# Patient Record
Sex: FEMALE | Race: OTHER | HISPANIC OR LATINO | URBAN - METROPOLITAN AREA
[De-identification: names, ages, dates, MRNs, and addresses within clinical notes are randomized per-mention and may not be internally consistent; named-entity substitution may affect disease eponyms.]

---

## 2017-11-05 ENCOUNTER — INPATIENT (INPATIENT)
Facility: HOSPITAL | Age: 82
LOS: 2 days | Discharge: HOME | DRG: 281 | End: 2017-11-08
Attending: INTERNAL MEDICINE | Admitting: INTERNAL MEDICINE
Payer: COMMERCIAL

## 2017-11-05 VITALS
SYSTOLIC BLOOD PRESSURE: 157 MMHG | OXYGEN SATURATION: 97 % | TEMPERATURE: 98 F | HEIGHT: 61 IN | WEIGHT: 167.99 LBS | RESPIRATION RATE: 16 BRPM | DIASTOLIC BLOOD PRESSURE: 87 MMHG | HEART RATE: 91 BPM

## 2017-11-05 DIAGNOSIS — E11.9 TYPE 2 DIABETES MELLITUS WITHOUT COMPLICATIONS: ICD-10-CM

## 2017-11-05 DIAGNOSIS — Z29.9 ENCOUNTER FOR PROPHYLACTIC MEASURES, UNSPECIFIED: ICD-10-CM

## 2017-11-05 DIAGNOSIS — I21.4 NON-ST ELEVATION (NSTEMI) MYOCARDIAL INFARCTION: ICD-10-CM

## 2017-11-05 DIAGNOSIS — E03.9 HYPOTHYROIDISM, UNSPECIFIED: ICD-10-CM

## 2017-11-05 DIAGNOSIS — J44.9 CHRONIC OBSTRUCTIVE PULMONARY DISEASE, UNSPECIFIED: ICD-10-CM

## 2017-11-05 DIAGNOSIS — N17.9 ACUTE KIDNEY FAILURE, UNSPECIFIED: ICD-10-CM

## 2017-11-05 DIAGNOSIS — I10 ESSENTIAL (PRIMARY) HYPERTENSION: ICD-10-CM

## 2017-11-05 DIAGNOSIS — Z86.711 PERSONAL HISTORY OF PULMONARY EMBOLISM: ICD-10-CM

## 2017-11-05 LAB
ALBUMIN SERPL ELPH-MCNC: 3.7 G/DL — SIGNIFICANT CHANGE UP (ref 3.5–5)
ALP SERPL-CCNC: 58 U/L — SIGNIFICANT CHANGE UP (ref 40–120)
ALT FLD-CCNC: 28 U/L DA — SIGNIFICANT CHANGE UP (ref 10–60)
ANION GAP SERPL CALC-SCNC: 5 MMOL/L — SIGNIFICANT CHANGE UP (ref 5–17)
ANION GAP SERPL CALC-SCNC: 5 MMOL/L — SIGNIFICANT CHANGE UP (ref 5–17)
ANION GAP SERPL CALC-SCNC: 7 MMOL/L — SIGNIFICANT CHANGE UP (ref 5–17)
APTT BLD: >200 SEC — CRITICAL HIGH (ref 27.5–37.4)
APTT BLD: >200 SEC — CRITICAL HIGH (ref 27.5–37.4)
AST SERPL-CCNC: 21 U/L — SIGNIFICANT CHANGE UP (ref 10–40)
BASOPHILS # BLD AUTO: 0.1 K/UL — SIGNIFICANT CHANGE UP (ref 0–0.2)
BASOPHILS NFR BLD AUTO: 1.2 % — SIGNIFICANT CHANGE UP (ref 0–2)
BILIRUB SERPL-MCNC: 0.2 MG/DL — SIGNIFICANT CHANGE UP (ref 0.2–1.2)
BUN SERPL-MCNC: 29 MG/DL — HIGH (ref 7–18)
BUN SERPL-MCNC: 36 MG/DL — HIGH (ref 7–18)
BUN SERPL-MCNC: 36 MG/DL — HIGH (ref 7–18)
CALCIUM SERPL-MCNC: 8.6 MG/DL — SIGNIFICANT CHANGE UP (ref 8.4–10.5)
CALCIUM SERPL-MCNC: 8.7 MG/DL — SIGNIFICANT CHANGE UP (ref 8.4–10.5)
CALCIUM SERPL-MCNC: 8.8 MG/DL — SIGNIFICANT CHANGE UP (ref 8.4–10.5)
CHLORIDE SERPL-SCNC: 103 MMOL/L — SIGNIFICANT CHANGE UP (ref 96–108)
CHLORIDE SERPL-SCNC: 105 MMOL/L — SIGNIFICANT CHANGE UP (ref 96–108)
CHLORIDE SERPL-SCNC: 106 MMOL/L — SIGNIFICANT CHANGE UP (ref 96–108)
CK MB BLD-MCNC: 1.4 % — SIGNIFICANT CHANGE UP (ref 0–3.5)
CK MB CFR SERPL CALC: 1.3 NG/ML — SIGNIFICANT CHANGE UP (ref 0–3.6)
CK MB CFR SERPL CALC: 1.7 NG/ML — SIGNIFICANT CHANGE UP (ref 0–3.6)
CK SERPL-CCNC: 120 U/L — SIGNIFICANT CHANGE UP (ref 21–215)
CO2 SERPL-SCNC: 29 MMOL/L — SIGNIFICANT CHANGE UP (ref 22–31)
CO2 SERPL-SCNC: 30 MMOL/L — SIGNIFICANT CHANGE UP (ref 22–31)
CO2 SERPL-SCNC: 31 MMOL/L — SIGNIFICANT CHANGE UP (ref 22–31)
CREAT SERPL-MCNC: 1.06 MG/DL — SIGNIFICANT CHANGE UP (ref 0.5–1.3)
CREAT SERPL-MCNC: 1.28 MG/DL — SIGNIFICANT CHANGE UP (ref 0.5–1.3)
CREAT SERPL-MCNC: 1.41 MG/DL — HIGH (ref 0.5–1.3)
D DIMER BLD IA.RAPID-MCNC: <150 NG/ML DDU — SIGNIFICANT CHANGE UP
EOSINOPHIL # BLD AUTO: 0.4 K/UL — SIGNIFICANT CHANGE UP (ref 0–0.5)
EOSINOPHIL NFR BLD AUTO: 4.9 % — SIGNIFICANT CHANGE UP (ref 0–6)
GLUCOSE BLDC GLUCOMTR-MCNC: 119 MG/DL — HIGH (ref 70–99)
GLUCOSE BLDC GLUCOMTR-MCNC: 78 MG/DL — SIGNIFICANT CHANGE UP (ref 70–99)
GLUCOSE BLDC GLUCOMTR-MCNC: 94 MG/DL — SIGNIFICANT CHANGE UP (ref 70–99)
GLUCOSE SERPL-MCNC: 86 MG/DL — SIGNIFICANT CHANGE UP (ref 70–99)
GLUCOSE SERPL-MCNC: 96 MG/DL — SIGNIFICANT CHANGE UP (ref 70–99)
GLUCOSE SERPL-MCNC: 98 MG/DL — SIGNIFICANT CHANGE UP (ref 70–99)
HCT VFR BLD CALC: 39.9 % — SIGNIFICANT CHANGE UP (ref 34.5–45)
HCT VFR BLD CALC: 41.9 % — SIGNIFICANT CHANGE UP (ref 34.5–45)
HGB BLD-MCNC: 12 G/DL — SIGNIFICANT CHANGE UP (ref 11.5–15.5)
HGB BLD-MCNC: 12.5 G/DL — SIGNIFICANT CHANGE UP (ref 11.5–15.5)
INR BLD: 2.64 RATIO — HIGH (ref 0.88–1.16)
LYMPHOCYTES # BLD AUTO: 2.1 K/UL — SIGNIFICANT CHANGE UP (ref 1–3.3)
LYMPHOCYTES # BLD AUTO: 26.8 % — SIGNIFICANT CHANGE UP (ref 13–44)
MCHC RBC-ENTMCNC: 29.5 PG — SIGNIFICANT CHANGE UP (ref 27–34)
MCHC RBC-ENTMCNC: 29.8 GM/DL — LOW (ref 32–36)
MCHC RBC-ENTMCNC: 29.8 PG — SIGNIFICANT CHANGE UP (ref 27–34)
MCHC RBC-ENTMCNC: 30.2 GM/DL — LOW (ref 32–36)
MCV RBC AUTO: 98.8 FL — SIGNIFICANT CHANGE UP (ref 80–100)
MCV RBC AUTO: 98.8 FL — SIGNIFICANT CHANGE UP (ref 80–100)
MONOCYTES # BLD AUTO: 0.7 K/UL — SIGNIFICANT CHANGE UP (ref 0–0.9)
MONOCYTES NFR BLD AUTO: 8.8 % — SIGNIFICANT CHANGE UP (ref 2–14)
NEUTROPHILS # BLD AUTO: 4.5 K/UL — SIGNIFICANT CHANGE UP (ref 1.8–7.4)
NEUTROPHILS NFR BLD AUTO: 58.3 % — SIGNIFICANT CHANGE UP (ref 43–77)
NT-PROBNP SERPL-SCNC: 303 PG/ML — SIGNIFICANT CHANGE UP (ref 0–450)
PLATELET # BLD AUTO: 232 K/UL — SIGNIFICANT CHANGE UP (ref 150–400)
PLATELET # BLD AUTO: 234 K/UL — SIGNIFICANT CHANGE UP (ref 150–400)
POTASSIUM SERPL-MCNC: 4.5 MMOL/L — SIGNIFICANT CHANGE UP (ref 3.5–5.3)
POTASSIUM SERPL-MCNC: 4.6 MMOL/L — SIGNIFICANT CHANGE UP (ref 3.5–5.3)
POTASSIUM SERPL-MCNC: 4.7 MMOL/L — SIGNIFICANT CHANGE UP (ref 3.5–5.3)
POTASSIUM SERPL-SCNC: 4.5 MMOL/L — SIGNIFICANT CHANGE UP (ref 3.5–5.3)
POTASSIUM SERPL-SCNC: 4.6 MMOL/L — SIGNIFICANT CHANGE UP (ref 3.5–5.3)
POTASSIUM SERPL-SCNC: 4.7 MMOL/L — SIGNIFICANT CHANGE UP (ref 3.5–5.3)
PROT SERPL-MCNC: 7.1 G/DL — SIGNIFICANT CHANGE UP (ref 6–8.3)
PROTHROM AB SERPL-ACNC: 29.4 SEC — HIGH (ref 9.8–12.7)
RBC # BLD: 4.04 M/UL — SIGNIFICANT CHANGE UP (ref 3.8–5.2)
RBC # BLD: 4.24 M/UL — SIGNIFICANT CHANGE UP (ref 3.8–5.2)
RBC # FLD: 14.2 % — SIGNIFICANT CHANGE UP (ref 10.3–14.5)
RBC # FLD: 14.3 % — SIGNIFICANT CHANGE UP (ref 10.3–14.5)
SODIUM SERPL-SCNC: 139 MMOL/L — SIGNIFICANT CHANGE UP (ref 135–145)
SODIUM SERPL-SCNC: 141 MMOL/L — SIGNIFICANT CHANGE UP (ref 135–145)
SODIUM SERPL-SCNC: 141 MMOL/L — SIGNIFICANT CHANGE UP (ref 135–145)
TROPONIN I SERPL-MCNC: 0.04 NG/ML — SIGNIFICANT CHANGE UP (ref 0–0.04)
TROPONIN I SERPL-MCNC: 0.04 NG/ML — SIGNIFICANT CHANGE UP (ref 0–0.04)
TROPONIN I SERPL-MCNC: 0.06 NG/ML — HIGH (ref 0–0.04)
TROPONIN I SERPL-MCNC: 0.1 NG/ML — HIGH (ref 0–0.04)
TSH SERPL-MCNC: 0.66 UU/ML — SIGNIFICANT CHANGE UP (ref 0.34–4.82)
TSH SERPL-MCNC: 0.88 UU/ML — SIGNIFICANT CHANGE UP (ref 0.34–4.82)
WBC # BLD: 7.7 K/UL — SIGNIFICANT CHANGE UP (ref 3.8–10.5)
WBC # BLD: 7.9 K/UL — SIGNIFICANT CHANGE UP (ref 3.8–10.5)
WBC # FLD AUTO: 7.7 K/UL — SIGNIFICANT CHANGE UP (ref 3.8–10.5)
WBC # FLD AUTO: 7.9 K/UL — SIGNIFICANT CHANGE UP (ref 3.8–10.5)

## 2017-11-05 PROCEDURE — 93970 EXTREMITY STUDY: CPT | Mod: 26

## 2017-11-05 PROCEDURE — 99285 EMERGENCY DEPT VISIT HI MDM: CPT | Mod: 25

## 2017-11-05 RX ORDER — ASPIRIN/CALCIUM CARB/MAGNESIUM 324 MG
81 TABLET ORAL DAILY
Qty: 0 | Refills: 0 | Status: DISCONTINUED | OUTPATIENT
Start: 2017-11-05 | End: 2017-11-08

## 2017-11-05 RX ORDER — AMLODIPINE BESYLATE 2.5 MG/1
2.5 TABLET ORAL DAILY
Qty: 0 | Refills: 0 | Status: DISCONTINUED | OUTPATIENT
Start: 2017-11-05 | End: 2017-11-08

## 2017-11-05 RX ORDER — GLUCAGON INJECTION, SOLUTION 0.5 MG/.1ML
1 INJECTION, SOLUTION SUBCUTANEOUS ONCE
Qty: 0 | Refills: 0 | Status: DISCONTINUED | OUTPATIENT
Start: 2017-11-05 | End: 2017-11-08

## 2017-11-05 RX ORDER — SODIUM CHLORIDE 9 MG/ML
1000 INJECTION, SOLUTION INTRAVENOUS
Qty: 0 | Refills: 0 | Status: DISCONTINUED | OUTPATIENT
Start: 2017-11-05 | End: 2017-11-08

## 2017-11-05 RX ORDER — LEVOTHYROXINE SODIUM 125 MCG
25 TABLET ORAL DAILY
Qty: 0 | Refills: 0 | Status: DISCONTINUED | OUTPATIENT
Start: 2017-11-05 | End: 2017-11-08

## 2017-11-05 RX ORDER — DEXTROSE 50 % IN WATER 50 %
25 SYRINGE (ML) INTRAVENOUS ONCE
Qty: 0 | Refills: 0 | Status: DISCONTINUED | OUTPATIENT
Start: 2017-11-05 | End: 2017-11-08

## 2017-11-05 RX ORDER — SODIUM CHLORIDE 9 MG/ML
1000 INJECTION INTRAMUSCULAR; INTRAVENOUS; SUBCUTANEOUS ONCE
Qty: 0 | Refills: 0 | Status: COMPLETED | OUTPATIENT
Start: 2017-11-05 | End: 2017-11-05

## 2017-11-05 RX ORDER — SODIUM CHLORIDE 9 MG/ML
500 INJECTION INTRAMUSCULAR; INTRAVENOUS; SUBCUTANEOUS ONCE
Qty: 0 | Refills: 0 | Status: DISCONTINUED | OUTPATIENT
Start: 2017-11-05 | End: 2017-11-05

## 2017-11-05 RX ORDER — DEXTROSE 50 % IN WATER 50 %
1 SYRINGE (ML) INTRAVENOUS ONCE
Qty: 0 | Refills: 0 | Status: DISCONTINUED | OUTPATIENT
Start: 2017-11-05 | End: 2017-11-08

## 2017-11-05 RX ORDER — INSULIN LISPRO 100/ML
VIAL (ML) SUBCUTANEOUS
Qty: 0 | Refills: 0 | Status: DISCONTINUED | OUTPATIENT
Start: 2017-11-05 | End: 2017-11-08

## 2017-11-05 RX ORDER — HEPARIN SODIUM 5000 [USP'U]/ML
4700 INJECTION INTRAVENOUS; SUBCUTANEOUS EVERY 6 HOURS
Qty: 0 | Refills: 0 | Status: DISCONTINUED | OUTPATIENT
Start: 2017-11-05 | End: 2017-11-06

## 2017-11-05 RX ORDER — MONTELUKAST 4 MG/1
10 TABLET, CHEWABLE ORAL DAILY
Qty: 0 | Refills: 0 | Status: DISCONTINUED | OUTPATIENT
Start: 2017-11-05 | End: 2017-11-08

## 2017-11-05 RX ORDER — DEXTROSE 50 % IN WATER 50 %
12.5 SYRINGE (ML) INTRAVENOUS ONCE
Qty: 0 | Refills: 0 | Status: DISCONTINUED | OUTPATIENT
Start: 2017-11-05 | End: 2017-11-08

## 2017-11-05 RX ORDER — CARVEDILOL PHOSPHATE 80 MG/1
3.12 CAPSULE, EXTENDED RELEASE ORAL EVERY 12 HOURS
Qty: 0 | Refills: 0 | Status: DISCONTINUED | OUTPATIENT
Start: 2017-11-05 | End: 2017-11-08

## 2017-11-05 RX ORDER — ASPIRIN/CALCIUM CARB/MAGNESIUM 324 MG
324 TABLET ORAL ONCE
Qty: 0 | Refills: 0 | Status: COMPLETED | OUTPATIENT
Start: 2017-11-05 | End: 2017-11-05

## 2017-11-05 RX ORDER — ATORVASTATIN CALCIUM 80 MG/1
40 TABLET, FILM COATED ORAL AT BEDTIME
Qty: 0 | Refills: 0 | Status: DISCONTINUED | OUTPATIENT
Start: 2017-11-05 | End: 2017-11-08

## 2017-11-05 RX ORDER — HEPARIN SODIUM 5000 [USP'U]/ML
4700 INJECTION INTRAVENOUS; SUBCUTANEOUS ONCE
Qty: 0 | Refills: 0 | Status: COMPLETED | OUTPATIENT
Start: 2017-11-05 | End: 2017-11-05

## 2017-11-05 RX ORDER — HEPARIN SODIUM 5000 [USP'U]/ML
INJECTION INTRAVENOUS; SUBCUTANEOUS
Qty: 25000 | Refills: 0 | Status: DISCONTINUED | OUTPATIENT
Start: 2017-11-05 | End: 2017-11-06

## 2017-11-05 RX ADMIN — MONTELUKAST 10 MILLIGRAM(S): 4 TABLET, CHEWABLE ORAL at 15:51

## 2017-11-05 RX ADMIN — Medication 324 MILLIGRAM(S): at 09:18

## 2017-11-05 RX ADMIN — ATORVASTATIN CALCIUM 40 MILLIGRAM(S): 80 TABLET, FILM COATED ORAL at 23:41

## 2017-11-05 RX ADMIN — CARVEDILOL PHOSPHATE 3.12 MILLIGRAM(S): 80 CAPSULE, EXTENDED RELEASE ORAL at 18:59

## 2017-11-05 RX ADMIN — HEPARIN SODIUM 0 UNIT(S)/HR: 5000 INJECTION INTRAVENOUS; SUBCUTANEOUS at 21:31

## 2017-11-05 RX ADMIN — HEPARIN SODIUM 4700 UNIT(S): 5000 INJECTION INTRAVENOUS; SUBCUTANEOUS at 09:18

## 2017-11-05 RX ADMIN — HEPARIN SODIUM 950 UNIT(S)/HR: 5000 INJECTION INTRAVENOUS; SUBCUTANEOUS at 09:18

## 2017-11-05 RX ADMIN — SODIUM CHLORIDE 1000 MILLILITER(S): 9 INJECTION INTRAMUSCULAR; INTRAVENOUS; SUBCUTANEOUS at 12:01

## 2017-11-05 NOTE — H&P ADULT - ASSESSMENT
89yo F with omhx of DM, HTN, HLD, Breast cancer in 2004 s/p R mastectomy, COPD, and PE on coumadin came in with c/o L sided chest pain that started around 1 am. She said she often has chest pain but this was very severe. It was 10/10, tightening, non-radiating and lasted about 30 minutes. It was releived once she got 2 doses of aspirin. Also c/o SOB while she was having chest pain. On my initial encounter, patient denies any complaints at all. She said the chest pain, and SOB has resolved.

## 2017-11-05 NOTE — H&P ADULT - NSHPLABSRESULTS_GEN_ALL_CORE
12.0   7.7   )-----------( 232      ( 05 Nov 2017 03:48 )             39.9   11-05    139  |  103  |  36<H>  ----------------------------<  96  4.5   |  31  |  1.41<H>    Ca    8.6      05 Nov 2017 03:48    TPro  7.1  /  Alb  3.7  /  TBili  0.2  /  DBili  x   /  AST  21  /  ALT  28  /  AlkPhos  58  11-05    Impression    No acute findings, in particular normal cardiac silhouette        < end of copied text >

## 2017-11-05 NOTE — ED PROVIDER NOTE - MEDICAL DECISION MAKING DETAILS
89 y/o female with multiple comorbidities here with CP associated with SOB. Exam is unremarkable with clear lungs and cardiac exam with S1S2 noted and RRR. Abd soft and nontender and nondistended. No LE edema and no JVD noted. 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 Consider CAD vs CAP vs CHF vs Valvular disease. Patient with low pretest probability for VTE, therefore PE is less likely. Plan CBC, CMP, CXR, EKG, Cardiacs, Coags, ASA, Reassess.

## 2017-11-05 NOTE — CONSULT NOTE ADULT - ATTENDING COMMENTS
Thank you for the courtesy of the consultation,I would be available for any further discussion if needed.  Jaime Lao MD,FACC.  [address] [place], [place] [id number] [phone]

## 2017-11-05 NOTE — H&P ADULT - PROBLEM SELECTOR PLAN 2
on coumadin  INR- within goal  - f/u daily INR  - Creatinine mildly elevated  - Will hydrate the patient and once creatinine is back to normal, will order CTA as discussed with Dr Lao.

## 2017-11-05 NOTE — CONSULT NOTE ADULT - ASSESSMENT
89yo F with omhx of DM, HTN, HLD, Breast cancer in 2004 s/p R mastectomy, COPD, and PE on coumadin came in with c/o L sided chest pain    Problem/Plan - 1:  ·  Problem: NSTEMI (non-ST elevated myocardial infarction).  Plan: T1 negative with TWI in V1 and V2 (no baseline EKG to compare with)  T2- 0.1 with TWI in v1, v2, v3, v4, and v5  - Start on heparin drip  - trende CE  - start ASA, Statin, and low dose coreg  - Titrate coreg as tolerated  - Admit to tele  - f/u ECHO    Problem/Plan - 2:  ·  Problem: History of pulmonary embolism.  Plan: on coumadin  INR- within goal  - f/u daily INR  - Creatinine mildly elevated  - Will hydrate the patient and once creatinine is back to normal, will order CTA       Problem/Plan - 3:  ·  Problem: JUAN FRANCISCO (acute kidney injury).  Plan: BUN/Cr ratio>20  - likely pre-renal  - Will give 1L bolus and f/u repeat BMP.

## 2017-11-05 NOTE — ED PROVIDER NOTE - PROGRESS NOTE DETAILS
Kathleen MALCOLM: Patient reassessed and found sleeping comfortably in bed. Patient family does not know her baseline Creatinine. Results discussed. Patient will wait for a second Troponin. Kathleen MALCOLM: Dr Rice (Cardiology ) reached and patient discussed. He would like to be called for any questions

## 2017-11-05 NOTE — ED ADULT NURSE REASSESSMENT NOTE - NS ED NURSE REASSESS COMMENT FT1
pt awake laert not in distress hooked to cardiac monitor,with saline lock intact.with family members at bedside.

## 2017-11-05 NOTE — H&P ADULT - NSHPREVIEWOFSYSTEMS_GEN_ALL_CORE
REVIEW OF SYSTEMS:  CONSTITUTIONAL: No fever, weight loss, or fatigue  EYES: No eye pain, visual disturbances, or discharge  ENMT:  No difficulty hearing, tinnitus, vertigo; No sinus or throat pain  NECK: No pain or stiffness  RESPIRATORY: Dry cough, No  wheezing, chills or hemoptysis; No shortness of breath  CARDIOVASCULAR: No chest pain, palpitations, dizziness, or leg swelling  GASTROINTESTINAL: No abdominal or epigastric pain. No nausea, vomiting, or hematemesis; No diarrhea or constipation. No melena or hematochezia.  GENITOURINARY: No dysuria, frequency, hematuria, or incontinence  NEUROLOGICAL: No headaches, memory loss, loss of strength, numbness, or tremors  SKIN: No itching, burning, rashes, or lesions   LYMPH NODES: No enlarged glands  ENDOCRINE: No heat or cold intolerance; No hair loss  MUSCULOSKELETAL: R leg pain- chronic

## 2017-11-05 NOTE — CONSULT NOTE ADULT - SUBJECTIVE AND OBJECTIVE BOX
CHIEF COMPLAINT:Chest pain    HPI: 89yo F with pmhx of T2DM, HTN, HLD, Breast cancer in 2004 s/p R mastectomy, COPD, and PE on Coumadin came in with c/o L sided chest pain that started around 1 am. She said she often has chest pain but this was very severe. It was 10/10, tightening, non-radiating and lasted about 30 minutes. It was releived once she got 2 doses of aspirin. Also c/o SOB while she was having chest pain. On my initial encounter, patient denies any complaints at all. She said the chest pain, and SOB has resolved. Patient has R eye covered, she said she had an infection in the eye s/p surgery a while ago, since then her doctor said to keep it on?    PMHx: as mentioned  PSHx: R mastectomy  Medications: Updated on med rec  SH: Lives with daughter. Ambulated independently. Denies smoking, alcohol or any other drug use  Allergies: NKDA  Code status: Full code per family (05 Nov 2017 09:12)      PAST MEDICAL & SURGICAL HISTORY:      MEDICATIONS  (STANDING):  amLODIPine   Tablet 2.5 milliGRAM(s) Oral daily  aspirin enteric coated 81 milliGRAM(s) Oral daily  atorvastatin 40 milliGRAM(s) Oral at bedtime  carvedilol 3.125 milliGRAM(s) Oral every 12 hours  heparin  Infusion.  Unit(s)/Hr (9.5 mL/Hr) IV Continuous <Continuous>  insulin lispro (HumaLOG) corrective regimen sliding scale   SubCutaneous three times a day before meals  levothyroxine 25 MICROGram(s) Oral daily  montelukast 10 milliGRAM(s) Oral daily    MEDICATIONS  (PRN):  dextrose Gel 1 Dose(s) Oral once PRN Blood Glucose LESS THAN 70 milliGRAM(s)/deciliter  glucagon  Injectable 1 milliGRAM(s) IntraMuscular once PRN Glucose LESS THAN 70 milligrams/deciliter  heparin  Injectable 4700 Unit(s) IV Push every 6 hours PRN For aPTT less than 40      FAMILY HISTORY:    No family history of premature coronary artery disease or sudden cardiac death    SOCIAL HISTORY:  Smoking-Non Smoker  Alcohol-Denies  Ilicit Drug use-Denies    REVIEW OF SYSTEMS:  Constitutional: [ ] fever, [ ]weight loss, [x ]fatigue Activity [ ] Bedbound,[ ] Ambulates [ ] Unassisted[ ] Cane/Walker [ ] Assistence.  Eyes: [ ] visual changes  Respiratory: [x ]shortness of breath;  [ ] cough, [ ]wheezing, [ ]chills, [ ]hemoptysis  Cardiovascular: [x ] chest pain, [ ]palpitations, [ ]dizziness,  [ ]leg swelling[ ]orthopnea [ ]PND  Gastrointestinal: [ ] abdominal pain, [ ]nausea, [ ]vomiting,  [ ]diarrhea,[ ]constipation  Genitourinary: [ ] dysuria, [ ] hematuria  Neurologic: [ ] headaches [ ] tremors[ ] weakness  Skin: [ ] itching, [ ]burning, [ ] rashes  Endocrine: [ ] heat or cold intolerance  Musculoskeletal: [ ] joint pain or swelling; [ ] muscle, back, or extremity pain  Psychiatric: [ ] depression, [ ]anxiety, [ ]mood swings, or [ ]difficulty sleeping  Hematologic: [ ] easy bruising, [ ] bleeding gums       [ x] All others negative	  [ ] Unable to obtain    Vital Signs Last 24 Hrs  T(C): 36.8 (05 Nov 2017 11:29), Max: 36.9 (05 Nov 2017 08:01)  T(F): 98.3 (05 Nov 2017 11:29), Max: 98.5 (05 Nov 2017 08:01)  HR: 93 (05 Nov 2017 11:29) (60 - 93)  BP: 129/68 (05 Nov 2017 11:29) (129/68 - 157/87)  BP(mean): --  RR: 18 (05 Nov 2017 11:29) (16 - 18)  SpO2: 98% (05 Nov 2017 11:29) (97% - 100%)  I&O's Summary      PHYSICAL EXAM:  General: No acute distress BMI-  HEENT: EOMI, PERRL[ ] Icteric  Neck: Supple, No JVD  Lungs: Equal air entry bilaterally; [ ] Rales [ ] Rhonchi [ ] Wheezing  Heart: Regular rate and rhythm;[ ] Murmurs-   /6 [ ] Systolic [ ] Diastolic [ ] Radiation,No rubs, or gallops  Abdomen: Nontender, bowel sounds present  Extremities: No clubbing, cyanosis, or edema[ ] Calf tenderness  Nervous system:  Alert & Oriented X3, no focal deficits  Psychiatric: Normal affect  Skin: No rashes or lesions      LABS:  11-05    141  |  105  |  36<H>  ----------------------------<  86  4.6   |  29  |  1.28    Ca    8.8      05 Nov 2017 07:22    TPro  7.1  /  Alb  3.7  /  TBili  0.2  /  DBili  x   /  AST  21  /  ALT  28  /  AlkPhos  58  11-05    Creatinine Trend: 1.28<--, 1.41<--                        12.0   7.7   )-----------( 232      ( 05 Nov 2017 03:48 )             39.9     PT/INR - ( 05 Nov 2017 09:32 )   PT: 29.4 sec;   INR: 2.64 ratio             Lipid Panel:   Cardiac Enzymes: CARDIAC MARKERS ( 05 Nov 2017 07:22 )  0.100 ng/mL / x     / x     / x     / x      CARDIAC MARKERS ( 05 Nov 2017 03:48 )  0.035 ng/mL / x     / x     / x     / 1.3 ng/mL      Serum Pro-Brain Natriuretic Peptide: 303 pg/mL (11-05-17 @ 07:22)        RADIOLOGY: US DPLX LWR EXT VEINS COMPL BI     Impression Negative bilateral DVT study      ECG [my interpretation]:    TELEMETRY:    ECHO:    STRESS TEST:    CATHETERIZATION:

## 2017-11-05 NOTE — ED PROVIDER NOTE - OBJECTIVE STATEMENT
89 y/o F pt with a significant PMHx of DM, HTN, HLD, breast cancer s/p R mastectomy, COPD, PE (on Coumadin) presents to the ED c/o L sided chest pain, pressure like in sensation since 0100 today, lasting 2 hours. Family member states that pt took 2 Aspirins at home PTA to the ED. Pt is wearing a L eye patch for an infection x3 months ago. Pt reports associated SOB. Pt denies fever, chills, cough, nausea, vomiting or any other complaints. Non-smoker. NKDA.

## 2017-11-05 NOTE — ED PROVIDER NOTE - PMH
Breast cancer in female    COPD (chronic obstructive pulmonary disease)    DM (diabetes mellitus)    HLD (hyperlipidemia)    HTN (hypertension)    PE (pulmonary thromboembolism)

## 2017-11-05 NOTE — ED PROVIDER NOTE - NS_ ATTENDINGSCRIBEDETAILS _ED_A_ED_FT
The scribe's documentation has been prepared under my direction and personally reviewed by me in its entirety. I confirm that the note above accurately reflects all work, treatment, procedures, and medical decision making performed by me (Dr. Daniele Miller).

## 2017-11-05 NOTE — H&P ADULT - PROBLEM SELECTOR PLAN 1
T1 negative with TWI in V1 and V2 (no baseline EKG to compare with)  T2- 0.1 with TWI in v1, v2, v3, v4, and v5  - Start on heparin drip  - trende CE  - start ASA, Statin, and low dose coreg  - Titrate coreg as tolerated  - Admit to tele  - f/u ECHO  - Amin- Cardio consult

## 2017-11-05 NOTE — H&P ADULT - HISTORY OF PRESENT ILLNESS
89yo F with pmhx of DM, HTN, HLD, Breast cancer in 2004 s/p R mastectomy, COPD, and PE on coumadin came in with c/o L sided chest pain that started around 1 am. She said she often has chest pain but this was very severe. It was 10/10, tightening, non-radiating and lasted about 30 minutes. It was releived once she got 2 doses of aspirin. Also c/o SOB while she was having chest pain. On my initial encounter, patient denies any complaints at all. She said the chest pain, and SOB has resolved. Patient has R eye covere, she said she had an infection in the eye s/p surgery a while ago, since then her doctor said to keep it on?    PMHx: as mentioned  PSHx: R mastectomy  Medications: Updated on med rec  SH: Lives with daughter. Ambulated independently. Denies smoking, alcohol or any other drug use  Allergies: NKDA  Code status: Full code per family

## 2017-11-05 NOTE — ED ADULT NURSE REASSESSMENT NOTE - NS ED NURSE REASSESS COMMENT FT1
Received patient at [facility] telemetry with NSR in no acute cardiac distress, V/S stable pain better control . Patient resting comfortably family at bedside continue to monitor patient.

## 2017-11-05 NOTE — H&P ADULT - NSHPPHYSICALEXAM_GEN_ALL_CORE
Vital Signs Last 24 Hrs  T(C): 36.9 (05 Nov 2017 08:01), Max: 36.9 (05 Nov 2017 08:01)  T(F): 98.5 (05 Nov 2017 08:01), Max: 98.5 (05 Nov 2017 08:01)  HR: 60 (05 Nov 2017 08:01) (60 - 91)  BP: 147/60 (05 Nov 2017 08:01) (139/65 - 157/87)  BP(mean): --  RR: 18 (05 Nov 2017 08:01) (16 - 18)  SpO2: 98% (05 Nov 2017 08:01) (97% - 100%)    GENERAL: NAD  HEAD:  Atraumatic, Normocephalic  EYES: EOMI, PERRLA, conjunctiva and sclera clear, L eye covered  ENMT:  Moist mucous membranes  NECK: Supple  NERVOUS SYSTEM:  Alert & Oriented X3  CHEST/LUNG: Clear to auscultation bilaterally; No rales, rhonchi, wheezing, or rubs  HEART: Regular rate and rhythm; No murmurs, rubs, or gallops  ABDOMEN: Soft, Nontender, Nondistended; Bowel sounds present  EXTREMITIES:  2+ Peripheral Pulses, No clubbing, or cyanosis  LYMPH: No lymphadenopathy noted  SKIN: No rashes or lesions

## 2017-11-05 NOTE — H&P ADULT - PROBLEM SELECTOR PLAN 5
on lasix, losartan and amlodipine  - C/w amlodipine  - Will hold off to lasix and losartan for now due to JUAN FRANCISCO. Reassess and restart as needed

## 2017-11-05 NOTE — ED ADULT NURSE REASSESSMENT NOTE - NS ED NURSE REASSESS COMMENT FT1
recived pt awake alert not in distress with saline lock intact,heparin on hold,to re start.with family members at bedside,hooked to cardiac monitor on sinus rhythym.

## 2017-11-05 NOTE — PATIENT PROFILE ADULT. - VISION (WITH CORRECTIVE LENSES IF THE PATIENT USUALLY WEARS THEM):
left eye vision loss/Partially impaired: cannot see medication labels or newsprint, but can see obstacles in path, and the surrounding layout; can count fingers at arm's length

## 2017-11-06 LAB
ANION GAP SERPL CALC-SCNC: 5 MMOL/L — SIGNIFICANT CHANGE UP (ref 5–17)
APPEARANCE UR: CLEAR — SIGNIFICANT CHANGE UP
APTT BLD: 44.7 SEC — HIGH (ref 27.5–37.4)
APTT BLD: 72.5 SEC — HIGH (ref 27.5–37.4)
APTT BLD: 81.7 SEC — HIGH (ref 27.5–37.4)
BILIRUB UR-MCNC: NEGATIVE — SIGNIFICANT CHANGE UP
BUN SERPL-MCNC: 23 MG/DL — HIGH (ref 7–18)
CALCIUM SERPL-MCNC: 8.7 MG/DL — SIGNIFICANT CHANGE UP (ref 8.4–10.5)
CHLORIDE SERPL-SCNC: 107 MMOL/L — SIGNIFICANT CHANGE UP (ref 96–108)
CO2 SERPL-SCNC: 29 MMOL/L — SIGNIFICANT CHANGE UP (ref 22–31)
COLOR SPEC: YELLOW — SIGNIFICANT CHANGE UP
CREAT SERPL-MCNC: 0.88 MG/DL — SIGNIFICANT CHANGE UP (ref 0.5–1.3)
DIFF PNL FLD: NEGATIVE — SIGNIFICANT CHANGE UP
GLUCOSE BLDC GLUCOMTR-MCNC: 102 MG/DL — HIGH (ref 70–99)
GLUCOSE BLDC GLUCOMTR-MCNC: 104 MG/DL — HIGH (ref 70–99)
GLUCOSE SERPL-MCNC: 96 MG/DL — SIGNIFICANT CHANGE UP (ref 70–99)
GLUCOSE UR QL: NEGATIVE — SIGNIFICANT CHANGE UP
HBA1C BLD-MCNC: 5.9 % — HIGH (ref 4–5.6)
HCT VFR BLD CALC: 40.8 % — SIGNIFICANT CHANGE UP (ref 34.5–45)
HCT VFR BLD CALC: 41 % — SIGNIFICANT CHANGE UP (ref 34.5–45)
HGB BLD-MCNC: 12.3 G/DL — SIGNIFICANT CHANGE UP (ref 11.5–15.5)
HGB BLD-MCNC: 12.4 G/DL — SIGNIFICANT CHANGE UP (ref 11.5–15.5)
INR BLD: 2.39 RATIO — HIGH (ref 0.88–1.16)
INR BLD: 2.52 RATIO — HIGH (ref 0.88–1.16)
KETONES UR-MCNC: NEGATIVE — SIGNIFICANT CHANGE UP
LEUKOCYTE ESTERASE UR-ACNC: ABNORMAL
MCHC RBC-ENTMCNC: 30.2 GM/DL — LOW (ref 32–36)
MCHC RBC-ENTMCNC: 30.2 GM/DL — LOW (ref 32–36)
MCHC RBC-ENTMCNC: 30.3 PG — SIGNIFICANT CHANGE UP (ref 27–34)
MCHC RBC-ENTMCNC: 30.5 PG — SIGNIFICANT CHANGE UP (ref 27–34)
MCV RBC AUTO: 100.4 FL — HIGH (ref 80–100)
MCV RBC AUTO: 100.9 FL — HIGH (ref 80–100)
NITRITE UR-MCNC: NEGATIVE — SIGNIFICANT CHANGE UP
PH UR: 6 — SIGNIFICANT CHANGE UP (ref 5–8)
PLATELET # BLD AUTO: 200 K/UL — SIGNIFICANT CHANGE UP (ref 150–400)
PLATELET # BLD AUTO: 212 K/UL — SIGNIFICANT CHANGE UP (ref 150–400)
POTASSIUM SERPL-MCNC: 5 MMOL/L — SIGNIFICANT CHANGE UP (ref 3.5–5.3)
POTASSIUM SERPL-SCNC: 5 MMOL/L — SIGNIFICANT CHANGE UP (ref 3.5–5.3)
PROT UR-MCNC: NEGATIVE — SIGNIFICANT CHANGE UP
PROTHROM AB SERPL-ACNC: 26.5 SEC — HIGH (ref 9.8–12.7)
PROTHROM AB SERPL-ACNC: 28 SEC — HIGH (ref 9.8–12.7)
RBC # BLD: 4.04 M/UL — SIGNIFICANT CHANGE UP (ref 3.8–5.2)
RBC # BLD: 4.09 M/UL — SIGNIFICANT CHANGE UP (ref 3.8–5.2)
RBC # FLD: 14.4 % — SIGNIFICANT CHANGE UP (ref 10.3–14.5)
RBC # FLD: 14.5 % — SIGNIFICANT CHANGE UP (ref 10.3–14.5)
SODIUM SERPL-SCNC: 141 MMOL/L — SIGNIFICANT CHANGE UP (ref 135–145)
SP GR SPEC: 1.01 — SIGNIFICANT CHANGE UP (ref 1.01–1.02)
UROBILINOGEN FLD QL: NEGATIVE — SIGNIFICANT CHANGE UP
WBC # BLD: 6.6 K/UL — SIGNIFICANT CHANGE UP (ref 3.8–10.5)
WBC # BLD: 7.4 K/UL — SIGNIFICANT CHANGE UP (ref 3.8–10.5)
WBC # FLD AUTO: 6.6 K/UL — SIGNIFICANT CHANGE UP (ref 3.8–10.5)
WBC # FLD AUTO: 7.4 K/UL — SIGNIFICANT CHANGE UP (ref 3.8–10.5)

## 2017-11-06 RX ORDER — HEPARIN SODIUM 5000 [USP'U]/ML
600 INJECTION INTRAVENOUS; SUBCUTANEOUS
Qty: 25000 | Refills: 0 | Status: DISCONTINUED | OUTPATIENT
Start: 2017-11-06 | End: 2017-11-06

## 2017-11-06 RX ORDER — WARFARIN SODIUM 2.5 MG/1
3 TABLET ORAL ONCE
Qty: 0 | Refills: 0 | Status: COMPLETED | OUTPATIENT
Start: 2017-11-06 | End: 2017-11-06

## 2017-11-06 RX ORDER — HEPARIN SODIUM 5000 [USP'U]/ML
4700 INJECTION INTRAVENOUS; SUBCUTANEOUS ONCE
Qty: 0 | Refills: 0 | Status: DISCONTINUED | OUTPATIENT
Start: 2017-11-06 | End: 2017-11-06

## 2017-11-06 RX ORDER — HEPARIN SODIUM 5000 [USP'U]/ML
4700 INJECTION INTRAVENOUS; SUBCUTANEOUS EVERY 6 HOURS
Qty: 0 | Refills: 0 | Status: DISCONTINUED | OUTPATIENT
Start: 2017-11-06 | End: 2017-11-06

## 2017-11-06 RX ORDER — LETROZOLE 2.5 MG/1
1 TABLET, FILM COATED ORAL
Qty: 0 | Refills: 0 | COMMUNITY

## 2017-11-06 RX ADMIN — Medication 81 MILLIGRAM(S): at 12:57

## 2017-11-06 RX ADMIN — HEPARIN SODIUM 0 UNIT(S)/HR: 5000 INJECTION INTRAVENOUS; SUBCUTANEOUS at 05:01

## 2017-11-06 RX ADMIN — HEPARIN SODIUM 0 UNIT(S)/HR: 5000 INJECTION INTRAVENOUS; SUBCUTANEOUS at 03:07

## 2017-11-06 RX ADMIN — Medication 25 MICROGRAM(S): at 11:31

## 2017-11-06 RX ADMIN — ATORVASTATIN CALCIUM 40 MILLIGRAM(S): 80 TABLET, FILM COATED ORAL at 22:24

## 2017-11-06 RX ADMIN — HEPARIN SODIUM 600 UNIT(S)/HR: 5000 INJECTION INTRAVENOUS; SUBCUTANEOUS at 12:57

## 2017-11-06 RX ADMIN — MONTELUKAST 10 MILLIGRAM(S): 4 TABLET, CHEWABLE ORAL at 12:56

## 2017-11-06 RX ADMIN — WARFARIN SODIUM 3 MILLIGRAM(S): 2.5 TABLET ORAL at 22:24

## 2017-11-06 RX ADMIN — AMLODIPINE BESYLATE 2.5 MILLIGRAM(S): 2.5 TABLET ORAL at 11:31

## 2017-11-06 NOTE — PROGRESS NOTE ADULT - PROBLEM SELECTOR PLAN 4
- on heparin gtt, bridge to coumadin  - LE doppler negative for dvt in b/l extremities - on heparin gtt, bridge to coumadin  - LE doppler negative for dvt in b/l extremities  - no clinical indication for CTA d/w attending and cardiology

## 2017-11-06 NOTE — ED ADULT NURSE REASSESSMENT NOTE - NS ED NURSE REASSESS COMMENT FT1
dr. sachin quintana text page of ptt result. dr. sachin quintana text page of ptt result.heparin stopped as per nomogram.

## 2017-11-06 NOTE — PROGRESS NOTE ADULT - PROBLEM SELECTOR PLAN 1
- symptoms resovled  - on heparin gtt, bridge to coumadin  - INR therapeutic, Warfarin 3mg po tonight  - e/u echocardiogram results  - d/w cardiology dr. Lao, dc telemetry

## 2017-11-07 DIAGNOSIS — Z02.9 ENCOUNTER FOR ADMINISTRATIVE EXAMINATIONS, UNSPECIFIED: ICD-10-CM

## 2017-11-07 LAB
ANION GAP SERPL CALC-SCNC: 2 MMOL/L — LOW (ref 5–17)
APTT BLD: 39.3 SEC — HIGH (ref 27.5–37.4)
BUN SERPL-MCNC: 20 MG/DL — HIGH (ref 7–18)
CALCIUM SERPL-MCNC: 8.8 MG/DL — SIGNIFICANT CHANGE UP (ref 8.4–10.5)
CHLORIDE SERPL-SCNC: 106 MMOL/L — SIGNIFICANT CHANGE UP (ref 96–108)
CO2 SERPL-SCNC: 32 MMOL/L — HIGH (ref 22–31)
CREAT SERPL-MCNC: 1.01 MG/DL — SIGNIFICANT CHANGE UP (ref 0.5–1.3)
GLUCOSE BLDC GLUCOMTR-MCNC: 152 MG/DL — HIGH (ref 70–99)
GLUCOSE BLDC GLUCOMTR-MCNC: 70 MG/DL — SIGNIFICANT CHANGE UP (ref 70–99)
GLUCOSE BLDC GLUCOMTR-MCNC: 86 MG/DL — SIGNIFICANT CHANGE UP (ref 70–99)
GLUCOSE BLDC GLUCOMTR-MCNC: 94 MG/DL — SIGNIFICANT CHANGE UP (ref 70–99)
GLUCOSE SERPL-MCNC: 95 MG/DL — SIGNIFICANT CHANGE UP (ref 70–99)
HCT VFR BLD CALC: 40.6 % — SIGNIFICANT CHANGE UP (ref 34.5–45)
HGB BLD-MCNC: 12.6 G/DL — SIGNIFICANT CHANGE UP (ref 11.5–15.5)
INR BLD: 1.89 RATIO — HIGH (ref 0.88–1.16)
MCHC RBC-ENTMCNC: 31 GM/DL — LOW (ref 32–36)
MCHC RBC-ENTMCNC: 31.5 PG — SIGNIFICANT CHANGE UP (ref 27–34)
MCV RBC AUTO: 101.4 FL — HIGH (ref 80–100)
PLATELET # BLD AUTO: 204 K/UL — SIGNIFICANT CHANGE UP (ref 150–400)
POTASSIUM SERPL-MCNC: 4.6 MMOL/L — SIGNIFICANT CHANGE UP (ref 3.5–5.3)
POTASSIUM SERPL-SCNC: 4.6 MMOL/L — SIGNIFICANT CHANGE UP (ref 3.5–5.3)
PROTHROM AB SERPL-ACNC: 20.9 SEC — HIGH (ref 9.8–12.7)
RBC # BLD: 4 M/UL — SIGNIFICANT CHANGE UP (ref 3.8–5.2)
RBC # FLD: 14.2 % — SIGNIFICANT CHANGE UP (ref 10.3–14.5)
SODIUM SERPL-SCNC: 140 MMOL/L — SIGNIFICANT CHANGE UP (ref 135–145)
WBC # BLD: 7.6 K/UL — SIGNIFICANT CHANGE UP (ref 3.8–10.5)
WBC # FLD AUTO: 7.6 K/UL — SIGNIFICANT CHANGE UP (ref 3.8–10.5)

## 2017-11-07 RX ORDER — WARFARIN SODIUM 2.5 MG/1
4 TABLET ORAL ONCE
Qty: 0 | Refills: 0 | Status: COMPLETED | OUTPATIENT
Start: 2017-11-07 | End: 2017-11-07

## 2017-11-07 RX ORDER — WARFARIN SODIUM 2.5 MG/1
3 TABLET ORAL ONCE
Qty: 0 | Refills: 0 | Status: DISCONTINUED | OUTPATIENT
Start: 2017-11-07 | End: 2017-11-07

## 2017-11-07 RX ADMIN — Medication 1: at 12:04

## 2017-11-07 RX ADMIN — MONTELUKAST 10 MILLIGRAM(S): 4 TABLET, CHEWABLE ORAL at 13:25

## 2017-11-07 RX ADMIN — ATORVASTATIN CALCIUM 40 MILLIGRAM(S): 80 TABLET, FILM COATED ORAL at 21:15

## 2017-11-07 RX ADMIN — AMLODIPINE BESYLATE 2.5 MILLIGRAM(S): 2.5 TABLET ORAL at 05:41

## 2017-11-07 RX ADMIN — Medication 81 MILLIGRAM(S): at 11:16

## 2017-11-07 RX ADMIN — CARVEDILOL PHOSPHATE 3.12 MILLIGRAM(S): 80 CAPSULE, EXTENDED RELEASE ORAL at 17:34

## 2017-11-07 RX ADMIN — WARFARIN SODIUM 4 MILLIGRAM(S): 2.5 TABLET ORAL at 21:15

## 2017-11-07 RX ADMIN — Medication 25 MICROGRAM(S): at 05:41

## 2017-11-07 NOTE — PROGRESS NOTE ADULT - PROBLEM SELECTOR PLAN 5
controlled  continue amlodipine and coreg
HgbA1C 5.9, patient take home medication Januvia  continue to HOLD home medication  continue to monitor fingerstick per floor protocol and sliding scale coverage  continue diabetic diet

## 2017-11-07 NOTE — PROGRESS NOTE ADULT - PROBLEM SELECTOR PLAN 4
patient home medications losartan, Lasix and Norvasc  continue Norvasc and coreg   continue to monitor BP per floor protocol  continue DASH diet.

## 2017-11-07 NOTE — PROGRESS NOTE ADULT - PROBLEM SELECTOR PLAN 1
Troponin 0.035 ----> 0.100 ----> 0.056 ---> 0.040  Cardiology Lao consulted  TTE showed EF 55%, mild TR, mild MI  continue ASA, statin and beta-blocker

## 2017-11-07 NOTE — PROGRESS NOTE ADULT - PROBLEM SELECTOR PLAN 8
IMPROVE SCORE 4  LE doppler negative for dvt in b/l extremities  patient on coumadin, no need for additional DVT prophylaxis at this time.

## 2017-11-07 NOTE — PROGRESS NOTE ADULT - PROBLEM SELECTOR PLAN 9
patient is from home. Physical therapy recommend home PT. Plan is for discharge home tomorrow. Case management aware. cleared by cardiology for discharge  patient is from home. Physical therapy recommend home PT. Plan is for discharge home tomorrow. Case management aware.

## 2017-11-07 NOTE — PROGRESS NOTE ADULT - PROBLEM SELECTOR PLAN 3
controlled  HgA1C 5.9  continue HISS
LE doppler negative for dvt in b/l extremities  no clinical indication for CTA   continue coumadin  INR 1.89 this AM -- will dose 4mg this evening  follow up AM level

## 2017-11-07 NOTE — PROGRESS NOTE ADULT - PROBLEM SELECTOR PLAN 7
Resolved  contine to encorage food PO hydration Resolved 1.41 on admission to 1.01 this AM  continue to encourage good PO hydration

## 2017-11-08 ENCOUNTER — TRANSCRIPTION ENCOUNTER (OUTPATIENT)
Age: 82
End: 2017-11-08

## 2017-11-08 VITALS
OXYGEN SATURATION: 96 % | DIASTOLIC BLOOD PRESSURE: 48 MMHG | HEART RATE: 61 BPM | SYSTOLIC BLOOD PRESSURE: 127 MMHG | RESPIRATION RATE: 16 BRPM | TEMPERATURE: 98 F

## 2017-11-08 LAB
APTT BLD: 44.5 SEC — HIGH (ref 27.5–37.4)
GLUCOSE BLDC GLUCOMTR-MCNC: 114 MG/DL — HIGH (ref 70–99)
GLUCOSE BLDC GLUCOMTR-MCNC: 89 MG/DL — SIGNIFICANT CHANGE UP (ref 70–99)
HCT VFR BLD CALC: 38.5 % — SIGNIFICANT CHANGE UP (ref 34.5–45)
HGB BLD-MCNC: 12.1 G/DL — SIGNIFICANT CHANGE UP (ref 11.5–15.5)
INR BLD: 1.85 RATIO — HIGH (ref 0.88–1.16)
MCHC RBC-ENTMCNC: 30.8 PG — SIGNIFICANT CHANGE UP (ref 27–34)
MCHC RBC-ENTMCNC: 31.4 GM/DL — LOW (ref 32–36)
MCV RBC AUTO: 98.1 FL — SIGNIFICANT CHANGE UP (ref 80–100)
PLATELET # BLD AUTO: 220 K/UL — SIGNIFICANT CHANGE UP (ref 150–400)
PROTHROM AB SERPL-ACNC: 20.4 SEC — HIGH (ref 9.8–12.7)
RBC # BLD: 3.92 M/UL — SIGNIFICANT CHANGE UP (ref 3.8–5.2)
RBC # FLD: 14 % — SIGNIFICANT CHANGE UP (ref 10.3–14.5)
WBC # BLD: 6.9 K/UL — SIGNIFICANT CHANGE UP (ref 3.8–10.5)
WBC # FLD AUTO: 6.9 K/UL — SIGNIFICANT CHANGE UP (ref 3.8–10.5)

## 2017-11-08 PROCEDURE — 85610 PROTHROMBIN TIME: CPT

## 2017-11-08 PROCEDURE — 85027 COMPLETE CBC AUTOMATED: CPT

## 2017-11-08 PROCEDURE — 80048 BASIC METABOLIC PNL TOTAL CA: CPT

## 2017-11-08 PROCEDURE — 93005 ELECTROCARDIOGRAM TRACING: CPT

## 2017-11-08 PROCEDURE — 82962 GLUCOSE BLOOD TEST: CPT

## 2017-11-08 PROCEDURE — 82553 CREATINE MB FRACTION: CPT

## 2017-11-08 PROCEDURE — 81001 URINALYSIS AUTO W/SCOPE: CPT

## 2017-11-08 PROCEDURE — 93306 TTE W/DOPPLER COMPLETE: CPT

## 2017-11-08 PROCEDURE — 83880 ASSAY OF NATRIURETIC PEPTIDE: CPT

## 2017-11-08 PROCEDURE — 99285 EMERGENCY DEPT VISIT HI MDM: CPT | Mod: 25

## 2017-11-08 PROCEDURE — 36415 COLL VENOUS BLD VENIPUNCTURE: CPT

## 2017-11-08 PROCEDURE — 82550 ASSAY OF CK (CPK): CPT

## 2017-11-08 PROCEDURE — 84484 ASSAY OF TROPONIN QUANT: CPT

## 2017-11-08 PROCEDURE — 85730 THROMBOPLASTIN TIME PARTIAL: CPT

## 2017-11-08 PROCEDURE — 83036 HEMOGLOBIN GLYCOSYLATED A1C: CPT

## 2017-11-08 PROCEDURE — 97161 PT EVAL LOW COMPLEX 20 MIN: CPT

## 2017-11-08 PROCEDURE — 71045 X-RAY EXAM CHEST 1 VIEW: CPT

## 2017-11-08 PROCEDURE — 80053 COMPREHEN METABOLIC PANEL: CPT

## 2017-11-08 PROCEDURE — 84443 ASSAY THYROID STIM HORMONE: CPT

## 2017-11-08 PROCEDURE — 85379 FIBRIN DEGRADATION QUANT: CPT

## 2017-11-08 PROCEDURE — 93970 EXTREMITY STUDY: CPT

## 2017-11-08 RX ORDER — CARVEDILOL PHOSPHATE 80 MG/1
1 CAPSULE, EXTENDED RELEASE ORAL
Qty: 0 | Refills: 0 | COMMUNITY
Start: 2017-11-08

## 2017-11-08 RX ORDER — ASPIRIN/CALCIUM CARB/MAGNESIUM 324 MG
1 TABLET ORAL
Qty: 0 | Refills: 0 | COMMUNITY
Start: 2017-11-08

## 2017-11-08 RX ORDER — WARFARIN SODIUM 2.5 MG/1
1 TABLET ORAL
Qty: 0 | Refills: 0 | COMMUNITY

## 2017-11-08 RX ORDER — LOSARTAN POTASSIUM 100 MG/1
1 TABLET, FILM COATED ORAL
Qty: 0 | Refills: 0 | COMMUNITY

## 2017-11-08 RX ORDER — ATORVASTATIN CALCIUM 80 MG/1
1 TABLET, FILM COATED ORAL
Qty: 0 | Refills: 0 | DISCHARGE
Start: 2017-11-08

## 2017-11-08 RX ORDER — WARFARIN SODIUM 2.5 MG/1
4 TABLET ORAL
Qty: 14 | Refills: 0 | OUTPATIENT
Start: 2017-11-08 | End: 2017-11-22

## 2017-11-08 RX ORDER — ATORVASTATIN CALCIUM 80 MG/1
1 TABLET, FILM COATED ORAL
Qty: 0 | Refills: 0 | COMMUNITY
Start: 2017-11-08

## 2017-11-08 RX ADMIN — AMLODIPINE BESYLATE 2.5 MILLIGRAM(S): 2.5 TABLET ORAL at 06:29

## 2017-11-08 RX ADMIN — Medication 81 MILLIGRAM(S): at 12:21

## 2017-11-08 RX ADMIN — MONTELUKAST 10 MILLIGRAM(S): 4 TABLET, CHEWABLE ORAL at 12:21

## 2017-11-08 RX ADMIN — Medication 25 MICROGRAM(S): at 06:29

## 2017-11-08 NOTE — DISCHARGE NOTE ADULT - MEDICATION SUMMARY - MEDICATIONS TO TAKE
I will START or STAY ON the medications listed below when I get home from the hospital:    aspirin 81 mg oral delayed release tablet  -- 1 tab(s) by mouth once a day  -- Indication: For NSTEMI (non-ST elevated myocardial infarction)    Coumadin 4 mg oral tablet  -- 4 milligram(s) by mouth once a day   -- Indication: For History of pulmonary embolism    Januvia 50 mg oral tablet  -- 1 tab(s) by mouth once a day  -- Indication: For Diabetes mellitus    atorvastatin 40 mg oral tablet  -- 1 tab(s) by mouth once a day (at bedtime)  -- Indication: For Non-ST elevation (NSTEMI) myocardial infarction    Aromasin 25 mg oral tablet  -- 1 tab(s) by mouth once a day  -- Indication: For Need for prophylactic measure    carvedilol 3.125 mg oral tablet  -- 1 tab(s) by mouth every 12 hours  -- Indication: For NSTEMI (non-ST elevated myocardial infarction)    amLODIPine 2.5 mg oral tablet  -- 1 tab(s) by mouth once a day  -- Indication: For HTN (hypertension)    Lasix 40 mg oral tablet  -- 1 tab(s) by mouth once a day  -- Indication: For HTN (hypertension)    Singulair 10 mg oral tablet  -- 1 tab(s) by mouth once a day  -- Indication: For COPD (chronic obstructive pulmonary disease)    levothyroxine 25 mcg (0.025 mg) oral tablet  -- 1 tab(s) by mouth once a day  -- Indication: For Hypothyroidism

## 2017-11-08 NOTE — DISCHARGE NOTE ADULT - HOSPITAL COURSE
89yo F with pmhx of DM, HTN, HLD, Breast cancer in 2004 s/p R mastectomy, COPD, and PE on coumadin came in with c/o L sided chest pain. She said she often has chest pain but this was very severe. It was 10/10, tightening, non-radiating and lasted about 30 minutes. It was relieved once she got 2 doses of aspirin. Also c/o SOB associated with the chest pain. On initial encounter, patient denies any complaints at all. She said the chest pain, and SOB has resolved. Patient has eye covered; she said she had an infection in the eye s/p surgery a while ago, since then her doctor said to keep it on?  SH: Lives with daughter. Ambulated independently. Denies smoking, alcohol or any other drug use  Allergies: NKDA  Code status: Full code per family  Admitted to medicine for further work-up and management.  While on medicine unit patient was followed by cardiologist Tashia and treated for NSTEMI with BB, ASA, and statin. TTE completed and showed EF55% with mild TR and MO. Home medications for history of HTN, COPD, and hypothyroidism were continued.        Problem/Plan - 3:  ·  Problem: History of pulmonary embolism.  Plan: LE doppler negative for dvt in b/l extremities  no clinical indication for CTA   continue coumadin  INR 1.89 this AM -- will dose 4mg this evening  follow up AM level.          Problem/Plan - 5:  ·  Problem: Diabetes mellitus.  Plan: HgbA1C 5.9, patient take home medication Januvia  continue to HOLD home medication  continue to monitor fingerstick per floor protocol and sliding scale coverage  continue diabetic diet.         Problem/Plan - 7:  ·  Problem: JUAN FRANCISCO (acute kidney injury).  Plan: Resolved 1.41 on admission to 1.01 this AM  continue to encourage good PO hydration.      Problem/Plan - 8:  ·  Problem: Need for prophylactic measure.  Plan: IMPROVE SCORE 4  LE doppler negative for dvt in b/l extremities  patient on coumadin, no need for additional DVT prophylaxis at this time.      Problem/Plan - 9:  ·  Problem: Discharge planning issues.  Plan: cleared by cardiology for discharge  patient is from home. Physical therapy recommend home PT. Plan is for discharge home tomorrow. Case management aware. 89yo F with pmhx of DM, HTN, HLD, Breast cancer in 2004 s/p R mastectomy, COPD, and PE on coumadin came in with c/o L sided chest pain. She said she often has chest pain but this was very severe. It was 10/10, tightening, non-radiating and lasted about 30 minutes. It was relieved once she got 2 doses of aspirin. Also c/o SOB associated with the chest pain. On initial encounter, patient denies any complaints at all. She said the chest pain, and SOB has resolved. Patient has eye covered; she said she had an infection in the eye s/p surgery a while ago, since then her doctor said to keep it on?  SH: Lives with daughter. Ambulated independently. Denies smoking, alcohol or any other drug use  Allergies: NKDA  Code status: Full code per family  Admitted to medicine for further work-up and management.  While on medicine unit patient was followed by cardiologist Tashia and treated for NSTEMI with BB, ASA, and statin. TTE completed and showed EF55% with mild TR and UT. Home medications for history of HTN, COPD, and hypothyroidism were continued. LE doppler completed and negative for DVT; coumadin continued for patient history of PE, and INR followed by cardiology. Patient also had JUAN FRANCISCO on admission with creatinine of 1.41; which resolved with hydration to 1.01. Patient remains stable, continues to denies any further episodes of chest pain. Seen by physical therapy who recommends home PT. Patient cleared for discharge by cardiologist and attending. Discharge plan and importance of following up with cardiologist discussed with patient and daughter using  [id number]. Both stated understanding of plan and plan to follow with Mamta Orta for INR and coumadin dose adjustment. 89yo F with pmhx of DM, HTN, HLD, Breast cancer in 2004 s/p R mastectomy, COPD, and PE on coumadin came in with c/o L sided chest pain. She said she often has chest pain but this was very severe. It was 10/10, tightening, non-radiating and lasted about 30 minutes. It was relieved once she got 2 doses of aspirin. Also c/o SOB associated with the chest pain. On initial encounter, patient denies any complaints at all. She said the chest pain, and SOB has resolved. Patient has eye covered; she said she had an infection in the eye s/p surgery a while ago, since then her doctor said to keep it on?  SH: Lives with daughter. Ambulated independently. Denies smoking, alcohol or any other drug use  Allergies: NKDA  Code status: Full code per family  Admitted to medicine for further work-up and management.  While on medicine unit patient was followed by cardiologist Tashia and treated for NSTEMI with BB, ASA, and statin. TTE completed and showed EF55% with mild TR and VA. Home medications for history of HTN, COPD, and hypothyroidism were continued. LE doppler completed and negative for DVT; coumadin continued for patient history of PE, and INR followed by cardiology. Patient also had JUAN FRANCISCO on admission with creatinine of 1.41; which resolved with hydration to 1.01. Patient remains stable, continues to denies any further episodes of chest pain. Seen by physical therapy who recommends home PT. Patient cleared for discharge by cardiologist and attending. Discharge plan and importance of following up with cardiologist discussed with patient and daughter using  [id number]. Both stated understanding of plan and plan to follow with Mamta Orta for INR and coumadin dose adjustment. Erx for 2 weeks coumadin dosing sent to patients pharmacy on file.

## 2017-11-08 NOTE — DISCHARGE NOTE ADULT - NSFTFSERV1RD_GEN_ALL_CORE
medication teaching and assessment/other:/rehabilitation services/observation and assessment/teaching and training

## 2017-11-08 NOTE — DISCHARGE NOTE ADULT - CARE PROVIDER_API CALL
Josiah Ni), Internal Medicine  [address] [place] [address]  Phone: [phone]  Fax: [phone]    Jaime Lao (ASHLEY), Cardiology  [address] [place] [address]  Phone: [phone]  Fax: [phone]

## 2017-11-08 NOTE — PROGRESS NOTE ADULT - ATTENDING COMMENTS
Thank you for the courtesy of the consultation,I would be available for any further discussion if needed.  Jaime Lao MD,FACC.  [address] [place], [place] [id number] [phone]

## 2017-11-08 NOTE — DISCHARGE NOTE ADULT - VISION (WITH CORRECTIVE LENSES IF THE PATIENT USUALLY WEARS THEM):
Partially impaired: cannot see medication labels or newsprint, but can see obstacles in path, and the surrounding layout; can count fingers at arm's length/left eye vision loss

## 2017-11-08 NOTE — DISCHARGE NOTE ADULT - CARE PLAN
Principal Discharge DX:	NSTEMI (non-ST elevated myocardial infarction)  Goal:	patient will remain symptom free  Instructions for follow-up, activity and diet:	continue aspirin, carvedilol, and statin  follow up with your cardiologist as an outpatient  Secondary Diagnosis:	COPD (chronic obstructive pulmonary disease)  Instructions for follow-up, activity and diet:	continue home medications  follow up with your pulmonologist as outpatient  Secondary Diagnosis:	Diabetes mellitus  Instructions for follow-up, activity and diet:	continue home medication regimen  your Hgb A1C was 5.9 on 11/06/17 --please recheck in 3 months as outpatient  follow up with your PCP  Secondary Diagnosis:	HLD (hyperlipidemia)  Instructions for follow-up, activity and diet:	continue statin medication  continue TLC diet  Secondary Diagnosis:	HTN (hypertension)  Instructions for follow-up, activity and diet:	continue norvasc and lasix  continue DASH diet  please follow up with your PCP  Secondary Diagnosis:	Hypothyroidism  Instructions for follow-up, activity and diet:	continue home synthroid  Secondary Diagnosis:	PE (pulmonary thromboembolism)  Instructions for follow-up, activity and diet:	continue coumadin 4mg  please follow up with Mamta Orta to have your INR checked on Friday and to adjust medication dose if necessary

## 2017-11-08 NOTE — PROGRESS NOTE ADULT - SUBJECTIVE AND OBJECTIVE BOX
NP Note discussed with  Primary Attending    Patient is a 89y old  Female who presents with a chief complaint of chest pain.    INTERVAL HPI/OVERNIGHT EVENTS: no new complaints. Denies cp/sob. Family by the bedside translating as patient is Frisian speaking. NAD.    MEDICATIONS  (STANDING):  amLODIPine   Tablet 2.5 milliGRAM(s) Oral daily  aspirin enteric coated 81 milliGRAM(s) Oral daily  atorvastatin 40 milliGRAM(s) Oral at bedtime  carvedilol 3.125 milliGRAM(s) Oral every 12 hours  dextrose 5%. 1000 milliLiter(s) (50 mL/Hr) IV Continuous <Continuous>  dextrose 50% Injectable 12.5 Gram(s) IV Push once  dextrose 50% Injectable 25 Gram(s) IV Push once  dextrose 50% Injectable 25 Gram(s) IV Push once  heparin  Infusion. 600 Unit(s)/Hr (6 mL/Hr) IV Continuous <Continuous>  heparin  Injectable 4700 Unit(s) IV Push once  insulin lispro (HumaLOG) corrective regimen sliding scale   SubCutaneous three times a day before meals  levothyroxine 25 MICROGram(s) Oral daily  montelukast 10 milliGRAM(s) Oral daily    MEDICATIONS  (PRN):  dextrose Gel 1 Dose(s) Oral once PRN Blood Glucose LESS THAN 70 milliGRAM(s)/deciliter  glucagon  Injectable 1 milliGRAM(s) IntraMuscular once PRN Glucose LESS THAN 70 milligrams/deciliter  heparin  Injectable 4700 Unit(s) IV Push every 6 hours PRN For aPTT less than 40  heparin  Injectable 4700 Unit(s) IV Push every 6 hours PRN For aPTT less than 40      __________________________________________________  REVIEW OF SYSTEMS:    CONSTITUTIONAL: No fever,   EYES: no acute visual disturbances  NECK: No pain or stiffness  RESPIRATORY: No cough; No shortness of breath  CARDIOVASCULAR: No chest pain, no palpitations  GASTROINTESTINAL: No pain. No nausea or vomiting; No diarrhea   NEUROLOGICAL: No headache or numbness, no tremors  MUSCULOSKELETAL: No joint pain, no muscle pain  GENITOURINARY: no dysuria, no frequency, no hesitancy  PSYCHIATRY: no depression , no anxiety  ALL OTHER  ROS negative        Vital Signs Last 24 Hrs  T(C): 36.7 (2017 11:50), Max: 37.3 (2017 20:25)  T(F): 98.1 (2017 11:50), Max: 99.1 (2017 20:25)  HR: 56 (2017 11:50) (52 - 63)  BP: 143/73 (2017 11:50) (121/50 - 162/63)  BP(mean): --  RR: 17 (2017 11:50) (13 - 18)  SpO2: 100% (2017 11:50) (97% - 100%)    ________________________________________________  PHYSICAL EXAM:  GENERAL: NAD  HEENT: Normocephalic;  conjunctivae and sclerae clear L eye, R eye covered (corneal abrasion/infection by family)  NECK : supple  CHEST/LUNG: Clear to auscultation bilaterally with good air entry   HEART: S1 S2  regular; no murmurs, gallops or rubs  ABDOMEN: Soft, Nontender, Nondistended; Bowel sounds present  EXTREMITIES: no cyanosis; no edema; no calf tenderness  SKIN: warm and dry; no rash  NERVOUS SYSTEM:  Awake and alert; Oriented  to place, person and time ; no new deficits    _________________________________________________  LABS:                        12.4   7.4   )-----------( 200      ( 2017 06:11 )             41.0     11-    141  |  107  |  23<H>  ----------------------------<  96  5.0   |  29  |  0.88    Ca    8.7      2017 06:11    TPro  7.1  /  Alb  3.7  /  TBili  0.2  /  DBili  x   /  AST  21  /  ALT  28  /  AlkPhos  58  11-05    PT/INR - ( 2017 10:32 )   PT: 26.5 sec;   INR: 2.39 ratio         PTT - ( 2017 10:32 )  PTT:72.5 sec  Urinalysis Basic - ( 2017 03:28 )    Color: Yellow / Appearance: Clear / S.010 / pH: x  Gluc: x / Ketone: Negative  / Bili: Negative / Urobili: Negative   Blood: x / Protein: Negative / Nitrite: Negative   Leuk Esterase: Trace / RBC: 2-5 /HPF / WBC 6-10 /HPF   Sq Epi: x / Non Sq Epi: Few /HPF / Bacteria: Few /HPF      CAPILLARY BLOOD GLUCOSE      POCT Blood Glucose.: 102 mg/dL (2017 11:56)  POCT Blood Glucose.: 104 mg/dL (2017 06:54)  POCT Blood Glucose.: 94 mg/dL (2017 21:11)  POCT Blood Glucose.: 78 mg/dL (2017 17:14)        RADIOLOGY & ADDITIONAL TESTS:    Imaging  Reviewed:  YEs  Consultant(s) Notes Reviewed:   YES      Plan of care was discussed with patient and family caregiver; all questions and concerns were addressed
Patient is a 89y old  Female who presents with a chief complaint of   No Known Allergies      INTERVAL HPI /OVERNIGHT EVENTS: no acute overnight events.  [id number] used to communicate. During encounter patient offers no new complaints. Denies any chest pain. Remains Afebrile. Explained that discharge should be anticipated later today or tomorrow. Patient in agreement. Family at bedside during encounter.     T(C): 36.9 (17 @ 13:47), Max: 37.1 (17 @ 15:39)  HR: 66 (17 @ 14:26) (57 - 66)  BP: 135/45 (17 @ 14:26) (134/44 - 152/43)  RR: 18 (17 @ 13:47) (18 - 20)  SpO2: 97% (17 @ 13:47) (92% - 100%)  I&O's Summary    Vital Signs Last 24 Hrs  T(C): 36.9 (2017 13:47), Max: 37.1 (2017 15:39)  T(F): 98.4 (2017 13:47), Max: 98.8 (2017 18:28)  HR: 66 (2017 14:26) (57 - 66)  BP: 135/45 (2017 14:26) (134/44 - 152/43)  BP(mean): --  RR: 18 (2017 13:47) (18 - 20)  SpO2: 97% (2017 13:47) (92% - 100%)  PAST MEDICAL & SURGICAL HISTORY:      LABS:                        12.6   7.6   )-----------( 204      ( 2017 07:57 )             40.6         140  |  106  |  20<H>  ----------------------------<  95  4.6   |  32<H>  |  1.01    Ca    8.8      2017 07:57      PT/INR - ( 2017 07:57 )   PT: 20.9 sec;   INR: 1.89 ratio         PTT - ( 2017 07:57 )  PTT:39.3 sec  CAPILLARY BLOOD GLUCOSE      POCT Blood Glucose.: 152 mg/dL (2017 11:14)  POCT Blood Glucose.: 94 mg/dL (2017 07:38)        Urinalysis Basic - ( 2017 03:28 )    Color: Yellow / Appearance: Clear / S.010 / pH: x  Gluc: x / Ketone: Negative  / Bili: Negative / Urobili: Negative   Blood: x / Protein: Negative / Nitrite: Negative   Leuk Esterase: Trace / RBC: 2-5 /HPF / WBC 6-10 /HPF   Sq Epi: x / Non Sq Epi: Few /HPF / Bacteria: Few /HPF        MEDICATIONS  (STANDING):  amLODIPine   Tablet 2.5 milliGRAM(s) Oral daily  aspirin enteric coated 81 milliGRAM(s) Oral daily  atorvastatin 40 milliGRAM(s) Oral at bedtime  carvedilol 3.125 milliGRAM(s) Oral every 12 hours  dextrose 5%. 1000 milliLiter(s) (50 mL/Hr) IV Continuous <Continuous>  dextrose 50% Injectable 12.5 Gram(s) IV Push once  dextrose 50% Injectable 25 Gram(s) IV Push once  dextrose 50% Injectable 25 Gram(s) IV Push once  insulin lispro (HumaLOG) corrective regimen sliding scale   SubCutaneous three times a day before meals  levothyroxine 25 MICROGram(s) Oral daily  montelukast 10 milliGRAM(s) Oral daily    MEDICATIONS  (PRN):  dextrose Gel 1 Dose(s) Oral once PRN Blood Glucose LESS THAN 70 milliGRAM(s)/deciliter  glucagon  Injectable 1 milliGRAM(s) IntraMuscular once PRN Glucose LESS THAN 70 milligrams/deciliter      REVIEW OF SYSTEMS:  CONSTITUTIONAL: No fever, weight loss, or fatigue  EYES: + dressing to L eye - patient states this is due to infection and will follow up as outpatient.   ENMT:  No difficulty hearing, tinnitus, vertigo; No sinus or throat pain  NECK: No pain or stiffness  RESPIRATORY: No cough, wheezing, chills or hemoptysis; No shortness of breath  CARDIOVASCULAR: No chest pain, palpitations, dizziness, or leg swelling  GASTROINTESTINAL: No abdominal or epigastric pain. No nausea, vomiting, or hematemesis. No melena or hematochezia.  GENITOURINARY: No dysuria, frequency, hematuria, or incontinence  NEUROLOGICAL: No headaches, memory loss, loss of strength, numbness, or tremors  SKIN: No itching, burning, rashes, or lesions   LYMPH NODES: No enlarged glands  ENDOCRINE: No heat or cold intolerance; No hair loss  MUSCULOSKELETAL: No joint pain or swelling; No muscle, back, or extremity pain  PSYCHIATRIC: No depression, anxiety, mood swings, or difficulty sleeping  HEME/LYMPH: No easy bruising, or bleeding gums  ALLERGY AND IMMUNOLOGIC: No hives or eczema    RADIOLOGY & ADDITIONAL TESTS:  < from: Xray Chest 1 View AP/PA (17 @ 03:31) >  Impression  No acute findings, in particular normal cardiac silhouette    < from: US Duplex Venous Lower Ext Complete, Bilateral (17 @ 11:00) >  Impression  Negative bilateral DVT study        Consultant(s) Notes Reviewed:  [x] YES  [ ] NO    PHYSICAL EXAM:  GENERAL: NAD, well-groomed, well-developed female sitting OOB to chair conversing with family  HEAD:  Atraumatic, Normocephalic  EYES: + dressing noted to left eye.   ENMT: No tonsillar erythema, exudates, or enlargement; Moist mucous membranes. No lesions  NECK: Supple, No JVD, Normal thyroid  NERVOUS SYSTEM:  Alert & Oriented X3, Good concentration; Motor Strength 5/5 B/L upper and lower extremities.   CHEST/LUNG: Good air movement bilaterally.  HEART: S1, S2.  ABDOMEN: Soft, Nontender, Nondistended; Bowel sounds present  EXTREMITIES:  2+ Peripheral Pulses, No clubbing, cyanosis, or edema  LYMPH: No lymphadenopathy noted  SKIN: No rashes or lesions    Care Collaborated Discussed with Consultants/Other Providers [x] YES  [ ] NO
PRESENTING CC:Chest apin    SUBJ: 89yo F with pmhx of T2DM, HTN, HLD, Breast cancer in 2004 s/p R mastectomy, COPD, and PE on Coumadin came in with c/o L sided chest pain.the chest pain, and SOB have resolved.       PMH -reviewed admission note, no change since admission  Heart faliure: acute [ ] chronic [ ] acute or chronic [ ] diastolic [ ] systolic [ ] combied systolic and diastolic[ ]  JUAN FRANCISCO: ATN[ ] renal medullary necrosis [ ] CKD I [ ]CKDII [ ]CKD III [ ]CKD IV [ ]CKD V [ ]Other pathological lesions [ ]    MEDICATIONS  (STANDING):  amLODIPine   Tablet 2.5 milliGRAM(s) Oral daily  aspirin enteric coated 81 milliGRAM(s) Oral daily  atorvastatin 40 milliGRAM(s) Oral at bedtime  carvedilol 3.125 milliGRAM(s) Oral every 12 hours  insulin lispro (HumaLOG) corrective regimen sliding scale   SubCutaneous three times a day before meals  levothyroxine 25 MICROGram(s) Oral daily  montelukast 10 milliGRAM(s) Oral daily    MEDICATIONS  (PRN):  dextrose Gel 1 Dose(s) Oral once PRN Blood Glucose LESS THAN 70 milliGRAM(s)/deciliter  glucagon  Injectable 1 milliGRAM(s) IntraMuscular once PRN Glucose LESS THAN 70 milligrams/deciliter          FAMILY HISTORY:    No family history of premature coronary artery disease or sudden cardiac death      REVIEW OF SYSTEMS:  Constitutional: [ ] fever, [ ]weight loss,  [x ]fatigue  Eyes:[x ] visual changes  Respiratory: [ ]shortness of breath;  [ ] cough, [ ]wheezing, [ ]chills, [ ]hemoptysis  Cardiovascular: [x ] chest pain, [ ]palpitations, [ ]dizziness,  [ ]leg swelling  Gastrointestinal: [ ] abdominal pain, [ ]nausea, [ ]vomiting,  [ ]diarrhea   Genitourinary: [ ] dysuria, [ ] hematuria  Neurologic: [ ] headaches [ ] tremors  Skin: [ ] itching, [ ]burning, [ ] rashes  Endocrine: [ ] heat or cold intolerance  Musculoskeletal: [ ] joint pain or swelling; [ ] muscle, back, or extremity pain  Psychiatric: [ ] depression, [ ]anxiety, [ ]mood swings, or [ ]difficulty sleeping  Hematologic: [ ] easy bruising, [ ] bleeding gums    [x] All remaining systems negative except as per above.     Vital Signs Last 24 Hrs  T(C): 36.6 (08 Nov 2017 04:30), Max: 36.9 (07 Nov 2017 13:47)  T(F): 97.9 (08 Nov 2017 04:30), Max: 98.4 (07 Nov 2017 13:47)  HR: 58 (08 Nov 2017 04:30) (58 - 66)  BP: 144/56 (08 Nov 2017 04:30) (134/44 - 148/52)  BP(mean): --  RR: 18 (08 Nov 2017 04:30) (18 - 20)  SpO2: 92% (08 Nov 2017 04:30) (92% - 97%)  I&O's Summary      PHYSICAL EXAM:  General: No acute distress  HEENT: EOMI, PERRL  Neck: Supple, No JVD  Lungs: Clear to percussion bilaterally; No rales or wheezing  Heart: Regular rate and rhythm; 2/6 systolic murmur,no rubs, or gallops  Abdomen: Nontender, bowel sounds present  Extremities: No clubbing, cyanosis, 1+ edema  Nervous system:  Alert & Oriented X3, no focal deficits  Psychiatric: Normal affect  Skin: No rashes or lesions    LABS:  11-07    140  |  106  |  20<H>  ----------------------------<  95  4.6   |  32<H>  |  1.01    Ca    8.8      07 Nov 2017 07:57      Creatinine Trend: 1.01<--, 0.88<--, 1.06<--, 1.28<--, 1.41<--                        12.1   6.9   )-----------( 220      ( 08 Nov 2017 08:03 )             38.5     PT/INR - ( 08 Nov 2017 08:03 )   PT: 20.4 sec;   INR: 1.85 ratio    PTT - ( 08 Nov 2017 08:03 )  PTT:44.5 sec        RADIOLOGY:US DPLX LWR EXT VEINS COMPL BI   Impression Negative bilateral DVT study    ECHO: Study Date: 11/6/2017:-Normal Left Ventricular Systolic Function,  (EF = 55%) Grade I diastolic dysfunction         IMPRESSION AND PLAN:      Problem/Plan - 1:  ·  Problem: Unlikely NSTEMI (non-ST elevated myocardial infarction).  Plan: Mildly elevated Troponins likely demand.  - - start ASA, Statin, and low dose coreg  - Titrate coreg as tolerated    Problem/Plan - 2:  ·  Problem: History of pulmonary embolism.  Plan: on coumadin Goal INR 2  INR- within goal  - f/u daily INR  -     Problem/Plan - 3:  ·  Problem: JUAN FRANCISCO (acute kidney injury).  Plan: BUN/Cr ratio>20  - likely pre-renal  - Will give 1L bolus and f/u repeat BMP.

## 2017-11-08 NOTE — PROGRESS NOTE ADULT - ASSESSMENT
89yo F with omhx of DM, HTN, HLD, Breast cancer in 2004 s/p R mastectomy, COPD, and PE on coumadin came in with c/o L sided chest pain
89yo F with pmhx of DM, HTN, HLD, Breast cancer in 2004 s/p R mastectomy, COPD, and PE on coumadin came in with c/o L sided chest pain. She said she often has chest pain but this was very severe. It was 10/10, tightening, non-radiating and lasted about 30 minutes. It was relieved once she got 2 doses of aspirin. Also c/o SOB associated with the chest pain. On initial encounter, patient denies any complaints at all. She said the chest pain, and SOB has resolved. Patient has eye covered; she said she had an infection in the eye s/p surgery a while ago, since then her doctor said to keep it on?  SH: Lives with daughter. Ambulated independently. Denies smoking, alcohol or any other drug use  Allergies: NKDA  Code status: Full code per family  Admitted to medicine for further work-up and management.
CARDIAC STATUS STABLE FOR DISCHARGE

## 2017-11-08 NOTE — DISCHARGE NOTE ADULT - PATIENT PORTAL LINK FT
“You can access the [facility] Patient Portal, offered by [facility], by registering with the following website: [email or web address]”

## 2017-11-08 NOTE — DISCHARGE NOTE ADULT - PLAN OF CARE
patient will remain symptom free continue aspirin, carvedilol, and statin  follow up with your cardiologist as an outpatient continue home medications  follow up with your pulmonologist as outpatient continue home medication regimen  your Hgb A1C was 5.9 on 11/06/17 --please recheck in 3 months as outpatient  follow up with your PCP continue statin medication  continue TLC diet continue norvasc and lasix  continue DASH diet  please follow up with your PCP continue home synthroid continue coumadin 4mg  please follow up with Mamta Orta to have your INR checked on Friday and to adjust medication dose if necessary

## 2017-11-08 NOTE — DISCHARGE NOTE ADULT - SECONDARY DIAGNOSIS.
COPD (chronic obstructive pulmonary disease) Diabetes mellitus HLD (hyperlipidemia) HTN (hypertension) Hypothyroidism PE (pulmonary thromboembolism)

## 2017-11-08 NOTE — DISCHARGE NOTE ADULT - CARE PROVIDERS DIRECT ADDRESSES
[email or web address]

## 2017-11-14 DIAGNOSIS — Z90.11 ACQUIRED ABSENCE OF RIGHT BREAST AND NIPPLE: Chronic | ICD-10-CM

## 2018-03-05 ENCOUNTER — APPOINTMENT (OUTPATIENT)
Dept: UROLOGY | Facility: CLINIC | Age: 83
End: 2018-03-05
Payer: MEDICARE

## 2018-03-05 VITALS
BODY MASS INDEX: 34.36 KG/M2 | WEIGHT: 175 LBS | HEIGHT: 60 IN | RESPIRATION RATE: 17 BRPM | OXYGEN SATURATION: 87 % | HEART RATE: 69 BPM

## 2018-03-05 VITALS — DIASTOLIC BLOOD PRESSURE: 70 MMHG | TEMPERATURE: 98.5 F | SYSTOLIC BLOOD PRESSURE: 120 MMHG

## 2018-03-05 DIAGNOSIS — Z86.39 PERSONAL HISTORY OF OTHER ENDOCRINE, NUTRITIONAL AND METABOLIC DISEASE: ICD-10-CM

## 2018-03-05 DIAGNOSIS — Z87.39 PERSONAL HISTORY OF OTHER DISEASES OF THE MUSCULOSKELETAL SYSTEM AND CONNECTIVE TISSUE: ICD-10-CM

## 2018-03-05 DIAGNOSIS — Z87.442 PERSONAL HISTORY OF URINARY CALCULI: ICD-10-CM

## 2018-03-05 DIAGNOSIS — R10.9 UNSPECIFIED ABDOMINAL PAIN: ICD-10-CM

## 2018-03-05 DIAGNOSIS — Z86.69 PERSONAL HISTORY OF OTHER DISEASES OF THE NERVOUS SYSTEM AND SENSE ORGANS: ICD-10-CM

## 2018-03-05 DIAGNOSIS — Z78.9 OTHER SPECIFIED HEALTH STATUS: ICD-10-CM

## 2018-03-05 PROCEDURE — 99203 OFFICE O/P NEW LOW 30 MIN: CPT

## 2018-03-08 ENCOUNTER — RESULT REVIEW (OUTPATIENT)
Age: 83
End: 2018-03-08

## 2018-03-08 PROBLEM — Z87.442 HISTORY OF NEPHROLITHIASIS: Status: ACTIVE | Noted: 2018-03-08

## 2018-03-08 LAB
APPEARANCE: CLEAR
BACTERIA UR CULT: NORMAL
BACTERIA: NEGATIVE
BILIRUBIN URINE: NEGATIVE
BLOOD URINE: NEGATIVE
COLOR: YELLOW
GLUCOSE QUALITATIVE U: NEGATIVE MG/DL
HYALINE CASTS: 1 /LPF
KETONES URINE: NEGATIVE
LEUKOCYTE ESTERASE URINE: NEGATIVE
MICROSCOPIC-UA: NORMAL
NITRITE URINE: NEGATIVE
PH URINE: 6
PROTEIN URINE: NEGATIVE MG/DL
RED BLOOD CELLS URINE: 3 /HPF
SPECIFIC GRAVITY URINE: 1.01
SQUAMOUS EPITHELIAL CELLS: 0 /HPF
UROBILINOGEN URINE: NEGATIVE MG/DL
WHITE BLOOD CELLS URINE: 0 /HPF

## 2018-06-07 ENCOUNTER — APPOINTMENT (OUTPATIENT)
Dept: UROLOGY | Facility: CLINIC | Age: 83
End: 2018-06-07

## 2018-07-25 PROBLEM — Z78.9 ALCOHOL USE: Status: INACTIVE | Noted: 2018-03-05

## 2019-04-24 ENCOUNTER — INPATIENT (INPATIENT)
Facility: HOSPITAL | Age: 84
LOS: 2 days | Discharge: HOME | DRG: 313 | End: 2019-04-27
Attending: INTERNAL MEDICINE | Admitting: INTERNAL MEDICINE
Payer: COMMERCIAL

## 2019-04-24 VITALS
SYSTOLIC BLOOD PRESSURE: 128 MMHG | TEMPERATURE: 98 F | WEIGHT: 169.98 LBS | OXYGEN SATURATION: 97 % | RESPIRATION RATE: 16 BRPM | DIASTOLIC BLOOD PRESSURE: 84 MMHG | HEART RATE: 72 BPM

## 2019-04-24 DIAGNOSIS — Z90.11 ACQUIRED ABSENCE OF RIGHT BREAST AND NIPPLE: Chronic | ICD-10-CM

## 2019-04-24 DIAGNOSIS — R07.9 CHEST PAIN, UNSPECIFIED: ICD-10-CM

## 2019-04-24 DIAGNOSIS — E11.9 TYPE 2 DIABETES MELLITUS WITHOUT COMPLICATIONS: ICD-10-CM

## 2019-04-24 DIAGNOSIS — M51.26 OTHER INTERVERTEBRAL DISC DISPLACEMENT, LUMBAR REGION: ICD-10-CM

## 2019-04-24 DIAGNOSIS — R94.31 ABNORMAL ELECTROCARDIOGRAM [ECG] [EKG]: ICD-10-CM

## 2019-04-24 DIAGNOSIS — I26.99 OTHER PULMONARY EMBOLISM WITHOUT ACUTE COR PULMONALE: ICD-10-CM

## 2019-04-24 DIAGNOSIS — E78.5 HYPERLIPIDEMIA, UNSPECIFIED: ICD-10-CM

## 2019-04-24 DIAGNOSIS — I10 ESSENTIAL (PRIMARY) HYPERTENSION: ICD-10-CM

## 2019-04-24 DIAGNOSIS — C50.919 MALIGNANT NEOPLASM OF UNSPECIFIED SITE OF UNSPECIFIED FEMALE BREAST: ICD-10-CM

## 2019-04-24 DIAGNOSIS — Z29.9 ENCOUNTER FOR PROPHYLACTIC MEASURES, UNSPECIFIED: ICD-10-CM

## 2019-04-24 PROBLEM — J44.9 CHRONIC OBSTRUCTIVE PULMONARY DISEASE, UNSPECIFIED: Chronic | Status: ACTIVE | Noted: 2017-11-14

## 2019-04-24 LAB
ALBUMIN SERPL ELPH-MCNC: 3.7 G/DL — SIGNIFICANT CHANGE UP (ref 3.5–5)
ALP SERPL-CCNC: 82 U/L — SIGNIFICANT CHANGE UP (ref 40–120)
ALT FLD-CCNC: 26 U/L DA — SIGNIFICANT CHANGE UP (ref 10–60)
ANION GAP SERPL CALC-SCNC: 6 MMOL/L — SIGNIFICANT CHANGE UP (ref 5–17)
APTT BLD: 38.3 SEC — HIGH (ref 27.5–36.3)
AST SERPL-CCNC: 19 U/L — SIGNIFICANT CHANGE UP (ref 10–40)
BILIRUB SERPL-MCNC: 0.3 MG/DL — SIGNIFICANT CHANGE UP (ref 0.2–1.2)
BUN SERPL-MCNC: 20 MG/DL — HIGH (ref 7–18)
CALCIUM SERPL-MCNC: 8.6 MG/DL — SIGNIFICANT CHANGE UP (ref 8.4–10.5)
CHLORIDE SERPL-SCNC: 103 MMOL/L — SIGNIFICANT CHANGE UP (ref 96–108)
CO2 SERPL-SCNC: 31 MMOL/L — SIGNIFICANT CHANGE UP (ref 22–31)
CREAT SERPL-MCNC: 1.08 MG/DL — SIGNIFICANT CHANGE UP (ref 0.5–1.3)
GLUCOSE BLDC GLUCOMTR-MCNC: 114 MG/DL — HIGH (ref 70–99)
GLUCOSE SERPL-MCNC: 103 MG/DL — HIGH (ref 70–99)
HCT VFR BLD CALC: 38.5 % — SIGNIFICANT CHANGE UP (ref 34.5–45)
HGB BLD-MCNC: 11.1 G/DL — LOW (ref 11.5–15.5)
INR BLD: 1.45 RATIO — HIGH (ref 0.88–1.16)
MCHC RBC-ENTMCNC: 26.7 PG — LOW (ref 27–34)
MCHC RBC-ENTMCNC: 28.8 GM/DL — LOW (ref 32–36)
MCV RBC AUTO: 92.8 FL — SIGNIFICANT CHANGE UP (ref 80–100)
NRBC # BLD: 0 /100 WBCS — SIGNIFICANT CHANGE UP (ref 0–0)
NT-PROBNP SERPL-SCNC: 199 PG/ML — SIGNIFICANT CHANGE UP (ref 0–450)
PLATELET # BLD AUTO: 322 K/UL — SIGNIFICANT CHANGE UP (ref 150–400)
POTASSIUM SERPL-MCNC: 4 MMOL/L — SIGNIFICANT CHANGE UP (ref 3.5–5.3)
POTASSIUM SERPL-SCNC: 4 MMOL/L — SIGNIFICANT CHANGE UP (ref 3.5–5.3)
PROT SERPL-MCNC: 7.4 G/DL — SIGNIFICANT CHANGE UP (ref 6–8.3)
PROTHROM AB SERPL-ACNC: 16.3 SEC — HIGH (ref 10–12.9)
RBC # BLD: 4.15 M/UL — SIGNIFICANT CHANGE UP (ref 3.8–5.2)
RBC # FLD: 15.5 % — HIGH (ref 10.3–14.5)
SODIUM SERPL-SCNC: 140 MMOL/L — SIGNIFICANT CHANGE UP (ref 135–145)
TROPONIN I SERPL-MCNC: <0.015 NG/ML — SIGNIFICANT CHANGE UP (ref 0–0.04)
TROPONIN I SERPL-MCNC: <0.015 NG/ML — SIGNIFICANT CHANGE UP (ref 0–0.04)
WBC # BLD: 8.4 K/UL — SIGNIFICANT CHANGE UP (ref 3.8–10.5)
WBC # FLD AUTO: 8.4 K/UL — SIGNIFICANT CHANGE UP (ref 3.8–10.5)

## 2019-04-24 PROCEDURE — 99285 EMERGENCY DEPT VISIT HI MDM: CPT

## 2019-04-24 PROCEDURE — 71045 X-RAY EXAM CHEST 1 VIEW: CPT | Mod: 26

## 2019-04-24 PROCEDURE — 71275 CT ANGIOGRAPHY CHEST: CPT | Mod: 26

## 2019-04-24 RX ORDER — FUROSEMIDE 40 MG
40 TABLET ORAL DAILY
Qty: 0 | Refills: 0 | Status: DISCONTINUED | OUTPATIENT
Start: 2019-04-24 | End: 2019-04-25

## 2019-04-24 RX ORDER — ACETAMINOPHEN 500 MG
1000 TABLET ORAL ONCE
Qty: 0 | Refills: 0 | Status: COMPLETED | OUTPATIENT
Start: 2019-04-24 | End: 2019-04-25

## 2019-04-24 RX ORDER — CARVEDILOL PHOSPHATE 80 MG/1
3.12 CAPSULE, EXTENDED RELEASE ORAL EVERY 12 HOURS
Qty: 0 | Refills: 0 | Status: DISCONTINUED | OUTPATIENT
Start: 2019-04-24 | End: 2019-04-27

## 2019-04-24 RX ORDER — ASPIRIN/CALCIUM CARB/MAGNESIUM 324 MG
81 TABLET ORAL DAILY
Qty: 0 | Refills: 0 | Status: DISCONTINUED | OUTPATIENT
Start: 2019-04-24 | End: 2019-04-25

## 2019-04-24 RX ORDER — LOSARTAN POTASSIUM 100 MG/1
100 TABLET, FILM COATED ORAL DAILY
Qty: 0 | Refills: 0 | Status: DISCONTINUED | OUTPATIENT
Start: 2019-04-24 | End: 2019-04-27

## 2019-04-24 RX ORDER — ATORVASTATIN CALCIUM 80 MG/1
40 TABLET, FILM COATED ORAL AT BEDTIME
Qty: 0 | Refills: 0 | Status: DISCONTINUED | OUTPATIENT
Start: 2019-04-24 | End: 2019-04-27

## 2019-04-24 RX ORDER — ACETAMINOPHEN 500 MG
650 TABLET ORAL EVERY 6 HOURS
Qty: 0 | Refills: 0 | Status: DISCONTINUED | OUTPATIENT
Start: 2019-04-24 | End: 2019-04-27

## 2019-04-24 RX ORDER — INSULIN LISPRO 100/ML
VIAL (ML) SUBCUTANEOUS
Qty: 0 | Refills: 0 | Status: DISCONTINUED | OUTPATIENT
Start: 2019-04-24 | End: 2019-04-27

## 2019-04-24 RX ORDER — WARFARIN SODIUM 2.5 MG/1
5 TABLET ORAL DAILY
Qty: 0 | Refills: 0 | Status: COMPLETED | OUTPATIENT
Start: 2019-04-24 | End: 2019-04-26

## 2019-04-24 RX ORDER — KETOROLAC TROMETHAMINE 30 MG/ML
15 SYRINGE (ML) INJECTION ONCE
Qty: 0 | Refills: 0 | Status: DISCONTINUED | OUTPATIENT
Start: 2019-04-24 | End: 2019-04-24

## 2019-04-24 RX ORDER — LEVOTHYROXINE SODIUM 125 MCG
25 TABLET ORAL DAILY
Qty: 0 | Refills: 0 | Status: DISCONTINUED | OUTPATIENT
Start: 2019-04-24 | End: 2019-04-27

## 2019-04-24 RX ORDER — ACETAMINOPHEN 500 MG
1000 TABLET ORAL ONCE
Qty: 0 | Refills: 0 | Status: COMPLETED | OUTPATIENT
Start: 2019-04-24 | End: 2019-04-24

## 2019-04-24 RX ORDER — INSULIN LISPRO 100/ML
VIAL (ML) SUBCUTANEOUS AT BEDTIME
Qty: 0 | Refills: 0 | Status: DISCONTINUED | OUTPATIENT
Start: 2019-04-24 | End: 2019-04-27

## 2019-04-24 RX ORDER — AMLODIPINE BESYLATE 2.5 MG/1
2.5 TABLET ORAL DAILY
Qty: 0 | Refills: 0 | Status: DISCONTINUED | OUTPATIENT
Start: 2019-04-24 | End: 2019-04-27

## 2019-04-24 RX ADMIN — Medication 15 MILLIGRAM(S): at 16:38

## 2019-04-24 RX ADMIN — Medication 1000 MILLIGRAM(S): at 09:11

## 2019-04-24 RX ADMIN — ATORVASTATIN CALCIUM 40 MILLIGRAM(S): 80 TABLET, FILM COATED ORAL at 23:07

## 2019-04-24 RX ADMIN — Medication 15 MILLIGRAM(S): at 17:10

## 2019-04-24 RX ADMIN — Medication 400 MILLIGRAM(S): at 09:11

## 2019-04-24 RX ADMIN — WARFARIN SODIUM 5 MILLIGRAM(S): 2.5 TABLET ORAL at 23:07

## 2019-04-24 RX ADMIN — Medication 1000 MILLIGRAM(S): at 10:06

## 2019-04-24 NOTE — ED ADULT NURSE NOTE - OBJECTIVE STATEMENT
BIB EMS alert and verbally responsive with c/o Rt sided face/neck pain  radiating to chest  and mild SOB

## 2019-04-24 NOTE — ED PROVIDER NOTE - OBJECTIVE STATEMENT
89 y/o F with PMHx of COPD, DM, HLD, HTN, PE presents to the ED with complaints of pain to R chest radiating to neck and R lower face x 3 days. Patient notes mild cough. Takes Coumadin and Lasix. Denies fever, shortness of breath, injury, trauma, fall, nausea, vomiting, abdominal pain or any other acute complaints. Patient has intolerance to aspirin.

## 2019-04-24 NOTE — H&P ADULT - PROBLEM SELECTOR PLAN 6
on coumadin 3 mg - subtherapeutic INR   - unknown duration of PE - on coumadin > 1 year   likely provoked in setting of breast cancer   - will continue with coumadin 5 mg and daily INR check - will hold on heparin drip given prolonged duration of anticoagulation and no evidence of PE on repeat CTA

## 2019-04-24 NOTE — ED PROVIDER NOTE - CLINICAL SUMMARY MEDICAL DECISION MAKING FREE TEXT BOX
89 y/o F presents with R chest pain radiating to the face and neck. Will check EKG, labs and CXR. Will consider CT angio of the chest.

## 2019-04-24 NOTE — H&P ADULT - NSICDXPASTMEDICALHX_GEN_ALL_CORE_FT
PAST MEDICAL HISTORY:  Breast cancer in female     COPD (chronic obstructive pulmonary disease)     DM (diabetes mellitus)     HLD (hyperlipidemia)     HTN (hypertension)     PE (pulmonary thromboembolism)

## 2019-04-24 NOTE — H&P ADULT - ASSESSMENT
ED course:  Vitals: 128/84, 72, 98.4, 16 (97)  Labs pertinent for INR of 1.45, T1 and T2 negative   ProBNP of 199  CTA chest: no PE, no acute lung pathology  noted   S/p tylenol IV and toradol given improvement in pain  EKG: multifocal atrial tachy with new Twave inversion in V2-V5 89/F from home with PMH of COPD on 2L home O2, Breast cancer, HTN, HLD, PE on coumadin, DM, lumbar disc herniation on steroid injections (last was on march 11, 2019) presented with right sided chest pain, shoulder pain and facial pain. History obtained form patient and daughter at bedside who mentions that she was having pain over right chest, shoulder and face which was different then her usual pain from disc herniation. Patient is not able to describe characteristics of pain - reports that pain was combination of colicky, pressure and stabbing, about 6-8/10 with no aggravating and reliving factor. Family also noted that her O2 requirement has gone up over last week. Denies nausea, vomiting, diarrhea, abdominal pain, palpitations, dizziness, headache, cough, wheezing, joint pain or swelling, fever, chills.      ED course:  Vitals: 128/84, 72, 98.4, 16 (97)  Labs pertinent for INR of 1.45, T1 and T2 negative   ProBNP of 199  CTA chest: no PE, no acute lung pathology  noted   S/p tylenol IV and toradol given improvement in pain  EKG: multifocal atrial tachy with new Twave inversion in V2-V5

## 2019-04-24 NOTE — H&P ADULT - PROBLEM SELECTOR PLAN 9
IMPROVE VTE Individual Risk Assessment          RISK                                                          Points  [  ] Previous VTE                                                3  [  ] Thrombophilia                                             2  [  ] Lower limb paralysis                                   2        (unable to hold up >15 seconds)    [  ] Current Cancer                                             2         (within 6 months)  [  ] Immobilization > 24 hrs                              1  [  ] ICU/CCU stay > 24 hours                             1  [  ] Age > 60                                                         1    IMPROVE VTE Score: 2  on coumadin

## 2019-04-24 NOTE — H&P ADULT - PROBLEM SELECTOR PLAN 1
presented with right sided chest pain radiating to shoulder and face over last 3 days- non specific but describes different then her usual pain form disc herniation   -  likely musculoskeletal vs neuropathic given distribution and history of herniation    T1 and T2 negative   EKG MAT with Twave inversion in V2 to V5  proBNP 199 and CTA chest - no PE, no acute pathology noted   will start on aspirin, statin and metoprolol as per ACS protocol   - will also start on tylenol PRN for pain control   - patient is not able to tolerate tramadol and gabapentin which makes her sleepy/lethargic  cardio Dr. Coy presented with right sided chest pain radiating to shoulder and face over last 3 days- non specific but describes different then her usual pain form disc herniation   -  likely musculoskeletal vs neuropathic given distribution and history of herniation    T1 and T2 negative   EKG MAT with Twave inversion in V2 to V5  proBNP 199 and CTA chest - no PE, no acute pathology noted   will start on aspirin, statin and carvedilol as per ACS protocol   - will also start on tylenol PRN for pain control   - patient is not able to tolerate tramadol and gabapentin which makes her sleepy/lethargic  cardio Dr. Coy

## 2019-04-24 NOTE — H&P ADULT - NSHPLABSRESULTS_GEN_ALL_CORE
Vital Signs Last 24 Hrs  T(C): 37.2 (24 Apr 2019 19:01), Max: 37.2 (24 Apr 2019 19:01)  T(F): 99 (24 Apr 2019 19:01), Max: 99 (24 Apr 2019 19:01)  HR: 81 (24 Apr 2019 19:01) (72 - 81)  BP: 145/88 (24 Apr 2019 19:01) (128/84 - 145/88)  BP(mean): --  RR: 18 (24 Apr 2019 19:01) (16 - 18)  SpO2: 98% (24 Apr 2019 19:01) (97% - 98%)                            11.1   8.40  )-----------( 322      ( 24 Apr 2019 08:47 )             38.5       04-24    140  |  103  |  20<H>  ----------------------------<  103<H>  4.0   |  31  |  1.08    Ca    8.6      24 Apr 2019 08:47    TPro  7.4  /  Alb  3.7  /  TBili  0.3  /  DBili  x   /  AST  19  /  ALT  26  /  AlkPhos  82  04-24                  PT/INR - ( 24 Apr 2019 08:47 )   PT: 16.3 sec;   INR: 1.45 ratio         PTT - ( 24 Apr 2019 08:47 )  PTT:38.3 sec    Lactate Trend      CARDIAC MARKERS ( 24 Apr 2019 16:25 )  <0.015 ng/mL / x     / x     / x     / x      CARDIAC MARKERS ( 24 Apr 2019 08:47 )  <0.015 ng/mL / x     / x     / x     / x        < from: CT Angio Chest w/ IV Cont (04.24.19 @ 15:30) >    IMPRESSION:     No pulmonary embolism to the proximal segmental branches. Limited   visualization of distal segmental and subsegmental branches secondary to   a combination of respiratory motion and suboptimal bolus timing.    No acute chest pathology.      < end of copied text >

## 2019-04-24 NOTE — H&P ADULT - PROBLEM SELECTOR PLAN 3
history of lumbar disc herniation - on steroid injection for pain control - last injection was on march, 11.  not able to tolerate tramadol and gabapentin due to sedative effects   pain control with IV and oral tylenol

## 2019-04-24 NOTE — H&P ADULT - HISTORY OF PRESENT ILLNESS
89/F from home with PMH of COPD on 2L home O2, Breast cancer, HTN, HLD, PE on coumadin, DM, lumbar disc herniation on steroid injections (last was on march 11, 2019) presented with right sided chest pain, shoulder pain and facial pain. History obtained form patient and daughter at bedside who mentions that she was having pain over right chest, shoulder and face which was different then her usual pain from disc herniation. Patient is not able to describe characteristics of pain - reports that pain was combination of colicky, pressure and stabbing, about 6-8/10 with no aggravating and reliving factor. Family also noted that her O2 requirement has gone up over last week. Denies nausea, vomiting, diarrhea, abdominal pain, palpitations, dizziness, headache, cough, wheezing, joint pain or swelling, fever, chills.

## 2019-04-25 DIAGNOSIS — C50.411 MALIGNANT NEOPLASM OF UPPER-OUTER QUADRANT OF RIGHT FEMALE BREAST: ICD-10-CM

## 2019-04-25 DIAGNOSIS — J44.9 CHRONIC OBSTRUCTIVE PULMONARY DISEASE, UNSPECIFIED: ICD-10-CM

## 2019-04-25 LAB
24R-OH-CALCIDIOL SERPL-MCNC: 49.2 NG/ML — SIGNIFICANT CHANGE UP (ref 30–80)
ANION GAP SERPL CALC-SCNC: 2 MMOL/L — LOW (ref 5–17)
BASOPHILS # BLD AUTO: 0.04 K/UL — SIGNIFICANT CHANGE UP (ref 0–0.2)
BASOPHILS NFR BLD AUTO: 0.5 % — SIGNIFICANT CHANGE UP (ref 0–2)
BUN SERPL-MCNC: 20 MG/DL — HIGH (ref 7–18)
CALCIUM SERPL-MCNC: 8.6 MG/DL — SIGNIFICANT CHANGE UP (ref 8.4–10.5)
CHLORIDE SERPL-SCNC: 104 MMOL/L — SIGNIFICANT CHANGE UP (ref 96–108)
CHOLEST SERPL-MCNC: 140 MG/DL — SIGNIFICANT CHANGE UP (ref 10–199)
CK MB BLD-MCNC: 1.9 % — SIGNIFICANT CHANGE UP (ref 0–3.5)
CK MB CFR SERPL CALC: 1.3 NG/ML — SIGNIFICANT CHANGE UP (ref 0–3.6)
CK SERPL-CCNC: 68 U/L — SIGNIFICANT CHANGE UP (ref 21–215)
CO2 SERPL-SCNC: 36 MMOL/L — HIGH (ref 22–31)
CREAT SERPL-MCNC: 1.02 MG/DL — SIGNIFICANT CHANGE UP (ref 0.5–1.3)
EOSINOPHIL # BLD AUTO: 0.39 K/UL — SIGNIFICANT CHANGE UP (ref 0–0.5)
EOSINOPHIL NFR BLD AUTO: 4.8 % — SIGNIFICANT CHANGE UP (ref 0–6)
FOLATE SERPL-MCNC: 14.3 NG/ML — SIGNIFICANT CHANGE UP
GLUCOSE BLDC GLUCOMTR-MCNC: 103 MG/DL — HIGH (ref 70–99)
GLUCOSE BLDC GLUCOMTR-MCNC: 106 MG/DL — HIGH (ref 70–99)
GLUCOSE BLDC GLUCOMTR-MCNC: 93 MG/DL — SIGNIFICANT CHANGE UP (ref 70–99)
GLUCOSE SERPL-MCNC: 110 MG/DL — HIGH (ref 70–99)
HBA1C BLD-MCNC: 6.4 % — HIGH (ref 4–5.6)
HCT VFR BLD CALC: 40.2 % — SIGNIFICANT CHANGE UP (ref 34.5–45)
HDLC SERPL-MCNC: 53 MG/DL — SIGNIFICANT CHANGE UP
HGB BLD-MCNC: 11.3 G/DL — LOW (ref 11.5–15.5)
IMM GRANULOCYTES NFR BLD AUTO: 0.4 % — SIGNIFICANT CHANGE UP (ref 0–1.5)
INR BLD: 1.55 RATIO — HIGH (ref 0.88–1.16)
LIPID PNL WITH DIRECT LDL SERPL: 60 MG/DL — SIGNIFICANT CHANGE UP
LYMPHOCYTES # BLD AUTO: 1.36 K/UL — SIGNIFICANT CHANGE UP (ref 1–3.3)
LYMPHOCYTES # BLD AUTO: 16.9 % — SIGNIFICANT CHANGE UP (ref 13–44)
MAGNESIUM SERPL-MCNC: 2 MG/DL — SIGNIFICANT CHANGE UP (ref 1.6–2.6)
MCHC RBC-ENTMCNC: 26.3 PG — LOW (ref 27–34)
MCHC RBC-ENTMCNC: 28.1 GM/DL — LOW (ref 32–36)
MCV RBC AUTO: 93.7 FL — SIGNIFICANT CHANGE UP (ref 80–100)
MONOCYTES # BLD AUTO: 0.72 K/UL — SIGNIFICANT CHANGE UP (ref 0–0.9)
MONOCYTES NFR BLD AUTO: 8.9 % — SIGNIFICANT CHANGE UP (ref 2–14)
NEUTROPHILS # BLD AUTO: 5.53 K/UL — SIGNIFICANT CHANGE UP (ref 1.8–7.4)
NEUTROPHILS NFR BLD AUTO: 68.5 % — SIGNIFICANT CHANGE UP (ref 43–77)
NRBC # BLD: 0 /100 WBCS — SIGNIFICANT CHANGE UP (ref 0–0)
PHOSPHATE SERPL-MCNC: 4.5 MG/DL — SIGNIFICANT CHANGE UP (ref 2.5–4.5)
PLATELET # BLD AUTO: 322 K/UL — SIGNIFICANT CHANGE UP (ref 150–400)
POTASSIUM SERPL-MCNC: 4.6 MMOL/L — SIGNIFICANT CHANGE UP (ref 3.5–5.3)
POTASSIUM SERPL-SCNC: 4.6 MMOL/L — SIGNIFICANT CHANGE UP (ref 3.5–5.3)
PROTHROM AB SERPL-ACNC: 17.4 SEC — HIGH (ref 10–12.9)
RBC # BLD: 4.29 M/UL — SIGNIFICANT CHANGE UP (ref 3.8–5.2)
RBC # FLD: 15.9 % — HIGH (ref 10.3–14.5)
SODIUM SERPL-SCNC: 142 MMOL/L — SIGNIFICANT CHANGE UP (ref 135–145)
TOTAL CHOLESTEROL/HDL RATIO MEASUREMENT: 2.6 RATIO — LOW (ref 3.3–7.1)
TRIGL SERPL-MCNC: 137 MG/DL — SIGNIFICANT CHANGE UP (ref 10–149)
TROPONIN I SERPL-MCNC: <0.015 NG/ML — SIGNIFICANT CHANGE UP (ref 0–0.04)
TSH SERPL-MCNC: 0.95 UU/ML — SIGNIFICANT CHANGE UP (ref 0.34–4.82)
VIT B12 SERPL-MCNC: >2000 PG/ML — HIGH (ref 232–1245)
WBC # BLD: 8.07 K/UL — SIGNIFICANT CHANGE UP (ref 3.8–10.5)
WBC # FLD AUTO: 8.07 K/UL — SIGNIFICANT CHANGE UP (ref 3.8–10.5)

## 2019-04-25 PROCEDURE — 93010 ELECTROCARDIOGRAM REPORT: CPT

## 2019-04-25 RX ORDER — SENNA PLUS 8.6 MG/1
2 TABLET ORAL AT BEDTIME
Qty: 0 | Refills: 0 | Status: DISCONTINUED | OUTPATIENT
Start: 2019-04-25 | End: 2019-04-27

## 2019-04-25 RX ORDER — DOCUSATE SODIUM 100 MG
100 CAPSULE ORAL
Qty: 0 | Refills: 0 | Status: DISCONTINUED | OUTPATIENT
Start: 2019-04-25 | End: 2019-04-25

## 2019-04-25 RX ORDER — ENOXAPARIN SODIUM 100 MG/ML
80 INJECTION SUBCUTANEOUS EVERY 12 HOURS
Qty: 0 | Refills: 0 | Status: COMPLETED | OUTPATIENT
Start: 2019-04-25 | End: 2019-04-26

## 2019-04-25 RX ORDER — POLYETHYLENE GLYCOL 3350 17 G/17G
17 POWDER, FOR SOLUTION ORAL DAILY
Qty: 0 | Refills: 0 | Status: DISCONTINUED | OUTPATIENT
Start: 2019-04-25 | End: 2019-04-27

## 2019-04-25 RX ADMIN — CARVEDILOL PHOSPHATE 3.12 MILLIGRAM(S): 80 CAPSULE, EXTENDED RELEASE ORAL at 06:22

## 2019-04-25 RX ADMIN — AMLODIPINE BESYLATE 2.5 MILLIGRAM(S): 2.5 TABLET ORAL at 06:22

## 2019-04-25 RX ADMIN — CARVEDILOL PHOSPHATE 3.12 MILLIGRAM(S): 80 CAPSULE, EXTENDED RELEASE ORAL at 17:41

## 2019-04-25 RX ADMIN — Medication 400 MILLIGRAM(S): at 21:51

## 2019-04-25 RX ADMIN — WARFARIN SODIUM 5 MILLIGRAM(S): 2.5 TABLET ORAL at 21:48

## 2019-04-25 RX ADMIN — Medication 40 MILLIGRAM(S): at 06:22

## 2019-04-25 RX ADMIN — POLYETHYLENE GLYCOL 3350 17 GRAM(S): 17 POWDER, FOR SOLUTION ORAL at 13:19

## 2019-04-25 RX ADMIN — ENOXAPARIN SODIUM 80 MILLIGRAM(S): 100 INJECTION SUBCUTANEOUS at 13:19

## 2019-04-25 RX ADMIN — Medication 25 MICROGRAM(S): at 06:22

## 2019-04-25 RX ADMIN — ATORVASTATIN CALCIUM 40 MILLIGRAM(S): 80 TABLET, FILM COATED ORAL at 21:48

## 2019-04-25 RX ADMIN — LOSARTAN POTASSIUM 100 MILLIGRAM(S): 100 TABLET, FILM COATED ORAL at 06:23

## 2019-04-25 RX ADMIN — SENNA PLUS 2 TABLET(S): 8.6 TABLET ORAL at 21:48

## 2019-04-25 RX ADMIN — Medication 1000 MILLIGRAM(S): at 22:06

## 2019-04-25 NOTE — PROGRESS NOTE ADULT - SUBJECTIVE AND OBJECTIVE BOX
PGY1 Note discussed with supervising resident and primary attending.    Patient is a 89y old  Female who presents with a chief complaint of right sided chest pain and facial pain (25 Apr 2019 12:11)      INTERVAL HPI/OVERNIGHT EVENTS:  pt seen and examined at bedside.  Cardiology consult appreciated. Nuclear stress test, echo.  [facility] onc recommending lovenox bridging for subtherapeutic INR  COPD at baseline.      MEDICATIONS  (STANDING):  amLODIPine   Tablet 2.5 milliGRAM(s) Oral daily  atorvastatin 40 milliGRAM(s) Oral at bedtime  carvedilol 3.125 milliGRAM(s) Oral every 12 hours  enoxaparin Injectable 80 milliGRAM(s) SubCutaneous every 12 hours  insulin lispro (HumaLOG) corrective regimen sliding scale   SubCutaneous three times a day before meals  insulin lispro (HumaLOG) corrective regimen sliding scale   SubCutaneous at bedtime  levothyroxine 25 MICROGram(s) Oral daily  losartan 100 milliGRAM(s) Oral daily  polyethylene glycol 3350 17 Gram(s) Oral daily  senna 2 Tablet(s) Oral at bedtime  warfarin 5 milliGRAM(s) Oral daily    MEDICATIONS  (PRN):  acetaminophen    Suspension .. 650 milliGRAM(s) Oral every 6 hours PRN Temp greater or equal to 38C (100.4F), Mild Pain (1 - 3)  acetaminophen  IVPB .. 1000 milliGRAM(s) IV Intermittent once PRN Severe Pain (7 - 10)      Allergies    aspirin (Stomach Upset)    Intolerances        REVIEW OF SYSTEMS:  CONSTITUTIONAL: No fever, weight loss, or fatigue  RESPIRATORY: No cough, wheezing, chills or hemoptysis; No shortness of breath  CARDIOVASCULAR: No chest pain, palpitations, dizziness, or leg swelling  GASTROINTESTINAL: No abdominal or epigastric pain. No nausea, vomiting, or hematemesis; No diarrhea or constipation. No melena or hematochezia.  NEUROLOGICAL: No headaches, memory loss, loss of strength, numbness, or tremors  SKIN: No itching, burning, rashes, or lesions     Vital Signs Last 24 Hrs  T(C): 36.4 (25 Apr 2019 11:31), Max: 37.2 (24 Apr 2019 19:01)  T(F): 97.6 (25 Apr 2019 11:31), Max: 99 (24 Apr 2019 19:01)  HR: 66 (25 Apr 2019 11:31) (62 - 81)  BP: 132/46 (25 Apr 2019 11:31) (130/58 - 178/60)  BP(mean): --  RR: 17 (25 Apr 2019 11:31) (17 - 18)  SpO2: 95% (25 Apr 2019 11:31) (95% - 98%)    PHYSICAL EXAM:  GENERAL: NAD, well-groomed, well-developed  HEAD:  Atraumatic, Normocephalic  EYES: EOMI, PERRLA, conjunctiva and sclera clear  NECK: Supple, No JVD, Normal thyroid  CHEST/LUNG: Clear to percussion bilaterally; No rales, rhonchi, wheezing, or rubs  HEART: Regular rate and rhythm; No murmurs, rubs, or gallops  ABDOMEN: Soft, Nontender, Nondistended; Bowel sounds present  NERVOUS SYSTEM:  Alert & Oriented X3, Good concentration; Motor Strength 5/5 B/L   EXTREMITIES:  2+ Peripheral Pulses, No clubbing, cyanosis, or edema  SKIN;    LABS:                        11.3   8.07  )-----------( 322      ( 25 Apr 2019 07:50 )             40.2     04-25    142  |  104  |  20<H>  ----------------------------<  110<H>  4.6   |  36<H>  |  1.02    Ca    8.6      25 Apr 2019 07:50  Phos  4.5     04-25  Mg     2.0     04-25    TPro  7.4  /  Alb  3.7  /  TBili  0.3  /  DBili  x   /  AST  19  /  ALT  26  /  AlkPhos  82  04-24    PT/INR - ( 25 Apr 2019 07:50 )   PT: 17.4 sec;   INR: 1.55 ratio         PTT - ( 24 Apr 2019 08:47 )  PTT:38.3 sec    CAPILLARY BLOOD GLUCOSE      POCT Blood Glucose.: 103 mg/dL (25 Apr 2019 08:05)  POCT Blood Glucose.: 114 mg/dL (24 Apr 2019 22:56)      RADIOLOGY & ADDITIONAL TESTS:    Imaging Personally Reviewed:  [ ] YES  [ ] NO    Consultant(s) Notes Reviewed:  [ ] YES  [ ] NO

## 2019-04-25 NOTE — PROGRESS NOTE ADULT - PROBLEM SELECTOR PLAN 2
[facility] Hospital Medicine  History & Physical    Patient Name: Navin Henning Jr.  MRN: [id number]  Admission Date: 3/3/2019  Attending Physician: Mac Chaparro MD   Primary Care Provider: Primary Doctor No         Patient information was obtained from patient and ER records.     Subjective:     Principal Problem:Ileus    Chief Complaint:   Chief Complaint   Patient presents with    Nausea     Patient reports nausea and vomiting that began last night, evaluated here last night and told to return to ED for worsening symptoms.     Emesis        HPI: 76 y.o. male with hyperlipidemia, COPD, and depression presents with complaint of nausea, vomiting, and abdominal pain that began last night.  Associated with abdominal distension and fever, T-max 102°  F at home.  He denies cough, SOB, chest pain, dizziness, syncope, diarrhea, hematemesis, coffee-ground emesis, bloody or black stools, or dysuria.  Evaluated in ED early this morning for these symptoms, received IV fluids, antiemetics, and Tylenol with improvement and was discharged.  Upon return home his symptoms recurred and he presented to the ER again.  CT abdomen/pelvis suggestive of mild ileus.  Also evidence of acute kidney injury, creatinine 1.7 with presume normal baseline.  He was not febrile in the ED and vital signs and lab work reassuring.  IV fluids and antiemetics initiated.  NG tube placed.  Admitted to Hospital Medicine for further evaluation treatment.    Past Medical History:   Diagnosis Date    Asthma     COPD (chronic obstructive pulmonary disease)     Depression     High cholesterol        No past surgical history on file.    Review of patient's allergies indicates:   Allergen Reactions    Doxycycline Rash    Penicillins Rash       Current Facility-Administered Medications on File Prior to Encounter   Medication    [COMPLETED] (pyxis) gi cocktail (mylanta 30 mL, lidocaine 2 % viscous 10 mL, dicyclomine 10 mL) 50  mL    [COMPLETED] 0.9%  NaCl infusion    [COMPLETED] ketorolac injection 15 mg    [COMPLETED] omnipaque 350 iohexol 75 mL    [COMPLETED] ondansetron injection 8 mg    [COMPLETED] pantoprazole injection 40 mg    [DISCONTINUED] ketorolac injection 15 mg     Current Outpatient Medications on File Prior to Encounter   Medication Sig    aspirin (ECOTRIN) 81 MG EC tablet Take 81 mg by mouth once daily.    FLUoxetine 20 MG tablet Take 40 mg by mouth once daily.    multivitamin capsule Take 1 capsule by mouth once daily.    ondansetron (ZOFRAN) 4 MG tablet Take 1 tablet (4 mg total) by mouth every 6 (six) hours as needed for Nausea (for nausea).    pravastatin (PRAVACHOL) 40 MG tablet Take 40 mg by mouth once daily.    [DISCONTINUED] polyethylene glycol (GLYCOLAX) 17 gram/dose powder Take 17 g by mouth once daily.     Family History     None        Tobacco Use    Smoking status: Never Smoker   Substance and Sexual Activity    Alcohol use: No     Frequency: Never    Drug use: No    Sexual activity: Not on file     Review of Systems   Constitutional: Positive for chills and fever.   Eyes: Negative for photophobia and visual disturbance.   Respiratory: Negative for cough and shortness of breath.    Cardiovascular: Negative for chest pain, palpitations and leg swelling.   Gastrointestinal: Positive for abdominal distention, abdominal pain, nausea and vomiting. Negative for diarrhea.   Genitourinary: Negative for frequency, hematuria and urgency.   Skin: Negative for pallor, rash and wound.   Neurological: Negative for light-headedness and headaches.   Psychiatric/Behavioral: Negative for confusion and decreased concentration.     Objective:     Vital Signs (Most Recent):  Temp: 98.4 °F (36.9 °C) (03/03/19 1652)  Pulse: 63 (03/03/19 1652)  Resp: 17 (03/03/19 1652)  BP: (!) 106/58 (03/03/19 1652)  SpO2: 96 % (03/03/19 1652) Vital Signs (24h Range):  Temp:  [97.8 °F (36.6 °C)-99.7 °F (37.6 °C)] 98.4 °F (36.9  °C)  Pulse:  [63-82] 63  Resp:  [16-20] 17  SpO2:  [91 %-100 %] 96 %  BP: (101-161)/(55-64) 106/58     Weight: 72.6 kg (160 lb)  Body mass index is 25.82 kg/m².    Physical Exam   Constitutional: He is oriented to person, place, and time. He appears well-developed and well-nourished. No distress.   HENT:   Head: Normocephalic and atraumatic.   Right Ear: External ear normal.   Left Ear: External ear normal.   Nose: Nose normal.   Mouth/Throat: Oropharynx is clear and moist.   Eyes: Conjunctivae and EOM are normal. Pupils are equal, round, and reactive to light.   Neck: Normal range of motion. Neck supple.   Cardiovascular: Normal rate, regular rhythm and intact distal pulses.   Pulmonary/Chest: Effort normal and breath sounds normal. No respiratory distress. He has no wheezes. He has no rales.   Abdominal: Soft. He exhibits distension. Bowel sounds are decreased. There is tenderness (Mild, generalized).   No palpable hepatomegaly or splenomegaly   Musculoskeletal: Normal range of motion. He exhibits no edema or tenderness.   Neurological: He is alert and oriented to person, place, and time.   Skin: Skin is warm and dry.   Psychiatric: He has a normal mood and affect. Thought content normal.   Nursing note and vitals reviewed.        CRANIAL NERVES     CN III, IV, VI   Pupils are equal, round, and reactive to light.  Extraocular motions are normal.        Significant Labs: All pertinent labs within the past 24 hours have been reviewed.    Significant Imaging: I have reviewed all pertinent imaging results/findings within the past 24 hours.    Assessment/Plan:     * Ileus    Abdominal pain and distension, nausea, and vomiting with evidence of mild ileus on CT.  Continue NG tube and supportive care with IV fluids, antiemetics, and analgesics.  NPO.     Intractable vomiting with nausea    Supportive care as above.     Acute viral syndrome    Supportive care as above.  Antipyretics as needed for fever.     CAROL (acute  kidney injury)    Creatinine 1.7, no prior baseline on file but he denies chronic kidney disease. Suspect prerenal secondary to dehydration due to gastric losses and poor p.o. intake.  Continue IV fluids, strict I/O's, monitor renal function and electrolytes, avoid nephrotoxic agents.     COPD (chronic obstructive pulmonary disease)    Well controlled and not on any inhalers or nebs at home, he does not smoke.  P.r.n. nebs.     Depression    Resume fluoxetine when able to tolerate p.o.     High cholesterol    Resume statin when able to tolerate p.o.       VTE Risk Mitigation (From admission, onward)        Ordered     enoxaparin injection 40 mg  Daily      03/03/19 1354     IP VTE HIGH RISK PATIENT  Once      03/03/19 1354     Place VICKIE hose  Until discontinued      03/03/19 1354        Benito Herrera Jr., APRN, [facility]  Hospitalist - Department of Hospital Medicine  [facility] [address] [place] [address]  Office #: [phone]; Pager #: [phone]     MAT with Twave inversion on V2 to V5   unchanged form last EKG in 2017

## 2019-04-25 NOTE — CONSULT NOTE ADULT - PROBLEM SELECTOR RECOMMENDATION 9
Continue Anastrazole due to large tumor and benefits of long term aromatase inhibitor w/o significant side effects.
R/o ACS protocol  ASA, BB, Statin  Echo  Cardio eval

## 2019-04-25 NOTE — CONSULT NOTE ADULT - ASSESSMENT
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 History obtained form patient and daughter at bedside who mentions that she was having pain over right chest, shoulder and face which was different then her usual pain from disc herniation. Patient is not able to describe characteristics of pain - reports that pain was combination of colicky, pressure and stabbing, about 6-8/10 with no aggravating and reliving factor. Family also noted that her O2 requirement has gone up over last week. Denies nausea, vomiting, diarrhea, abdominal pain, palpitations, dizziness, headache, cough, wheezing, joint pain or swelling, fever, chills. (24 Apr 2019 19:11)
89 yr old Female from home with PMHX of COPD on 2L home O2, Breast cancer, HTN, HLD, PE on coumadin, DM, lumbar disc herniation on steroid injections (last was on march 11, 2019) presented with right sided chest pain.  1.Tele monitoring.  2.Echocardiogram.  3.Stress test-R/O ischemia.  4.DM-Insulin  5.HTN-cont bp medication.  6.Lipid d/o-statin.  7.Hypothyroidism-synthroid.  8.PPI.

## 2019-04-25 NOTE — PROGRESS NOTE ADULT - SUBJECTIVE AND OBJECTIVE BOX
Patient is a 89y old  Female who presents with a chief complaint of right sided chest pain and facial pain (24 Apr 2019 19:11)    pt seen in icu [  ], reg med floor [   ], bed [  ], chair at bedside [   ], a+o x3 [  ], lethargic [  ],  nad [  ]    valle [  ], ngt [  ], peg [  ], et tube [  ], cent line [  ], picc line [  ]        Allergies    aspirin (Stomach Upset)        Vitals    T(F): 97.7 (04-25-19 @ 05:36), Max: 99 (04-24-19 @ 19:01)  HR: 65 (04-25-19 @ 06:23) (62 - 81)  BP: 130/58 (04-25-19 @ 06:23) (130/58 - 178/60)  RR: 17 (04-25-19 @ 05:36) (17 - 18)  SpO2: 96% (04-25-19 @ 05:36) (95% - 98%)  Wt(kg): --  CAPILLARY BLOOD GLUCOSE      POCT Blood Glucose.: 114 mg/dL (24 Apr 2019 22:56)      Labs                          11.1   8.40  )-----------( 322      ( 24 Apr 2019 08:47 )             38.5       04-24    140  |  103  |  20<H>  ----------------------------<  103<H>  4.0   |  31  |  1.08    Ca    8.6      24 Apr 2019 08:47    TPro  7.4  /  Alb  3.7  /  TBili  0.3  /  DBili  x   /  AST  19  /  ALT  26  /  AlkPhos  82  04-24      CARDIAC MARKERS ( 24 Apr 2019 16:25 )  <0.015 ng/mL / x     / x     / x     / x      CARDIAC MARKERS ( 24 Apr 2019 08:47 )  <0.015 ng/mL / x     / x     / x     / x                Radiology Results      Meds    MEDICATIONS  (STANDING):  amLODIPine   Tablet 2.5 milliGRAM(s) Oral daily  aspirin enteric coated 81 milliGRAM(s) Oral daily  atorvastatin 40 milliGRAM(s) Oral at bedtime  carvedilol 3.125 milliGRAM(s) Oral every 12 hours  furosemide    Tablet 40 milliGRAM(s) Oral daily  insulin lispro (HumaLOG) corrective regimen sliding scale   SubCutaneous three times a day before meals  insulin lispro (HumaLOG) corrective regimen sliding scale   SubCutaneous at bedtime  levothyroxine 25 MICROGram(s) Oral daily  losartan 100 milliGRAM(s) Oral daily  warfarin 5 milliGRAM(s) Oral daily      MEDICATIONS  (PRN):  acetaminophen    Suspension .. 650 milliGRAM(s) Oral every 6 hours PRN Temp greater or equal to 38C (100.4F), Mild Pain (1 - 3)  acetaminophen  IVPB .. 1000 milliGRAM(s) IV Intermittent once PRN Severe Pain (7 - 10)      Physical Exam    Neuro :  no focal deficits  Respiratory: CTA B/L  CV: RRR, S1S2, no murmurs,   Abdominal: Soft, NT, ND +BS,  Extremities: No edema, + peripheral pulses    ASSESSMENT    Chest pain  PE (pulmonary thromboembolism)  COPD (chronic obstructive pulmonary disease)  Breast cancer in female  HLD (hyperlipidemia)  HTN (hypertension)  DM (diabetes mellitus)  H/O mastectomy, right      PLAN 89/F from home with PMH of COPD on 2L home O2, Breast cancer, HTN, HLD, PE on coumadin, DM, lumbar disc herniation on steroid injections (last was on march 11, 2019) presented with right sided chest pain, shoulder pain and facial pain. History obtained form patient and daughter at bedside who mentions that she was having pain over right chest, shoulder and face which was different then her usual pain from disc herniation. Patient is not able to describe characteristics of pain - reports that pain was combination of colicky, pressure and stabbing, about 6-8/10 with no aggravating and reliving factor. Family also noted that her O2 requirement has gone up over last week. Denies nausea, vomiting, diarrhea, abdominal pain, palpitations, dizziness, headache, cough, wheezing, joint pain or swelling, fever, chills.        Review of Systems:  Other Review of Systems: All other review of systems negative, except as noted in HPI	      pt seen in tele [x  ], reg med floor [   ], bed [ x ], chair at bedside [   ], a+o x3 [x  ], lethargic [  ],  nad [ x ]        Allergies    aspirin (Stomach Upset)        Vitals    T(F): 97.7 (04-25-19 @ 05:36), Max: 99 (04-24-19 @ 19:01)  HR: 65 (04-25-19 @ 06:23) (62 - 81)  BP: 130/58 (04-25-19 @ 06:23) (130/58 - 178/60)  RR: 17 (04-25-19 @ 05:36) (17 - 18)  SpO2: 96% (04-25-19 @ 05:36) (95% - 98%)  Wt(kg): --  CAPILLARY BLOOD GLUCOSE      POCT Blood Glucose.: 114 mg/dL (24 Apr 2019 22:56)      Labs                          11.1   8.40  )-----------( 322      ( 24 Apr 2019 08:47 )             38.5       04-24    140  |  103  |  20<H>  ----------------------------<  103<H>  4.0   |  31  |  1.08    Ca    8.6      24 Apr 2019 08:47    TPro  7.4  /  Alb  3.7  /  TBili  0.3  /  DBili  x   /  AST  19  /  ALT  26  /  AlkPhos  82  04-24    INR: 1.55 ratio (04.25.19 @ 07:50)        CARDIAC MARKERS ( 24 Apr 2019 16:25 )  <0.015 ng/mL / x     / x     / x     / x      CARDIAC MARKERS ( 24 Apr 2019 08:47 )  <0.015 ng/mL / x     / x     / x     / x                Radiology Results      Meds    MEDICATIONS  (STANDING):  amLODIPine   Tablet 2.5 milliGRAM(s) Oral daily  aspirin enteric coated 81 milliGRAM(s) Oral daily  atorvastatin 40 milliGRAM(s) Oral at bedtime  carvedilol 3.125 milliGRAM(s) Oral every 12 hours  furosemide    Tablet 40 milliGRAM(s) Oral daily  insulin lispro (HumaLOG) corrective regimen sliding scale   SubCutaneous three times a day before meals  insulin lispro (HumaLOG) corrective regimen sliding scale   SubCutaneous at bedtime  levothyroxine 25 MICROGram(s) Oral daily  losartan 100 milliGRAM(s) Oral daily  warfarin 5 milliGRAM(s) Oral daily      MEDICATIONS  (PRN):  acetaminophen    Suspension .. 650 milliGRAM(s) Oral every 6 hours PRN Temp greater or equal to 38C (100.4F), Mild Pain (1 - 3)  acetaminophen  IVPB .. 1000 milliGRAM(s) IV Intermittent once PRN Severe Pain (7 - 10)      Physical Exam    Neuro :  no focal deficits  Respiratory: CTA B/L  CV: RRR, S1S2, no murmurs,   Abdominal: Soft, NT, ND +BS,  Extremities: No edema, + peripheral pulses    ASSESSMENT    Chest pain  r/o acs  right mandibular and clavicular pain likely 2nd to degenerative disease  h/o PE (pulmonary thromboembolism)  COPD (chronic obstructive pulmonary disease)  Breast cancer in female  HLD (hyperlipidemia)  HTN (hypertension)  DM (diabetes mellitus)  H/O mastectomy, right      PLAN      cont tele,   acs protocol,   cont lopressor, aspirin, statin,   ce q8 x 2 neg noted above  f/u echo  cardio cons  cont tylenol prn pain  pulm cons  cont bronchodilators  heme onc cons  inr subtherapeutic noted above  cont coumadin  add lovenox 80 mg q 12 until inr >2  cont current meds

## 2019-04-25 NOTE — CONSULT NOTE ADULT - SUBJECTIVE AND OBJECTIVE BOX
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 History obtained form patient and daughter at bedside who mentions that she was having pain over right chest, shoulder and face which was different then her usual pain from disc herniation. Patient is not able to describe characteristics of pain - reports that pain was combination of colicky, pressure and stabbing, about 6-8/10 with no aggravating and reliving factor. Family also noted that her O2 requirement has gone up over last week. Denies nausea, vomiting, diarrhea, abdominal pain, palpitations, dizziness, headache, cough, wheezing, joint pain or swelling, fever, chills. (24 Apr 2019 19:11)       ROS:  Negative except for:    PAST MEDICAL & SURGICAL HISTORY:  PE (pulmonary thromboembolism)  COPD (chronic obstructive pulmonary disease)  Breast cancer in female  HLD (hyperlipidemia)  HTN (hypertension)  DM (diabetes mellitus)  H/O mastectomy, right      SOCIAL HISTORY:    FAMILY HISTORY:  No pertinent family history in first degree relatives      MEDICATIONS  (STANDING):  amLODIPine   Tablet 2.5 milliGRAM(s) Oral daily  atorvastatin 40 milliGRAM(s) Oral at bedtime  carvedilol 3.125 milliGRAM(s) Oral every 12 hours  enoxaparin Injectable 80 milliGRAM(s) SubCutaneous every 12 hours  insulin lispro (HumaLOG) corrective regimen sliding scale   SubCutaneous three times a day before meals  insulin lispro (HumaLOG) corrective regimen sliding scale   SubCutaneous at bedtime  levothyroxine 25 MICROGram(s) Oral daily  losartan 100 milliGRAM(s) Oral daily  polyethylene glycol 3350 17 Gram(s) Oral daily  senna 2 Tablet(s) Oral at bedtime  warfarin 5 milliGRAM(s) Oral daily    MEDICATIONS  (PRN):  acetaminophen    Suspension .. 650 milliGRAM(s) Oral every 6 hours PRN Temp greater or equal to 38C (100.4F), Mild Pain (1 - 3)  acetaminophen  IVPB .. 1000 milliGRAM(s) IV Intermittent once PRN Severe Pain (7 - 10)      Allergies    aspirin (Stomach Upset)    Intolerances        Vital Signs Last 24 Hrs  T(C): 36.4 (25 Apr 2019 11:31), Max: 37.2 (24 Apr 2019 19:01)  T(F): 97.6 (25 Apr 2019 11:31), Max: 99 (24 Apr 2019 19:01)  HR: 66 (25 Apr 2019 11:31) (62 - 81)  BP: 132/46 (25 Apr 2019 11:31) (130/58 - 178/60)  BP(mean): --  RR: 17 (25 Apr 2019 11:31) (17 - 18)  SpO2: 95% (25 Apr 2019 11:31) (95% - 98%)    PHYSICAL EXAM  General: adult in NAD  HEENT: clear oropharynx, anicteric sclera, pink conjunctiva  Neck: supple  CV: normal S1/S2 with no murmur rubs or gallops  Lungs: positive air movement b/l ant lungs,clear to auscultation, no wheezes, no rales  Abdomen: soft non-tender non-distended, no hepatosplenomegaly  Ext: no clubbing cyanosis or edema  Skin: no rashes and no petechiae  Neuro: alert and oriented X 4, no focal deficits      LABS:                          11.3   8.07  )-----------( 322      ( 25 Apr 2019 07:50 )             40.2         Mean Cell Volume : 93.7 fl  Mean Cell Hemoglobin : 26.3 pg  Mean Cell Hemoglobin Concentration : 28.1 gm/dL  Auto Neutrophil # : 5.53 K/uL  Auto Lymphocyte # : 1.36 K/uL  Auto Monocyte # : 0.72 K/uL  Auto Eosinophil # : 0.39 K/uL  Auto Basophil # : 0.04 K/uL  Auto Neutrophil % : 68.5 %  Auto Lymphocyte % : 16.9 %  Auto Monocyte % : 8.9 %  Auto Eosinophil % : 4.8 %  Auto Basophil % : 0.5 %      Serial CBC's  04-25 @ 07:50  Hct-40.2 / Hgb-11.3 / Plat-322 / RBC-4.29 / WBC-8.07  Serial CBC's  04-24 @ 08:47  Hct-38.5 / Hgb-11.1 / Plat-322 / RBC-4.15 / WBC-8.40      04-25    142  |  104  |  20<H>  ----------------------------<  110<H>  4.6   |  36<H>  |  1.02    Ca    8.6      25 Apr 2019 07:50  Phos  4.5     04-25  Mg     2.0     04-25    TPro  7.4  /  Alb  3.7  /  TBili  0.3  /  DBili  x   /  AST  19  /  ALT  26  /  AlkPhos  82  04-24      PT/INR - ( 25 Apr 2019 07:50 )   PT: 17.4 sec;   INR: 1.55 ratio         PTT - ( 24 Apr 2019 08:47 )  PTT:38.3 sec                BLOOD SMEAR INTERPRETATION:       RADIOLOGY & ADDITIONAL STUDIES:
CHIEF COMPLAINT:Patient is a 89y old  Female who presents with a chief complaint of right sided chest pain and facial pain      HPI:  89 yr old Female from home with PMHX of COPD on 2L home O2, Breast cancer, HTN, HLD, PE on coumadin, DM, lumbar disc herniation on steroid injections (last was on march 11, 2019) presented with right sided chest pain, shoulder pain and facial pain. History obtained form patient and daughter at bedside who mentions that she was having pain over right chest, shoulder and face which was different then her usual pain from disc herniation. Patient is not able to describe characteristics of pain - reports that pain was combination of colicky, pressure and stabbing, about 6-8/10 with no aggravating and reliving factor. Family also noted that her O2 requirement has gone up over last week. Denies nausea, vomiting, diarrhea, abdominal pain, palpitations, dizziness, headache, cough, wheezing, joint pain or swelling, fever, chills. (24 Apr 2019 19:11)      PAST MEDICAL & SURGICAL HISTORY:  PE (pulmonary thromboembolism)  COPD (chronic obstructive pulmonary disease)  Breast cancer in female  HLD (hyperlipidemia)  HTN (hypertension)  DM (diabetes mellitus)  H/O mastectomy, right      MEDICATIONS  (STANDING):  amLODIPine   Tablet 2.5 milliGRAM(s) Oral daily  aspirin enteric coated 81 milliGRAM(s) Oral daily  atorvastatin 40 milliGRAM(s) Oral at bedtime  carvedilol 3.125 milliGRAM(s) Oral every 12 hours  insulin lispro (HumaLOG) corrective regimen sliding scale   SubCutaneous three times a day before meals  insulin lispro (HumaLOG) corrective regimen sliding scale   SubCutaneous at bedtime  levothyroxine 25 MICROGram(s) Oral daily  losartan 100 milliGRAM(s) Oral daily  warfarin 5 milliGRAM(s) Oral daily    MEDICATIONS  (PRN):  acetaminophen    Suspension .. 650 milliGRAM(s) Oral every 6 hours PRN Temp greater or equal to 38C (100.4F), Mild Pain (1 - 3)  acetaminophen  IVPB .. 1000 milliGRAM(s) IV Intermittent once PRN Severe Pain (7 - 10)      FAMILY HISTORY:  No pertinent family history in first degree relatives      SOCIAL HISTORY:    [x ] Non-smoker    [x ] Alcohol-denies    Allergies    aspirin (Stomach Upset)    Intolerances    	    REVIEW OF SYSTEMS:  CONSTITUTIONAL: No fever, weight loss, or fatigue  EYES: No eye pain, visual disturbances, or discharge  ENT:  No difficulty hearing, tinnitus, vertigo; No sinus or throat pain  NECK: No pain or stiffness  RESPIRATORY: No cough, wheezing, chills or hemoptysis; No Shortness of Breath  CARDIOVASCULAR: + chest pain, No palpitations, passing out, dizziness, or leg swelling  GASTROINTESTINAL: No abdominal or epigastric pain. No nausea, vomiting, or hematemesis; No diarrhea or constipation. No melena or hematochezia.  GENITOURINARY: No dysuria, frequency, hematuria, or incontinence  NEUROLOGICAL: No headaches, memory loss, loss of strength, numbness, or tremors  SKIN: No itching, burning, rashes, or lesions   LYMPH Nodes: No enlarged glands  ENDOCRINE: No heat or cold intolerance; No hair loss  MUSCULOSKELETAL: No joint pain or swelling; No muscle, back, or extremity pain  PSYCHIATRIC: No depression, anxiety, mood swings, or difficulty sleeping  HEME/LYMPH: No easy bruising, or bleeding gums  ALLERGY AND IMMUNOLOGIC: No hives or eczema	      PHYSICAL EXAM:  T(C): 36.5 (04-25-19 @ 05:36), Max: 37.2 (04-24-19 @ 19:01)  HR: 65 (04-25-19 @ 06:23) (62 - 81)  BP: 130/58 (04-25-19 @ 06:23) (130/58 - 178/60)  RR: 17 (04-25-19 @ 05:36) (17 - 18)  SpO2: 96% (04-25-19 @ 05:36) (95% - 98%)    Appearance: Normal	  HEENT:   Normal oral mucosa, PERRL, EOMI	  Lymphatic: No lymphadenopathy  Cardiovascular: Normal S1 S2, No JVD, No murmurs, No edema  Respiratory: Lungs clear to auscultation	  Psychiatry: A & O x 3, Mood & affect appropriate  Gastrointestinal:  Soft, Non-tender, + BS	  Skin: No rashes, No ecchymoses, No cyanosis	  Neurologic: Non-focal  Extremities: Normal range of motion, No clubbing, cyanosis or edema  Vascular: Peripheral pulses palpable 2+ bilaterally  	    ECG:  	nsr with T wave inversion anterior leads    	  LABS:	 	    CARDIAC MARKERS:  CARDIAC MARKERS ( 25 Apr 2019 07:50 )  <0.015 ng/mL / x     / 68 U/L / x     / 1.3 ng/mL  CARDIAC MARKERS ( 24 Apr 2019 16:25 )  <0.015 ng/mL / x     / x     / x     / x      CARDIAC MARKERS ( 24 Apr 2019 08:47 )  <0.015 ng/mL / x     / x     / x     / x                              11.3   8.07  )-----------( 322      ( 25 Apr 2019 07:50 )             40.2     04-25    142  |  104  |  20<H>  ----------------------------<  110<H>  4.6   |  36<H>  |  1.02    Ca    8.6      25 Apr 2019 07:50  Phos  4.5     04-25  Mg     2.0     04-25    TPro  7.4  /  Alb  3.7  /  TBili  0.3  /  DBili  x   /  AST  19  /  ALT  26  /  AlkPhos  82  04-24      Lipid Profile: Cholesterol 140  LDL 60  HDL 53        TSH: Thyroid Stimulating Hormone, Serum: 0.95 uU/mL (04-25 @ 07:50)    EXAM:  CT ANGIO CHEST (W)AW IC                            PROCEDURE DATE:  04/24/2019          INTERPRETATION:  CT ANGIO CHEST (W)AW IC    CLINICAL INFORMATION:  right chest pain  r/o acute pulm. embolism    TECHNIQUE: Contrast enhanced CT pulmonary angiogram was performed.   Multiplanar CT and HRCT images were reviewed. Maximum intensity   projection (MIP) images are reconstructed as per CT angiography protocol.   Images were acquired during the administration of 50 cc Omnipaque 350 IV   contrast. This study was performed using automatic exposure control   (radiation dose reduction software) to obtain a diagnostic image quality   scan with patient dose as low as reasonably achievable.    COMPARISON: Same day chest x-ray    PULMONARY ARTERIES: No pulmonary embolism to the proximal segmental   branches. Limited visualization of distal segmental and subsegmental   branches secondary to a combination of respiratory motion and suboptimal   bolus timing.    LUNGS, AIRWAYS: The central airways are patent. The lungs are clear.   Bibasilar atelectasis.    PLEURA: No pleural abnormality.    VESSELS: Normal caliber aorta.    HEART: Normal heart size. No pericardial effusion.    MEDIASTINUM, ZHANNA, AXILLAE: No adenopathy.    UPPER ABDOMEN: Limited visualization is unremarkable.    BONES AND CHEST WALL: No acute bony abnormality.    IMPRESSION:     No pulmonary embolism to the proximal segmental branches. Limited   visualization of distal segmental and subsegmental branches secondary to   a combination of respiratory motion and suboptimal bolus timing.    No acute chest pathology.              Echocardiogram:     CONCLUSIONS:  1. Normal mitral valve.  2. Normal trileaflet aortic valve.  3. Aortic Root: 3.5 cm.  4. Mild left atrial enlargement.  5. Normal left ventricular internal dimensions and wall  thicknesses.  6. Normal Left Ventricular Systolic Function,  (EF = 55%)  7. Grade I diastolic dysfunction (Impaired relaxation).  8. Normal right atrium.  9. Normal right ventricular size and function.  10. RV systolic pressure is 29 mm Hg.  11. There is mild tricuspid regurgitation.  12. There is mild pulmonic regurgitation.  13. Normal pericardium with no pericardial effusion.    ------------------------------------------------------------------------  Confirmed on  11/7/2017 - 07:42:01 by Ruthy Coy MD    PT/INR - ( 25 Apr 2019 07:50 )   PT: 17.4 sec;   INR: 1.55 ratio         PTT - ( 24 Apr 2019 08:47 )  PTT:38.3 sec
PULMONARY CONSULT NOTE      SIMONA CARLTON  MRN-[id number]    Patient is a 89y old  Female who presents with a chief complaint of right sided chest pain and facial pain (25 Apr 2019 10:53)      History of Present Illness:  Reason for Admission: right sided chest pain and facial pain	  History of Present Illness: 	  89/F from home with PMH of COPD on 2L home O2, Breast cancer, HTN, HLD, PE on coumadin, DM, lumbar disc herniation on steroid injections (last was on march 11, 2019) presented with right sided chest pain, shoulder pain and facial pain. History obtained form patient and daughter at bedside who mentions that she was having pain over right chest, shoulder and face which was different then her usual pain from disc herniation. Patient is not able to describe characteristics of pain - reports that pain was combination of colicky, pressure and stabbing, about 6-8/10 with no aggravating and reliving factor. Family also noted that her O2 requirement has gone up over last week. Denies nausea, vomiting, diarrhea, abdominal pain, palpitations, dizziness, headache, cough, wheezing, joint pain or swelling, fever, chills.        HISTORY OF PRESENT ILLNESS: As above. Awake, alert, comfortable out of bed to chair in NAD. Has low back pain    MEDICATIONS  (STANDING):  amLODIPine   Tablet 2.5 milliGRAM(s) Oral daily  atorvastatin 40 milliGRAM(s) Oral at bedtime  carvedilol 3.125 milliGRAM(s) Oral every 12 hours  insulin lispro (HumaLOG) corrective regimen sliding scale   SubCutaneous three times a day before meals  insulin lispro (HumaLOG) corrective regimen sliding scale   SubCutaneous at bedtime  levothyroxine 25 MICROGram(s) Oral daily  losartan 100 milliGRAM(s) Oral daily  polyethylene glycol 3350 17 Gram(s) Oral daily  senna 2 Tablet(s) Oral at bedtime  warfarin 5 milliGRAM(s) Oral daily      MEDICATIONS  (PRN):  acetaminophen    Suspension .. 650 milliGRAM(s) Oral every 6 hours PRN Temp greater or equal to 38C (100.4F), Mild Pain (1 - 3)  acetaminophen  IVPB .. 1000 milliGRAM(s) IV Intermittent once PRN Severe Pain (7 - 10)      Allergies    aspirin (Stomach Upset)    Intolerances        PAST MEDICAL & SURGICAL HISTORY:  PE (pulmonary thromboembolism)  COPD (chronic obstructive pulmonary disease)  Breast cancer in female  HLD (hyperlipidemia)  HTN (hypertension)  DM (diabetes mellitus)  H/O mastectomy, right      FAMILY HISTORY:  No pertinent family history in first degree relatives      SOCIAL HISTORY  Smoking History:     REVIEW OF SYSTEMS:    CONSTITUTIONAL:  No fevers, chills, sweats    HEENT:  Eyes:  No diplopia or blurred vision. ENT:  No earache, sore throat or runny nose.    CARDIOVASCULAR:  + chest pain but no palpitations.    RESPIRATORY:  Per HPI    GASTROINTESTINAL:  No abdominal pain, nausea, vomiting or diarrhea.    GENITOURINARY:  No dysuria, frequency or urgency.    NEUROLOGIC:  No paresthesias, fasciculations, seizures or weakness.    PSYCHIATRIC:  No disorder of thought or mood.    Vital Signs Last 24 Hrs  T(C): 36.4 (25 Apr 2019 11:31), Max: 37.2 (24 Apr 2019 19:01)  T(F): 97.6 (25 Apr 2019 11:31), Max: 99 (24 Apr 2019 19:01)  HR: 66 (25 Apr 2019 11:31) (62 - 81)  BP: 132/46 (25 Apr 2019 11:31) (130/58 - 178/60)  BP(mean): --  RR: 17 (25 Apr 2019 11:31) (17 - 18)  SpO2: 95% (25 Apr 2019 11:31) (95% - 98%)  I&O's Detail      PHYSICAL EXAMINATION:    GENERAL: The patient is a well-developed, well-nourished _____in no apparent distress.     HEENT: Head is normocephalic and atraumatic. Extraocular muscles are intact. Mucous membranes are moist.     NECK: Supple.     LUNGS: Clear to auscultation without wheezing, rales, or rhonchi. Respirations unlabored    HEART: Regular rate and rhythm without murmur.    ABDOMEN: Soft, nontender, and nondistended.  No hepatosplenomegaly is noted.    EXTREMITIES: Without any cyanosis, clubbing, rash, lesions or edema.    NEUROLOGIC: Grossly intact.      LABS:                        11.3   8.07  )-----------( 322      ( 25 Apr 2019 07:50 )             40.2     04-25    142  |  104  |  20<H>  ----------------------------<  110<H>  4.6   |  36<H>  |  1.02    Ca    8.6      25 Apr 2019 07:50  Phos  4.5     04-25  Mg     2.0     04-25    TPro  7.4  /  Alb  3.7  /  TBili  0.3  /  DBili  x   /  AST  19  /  ALT  26  /  AlkPhos  82  04-24    PT/INR - ( 25 Apr 2019 07:50 )   PT: 17.4 sec;   INR: 1.55 ratio         PTT - ( 24 Apr 2019 08:47 )  PTT:38.3 sec      CARDIAC MARKERS ( 25 Apr 2019 07:50 )  <0.015 ng/mL / x     / 68 U/L / x     / 1.3 ng/mL  CARDIAC MARKERS ( 24 Apr 2019 16:25 )  <0.015 ng/mL / x     / x     / x     / x      CARDIAC MARKERS ( 24 Apr 2019 08:47 )  <0.015 ng/mL / x     / x     / x     / x            Serum Pro-Brain Natriuretic Peptide: 199 pg/mL (04-24-19 @ 08:47)          MICROBIOLOGY:    RADIOLOGY & ADDITIONAL STUDIES:    CXR:  < from: Xray Chest 1 View AP/PA (04.24.19 @ 08:30) >  IMPRESSION: Unchanged chest as above.    < end of copied text >    Ct scan chest:    ekg;    echo:

## 2019-04-25 NOTE — PROGRESS NOTE ADULT - PROBLEM SELECTOR PLAN 1
presented with right sided chest pain radiating to shoulder and face over last 3 days- non specific but describes different then her usual pain form disc herniation   -  likely musculoskeletal vs neuropathic given distribution and history of herniation    T1 and T2 negative   EKG MAT with Twave inversion in V2 to V5  proBNP 199 and CTA chest - no PE, no acute pathology noted   will start on aspirin, statin and carvedilol as per ACS protocol   - will also start on tylenol PRN for pain control   - patient is not able to tolerate tramadol and gabapentin which makes her sleepy/lethargic  cardio Dr. Coy

## 2019-04-25 NOTE — CONSULT NOTE ADULT - REASON FOR ADMISSION
right sided chest pain and facial pain

## 2019-04-25 NOTE — CONSULT NOTE ADULT - PROBLEM SELECTOR PROBLEM 1
Malignant neoplasm of upper-outer quadrant of right breast in female, estrogen receptor positive
Chest pain, unspecified type

## 2019-04-25 NOTE — CONSULT NOTE ADULT - PROBLEM SELECTOR RECOMMENDATION 2
Unprovoked with syncope.   Achieve INR 2.5 with coverage with Lovenox until therapeutic
Bronchodilators  Symbicort 160/4.5 mcgs 2 puffs po BID  Spiriva  Pfts as OP  Oxygen supp

## 2019-04-25 NOTE — PROGRESS NOTE ADULT - ASSESSMENT
89/F from home with PMH of COPD on 2L home O2, Breast cancer, HTN, HLD, PE on coumadin, DM, lumbar disc herniation on steroid injections (last was on march 11, 2019) presented with right sided chest pain, shoulder pain and facial pain. History obtained form patient and daughter at bedside who mentions that she was having pain over right chest, shoulder and face which was different then her usual pain from disc herniation. Patient is not able to describe characteristics of pain - reports that pain was combination of colicky, pressure and stabbing, about 6-8/10 with no aggravating and reliving factor. Family also noted that her O2 requirement has gone up over last week. Denies nausea, vomiting, diarrhea, abdominal pain, palpitations, dizziness, headache, cough, wheezing, joint pain or swelling, fever, chills.      ED course:  Vitals: 128/84, 72, 98.4, 16 (97)  Labs pertinent for INR of 1.45, T1 and T2 negative   ProBNP of 199  CTA chest: no PE, no acute lung pathology  noted   S/p tylenol IV and toradol given improvement in pain  EKG: multifocal atrial tachy with new Twave inversion in V2-V5

## 2019-04-26 LAB
ANION GAP SERPL CALC-SCNC: 4 MMOL/L — LOW (ref 5–17)
BUN SERPL-MCNC: 30 MG/DL — HIGH (ref 7–18)
CALCIUM SERPL-MCNC: 8.6 MG/DL — SIGNIFICANT CHANGE UP (ref 8.4–10.5)
CHLORIDE SERPL-SCNC: 102 MMOL/L — SIGNIFICANT CHANGE UP (ref 96–108)
CO2 SERPL-SCNC: 33 MMOL/L — HIGH (ref 22–31)
CREAT SERPL-MCNC: 1.19 MG/DL — SIGNIFICANT CHANGE UP (ref 0.5–1.3)
GLUCOSE BLDC GLUCOMTR-MCNC: 137 MG/DL — HIGH (ref 70–99)
GLUCOSE BLDC GLUCOMTR-MCNC: 150 MG/DL — HIGH (ref 70–99)
GLUCOSE BLDC GLUCOMTR-MCNC: 85 MG/DL — SIGNIFICANT CHANGE UP (ref 70–99)
GLUCOSE SERPL-MCNC: 130 MG/DL — HIGH (ref 70–99)
HCT VFR BLD CALC: 38.3 % — SIGNIFICANT CHANGE UP (ref 34.5–45)
HGB BLD-MCNC: 10.8 G/DL — LOW (ref 11.5–15.5)
INR BLD: 1.84 RATIO — HIGH (ref 0.88–1.16)
MCHC RBC-ENTMCNC: 26.6 PG — LOW (ref 27–34)
MCHC RBC-ENTMCNC: 28.2 GM/DL — LOW (ref 32–36)
MCV RBC AUTO: 94.3 FL — SIGNIFICANT CHANGE UP (ref 80–100)
NRBC # BLD: 0 /100 WBCS — SIGNIFICANT CHANGE UP (ref 0–0)
PLATELET # BLD AUTO: 300 K/UL — SIGNIFICANT CHANGE UP (ref 150–400)
POTASSIUM SERPL-MCNC: 4.8 MMOL/L — SIGNIFICANT CHANGE UP (ref 3.5–5.3)
POTASSIUM SERPL-SCNC: 4.8 MMOL/L — SIGNIFICANT CHANGE UP (ref 3.5–5.3)
PROTHROM AB SERPL-ACNC: 20.8 SEC — HIGH (ref 10–12.9)
RBC # BLD: 4.06 M/UL — SIGNIFICANT CHANGE UP (ref 3.8–5.2)
RBC # FLD: 15.8 % — HIGH (ref 10.3–14.5)
SODIUM SERPL-SCNC: 139 MMOL/L — SIGNIFICANT CHANGE UP (ref 135–145)
WBC # BLD: 9.15 K/UL — SIGNIFICANT CHANGE UP (ref 3.8–10.5)
WBC # FLD AUTO: 9.15 K/UL — SIGNIFICANT CHANGE UP (ref 3.8–10.5)

## 2019-04-26 PROCEDURE — 93306 TTE W/DOPPLER COMPLETE: CPT | Mod: 26

## 2019-04-26 RX ORDER — MONTELUKAST 4 MG/1
10 TABLET, CHEWABLE ORAL DAILY
Qty: 0 | Refills: 0 | Status: DISCONTINUED | OUTPATIENT
Start: 2019-04-26 | End: 2019-04-27

## 2019-04-26 RX ORDER — LORATADINE 10 MG/1
10 TABLET ORAL DAILY
Qty: 0 | Refills: 0 | Status: DISCONTINUED | OUTPATIENT
Start: 2019-04-26 | End: 2019-04-27

## 2019-04-26 RX ORDER — IPRATROPIUM/ALBUTEROL SULFATE 18-103MCG
3 AEROSOL WITH ADAPTER (GRAM) INHALATION ONCE
Qty: 0 | Refills: 0 | Status: COMPLETED | OUTPATIENT
Start: 2019-04-26 | End: 2019-04-26

## 2019-04-26 RX ORDER — LEVOTHYROXINE SODIUM 125 MCG
1 TABLET ORAL
Qty: 0 | Refills: 0 | COMMUNITY

## 2019-04-26 RX ORDER — ENOXAPARIN SODIUM 100 MG/ML
80 INJECTION SUBCUTANEOUS EVERY 12 HOURS
Qty: 0 | Refills: 0 | Status: COMPLETED | OUTPATIENT
Start: 2019-04-26 | End: 2019-04-27

## 2019-04-26 RX ORDER — PANTOPRAZOLE SODIUM 20 MG/1
40 TABLET, DELAYED RELEASE ORAL
Qty: 0 | Refills: 0 | Status: DISCONTINUED | OUTPATIENT
Start: 2019-04-26 | End: 2019-04-27

## 2019-04-26 RX ADMIN — AMLODIPINE BESYLATE 2.5 MILLIGRAM(S): 2.5 TABLET ORAL at 05:36

## 2019-04-26 RX ADMIN — ATORVASTATIN CALCIUM 40 MILLIGRAM(S): 80 TABLET, FILM COATED ORAL at 21:04

## 2019-04-26 RX ADMIN — LORATADINE 10 MILLIGRAM(S): 10 TABLET ORAL at 13:37

## 2019-04-26 RX ADMIN — ENOXAPARIN SODIUM 80 MILLIGRAM(S): 100 INJECTION SUBCUTANEOUS at 05:38

## 2019-04-26 RX ADMIN — MONTELUKAST 10 MILLIGRAM(S): 4 TABLET, CHEWABLE ORAL at 13:38

## 2019-04-26 RX ADMIN — WARFARIN SODIUM 5 MILLIGRAM(S): 2.5 TABLET ORAL at 21:04

## 2019-04-26 RX ADMIN — SENNA PLUS 2 TABLET(S): 8.6 TABLET ORAL at 21:04

## 2019-04-26 RX ADMIN — ENOXAPARIN SODIUM 80 MILLIGRAM(S): 100 INJECTION SUBCUTANEOUS at 18:00

## 2019-04-26 RX ADMIN — CARVEDILOL PHOSPHATE 3.12 MILLIGRAM(S): 80 CAPSULE, EXTENDED RELEASE ORAL at 18:09

## 2019-04-26 RX ADMIN — CARVEDILOL PHOSPHATE 3.12 MILLIGRAM(S): 80 CAPSULE, EXTENDED RELEASE ORAL at 05:36

## 2019-04-26 RX ADMIN — POLYETHYLENE GLYCOL 3350 17 GRAM(S): 17 POWDER, FOR SOLUTION ORAL at 13:38

## 2019-04-26 RX ADMIN — LOSARTAN POTASSIUM 100 MILLIGRAM(S): 100 TABLET, FILM COATED ORAL at 05:36

## 2019-04-26 RX ADMIN — Medication 25 MICROGRAM(S): at 05:36

## 2019-04-26 NOTE — PROGRESS NOTE ADULT - SUBJECTIVE AND OBJECTIVE BOX
Pt is awake, alert, in NAD. Currently seen/examined in stress test. Family at bedside.     INTERVAL HPI/OVERNIGHT EVENTS:    VITAL SIGNS:  T(F): 98.8 (04-26-19 @ 05:44)  HR: 73 (04-26-19 @ 06:28)  BP: 147/72 (04-26-19 @ 06:28)  RR: 17 (04-26-19 @ 06:16)  SpO2: 99% (04-26-19 @ 06:16)  Wt(kg): --  I&O's Detail          REVIEW OF SYSTEMS:    CONSTITUTIONAL:  No fevers, chills, sweats    HEENT:  Eyes:  No diplopia or blurred vision. ENT:  No earache, sore throat or runny nose.    CARDIOVASCULAR:  No pressure, squeezing, tightness, or heaviness about the chest; no palpitations.    RESPIRATORY:  Per HPI    GASTROINTESTINAL:  No abdominal pain, nausea, vomiting or diarrhea.    GENITOURINARY:  No dysuria, frequency or urgency.    NEUROLOGIC:  No paresthesias, fasciculations, seizures or weakness.    PSYCHIATRIC:  No disorder of thought or mood.      PHYSICAL EXAM:    Constitutional: Well developed and nourished  Eyes: Perrla  ENMT: normal  Neck: supple  Respiratory: good air entry  Cardiovascular: S1 S2 regular  Gastrointestinal: Soft, Non tender  Extremities: No edema  Vascular: normal  Neurological: Awake, alert,Ox3  Musculoskeletal:Normal      MEDICATIONS  (STANDING):  amLODIPine   Tablet 2.5 milliGRAM(s) Oral daily  atorvastatin 40 milliGRAM(s) Oral at bedtime  carvedilol 3.125 milliGRAM(s) Oral every 12 hours  insulin lispro (HumaLOG) corrective regimen sliding scale   SubCutaneous three times a day before meals  insulin lispro (HumaLOG) corrective regimen sliding scale   SubCutaneous at bedtime  levothyroxine 25 MICROGram(s) Oral daily  losartan 100 milliGRAM(s) Oral daily  polyethylene glycol 3350 17 Gram(s) Oral daily  senna 2 Tablet(s) Oral at bedtime  warfarin 5 milliGRAM(s) Oral daily    MEDICATIONS  (PRN):  acetaminophen    Suspension .. 650 milliGRAM(s) Oral every 6 hours PRN Temp greater or equal to 38C (100.4F), Mild Pain (1 - 3)      Allergies    aspirin (Stomach Upset)    Intolerances        LABS:                        10.8   9.15  )-----------( 300      ( 26 Apr 2019 07:31 )             38.3     04-26    139  |  102  |  30<H>  ----------------------------<  130<H>  4.8   |  33<H>  |  1.19    Ca    8.6      26 Apr 2019 07:31  Phos  4.5     04-25  Mg     2.0     04-25      PT/INR - ( 26 Apr 2019 07:31 )   PT: 20.8 sec;   INR: 1.84 ratio               CARDIAC MARKERS ( 25 Apr 2019 07:50 )  <0.015 ng/mL / x     / 68 U/L / x     / 1.3 ng/mL  CARDIAC MARKERS ( 24 Apr 2019 16:25 )  <0.015 ng/mL / x     / x     / x     / x          CAPILLARY BLOOD GLUCOSE      POCT Blood Glucose.: 93 mg/dL (25 Apr 2019 21:17)  POCT Blood Glucose.: 106 mg/dL (25 Apr 2019 12:37)    pro-bnp 199 04-24 @ 08:47     d-dimer --  04-24 @ 08:47      RADIOLOGY & ADDITIONAL TESTS:    CXR:    Ct scan chest:    ekg;    echo:

## 2019-04-26 NOTE — CHART NOTE - NSCHARTNOTEFT_GEN_A_CORE
Pt had 2 epsiode of bradycardia to 40's , pt remained vitally stable c/o of some nausea. Pt is on coreg 3.125mg bid. Primary team please follow and consider medication adjustment if needed.

## 2019-04-26 NOTE — PROGRESS NOTE ADULT - SUBJECTIVE AND OBJECTIVE BOX
Patient is a 89y old  Female who presents with a chief complaint of right sided chest pain and facial pain (26 Apr 2019 11:03)      pt seen in tele [x  ], reg med floor [   ], bed [ x ], chair at bedside [   ], a+o x3 [x  ], lethargic [  ],  nad [ x ]      Allergies    aspirin (Stomach Upset)        Vitals    T(F): 98.8 (04-26-19 @ 05:44), Max: 98.8 (04-26-19 @ 05:44)  HR: 73 (04-26-19 @ 06:28) (40 - 119)  BP: 147/72 (04-26-19 @ 06:28) (118/80 - 186/66)  RR: 17 (04-26-19 @ 06:16) (17 - 20)  SpO2: 99% (04-26-19 @ 06:16) (95% - 99%)  Wt(kg): --  CAPILLARY BLOOD GLUCOSE      POCT Blood Glucose.: 93 mg/dL (25 Apr 2019 21:17)      Labs                          10.8   9.15  )-----------( 300      ( 26 Apr 2019 07:31 )             38.3       04-26    139  |  102  |  30<H>  ----------------------------<  130<H>  4.8   |  33<H>  |  1.19    Ca    8.6      26 Apr 2019 07:31  Phos  4.5     04-25  Mg     2.0     04-25        CARDIAC MARKERS ( 25 Apr 2019 07:50 )  <0.015 ng/mL / x     / 68 U/L / x     / 1.3 ng/mL  CARDIAC MARKERS ( 24 Apr 2019 16:25 )  <0.015 ng/mL / x     / x     / x     / x          INR: 1.84 ratio (04.26.19 @ 07:31)          Radiology Results      Meds    MEDICATIONS  (STANDING):  amLODIPine   Tablet 2.5 milliGRAM(s) Oral daily  atorvastatin 40 milliGRAM(s) Oral at bedtime  carvedilol 3.125 milliGRAM(s) Oral every 12 hours  insulin lispro (HumaLOG) corrective regimen sliding scale   SubCutaneous three times a day before meals  insulin lispro (HumaLOG) corrective regimen sliding scale   SubCutaneous at bedtime  levothyroxine 25 MICROGram(s) Oral daily  loratadine 10 milliGRAM(s) Oral daily  losartan 100 milliGRAM(s) Oral daily  montelukast 10 milliGRAM(s) Oral daily  pantoprazole    Tablet 40 milliGRAM(s) Oral before breakfast  polyethylene glycol 3350 17 Gram(s) Oral daily  senna 2 Tablet(s) Oral at bedtime  warfarin 5 milliGRAM(s) Oral daily      MEDICATIONS  (PRN):  acetaminophen    Suspension .. 650 milliGRAM(s) Oral every 6 hours PRN Temp greater or equal to 38C (100.4F), Mild Pain (1 - 3)      Physical Exam    Neuro :  no focal deficits  Respiratory: CTA B/L  CV: RRR, S1S2, no murmurs,   Abdominal: Soft, NT, ND +BS,  Extremities: No edema, + peripheral pulses    ASSESSMENT    Chest pain  r/o acs  right mandibular and clavicular pain likely 2nd to degenerative disease  h/o PE (pulmonary thromboembolism)  COPD (chronic obstructive pulmonary disease)  Breast cancer in female  HLD (hyperlipidemia)  HTN (hypertension)  DM (diabetes mellitus)  H/O mastectomy, right      PLAN      cont tele,   acs protocol,   cont lopressor, aspirin, statin,   ce q8 x 2 neg noted above  f/u echo  cardio cons  f/u Echocardiogram.  f/u Stress test-R/O ischemia.  cont tylenol prn pain  pulm cons  cont bronchodilators  heme onc cons noted   inr subtherapeutic noted above  Achieve INR 2.5 with coverage with Lovenox until therapeutic.   cont coumadin  add lovenox 80 mg q 12 until inr >2.5  Continue Anastrazole due to large tumor and benefits of long term aromatase inhibitor w/o significant side effects  cont current meds

## 2019-04-26 NOTE — PROGRESS NOTE ADULT - ASSESSMENT
89 yr old Female from home with PMHX of COPD on 2L home O2, Breast cancer, HTN, HLD, PE on coumadin, DM, lumbar disc herniation on steroid injections (last was on march 11, 2019) presented with right sided chest pain.  1.Tele monitoring.  2.Echocardiogram.  3.Stress test-R/O ischemia.  4.DM-Insulin  5.HTN-cont bp medication.  6.Lipid d/o-statin.  7.Hypothyroidism-synthroid.  8.PPI.  9.Hx pf PE-lovenox and coumadin until INR>2.0.

## 2019-04-26 NOTE — PROGRESS NOTE ADULT - SUBJECTIVE AND OBJECTIVE BOX
CHIEF COMPLAINT:Patient is a 89y old  Female who presents with a chief complaint of right sided chest pain and facial pain. Pt appears comfortable.    	  REVIEW OF SYSTEMS:  CONSTITUTIONAL: No fever, weight loss, or fatigue  EYES: No eye pain, visual disturbances, or discharge  ENT:  No difficulty hearing, tinnitus, vertigo; No sinus or throat pain  NECK: No pain or stiffness  RESPIRATORY: No cough, wheezing, chills or hemoptysis; No Shortness of Breath  CARDIOVASCULAR: No chest pain, palpitations, passing out, dizziness, or leg swelling  GASTROINTESTINAL: No abdominal or epigastric pain. No nausea, vomiting, or hematemesis; No diarrhea or constipation. No melena or hematochezia.  GENITOURINARY: No dysuria, frequency, hematuria, or incontinence  NEUROLOGICAL: No headaches, memory loss, loss of strength, numbness, or tremors  SKIN: No itching, burning, rashes, or lesions   LYMPH Nodes: No enlarged glands  ENDOCRINE: No heat or cold intolerance; No hair loss  MUSCULOSKELETAL: No joint pain or swelling; No muscle, back, or extremity pain  PSYCHIATRIC: No depression, anxiety, mood swings, or difficulty sleeping  HEME/LYMPH: No easy bruising, or bleeding gums  ALLERGY AND IMMUNOLOGIC: No hives or eczema	      PHYSICAL EXAM:  T(C): 37.1 (04-26-19 @ 05:44), Max: 37.1 (04-26-19 @ 05:44)  HR: 73 (04-26-19 @ 06:28) (40 - 119)  BP: 147/72 (04-26-19 @ 06:28) (118/80 - 186/66)  RR: 17 (04-26-19 @ 06:16) (17 - 20)  SpO2: 99% (04-26-19 @ 06:16) (95% - 99%)  Wt(kg): --  I&O's Summary      Appearance: Normal	  HEENT:   Normal oral mucosa, PERRL, EOMI	  Lymphatic: No lymphadenopathy  Cardiovascular: Normal S1 S2, No JVD, No murmurs, No edema  Respiratory: Lungs clear to auscultation	  Psychiatry: A & O x 3, Mood & affect appropriate  Gastrointestinal:  Soft, Non-tender, + BS	  Skin: No rashes, No ecchymoses, No cyanosis	  Neurologic: Non-focal  Extremities: Normal range of motion, No clubbing, cyanosis or edema  Vascular: Peripheral pulses palpable 2+ bilaterally    MEDICATIONS  (STANDING):  amLODIPine   Tablet 2.5 milliGRAM(s) Oral daily  atorvastatin 40 milliGRAM(s) Oral at bedtime  carvedilol 3.125 milliGRAM(s) Oral every 12 hours  insulin lispro (HumaLOG) corrective regimen sliding scale   SubCutaneous three times a day before meals  insulin lispro (HumaLOG) corrective regimen sliding scale   SubCutaneous at bedtime  levothyroxine 25 MICROGram(s) Oral daily  losartan 100 milliGRAM(s) Oral daily  polyethylene glycol 3350 17 Gram(s) Oral daily  senna 2 Tablet(s) Oral at bedtime  warfarin 5 milliGRAM(s) Oral daily        	  LABS:	 	    CARDIAC MARKERS:  CARDIAC MARKERS ( 25 Apr 2019 07:50 )  <0.015 ng/mL / x     / 68 U/L / x     / 1.3 ng/mL  CARDIAC MARKERS ( 24 Apr 2019 16:25 )  <0.015 ng/mL / x     / x     / x     / x                                    10.8   9.15  )-----------( 300      ( 26 Apr 2019 07:31 )             38.3     04-26    139  |  102  |  30<H>  ----------------------------<  130<H>  4.8   |  33<H>  |  1.19    Ca    8.6      26 Apr 2019 07:31  Phos  4.5     04-25  Mg     2.0     04-25      proBNP: Serum Pro-Brain Natriuretic Peptide: 199 pg/mL (04-24 @ 08:47)    Lipid Profile: Cholesterol 140  LDL 60  HDL 53      HgA1c: Hemoglobin A1C, Whole Blood: 6.4 % (04-25 @ 10:18)    TSH: Thyroid Stimulating Hormone, Serum: 0.95 uU/mL (04-25 @ 07:50)      	  PT/INR - ( 26 Apr 2019 07:31 )   PT: 20.8 sec;   INR: 1.84 ratio

## 2019-04-26 NOTE — PROGRESS NOTE ADULT - ASSESSMENT
1. Chest pain  R/O ACS protocol  In stress test - pending completed report.   ASA, BB, Statin  Cardio eval.  Tele monitoring       2. COPD   Bronchodilators  Symbicort 160/4.5 mcgs 2 puffs po BID  Spiriva  PFTs as OP  Oxygen supp.       3. Lumbar disc herniation.   Pain control  PT eval.       4. DM (diabetes mellitus)  Accuchecks with reg insulin coverage  HBA1C.       5. HTN  Monitor BP  Cont meds.       6. Breast Cancer   Hem/Onc follow up.      7. PE  Cont Coumadin  Monitor INR.

## 2019-04-27 ENCOUNTER — TRANSCRIPTION ENCOUNTER (OUTPATIENT)
Age: 84
End: 2019-04-27

## 2019-04-27 VITALS — OXYGEN SATURATION: 98 % | DIASTOLIC BLOOD PRESSURE: 48 MMHG | HEART RATE: 58 BPM | SYSTOLIC BLOOD PRESSURE: 141 MMHG

## 2019-04-27 LAB
ANION GAP SERPL CALC-SCNC: 4 MMOL/L — LOW (ref 5–17)
BUN SERPL-MCNC: 34 MG/DL — HIGH (ref 7–18)
CALCIUM SERPL-MCNC: 8.8 MG/DL — SIGNIFICANT CHANGE UP (ref 8.4–10.5)
CHLORIDE SERPL-SCNC: 100 MMOL/L — SIGNIFICANT CHANGE UP (ref 96–108)
CO2 SERPL-SCNC: 33 MMOL/L — HIGH (ref 22–31)
CREAT SERPL-MCNC: 1.28 MG/DL — SIGNIFICANT CHANGE UP (ref 0.5–1.3)
GLUCOSE BLDC GLUCOMTR-MCNC: 106 MG/DL — HIGH (ref 70–99)
GLUCOSE BLDC GLUCOMTR-MCNC: 108 MG/DL — HIGH (ref 70–99)
GLUCOSE SERPL-MCNC: 114 MG/DL — HIGH (ref 70–99)
HCT VFR BLD CALC: 36.5 % — SIGNIFICANT CHANGE UP (ref 34.5–45)
HGB BLD-MCNC: 10.4 G/DL — LOW (ref 11.5–15.5)
INR BLD: 2.36 RATIO — HIGH (ref 0.88–1.16)
MCHC RBC-ENTMCNC: 26.7 PG — LOW (ref 27–34)
MCHC RBC-ENTMCNC: 28.5 GM/DL — LOW (ref 32–36)
MCV RBC AUTO: 93.8 FL — SIGNIFICANT CHANGE UP (ref 80–100)
NRBC # BLD: 0 /100 WBCS — SIGNIFICANT CHANGE UP (ref 0–0)
PLATELET # BLD AUTO: 273 K/UL — SIGNIFICANT CHANGE UP (ref 150–400)
POTASSIUM SERPL-MCNC: 4.4 MMOL/L — SIGNIFICANT CHANGE UP (ref 3.5–5.3)
POTASSIUM SERPL-SCNC: 4.4 MMOL/L — SIGNIFICANT CHANGE UP (ref 3.5–5.3)
PROTHROM AB SERPL-ACNC: 26.9 SEC — HIGH (ref 10–12.9)
RBC # BLD: 3.89 M/UL — SIGNIFICANT CHANGE UP (ref 3.8–5.2)
RBC # FLD: 15.6 % — HIGH (ref 10.3–14.5)
SODIUM SERPL-SCNC: 137 MMOL/L — SIGNIFICANT CHANGE UP (ref 135–145)
WBC # BLD: 9.06 K/UL — SIGNIFICANT CHANGE UP (ref 3.8–10.5)
WBC # FLD AUTO: 9.06 K/UL — SIGNIFICANT CHANGE UP (ref 3.8–10.5)

## 2019-04-27 PROCEDURE — 93005 ELECTROCARDIOGRAM TRACING: CPT

## 2019-04-27 PROCEDURE — 96375 TX/PRO/DX INJ NEW DRUG ADDON: CPT

## 2019-04-27 PROCEDURE — 97162 PT EVAL MOD COMPLEX 30 MIN: CPT

## 2019-04-27 PROCEDURE — 82550 ASSAY OF CK (CPK): CPT

## 2019-04-27 PROCEDURE — 83735 ASSAY OF MAGNESIUM: CPT

## 2019-04-27 PROCEDURE — 82306 VITAMIN D 25 HYDROXY: CPT

## 2019-04-27 PROCEDURE — 71045 X-RAY EXAM CHEST 1 VIEW: CPT

## 2019-04-27 PROCEDURE — 83036 HEMOGLOBIN GLYCOSYLATED A1C: CPT

## 2019-04-27 PROCEDURE — 96374 THER/PROPH/DIAG INJ IV PUSH: CPT

## 2019-04-27 PROCEDURE — 71275 CT ANGIOGRAPHY CHEST: CPT

## 2019-04-27 PROCEDURE — 84443 ASSAY THYROID STIM HORMONE: CPT

## 2019-04-27 PROCEDURE — 82746 ASSAY OF FOLIC ACID SERUM: CPT

## 2019-04-27 PROCEDURE — 78452 HT MUSCLE IMAGE SPECT MULT: CPT

## 2019-04-27 PROCEDURE — 80048 BASIC METABOLIC PNL TOTAL CA: CPT

## 2019-04-27 PROCEDURE — 36415 COLL VENOUS BLD VENIPUNCTURE: CPT

## 2019-04-27 PROCEDURE — 93306 TTE W/DOPPLER COMPLETE: CPT

## 2019-04-27 PROCEDURE — 83880 ASSAY OF NATRIURETIC PEPTIDE: CPT

## 2019-04-27 PROCEDURE — 85027 COMPLETE CBC AUTOMATED: CPT

## 2019-04-27 PROCEDURE — 82553 CREATINE MB FRACTION: CPT

## 2019-04-27 PROCEDURE — 99285 EMERGENCY DEPT VISIT HI MDM: CPT | Mod: 25

## 2019-04-27 PROCEDURE — 82962 GLUCOSE BLOOD TEST: CPT

## 2019-04-27 PROCEDURE — 84100 ASSAY OF PHOSPHORUS: CPT

## 2019-04-27 PROCEDURE — 82607 VITAMIN B-12: CPT

## 2019-04-27 PROCEDURE — 84484 ASSAY OF TROPONIN QUANT: CPT

## 2019-04-27 PROCEDURE — 80061 LIPID PANEL: CPT

## 2019-04-27 PROCEDURE — 85610 PROTHROMBIN TIME: CPT

## 2019-04-27 PROCEDURE — 85730 THROMBOPLASTIN TIME PARTIAL: CPT

## 2019-04-27 PROCEDURE — G0378: CPT

## 2019-04-27 PROCEDURE — A9502: CPT

## 2019-04-27 PROCEDURE — 80053 COMPREHEN METABOLIC PANEL: CPT

## 2019-04-27 PROCEDURE — 93017 CV STRESS TEST TRACING ONLY: CPT

## 2019-04-27 RX ORDER — WARFARIN SODIUM 2.5 MG/1
1 TABLET ORAL
Qty: 0 | Refills: 0 | COMMUNITY

## 2019-04-27 RX ORDER — WARFARIN SODIUM 2.5 MG/1
1 TABLET ORAL
Qty: 14 | Refills: 0
Start: 2019-04-27 | End: 2019-05-10

## 2019-04-27 RX ORDER — WARFARIN SODIUM 2.5 MG/1
5 TABLET ORAL DAILY
Qty: 0 | Refills: 0 | Status: DISCONTINUED | OUTPATIENT
Start: 2019-04-27 | End: 2019-04-27

## 2019-04-27 RX ORDER — LEVOTHYROXINE SODIUM 125 MCG
1 TABLET ORAL
Qty: 30 | Refills: 0
Start: 2019-04-27 | End: 2019-05-26

## 2019-04-27 RX ORDER — ACETAMINOPHEN 500 MG
650 TABLET ORAL ONCE
Qty: 0 | Refills: 0 | Status: COMPLETED | OUTPATIENT
Start: 2019-04-27 | End: 2019-04-27

## 2019-04-27 RX ORDER — CARVEDILOL PHOSPHATE 80 MG/1
1 CAPSULE, EXTENDED RELEASE ORAL
Qty: 60 | Refills: 0
Start: 2019-04-27 | End: 2019-05-26

## 2019-04-27 RX ORDER — ACETAMINOPHEN 500 MG
0 TABLET ORAL
Qty: 0 | Refills: 0 | COMMUNITY

## 2019-04-27 RX ORDER — FUROSEMIDE 40 MG
1 TABLET ORAL
Qty: 0 | Refills: 0 | COMMUNITY

## 2019-04-27 RX ADMIN — POLYETHYLENE GLYCOL 3350 17 GRAM(S): 17 POWDER, FOR SOLUTION ORAL at 11:51

## 2019-04-27 RX ADMIN — AMLODIPINE BESYLATE 2.5 MILLIGRAM(S): 2.5 TABLET ORAL at 06:18

## 2019-04-27 RX ADMIN — CARVEDILOL PHOSPHATE 3.12 MILLIGRAM(S): 80 CAPSULE, EXTENDED RELEASE ORAL at 06:18

## 2019-04-27 RX ADMIN — LOSARTAN POTASSIUM 100 MILLIGRAM(S): 100 TABLET, FILM COATED ORAL at 06:18

## 2019-04-27 RX ADMIN — PANTOPRAZOLE SODIUM 40 MILLIGRAM(S): 20 TABLET, DELAYED RELEASE ORAL at 06:18

## 2019-04-27 RX ADMIN — LORATADINE 10 MILLIGRAM(S): 10 TABLET ORAL at 11:51

## 2019-04-27 RX ADMIN — MONTELUKAST 10 MILLIGRAM(S): 4 TABLET, CHEWABLE ORAL at 11:51

## 2019-04-27 RX ADMIN — ENOXAPARIN SODIUM 80 MILLIGRAM(S): 100 INJECTION SUBCUTANEOUS at 06:19

## 2019-04-27 RX ADMIN — Medication 650 MILLIGRAM(S): at 04:01

## 2019-04-27 RX ADMIN — Medication 650 MILLIGRAM(S): at 04:38

## 2019-04-27 NOTE — DISCHARGE NOTE PROVIDER - HOSPITAL COURSE
89/F from home with PMH of COPD on 2L home O2, Breast cancer, HTN, HLD, PE on coumadin, DM, lumbar disc herniation on steroid injections (last was on march 11, 2019) presented with right sided chest pain, shoulder pain and facial pain. History obtained form patient and daughter at bedside who mentions that she was having pain over right chest, shoulder and face which was different then her usual pain from disc herniation. Patient is not able to describe characteristics of pain - reports that pain was combination of colicky, pressure and stabbing, about 6-8/10 with no aggravating and reliving factor. Family also noted that her O2 requirement has gone up over last week. Denies nausea, vomiting, diarrhea, abdominal pain, palpitations, dizziness, headache, cough, wheezing, joint pain or swelling, fever, chills.            Patient was admitted for chest pain/ACS rule out.

## 2019-04-27 NOTE — DISCHARGE NOTE NURSING/CASE MANAGEMENT/SOCIAL WORK - NSDCDPATPORTLINK_GEN_ALL_CORE
You can access the [facility] Portal, offered by [facility], by registering with the following website: [email or web address]

## 2019-04-27 NOTE — PHYSICAL THERAPY INITIAL EVALUATION ADULT - CRITERIA FOR SKILLED THERAPEUTIC INTERVENTIONS
impairments found/anticipated discharge recommendation/functional limitations in following categories/risk reduction/prevention/rehab potential

## 2019-04-27 NOTE — PROGRESS NOTE ADULT - SUBJECTIVE AND OBJECTIVE BOX
CHIEF COMPLAINT:Patient is a 89y old  Female who presents with a chief complaint of right sided chest pain and facial pain.Pt appears comfortable.    	  REVIEW OF SYSTEMS:  CONSTITUTIONAL: No fever, weight loss, or fatigue  EYES: No eye pain, visual disturbances, or discharge  ENT:  No difficulty hearing, tinnitus, vertigo; No sinus or throat pain  NECK: No pain or stiffness  RESPIRATORY: No cough, wheezing, chills or hemoptysis; No Shortness of Breath  CARDIOVASCULAR: No chest pain, palpitations, passing out, dizziness, or leg swelling  GASTROINTESTINAL: No abdominal or epigastric pain. No nausea, vomiting, or hematemesis; No diarrhea or constipation. No melena or hematochezia.  GENITOURINARY: No dysuria, frequency, hematuria, or incontinence  NEUROLOGICAL: No headaches, memory loss, loss of strength, numbness, or tremors  SKIN: No itching, burning, rashes, or lesions   LYMPH Nodes: No enlarged glands  ENDOCRINE: No heat or cold intolerance; No hair loss  MUSCULOSKELETAL: No joint pain or swelling; No muscle, back, or extremity pain  PSYCHIATRIC: No depression, anxiety, mood swings, or difficulty sleeping  HEME/LYMPH: No easy bruising, or bleeding gums  ALLERGY AND IMMUNOLOGIC: No hives or eczema	      PHYSICAL EXAM:  T(C): 37.1 (04-27-19 @ 05:15), Max: 37.9 (04-26-19 @ 13:30)  HR: 58 (04-27-19 @ 10:41) (55 - 72)  BP: 141/48 (04-27-19 @ 10:41) (108/50 - 141/48)  RR: 18 (04-27-19 @ 05:15) (17 - 18)  SpO2: 98% (04-27-19 @ 10:41) (98% - 98%)      Appearance: Normal	  HEENT:   Normal oral mucosa, PERRL, EOMI	  Lymphatic: No lymphadenopathy  Cardiovascular: Normal S1 S2, No JVD, No murmurs, No edema  Respiratory: Lungs clear to auscultation	  Psychiatry: A & O x 3, Mood & affect appropriate  Gastrointestinal:  Soft, Non-tender, + BS	  Skin: No rashes, No ecchymoses, No cyanosis	  Neurologic: Non-focal  Extremities: Normal range of motion, No clubbing, cyanosis or edema  Vascular: Peripheral pulses palpable 2+ bilaterally    MEDICATIONS  (STANDING):  amLODIPine   Tablet 2.5 milliGRAM(s) Oral daily  atorvastatin 40 milliGRAM(s) Oral at bedtime  carvedilol 3.125 milliGRAM(s) Oral every 12 hours  insulin lispro (HumaLOG) corrective regimen sliding scale   SubCutaneous three times a day before meals  insulin lispro (HumaLOG) corrective regimen sliding scale   SubCutaneous at bedtime  levothyroxine 25 MICROGram(s) Oral daily  loratadine 10 milliGRAM(s) Oral daily  losartan 100 milliGRAM(s) Oral daily  montelukast 10 milliGRAM(s) Oral daily  pantoprazole    Tablet 40 milliGRAM(s) Oral before breakfast  polyethylene glycol 3350 17 Gram(s) Oral daily  senna 2 Tablet(s) Oral at bedtime  warfarin 5 milliGRAM(s) Oral daily      LABS:	 	                     10.4   9.06  )-----------( 273      ( 27 Apr 2019 07:25 )             36.5     04-27    137  |  100  |  34<H>  ----------------------------<  114<H>  4.4   |  33<H>  |  1.28    Ca    8.8      27 Apr 2019 07:25      proBNP: Serum Pro-Brain Natriuretic Peptide: 199 pg/mL (04-24 @ 08:47)    Lipid Profile: Cholesterol 140  LDL 60  HDL 53      HgA1c: Hemoglobin A1C, Whole Blood: 6.4 % (04-25 @ 10:18)    TSH: Thyroid Stimulating Hormone, Serum: 0.95 uU/mL (04-25 @ 07:50)      	  Stress test:    IMPRESSIONS:Normal Study  * Non-diagnostic EKG portion of Lexiscan stress test.  * Review of raw data shows: The study is of good technical  quality.  * The left ventricle was normal in size. Normal myocardial  perfusion scan, with no evidenceof infarction or  inducible ischemia.  * Post-stress resting myocardial perfusion gated SPECT  imaging was performed (LVEF > 70%)      INR: 2.36 ratio (04.27.19 @ 07:25)    OBSERVATIONS:  Mitral Valve: Normal mitral valve. No mitral regurgitation  is noted.  Aortic Root: Normal aortic root.  Aortic Valve: Aortic valve not well visualized. Peak  transaortic valve gradient equals 19.9 mm Hg, mean  transaortic valve gradient equals 11 mm Hg, consistent with  mild aortic stenosis. No aortic valve regurgitation seen.  Left Atrium: Normal left atrium.  LA volume index = 22  cc/m2.  Left Ventricle: Normal Left Ventricular Systolic Function,  (EF = 55 to 60% by visual estimation) Not all LV wall  segments were seen. Normal left ventricular internal  dimensions and wall thicknesses. Grade I diastolic  dysfunction (Impaired relaxation).  Right Heart: Normal right atrium. Normal right ventricular  size and systolic function (TAPSE 2.0 cm). Tricuspid valve  not well visualized, probably normal. Trace tricuspid  regurgitation. Pulmonic valve not well seen. Mild pulmonic  insufficiency is noted.  Pericardium/PleuraNo pericardial effusion. A prominent  epicardial fat pad is noted.  Hemodynamic: RA Pressure is 8 mm Hg. RV systolic pressure  is borderline normal at  33 mm Hg.

## 2019-04-27 NOTE — DISCHARGE NOTE NURSING/CASE MANAGEMENT/SOCIAL WORK - NSDCPEPTCOWADR_GEN_ALL_CORE
Coumadin/Warfarin increases the risk for bleeding. Notify your doctor if you see any bleeding or any of the side effects listed in the Coumadin/Warfarin Booklet. Diet and medications can affect the PT/INR blood level. When Coumadin/Warfarin is taken with other medicines it can change the way other medicines work. Other medicines can also change the way Coumadin/Warfarin works. It is very important to tell your healthcare provider about all of the other medicines, including over-the-counter medications, herbs, diet supplements, or products containing Vitamin K. Call your doctor before starting, stopping, or changing the dose of any prescription or over-the-counter medications.    Any product containing Aspirin lessens the blood's ability to form clots and adds to the effect of Coumadin/Warfarin.    Never take Aspirin without speaking with your health care provider. no

## 2019-04-27 NOTE — PROGRESS NOTE ADULT - ASSESSMENT
1. Chest pain  R/O ACS protocol  stress test   ASA, BB, Statin  Cardio follow up   Tele monitoring       2. COPD   Bronchodilators  Symbicort 160/4.5 mcgs 2 puffs po BID  Spiriva  PFTs as OP  Oxygen supp.       3. Lumbar disc herniation.   Pain control  PT consult noted      4. DM (diabetes mellitus)  Accuchecks with reg insulin coverage  HBA1C.       5. HTN  Monitor BP  Cont meds.       6. Breast Cancer   Hem/Onc follow up.      7. PE  Cont Coumadin  Monitor INR. 1. Chest pain  R/O ACS protocol  stress test negative for ischemia  ASA, BB, Statin  Cardio follow up   Tele monitoring       2. COPD   Bronchodilators  Symbicort 160/4.5 mcgs 2 puffs po BID  Spiriva  PFTs as OP  Oxygen supp.       3. Lumbar disc herniation.   Pain control  PT consult noted      4. DM (diabetes mellitus)  Accuchecks with reg insulin coverage  HBA1C.       5. HTN  Monitor BP  Cont meds.       6. Breast Cancer   Hem/Onc follow up.      7. PE  Cont Coumadin  Monitor INR.

## 2019-04-27 NOTE — DISCHARGE NOTE PROVIDER - NSDCCPCAREPLAN_GEN_ALL_CORE_FT
PRINCIPAL DISCHARGE DIAGNOSIS  Diagnosis: Chest pain, unspecified type  Assessment and Plan of Treatment: You were admitted to the hospital with chest pain. Your EKG was unchanged from a previous one for comparison. Your cardiac enzymes were negative. No further inpatient workup is needed.      SECONDARY DISCHARGE DIAGNOSES  Diagnosis: COPD (chronic obstructive pulmonary disease)  Assessment and Plan of Treatment: Continue your home medications.    Diagnosis: Malignant neoplasm of upper-outer quadrant of right breast in female, estrogen receptor positive  Assessment and Plan of Treatment: Continue chemotherapy. Follow up with your oncologist outpatinet.    Diagnosis: HTN (hypertension)  Assessment and Plan of Treatment: Continue losartan. Coreg and amlodipine as prescribed. We have stopped your home medication lasix. Follow up with your PCP in 1 week to see if you need to resume this medication.    Diagnosis: DM (diabetes mellitus)  Assessment and Plan of Treatment: Continue home medications.    Diagnosis: PE (pulmonary thromboembolism)  Assessment and Plan of Treatment: Continue coumadin 5mg as you were inpatient. Follow up with your PCP in 3 days to check your INR.

## 2019-04-27 NOTE — PROGRESS NOTE ADULT - SUBJECTIVE AND OBJECTIVE BOX
Patient is a 89y old  Female who presents with a chief complaint of right sided chest pain and facial pain (27 Apr 2019 11:08)    Awake ,alert , lying in bed in NAD. No cough or sob at this moment.     INTERVAL HPI/OVERNIGHT EVENTS:      VITAL SIGNS:  T(F): 98.8 (04-27-19 @ 05:15)  HR: 58 (04-27-19 @ 10:41)  BP: 141/48 (04-27-19 @ 10:41)  RR: 18 (04-27-19 @ 05:15)  SpO2: 98% (04-27-19 @ 10:41)  Wt(kg): --  I&O's Detail          REVIEW OF SYSTEMS:    CONSTITUTIONAL:  No fevers, chills, sweats    HEENT:  Eyes:  No diplopia or blurred vision. ENT:  No earache, sore throat or runny nose.    CARDIOVASCULAR:  No pressure, squeezing, tightness, or heaviness about the chest; no palpitations.    RESPIRATORY:  Per HPI    GASTROINTESTINAL:  No abdominal pain, nausea, vomiting or diarrhea.    GENITOURINARY:  No dysuria, frequency or urgency.    NEUROLOGIC:  No paresthesias, fasciculations, seizures or weakness.    PSYCHIATRIC:  No disorder of thought or mood.      PHYSICAL EXAM:    Constitutional: Well developed and nourished  Eyes:Perrla  ENMT: normal  Neck:supple  Respiratory: good air entry  Cardiovascular: S1 S2 regular  Gastrointestinal: Soft, Non tender  Extremities: No edema  Vascular:normal  Neurological:Awake, alert,Ox3  Musculoskeletal:Normal      MEDICATIONS  (STANDING):  amLODIPine   Tablet 2.5 milliGRAM(s) Oral daily  atorvastatin 40 milliGRAM(s) Oral at bedtime  carvedilol 3.125 milliGRAM(s) Oral every 12 hours  insulin lispro (HumaLOG) corrective regimen sliding scale   SubCutaneous three times a day before meals  insulin lispro (HumaLOG) corrective regimen sliding scale   SubCutaneous at bedtime  levothyroxine 25 MICROGram(s) Oral daily  loratadine 10 milliGRAM(s) Oral daily  losartan 100 milliGRAM(s) Oral daily  montelukast 10 milliGRAM(s) Oral daily  pantoprazole    Tablet 40 milliGRAM(s) Oral before breakfast  polyethylene glycol 3350 17 Gram(s) Oral daily  senna 2 Tablet(s) Oral at bedtime  warfarin 5 milliGRAM(s) Oral daily    MEDICATIONS  (PRN):  acetaminophen    Suspension .. 650 milliGRAM(s) Oral every 6 hours PRN Temp greater or equal to 38C (100.4F), Mild Pain (1 - 3)      Allergies    aspirin (Stomach Upset)    Intolerances        LABS:                        10.4   9.06  )-----------( 273      ( 27 Apr 2019 07:25 )             36.5     04-27    137  |  100  |  34<H>  ----------------------------<  114<H>  4.4   |  33<H>  |  1.28    Ca    8.8      27 Apr 2019 07:25      PT/INR - ( 27 Apr 2019 07:25 )   PT: 26.9 sec;   INR: 2.36 ratio                   CAPILLARY BLOOD GLUCOSE      POCT Blood Glucose.: 108 mg/dL (27 Apr 2019 11:29)  POCT Blood Glucose.: 106 mg/dL (27 Apr 2019 08:15)  POCT Blood Glucose.: 85 mg/dL (26 Apr 2019 22:12)  POCT Blood Glucose.: 150 mg/dL (26 Apr 2019 17:35)  POCT Blood Glucose.: 137 mg/dL (26 Apr 2019 12:24)    pro-bnp 199 04-24 @ 08:47     d-dimer --  04-24 @ 08:47      RADIOLOGY & ADDITIONAL TESTS:    CXR:  < from: Xray Chest 1 View AP/PA (04.24.19 @ 08:30) >    Poor inspiration crowds the chest and right hemidiaphragm is also   elevated compared to the left.    Heart is magnified by technique.    Slight blunting of the left lateral costophrenic angle is noted.    The chest is similar to November 5, 2017.    IMPRESSION: Unchanged chest as above.    < end of copied text >    Ct scan chest:  < from: CT Angio Chest w/ IV Cont (04.24.19 @ 15:30) >  IMPRESSION:     No pulmonary embolism to the proximal segmental branches. Limited   visualization of distal segmental and subsegmental branches secondary to   a combination of respiratory motion and suboptimal bolus timing.    No acute chest pathology.    < end of copied text >    ekg;    echo:  < from: Transthoracic Echocardiogram (04.26.19 @ 09:35) >  CONCLUSIONS:  1. Normal mitralvalve. No mitral regurgitation is noted.  2. Aortic valve not well visualized. Mild aortic stenosis.  No aortic valve regurgitation seen.  3. Normal aortic root.  4. Normal left atrium.  5. Normal left ventricular internal dimensions and wall  thicknesses.  6. Normal Left Ventricular Systolic Function,  (EF = 55 to  60% by visual estimation)  7. Grade I diastolic dysfunction (Impaired relaxation).  8. Normal right atrium.  9. Normal right ventricular size and systolic function  (TAPSE 2.0 cm).  10. RV systolic pressure is borderline normal at  33 mm Hg.  11. Tricuspid valve not well visualized, probably normal.  Trace tricuspid regurgitation.  12. Pulmonic valve not well seen. Mild pulmonic  insufficiency is noted.  13. No pericardial effusion.    < end of copied text > Patient is a 89y old  Female who presents with a chief complaint of right sided chest pain and facial pain (27 Apr 2019 11:08)    Awake ,alert , out of  bed in NAD. No cough or sob at this moment. Facial and chest pain improved. Still with some lower back pain    INTERVAL HPI/OVERNIGHT EVENTS:      VITAL SIGNS:  T(F): 98.8 (04-27-19 @ 05:15)  HR: 58 (04-27-19 @ 10:41)  BP: 141/48 (04-27-19 @ 10:41)  RR: 18 (04-27-19 @ 05:15)  SpO2: 98% (04-27-19 @ 10:41)  Wt(kg): --  I&O's Detail          REVIEW OF SYSTEMS:    CONSTITUTIONAL:  No fevers, chills, sweats    HEENT:  Eyes:  No diplopia or blurred vision. ENT:  No earache, sore throat or runny nose.    CARDIOVASCULAR:  No pressure, squeezing, tightness, or heaviness about the chest; no palpitations.    RESPIRATORY:  Per HPI    GASTROINTESTINAL:  No abdominal pain, nausea, vomiting or diarrhea.    GENITOURINARY:  No dysuria, frequency or urgency.    NEUROLOGIC:  No paresthesias, fasciculations, seizures or weakness.    PSYCHIATRIC:  No disorder of thought or mood.      PHYSICAL EXAM:    Constitutional: Well developed and nourished  Eyes:Perrla  ENMT: normal  Neck:supple  Respiratory: good air entry  Cardiovascular: S1 S2 regular  Gastrointestinal: Soft, Non tender  Extremities: No edema  Vascular:normal  Neurological:Awake, alert,Ox3  Musculoskeletal: Tender lower back      MEDICATIONS  (STANDING):  amLODIPine   Tablet 2.5 milliGRAM(s) Oral daily  atorvastatin 40 milliGRAM(s) Oral at bedtime  carvedilol 3.125 milliGRAM(s) Oral every 12 hours  insulin lispro (HumaLOG) corrective regimen sliding scale   SubCutaneous three times a day before meals  insulin lispro (HumaLOG) corrective regimen sliding scale   SubCutaneous at bedtime  levothyroxine 25 MICROGram(s) Oral daily  loratadine 10 milliGRAM(s) Oral daily  losartan 100 milliGRAM(s) Oral daily  montelukast 10 milliGRAM(s) Oral daily  pantoprazole    Tablet 40 milliGRAM(s) Oral before breakfast  polyethylene glycol 3350 17 Gram(s) Oral daily  senna 2 Tablet(s) Oral at bedtime  warfarin 5 milliGRAM(s) Oral daily    MEDICATIONS  (PRN):  acetaminophen    Suspension .. 650 milliGRAM(s) Oral every 6 hours PRN Temp greater or equal to 38C (100.4F), Mild Pain (1 - 3)      Allergies    aspirin (Stomach Upset)    Intolerances        LABS:                        10.4   9.06  )-----------( 273      ( 27 Apr 2019 07:25 )             36.5     04-27    137  |  100  |  34<H>  ----------------------------<  114<H>  4.4   |  33<H>  |  1.28    Ca    8.8      27 Apr 2019 07:25      PT/INR - ( 27 Apr 2019 07:25 )   PT: 26.9 sec;   INR: 2.36 ratio                   CAPILLARY BLOOD GLUCOSE      POCT Blood Glucose.: 108 mg/dL (27 Apr 2019 11:29)  POCT Blood Glucose.: 106 mg/dL (27 Apr 2019 08:15)  POCT Blood Glucose.: 85 mg/dL (26 Apr 2019 22:12)  POCT Blood Glucose.: 150 mg/dL (26 Apr 2019 17:35)  POCT Blood Glucose.: 137 mg/dL (26 Apr 2019 12:24)    pro-bnp 199 04-24 @ 08:47     d-dimer --  04-24 @ 08:47      RADIOLOGY & ADDITIONAL TESTS:    CXR:  < from: Xray Chest 1 View AP/PA (04.24.19 @ 08:30) >    Poor inspiration crowds the chest and right hemidiaphragm is also   elevated compared to the left.    Heart is magnified by technique.    Slight blunting of the left lateral costophrenic angle is noted.    The chest is similar to November 5, 2017.    IMPRESSION: Unchanged chest as above.    < end of copied text >    Ct scan chest:  < from: CT Angio Chest w/ IV Cont (04.24.19 @ 15:30) >  IMPRESSION:     No pulmonary embolism to the proximal segmental branches. Limited   visualization of distal segmental and subsegmental branches secondary to   a combination of respiratory motion and suboptimal bolus timing.    No acute chest pathology.    < end of copied text >    ekg;  < from: Nuclear Stress Test-Pharmacologic (04.26.19 @ 08:11) >    IMPRESSIONS:Normal Study  * Non-diagnostic EKG portion of Lexiscan stress test.  * Review of raw data shows: The study is of good technical  quality.  * The left ventricle was normal in size. Normal myocardial  perfusion scan, with no evidenceof infarction or  inducible ischemia.  * Post-stress resting myocardial perfusion gated SPECT  imaging was performed (LVEF > 70%)    < end of copied text >    echo:  < from: Transthoracic Echocardiogram (04.26.19 @ 09:35) >  CONCLUSIONS:  1. Normal mitralvalve. No mitral regurgitation is noted.  2. Aortic valve not well visualized. Mild aortic stenosis.  No aortic valve regurgitation seen.  3. Normal aortic root.  4. Normal left atrium.  5. Normal left ventricular internal dimensions and wall  thicknesses.  6. Normal Left Ventricular Systolic Function,  (EF = 55 to  60% by visual estimation)  7. Grade I diastolic dysfunction (Impaired relaxation).  8. Normal right atrium.  9. Normal right ventricular size and systolic function  (TAPSE 2.0 cm).  10. RV systolic pressure is borderline normal at  33 mm Hg.  11. Tricuspid valve not well visualized, probably normal.  Trace tricuspid regurgitation.  12. Pulmonic valve not well seen. Mild pulmonic  insufficiency is noted.  13. No pericardial effusion.    < end of copied text >

## 2019-04-27 NOTE — PROGRESS NOTE ADULT - SUBJECTIVE AND OBJECTIVE BOX
Patient is a 89y old  Female who presents with a chief complaint of right sided chest pain and facial pain (26 Apr 2019 11:20)    pt seen in tele [x  ], reg med floor [   ], bed [ x ], chair at bedside [   ], a+o x3 [x  ], lethargic [  ],  nad [ x ]      Allergies    aspirin (Stomach Upset)        Vitals    T(F): 98.8 (04-27-19 @ 05:15), Max: 100.3 (04-26-19 @ 13:30)  HR: 65 (04-27-19 @ 05:15) (55 - 72)  BP: 118/38 (04-27-19 @ 05:15) (108/50 - 136/35)  RR: 18 (04-27-19 @ 05:15) (17 - 18)  SpO2: 98% (04-27-19 @ 05:15) (98% - 98%)  Wt(kg): --  CAPILLARY BLOOD GLUCOSE      POCT Blood Glucose.: 85 mg/dL (26 Apr 2019 22:12)      Labs                          10.4   9.06  )-----------( 273      ( 27 Apr 2019 07:25 )             36.5       04-27    137  |  100  |  34<H>  ----------------------------<  114<H>  4.4   |  33<H>  |  1.28    Ca    8.8      27 Apr 2019 07:25        < from: Transthoracic Echocardiogram (04.26.19 @ 09:35) >  CONCLUSIONS:  1. Normal mitralvalve. No mitral regurgitation is noted.  2. Aortic valve not well visualized. Mild aortic stenosis.  No aortic valve regurgitation seen.  3. Normal aortic root.  4. Normal left atrium.  5. Normal left ventricular internal dimensions and wall  thicknesses.  6. Normal Left Ventricular Systolic Function,  (EF = 55 to  60% by visual estimation)  7. Grade I diastolic dysfunction (Impaired relaxation).  8. Normal right atrium.  9. Normal right ventricular size and systolic function  (TAPSE 2.0 cm).  10. RV systolic pressure is borderline normal at  33 mm Hg.  11. Tricuspid valve not well visualized, probably normal.  Trace tricuspid regurgitation.  12. Pulmonic valve not well seen. Mild pulmonic  insufficiency is noted.  13. No pericardial effusion.    < end of copied text >        < from: Nuclear Stress Test-Pharmacologic (04.26.19 @ 08:11) >  IMPRESSIONS:Normal Study  * Non-diagnostic EKG portion of Lexiscan stress test.  * Review of raw data shows: The study is of good technical  quality.  * The left ventricle was normal in size. Normal myocardial  perfusion scan, with no evidenceof infarction or  inducible ischemia.  * Post-stress resting myocardial perfusion gated SPECT  imaging was performed (LVEF > 70%)      < end of copied text >              Radiology Results      Meds    MEDICATIONS  (STANDING):  amLODIPine   Tablet 2.5 milliGRAM(s) Oral daily  atorvastatin 40 milliGRAM(s) Oral at bedtime  carvedilol 3.125 milliGRAM(s) Oral every 12 hours  insulin lispro (HumaLOG) corrective regimen sliding scale   SubCutaneous three times a day before meals  insulin lispro (HumaLOG) corrective regimen sliding scale   SubCutaneous at bedtime  levothyroxine 25 MICROGram(s) Oral daily  loratadine 10 milliGRAM(s) Oral daily  losartan 100 milliGRAM(s) Oral daily  montelukast 10 milliGRAM(s) Oral daily  pantoprazole    Tablet 40 milliGRAM(s) Oral before breakfast  polyethylene glycol 3350 17 Gram(s) Oral daily  senna 2 Tablet(s) Oral at bedtime  warfarin 5 milliGRAM(s) Oral daily      MEDICATIONS  (PRN):  acetaminophen    Suspension .. 650 milliGRAM(s) Oral every 6 hours PRN Temp greater or equal to 38C (100.4F), Mild Pain (1 - 3)      Physical Exam      Neuro :  no focal deficits  Respiratory: CTA B/L  CV: RRR, S1S2, no murmurs,   Abdominal: Soft, NT, ND +BS,  Extremities: No edema, + peripheral pulses    ASSESSMENT    Chest pain likely muscular skeletal  acs r/o  right mandibular and clavicular pain likely 2nd to degenerative disease  h/o PE (pulmonary thromboembolism)  COPD (chronic obstructive pulmonary disease)  Breast cancer in female  HLD (hyperlipidemia)  HTN (hypertension)  DM (diabetes mellitus)  H/O mastectomy, right      PLAN      cont tele,   cont lopressor, aspirin, statin,   ce q8 x 2 neg noted  echo with lver 55-60%, grade 1 dd noted above  cardio f/u  Stress test neg for ischemia noted above.  cont tylenol prn pain  pulm f/u  cont bronchodilators  heme onc cons noted   inr therapeutic noted above  cont coumadin 5mg qhs  d/c lovenox as inr >2.5  Continue Anastrazole due to large tumor and benefits of long term aromatase inhibitor w/o significant side effects  cont current meds  pt stable for d/c

## 2019-04-27 NOTE — PHYSICAL THERAPY INITIAL EVALUATION ADULT - ADDITIONAL COMMENTS
Patient lives in a building apartment with 3 steps outside the building to access the stairs, lives with her daughter; uses a rollator for indoor/outdoor ambulation with supervision; requires assistance with tub/toilet transfers; requires assistance with dressing, toileting and bathing.

## 2019-04-27 NOTE — PROGRESS NOTE ADULT - REASON FOR ADMISSION
right sided chest pain and facial pain

## 2019-04-27 NOTE — PROGRESS NOTE ADULT - ASSESSMENT
89 yr old Female from home with PMHX of COPD on 2L home O2, Breast cancer, HTN, HLD, PE on coumadin, DM, lumbar disc herniation on steroid injections (last was on march 11, 2019) presented with right sided chest pain.  1.D/C Tele monitoring.  2.Sleep study as outpatient-R/O CARLOS.  3.DM-Insulin.  4.Hx pf PE-coumadin until INR 2.0-3.0.  5.HTN-cont bp medication.  6.Lipid d/o-statin.  7.Hypothyroidism-synthroid.  8.PPI.

## 2020-07-29 ENCOUNTER — APPOINTMENT (OUTPATIENT)
Dept: SURGERY | Facility: CLINIC | Age: 85
End: 2020-07-29
Payer: MEDICARE

## 2020-07-29 VITALS
BODY MASS INDEX: 34.95 KG/M2 | SYSTOLIC BLOOD PRESSURE: 182 MMHG | DIASTOLIC BLOOD PRESSURE: 77 MMHG | HEIGHT: 60 IN | WEIGHT: 178 LBS | OXYGEN SATURATION: 97 % | HEART RATE: 71 BPM

## 2020-07-29 DIAGNOSIS — Z80.51 FAMILY HISTORY OF MALIGNANT NEOPLASM OF KIDNEY: ICD-10-CM

## 2020-07-29 DIAGNOSIS — K43.2 INCISIONAL HERNIA W/OUT OBSTRUCTION OR GANGRENE: ICD-10-CM

## 2020-07-29 DIAGNOSIS — Z80.1 FAMILY HISTORY OF MALIGNANT NEOPLASM OF TRACHEA, BRONCHUS AND LUNG: ICD-10-CM

## 2020-07-29 DIAGNOSIS — Z86.39 PERSONAL HISTORY OF OTHER ENDOCRINE, NUTRITIONAL AND METABOLIC DISEASE: ICD-10-CM

## 2020-07-29 DIAGNOSIS — Z87.898 PERSONAL HISTORY OF OTHER SPECIFIED CONDITIONS: ICD-10-CM

## 2020-07-29 DIAGNOSIS — I25.2 OLD MYOCARDIAL INFARCTION: ICD-10-CM

## 2020-07-29 DIAGNOSIS — K42.9 UMBILICAL HERNIA W/OUT OBSTRUCTION OR GANGRENE: ICD-10-CM

## 2020-07-29 DIAGNOSIS — Z86.711 PERSONAL HISTORY OF PULMONARY EMBOLISM: ICD-10-CM

## 2020-07-29 DIAGNOSIS — Z87.09 PERSONAL HISTORY OF OTHER DISEASES OF THE RESPIRATORY SYSTEM: ICD-10-CM

## 2020-07-29 PROCEDURE — 99024 POSTOP FOLLOW-UP VISIT: CPT

## 2020-07-31 PROBLEM — Z80.51 FAMILY HISTORY OF KIDNEY CANCER: Status: ACTIVE | Noted: 2018-03-05

## 2020-07-31 PROBLEM — Z87.898 HISTORY OF NEOPLASM OF BREAST: Status: RESOLVED | Noted: 2018-03-05 | Resolved: 2020-07-31

## 2020-07-31 PROBLEM — Z87.09 HISTORY OF ASTHMA: Status: RESOLVED | Noted: 2018-03-05 | Resolved: 2020-07-31

## 2020-07-31 PROBLEM — Z86.711 HISTORY OF PULMONARY EMBOLISM: Status: RESOLVED | Noted: 2018-03-08 | Resolved: 2020-07-31

## 2020-07-31 PROBLEM — I25.2 HISTORY OF HEART ATTACK: Status: RESOLVED | Noted: 2018-03-05 | Resolved: 2020-07-31

## 2020-07-31 PROBLEM — Z86.39 HISTORY OF TYPE 2 DIABETES MELLITUS: Status: RESOLVED | Noted: 2018-03-05 | Resolved: 2020-07-31

## 2020-07-31 PROBLEM — Z80.1 FAMILY HISTORY OF LUNG CANCER: Status: ACTIVE | Noted: 2018-03-05

## 2020-07-31 NOTE — ADDENDUM
[FreeTextEntry1] : Telephonic Visit: Verbal consent. \par \par Patient Location: Home \par Provider Location: Medical Office, [facility]\par Visit Participants: Patient Ms. SIMONA CARLTON , Provider: Esther Cohen\par Telephonic Visit; Verbal Consent Obtained from patient prior to office visit.\par \par

## 2020-07-31 NOTE — PHYSICAL EXAM
[No Rash or Lesion] : No rash or lesion [Alert] : alert [Oriented to Person] : oriented to person [Oriented to Place] : oriented to place [Oriented to Time] : oriented to time [Calm] : calm [de-identified] : The patient is alert, well-groomed, well developed and cheerful.  [de-identified] : NCAT; sclerae anicteric; [de-identified] : Breath sounds equal and bilateral, no wheezing no rales or rhonchi  [de-identified] :  good S1, S2, no m/r/g bilateral  [de-identified] : Normoactive bowel sounds, soft and nontender, no hepatosplenomegaly or masses noted, umbilical hernia\par   [de-identified] : Weakness

## 2020-07-31 NOTE — ASSESSMENT
[FreeTextEntry1] : SIMONA CARLTON is a 89 year old female with Umbilical hernia. Patient c/o pain at periumbilical aria and CT showed fat containing umbilical hernia. Due to age and comorbidities surgery is not recommended at present time as she is high risk.

## 2020-07-31 NOTE — HISTORY OF PRESENT ILLNESS
[de-identified] : SIMONA CARLTON is a 89 year old female who present in the office for initial consultation who was referred by Dr. Basil Tejeda with the chief complaint of having pain and swelling on umbilical aria. Patient reports having this condition for 1 year. Patient stated that she has pain and bulging at umbilical aria. Patient s/p CT abdomen and pelvis on 08/19/2020 that showed left paramidline umbilical hernia.

## 2020-07-31 NOTE — REVIEW OF SYSTEMS
[Negative] : Heme/Lymph [Dysuria] : no dysuria [Incontinence] : no incontinence [Pelvic Pain] : no pelvic pain [Dysmenorrhea] : no dysmenorrhea [Vaginal Discharge] : no vaginal discharge [Abn Vaginal Bleeding] : no unexplained vaginal bleeding [FreeTextEntry8] : periumbilical pain

## 2021-03-05 NOTE — ED ADULT TRIAGE NOTE - AS TEMP SITE
Continue to push water intake. Continue pepto bismol as needed. Continue soft/balnd diet as tolerated. Avoid foods high in fiber- see below. BRAT- bananas, rice, applesauce, toast - recommended. Collect your stool sample as instructed and bring in to any ACL lab and we will follow up with you per results. Follow up with your primary doctor if diarrhea worsens or fails to improve as you may need a referral to a GI specialist provider. If you develop severe or persistent dizziness, headache, vision changes, light headed, pass out or almost pass out, severe weakness, or unable to urinate then please go directly to the ER or call 911 for evaluation. Follow up as needed as well for onset of pain, vomiting, nausea, or fever. Patient Education     Treating Diarrhea    Diarrhea happens when you have loose, watery, or frequent bowel movements. It is a common problem with many causes. Most cases of diarrhea clear up on their own. But certain cases may need treatment. Be sure to see your healthcare providerÂ if your symptoms don't get better in a few days. Getting relief  Treatment of diarrhea depends on its cause. Diarrhea caused by bacterial or parasite infection is often treated with antibiotics. Diarrhea caused by other factors, such as a stomach virus, often improves with simple home treatment. The tips below may also help ease your symptoms. Â· Drink plenty of fluids. This helps prevent too much fluid loss (dehydration). Water, clear soups, and electrolyte solutions are good choices. Don't take alcohol, coffee, tea, or milk. These can irritate your intestines andÂ make symptoms worse. Â· Suck on ice chips if drinking makes you queasy. Â· Return to your normal diet slowly. You may want to eat bland foods at first, such as rice and toast. Also, you may need to stay away from certain foods for a while, such as dairy products. These can make symptoms worse.  Ask yourÂ healthcare providerÂ if there are any other foods you should stay away from. Â· If you were prescribed antibiotics, take them as directed. Â· Don't take anti-diarrhea medicines without asking yourÂ providerÂ first.  Call your healthcare providerÂ   Call your healthcare provider if you have any of the following:Â   Â· A fever of 100.4Â° F ( 38.0Â°C) or higher, or as directed by your provider  Â· Chills  Â· Severe pain  Â· Worsening diarrhea or diarrhea for more than 2 days  Â· Bloody vomit or stool  Â· Signs of dehydration (dizziness, dry mouth and tongue, rapid pulse, dark urine)  Velia last reviewed this educational content on 6/1/2019  Â© 8802-0165 [facility]. 2016 [facility], [place], [facility] [address] All rights reserved. This information is not intended as a substitute for professional medical care. Always follow your healthcare professional's instructions. Patient Education     Diet for Vomiting or Diarrhea (Adult)    Your symptoms may return or get worse after eating certain foods listed below. If this happens, stop eating these foods until your symptoms ease and you feel better. Once the vomiting stops, follow the steps below. Â   During the first 12 to 24 hours  During the first 12 to 24 hours, follow this diet:  Â· Drinks. Â Plain water, sport drinks like electrolyte solutions, soft drinks without caffeine, mineral water (plain or flavored), clear fruit juices, and decaffeinated tea and coffee. Â· Soups. Â Clear broth. Â· Desserts. Plain gelatin, popsicles, and fruit juice bars. As you feel better, you may add 6Â to 8 ounces of yogurt per day. If you have diarrhea, don't haveÂ foods or drinks that contain sugar, high-fructose corn syrup, or sugar alcohols.   During the next 24 hours  During the next 24 hours you may add the following to the above:  Â· Hot cereal, plain toast, bread, rolls, and crackers  Â· Plain noodles, rice, mashed potatoes, and chicken noodle or rice soup  Â· Unsweetened canned fruit (but not pineapple) and bananas  Don't eat more thanÂ 15 grams of fat a day. Do this by staying away from margarine, butter, oils, mayonnaise, sauces, gravies, fried foods, peanut butter, meat, poultry, and fish. Don't eat much fiber. Stay away fromÂ raw or cooked vegetables, fresh fruits (except bananas), and bran cereals. Limit how muchÂ caffeine and chocolate you have. Do not use anyÂ spices or seasonings except salt. During the next 24 hours  Slowly go back to your normal diet, as you feel better and your symptoms ease. Date Last Reviewed: 8/1/2016  Â© 9936-7596 [employer] [address] All rights reserved. This information is not intended as a substitute for professional medical care. Always follow your healthcare professional's instructions. oral

## 2021-03-09 NOTE — ED PROVIDER NOTE - INTERPRETATION
Location #5: lip Treatment Number (Optional): 1 Consent: Written consent obtained, risks reviewed including but not limited to pain, scarring, infection and incomplete improvement.  Patient understands the procedure is cosmetic in nature and will require out of pocket payment. Location #1: forehead Detail Level: Zone Location #3: cheek Depth In Mm: 0.8 Price (Use Numbers Only, No Special Characters Or $): 1200 Location #2: temples Post-Care Instructions: After the procedure, take precautions agains sun exposure. Do not apply sunscreen for 12 hours after the procedure. Do not apply make-up for 12 hours after the procedure. Avoid alcohol based toners for 10-14 days. After 2-3 days patients can return to their regular skin regimen. Location #4: nose abnormal

## 2021-10-08 ENCOUNTER — EMERGENCY (EMERGENCY)
Facility: HOSPITAL | Age: 86
LOS: 1 days | Discharge: HOME | End: 2021-10-08
Attending: EMERGENCY MEDICINE
Payer: COMMERCIAL

## 2021-10-08 VITALS
HEART RATE: 67 BPM | SYSTOLIC BLOOD PRESSURE: 189 MMHG | DIASTOLIC BLOOD PRESSURE: 96 MMHG | OXYGEN SATURATION: 97 % | RESPIRATION RATE: 18 BRPM | TEMPERATURE: 98 F | WEIGHT: 164.91 LBS

## 2021-10-08 VITALS
SYSTOLIC BLOOD PRESSURE: 177 MMHG | RESPIRATION RATE: 18 BRPM | HEART RATE: 69 BPM | TEMPERATURE: 99 F | OXYGEN SATURATION: 95 % | DIASTOLIC BLOOD PRESSURE: 76 MMHG

## 2021-10-08 LAB
ALBUMIN SERPL ELPH-MCNC: 3.5 G/DL — SIGNIFICANT CHANGE UP (ref 3.5–5)
ALP SERPL-CCNC: 97 U/L — SIGNIFICANT CHANGE UP (ref 40–120)
ALT FLD-CCNC: 33 U/L DA — SIGNIFICANT CHANGE UP (ref 10–60)
ANION GAP SERPL CALC-SCNC: 7 MMOL/L — SIGNIFICANT CHANGE UP (ref 5–17)
ANION GAP SERPL CALC-SCNC: 8 MMOL/L — SIGNIFICANT CHANGE UP (ref 5–17)
APPEARANCE UR: CLEAR — SIGNIFICANT CHANGE UP
AST SERPL-CCNC: 36 U/L — SIGNIFICANT CHANGE UP (ref 10–40)
BASOPHILS # BLD AUTO: 0.05 K/UL — SIGNIFICANT CHANGE UP (ref 0–0.2)
BASOPHILS NFR BLD AUTO: 0.7 % — SIGNIFICANT CHANGE UP (ref 0–2)
BILIRUB SERPL-MCNC: 0.4 MG/DL — SIGNIFICANT CHANGE UP (ref 0.2–1.2)
BILIRUB UR-MCNC: NEGATIVE — SIGNIFICANT CHANGE UP
BUN SERPL-MCNC: 20 MG/DL — HIGH (ref 7–18)
BUN SERPL-MCNC: 20 MG/DL — HIGH (ref 7–18)
CALCIUM SERPL-MCNC: 9 MG/DL — SIGNIFICANT CHANGE UP (ref 8.4–10.5)
CALCIUM SERPL-MCNC: 9.2 MG/DL — SIGNIFICANT CHANGE UP (ref 8.4–10.5)
CHLORIDE SERPL-SCNC: 93 MMOL/L — LOW (ref 96–108)
CHLORIDE SERPL-SCNC: 94 MMOL/L — LOW (ref 96–108)
CO2 SERPL-SCNC: 28 MMOL/L — SIGNIFICANT CHANGE UP (ref 22–31)
CO2 SERPL-SCNC: 28 MMOL/L — SIGNIFICANT CHANGE UP (ref 22–31)
COLOR SPEC: YELLOW — SIGNIFICANT CHANGE UP
CREAT SERPL-MCNC: 1.04 MG/DL — SIGNIFICANT CHANGE UP (ref 0.5–1.3)
CREAT SERPL-MCNC: 1.11 MG/DL — SIGNIFICANT CHANGE UP (ref 0.5–1.3)
DIFF PNL FLD: NEGATIVE — SIGNIFICANT CHANGE UP
EOSINOPHIL # BLD AUTO: 0.39 K/UL — SIGNIFICANT CHANGE UP (ref 0–0.5)
EOSINOPHIL NFR BLD AUTO: 5.2 % — SIGNIFICANT CHANGE UP (ref 0–6)
GLUCOSE SERPL-MCNC: 104 MG/DL — HIGH (ref 70–99)
GLUCOSE SERPL-MCNC: 110 MG/DL — HIGH (ref 70–99)
GLUCOSE UR QL: NEGATIVE — SIGNIFICANT CHANGE UP
HCT VFR BLD CALC: 41.6 % — SIGNIFICANT CHANGE UP (ref 34.5–45)
HGB BLD-MCNC: 13.3 G/DL — SIGNIFICANT CHANGE UP (ref 11.5–15.5)
IMM GRANULOCYTES NFR BLD AUTO: 0.5 % — SIGNIFICANT CHANGE UP (ref 0–1.5)
KETONES UR-MCNC: NEGATIVE — SIGNIFICANT CHANGE UP
LEUKOCYTE ESTERASE UR-ACNC: NEGATIVE — SIGNIFICANT CHANGE UP
LYMPHOCYTES # BLD AUTO: 1.59 K/UL — SIGNIFICANT CHANGE UP (ref 1–3.3)
LYMPHOCYTES # BLD AUTO: 21.3 % — SIGNIFICANT CHANGE UP (ref 13–44)
MCHC RBC-ENTMCNC: 29.6 PG — SIGNIFICANT CHANGE UP (ref 27–34)
MCHC RBC-ENTMCNC: 32 GM/DL — SIGNIFICANT CHANGE UP (ref 32–36)
MCV RBC AUTO: 92.4 FL — SIGNIFICANT CHANGE UP (ref 80–100)
MONOCYTES # BLD AUTO: 0.86 K/UL — SIGNIFICANT CHANGE UP (ref 0–0.9)
MONOCYTES NFR BLD AUTO: 11.5 % — SIGNIFICANT CHANGE UP (ref 2–14)
NEUTROPHILS # BLD AUTO: 4.55 K/UL — SIGNIFICANT CHANGE UP (ref 1.8–7.4)
NEUTROPHILS NFR BLD AUTO: 60.8 % — SIGNIFICANT CHANGE UP (ref 43–77)
NITRITE UR-MCNC: NEGATIVE — SIGNIFICANT CHANGE UP
NRBC # BLD: 0 /100 WBCS — SIGNIFICANT CHANGE UP (ref 0–0)
PH UR: 6.5 — SIGNIFICANT CHANGE UP (ref 5–8)
PLATELET # BLD AUTO: 286 K/UL — SIGNIFICANT CHANGE UP (ref 150–400)
POTASSIUM SERPL-MCNC: 5.1 MMOL/L — SIGNIFICANT CHANGE UP (ref 3.5–5.3)
POTASSIUM SERPL-MCNC: 6.2 MMOL/L — CRITICAL HIGH (ref 3.5–5.3)
POTASSIUM SERPL-SCNC: 5.1 MMOL/L — SIGNIFICANT CHANGE UP (ref 3.5–5.3)
POTASSIUM SERPL-SCNC: 6.2 MMOL/L — CRITICAL HIGH (ref 3.5–5.3)
PROT SERPL-MCNC: 7.7 G/DL — SIGNIFICANT CHANGE UP (ref 6–8.3)
PROT UR-MCNC: 15
RBC # BLD: 4.5 M/UL — SIGNIFICANT CHANGE UP (ref 3.8–5.2)
RBC # FLD: 13.6 % — SIGNIFICANT CHANGE UP (ref 10.3–14.5)
SODIUM SERPL-SCNC: 128 MMOL/L — LOW (ref 135–145)
SODIUM SERPL-SCNC: 130 MMOL/L — LOW (ref 135–145)
SP GR SPEC: 1 — LOW (ref 1.01–1.02)
UROBILINOGEN FLD QL: NEGATIVE — SIGNIFICANT CHANGE UP
WBC # BLD: 7.48 K/UL — SIGNIFICANT CHANGE UP (ref 3.8–10.5)
WBC # FLD AUTO: 7.48 K/UL — SIGNIFICANT CHANGE UP (ref 3.8–10.5)

## 2021-10-08 PROCEDURE — 72131 CT LUMBAR SPINE W/O DYE: CPT | Mod: 26,MG

## 2021-10-08 PROCEDURE — G1004: CPT

## 2021-10-08 PROCEDURE — 99285 EMERGENCY DEPT VISIT HI MDM: CPT

## 2021-10-08 PROCEDURE — 72192 CT PELVIS W/O DYE: CPT | Mod: 26,MG

## 2021-10-08 RX ORDER — OXYCODONE AND ACETAMINOPHEN 5; 325 MG/1; MG/1
1 TABLET ORAL ONCE
Refills: 0 | Status: DISCONTINUED | OUTPATIENT
Start: 2021-10-08 | End: 2021-10-08

## 2021-10-08 RX ORDER — LIDOCAINE 4 G/100G
1 CREAM TOPICAL ONCE
Refills: 0 | Status: COMPLETED | OUTPATIENT
Start: 2021-10-08 | End: 2021-10-08

## 2021-10-08 RX ORDER — KETOROLAC TROMETHAMINE 30 MG/ML
15 SYRINGE (ML) INJECTION ONCE
Refills: 0 | Status: DISCONTINUED | OUTPATIENT
Start: 2021-10-08 | End: 2021-10-08

## 2021-10-08 RX ORDER — KETOROLAC TROMETHAMINE 30 MG/ML
30 SYRINGE (ML) INJECTION ONCE
Refills: 0 | Status: DISCONTINUED | OUTPATIENT
Start: 2021-10-08 | End: 2021-10-08

## 2021-10-08 RX ADMIN — LIDOCAINE 1 PATCH: 4 CREAM TOPICAL at 19:24

## 2021-10-08 RX ADMIN — OXYCODONE AND ACETAMINOPHEN 1 TABLET(S): 5; 325 TABLET ORAL at 19:24

## 2021-10-08 RX ADMIN — Medication 30 MILLIGRAM(S): at 17:39

## 2021-10-08 RX ADMIN — Medication 30 MILLIGRAM(S): at 18:39

## 2021-10-08 RX ADMIN — OXYCODONE AND ACETAMINOPHEN 1 TABLET(S): 5; 325 TABLET ORAL at 20:24

## 2021-10-08 NOTE — ED PROVIDER NOTE - NSFOLLOWUPINSTRUCTIONS_ED_ALL_ED_FT
Dolor musculoesquelético    LO QUE NECESITA SABER:    ¿Qué necesito saber sobre el dolor musculoesquelético?El dolor musculoesquelético puede producirse en músculos, huesos, ligamentos, tendones o nervios. El dolor puede ser sordo, molesto o taylor. También puede sentir dolor o sensibilidad al tacto. El dolor puede presentarse en cualquier parte del cuerpo. El dolor musculoesquelético puede deberse a belgica lesión o a belgica enfermedad mert la polimiositis.    ¿Cómo se diagnostica la causa del dolor musculoesquelético?Lay médico le preguntará acerca de drake afecciones médicas o lesiones pasadas. Podría tocar, presionar, doblar, estirar o  la gabriele adolorida. Informe a lay médico si el dolor es aixa o si desaparece y vuelve a aparecer. Dígale si el dolor es sordo, taylor o intenso. Dígale también si el dolor lo despierta del sueño. Es posible que también deba hacerse alguno de los siguientes exámenes:  •Belgica radiografía, belgica imagen de resonancia magnética o belgica tomografía computarizadapueden mostrar imágenes de belgica lesión o afección médica que está causando lay dolor. Es posible que le administren líquido de contraste para que las imágenes del área se vean con mayor claridad. Dígale al médico si usted alguna vez ha tenido belgica reacción alérgica al líquido de contraste. No entre a la rashmi donde se realiza la resonancia magnética con algo de metal. El metal puede causar lesiones serias. Informe a lay médico si usted tiene algún metal dentro o sobre lay cuerpo.      •Un ultrasonidopueden mostrar imágenes de problemas en los músculos o tejidos que causan el dolor.      ¿Cuál es el tratamiento del dolor musculoesquelético?  •Los HARJIT,pueden disminuir la inflamación y el dolor o la fiebre. Sena medicamento está disponible con o sin belgica receta médica. Los HARJIT pueden causar sangrado estomacal o problemas renales en ciertas personas. Si usted aidee un medicamento anticoagulante, siempre pregúntele a lay médico si los HARJIT son seguros para usted. Siempre jose la etiqueta de sena medicamento y siga las instrucciones.      •Acetaminofénalivia el dolor y baja la fiebre. Está disponible sin receta médica. Pregunte la cantidad y la frecuencia con que debe tomarlos. Siga las indicaciones. Jose las etiquetas de todos los demás medicamentos que esté usando para saber si también contienen acetaminofén, o pregunte a lay médico o farmacéutico. El acetaminofén puede causar daño en el hígado cuando no se aidee de forma correcta. No use más de 4 gramos (4000 miligramos) en total de acetaminofeno en un día.      •Relajantes muscularesayudar a relajar los músculos y reducir el dolor y los espasmos musculares.      •Los esteroidesse pueden administrar para reducir el enrojecimiento, el dolor y la hinchazón.      ¿Qué puedo hacer para manejar mis síntomas?  •Repose según le indicaron.Evite actividades que causen dolor. Podrá volver a la actividad normal cuando pueda moverse sin sentir dolor. Siga las indicaciones adecuadas para el descanso y la actividad. Belgica vez desaparecidos los síntomas, tendrá de sufrir lesiones chance 3 semanas después.      •Coloque hielo en la gabriele dolorida para disminuir el dolor y la hinchazón.Use un paquete de hielo o ponga cubitos de hielo en belgica bolsa plástica y envuélvala con belgica toalla. Siempre debe flaquito belgica harley entre el hielo y la piel. Aplique el hielo con la frecuencia que se le indique chance las primeras 24 a 48 horas.      •Aplique compresión en la gabriele, si así se lo indicaron.Es posible que lay médico quiera que use belgica férula, belgica abrazadera o belgica venda elástica. La compresión ayuda a disminuir el dolor y la hinchazón en un brazo o belgica pierna. Belgica tablilla, soporte o venda elástica también ayudarán a proteger la gabriele dolorida cuando usted se mueve.  Cómo colocar belgica venda elástica           •Eleve la pierna o el brazo que le duele para reducir la hinchazón y el dolor.Eleve la extremidad mientras esté sentado o acostado. Apoye la pierna con dolor sobre almohadones para mantenerla por encima del corazón.         Elevar la pierna           ¿Cuándo daysi buscar atención inmediata?  •Tiene dolor intenso al  la gabriele.      •Pierde sensibilidad en la gabriele.      •Tiene un dolor nuevo o que empeora o hinchazón en la gabriele. Puede que sienta la piel tirante.      ¿Cuándo daysi llamar a mi médico?  •Tiene fiebre.      •Usted tiene dolor intenso que no mejora con el tratamiento.      •Le candida dormir a causa del dolor.      •La gabriele dolorida se hace más sensible, rojiza y caliente al tacto.      •Usted tiene guerrero capacidad de movimiento en la gabriele dolorida.      •Usted tiene preguntas o inquietudes acerca de lay condición o cuidado.      ACUERDOS SOBRE LAY CUIDADO:    Usted tiene el derecho de ayudar a planear lay cuidado. Aprenda todo lo que pueda sobre lay condición y mert darle tratamiento. Discuta drake opciones de tratamiento con drake médicos para decidir el cuidado que usted desea recibir. Usted siempre tiene el derecho de rechazar el tratamiento.       © Copyright [employer] 2021           back to top                          © Copyright [employer] 2021

## 2021-10-08 NOTE — ED ADULT NURSE NOTE - OBJECTIVE STATEMENT
Patient present to ED with c/o right hip pain radiating to right leg for the past 2-3 days . As per patient she get injection for her pain ,got it last 3-4 months is schedule monday for injection but pain was unbear able

## 2021-10-08 NOTE — ED PROVIDER NOTE - PROGRESS NOTE DETAILS
pt reassessed, pain had mildly improved however shortly returned.  Will give additional analgesia. CT shows L4/L5 disc herniation. Pt's daughter is here with her.  pain improved in ED.  All results reviewed with family.  They are comfortable to go home with patient.

## 2021-10-08 NOTE — ED PROVIDER NOTE - OBJECTIVE STATEMENT
89 y.o female with history of diabetes and breast cancer s/p resection come with [place] speaking grandson used for . Patient complaining of right hip pain for the past 2 days. Patient is normally ambulating with a cane, however for the last 2 days she has been bed bound and unable to sleep secondary to pain. Family has been giving patient massages and pain meds with minimal improvement. No trauma, fall, fever, abdominal pain, diarrhea, urinary symptoms, or any other complaints.

## 2021-10-08 NOTE — ED PROVIDER NOTE - PATIENT PORTAL LINK FT
You can access the FollowMyHealth Patient Portal offered by [facility] by registering at the following website: [email or web address]. By joining [employer]’s FollowMyHealth portal, you will also be able to view your health information using other applications (apps) compatible with our system.

## 2021-10-09 ENCOUNTER — INPATIENT (INPATIENT)
Facility: HOSPITAL | Age: 86
LOS: 3 days | Discharge: HOME | DRG: 552 | End: 2021-10-13
Attending: INTERNAL MEDICINE | Admitting: INTERNAL MEDICINE
Payer: COMMERCIAL

## 2021-10-09 VITALS
TEMPERATURE: 96 F | SYSTOLIC BLOOD PRESSURE: 171 MMHG | RESPIRATION RATE: 18 BRPM | HEART RATE: 67 BPM | DIASTOLIC BLOOD PRESSURE: 78 MMHG | OXYGEN SATURATION: 99 %

## 2021-10-09 LAB
ALBUMIN SERPL ELPH-MCNC: 3.9 G/DL — SIGNIFICANT CHANGE UP (ref 3.5–5)
ALP SERPL-CCNC: 111 U/L — SIGNIFICANT CHANGE UP (ref 40–120)
ALT FLD-CCNC: 32 U/L DA — SIGNIFICANT CHANGE UP (ref 10–60)
ANION GAP SERPL CALC-SCNC: 4 MMOL/L — LOW (ref 5–17)
APTT BLD: 50.5 SEC — HIGH (ref 27.5–35.5)
AST SERPL-CCNC: 29 U/L — SIGNIFICANT CHANGE UP (ref 10–40)
BASOPHILS # BLD AUTO: 0.05 K/UL — SIGNIFICANT CHANGE UP (ref 0–0.2)
BASOPHILS NFR BLD AUTO: 0.5 % — SIGNIFICANT CHANGE UP (ref 0–2)
BILIRUB SERPL-MCNC: 0.3 MG/DL — SIGNIFICANT CHANGE UP (ref 0.2–1.2)
BUN SERPL-MCNC: 22 MG/DL — HIGH (ref 7–18)
CALCIUM SERPL-MCNC: 9.3 MG/DL — SIGNIFICANT CHANGE UP (ref 8.4–10.5)
CHLORIDE SERPL-SCNC: 92 MMOL/L — LOW (ref 96–108)
CO2 SERPL-SCNC: 29 MMOL/L — SIGNIFICANT CHANGE UP (ref 22–31)
CREAT SERPL-MCNC: 1.24 MG/DL — SIGNIFICANT CHANGE UP (ref 0.5–1.3)
EOSINOPHIL # BLD AUTO: 0.54 K/UL — HIGH (ref 0–0.5)
EOSINOPHIL NFR BLD AUTO: 5.1 % — SIGNIFICANT CHANGE UP (ref 0–6)
GLUCOSE SERPL-MCNC: 110 MG/DL — HIGH (ref 70–99)
HCT VFR BLD CALC: 43.7 % — SIGNIFICANT CHANGE UP (ref 34.5–45)
HGB BLD-MCNC: 13.9 G/DL — SIGNIFICANT CHANGE UP (ref 11.5–15.5)
IMM GRANULOCYTES NFR BLD AUTO: 0.5 % — SIGNIFICANT CHANGE UP (ref 0–1.5)
INR BLD: 2.5 RATIO — HIGH (ref 0.88–1.16)
LYMPHOCYTES # BLD AUTO: 19.1 % — SIGNIFICANT CHANGE UP (ref 13–44)
LYMPHOCYTES # BLD AUTO: 2.04 K/UL — SIGNIFICANT CHANGE UP (ref 1–3.3)
MCHC RBC-ENTMCNC: 29.4 PG — SIGNIFICANT CHANGE UP (ref 27–34)
MCHC RBC-ENTMCNC: 31.8 GM/DL — LOW (ref 32–36)
MCV RBC AUTO: 92.6 FL — SIGNIFICANT CHANGE UP (ref 80–100)
MONOCYTES # BLD AUTO: 0.92 K/UL — HIGH (ref 0–0.9)
MONOCYTES NFR BLD AUTO: 8.6 % — SIGNIFICANT CHANGE UP (ref 2–14)
NEUTROPHILS # BLD AUTO: 7.06 K/UL — SIGNIFICANT CHANGE UP (ref 1.8–7.4)
NEUTROPHILS NFR BLD AUTO: 66.2 % — SIGNIFICANT CHANGE UP (ref 43–77)
NRBC # BLD: 0 /100 WBCS — SIGNIFICANT CHANGE UP (ref 0–0)
PLATELET # BLD AUTO: 319 K/UL — SIGNIFICANT CHANGE UP (ref 150–400)
POTASSIUM SERPL-MCNC: 6.3 MMOL/L — CRITICAL HIGH (ref 3.5–5.3)
POTASSIUM SERPL-SCNC: 6.3 MMOL/L — CRITICAL HIGH (ref 3.5–5.3)
PROT SERPL-MCNC: 8.1 G/DL — SIGNIFICANT CHANGE UP (ref 6–8.3)
PROTHROM AB SERPL-ACNC: 28.5 SEC — HIGH (ref 10.6–13.6)
RBC # BLD: 4.72 M/UL — SIGNIFICANT CHANGE UP (ref 3.8–5.2)
RBC # FLD: 13.5 % — SIGNIFICANT CHANGE UP (ref 10.3–14.5)
SARS-COV-2 RNA SPEC QL NAA+PROBE: SIGNIFICANT CHANGE UP
SODIUM SERPL-SCNC: 125 MMOL/L — LOW (ref 135–145)
WBC # BLD: 10.66 K/UL — HIGH (ref 3.8–10.5)
WBC # FLD AUTO: 10.66 K/UL — HIGH (ref 3.8–10.5)

## 2021-10-09 PROCEDURE — 70450 CT HEAD/BRAIN W/O DYE: CPT | Mod: 26,MG

## 2021-10-09 PROCEDURE — 72125 CT NECK SPINE W/O DYE: CPT | Mod: 26,MG

## 2021-10-09 PROCEDURE — G1004: CPT

## 2021-10-09 PROCEDURE — 70486 CT MAXILLOFACIAL W/O DYE: CPT | Mod: 26,MG

## 2021-10-09 PROCEDURE — 99285 EMERGENCY DEPT VISIT HI MDM: CPT

## 2021-10-09 RX ORDER — ACETAMINOPHEN 500 MG
975 TABLET ORAL ONCE
Refills: 0 | Status: COMPLETED | OUTPATIENT
Start: 2021-10-09 | End: 2021-10-10

## 2021-10-09 RX ORDER — MORPHINE SULFATE 50 MG/1
2 CAPSULE, EXTENDED RELEASE ORAL ONCE
Refills: 0 | Status: DISCONTINUED | OUTPATIENT
Start: 2021-10-09 | End: 2021-10-09

## 2021-10-09 NOTE — ED PROVIDER NOTE - MUSCULOSKELETAL, MLM
no spinal tenderness, left paraspinal tenderness radiating to the right leg, tenderness ischial prominence with positive straight leg pass

## 2021-10-09 NOTE — ED PROVIDER NOTE - EYES, MLM
Large swelling hematoma to the left frontal temporal region, x4-5 cm laceration under left eyebrow w/ active bleeding, large hematoma on upper and lower left eye, unable to open eyes due to swelling of left eye

## 2021-10-09 NOTE — ED PROVIDER NOTE - PROGRESS NOTE DETAILS
No intracranial bleed, periorbital large hematoma, laceration sutured. INR 2.5. BMP repeated given elevated K. No active chest pain and no EKG changes. Patient admitted to medicine for inability to ambulate and observation of large hematoma given on coumadin.

## 2021-10-09 NOTE — ED PROVIDER NOTE - CHPI ED SYMPTOMS NEG
new pain in back, new trauma, nausea, vomiting, chest pain, shortness of breath/no loss of consciousness

## 2021-10-09 NOTE — ED PROVIDER NOTE - OBJECTIVE STATEMENT
89 year old female on Coumadin for multiple PE was seen here yesterday for severe thoracic back pain radiating to bilateral leg. The imaging of hips and thoracic spine were normal, implied with medication and discharged. Patient is coming back today. Patient states after a mechanical fall, she fell back on a rocking chair and landed on her head, causing a laceration to the left brow, and large hematoma to the left eye incurring in the lower and upper lids. Patient denies LOC, any new pain in the back and trauma anywhere else. Pt also denies nausea, vomiting, chest pain, and shortness of breath.  NKDA

## 2021-10-09 NOTE — ED PROVIDER NOTE - CLINICAL SUMMARY MEDICAL DECISION MAKING FREE TEXT BOX
89 year old female. Will do CT head, c-sine and facial maxillary to rule out fracture and hemorrhage, given patient is on Coumadin. Will do basic labs, possibly admit due to patient being in a lot of pain and continues to fall at home and needs physical therapy.

## 2021-10-09 NOTE — ED PROVIDER NOTE - CARE PLAN
1 Principal Discharge DX:	Inability to ambulate due to hip  Secondary Diagnosis:	Periorbital hematoma

## 2021-10-09 NOTE — ED ADULT NURSE NOTE - INTERVENTIONS DEFINITIONS
Call bell, personal items and telephone within reach/Instruct patient to call for assistance/Physically safe environment: no spills, clutter or unnecessary equipment/Stretcher in lowest position, wheels locked, appropriate side rails in place/Monitor for mental status changes and reorient to person, place, and time/Reinforce activity limits and safety measures with patient and family

## 2021-10-09 NOTE — ED ADULT NURSE NOTE - OBJECTIVE STATEMENT
Pt presents to ED  s/p fall. Laceration noted to left eyebrow and bruise noted to left eye. Pt denies LOC. Small amount of bleeding noted.

## 2021-10-10 DIAGNOSIS — R26.2 DIFFICULTY IN WALKING, NOT ELSEWHERE CLASSIFIED: ICD-10-CM

## 2021-10-10 DIAGNOSIS — Z29.9 ENCOUNTER FOR PROPHYLACTIC MEASURES, UNSPECIFIED: ICD-10-CM

## 2021-10-10 DIAGNOSIS — E87.1 HYPO-OSMOLALITY AND HYPONATREMIA: ICD-10-CM

## 2021-10-10 DIAGNOSIS — E87.5 HYPERKALEMIA: ICD-10-CM

## 2021-10-10 DIAGNOSIS — Z86.711 PERSONAL HISTORY OF PULMONARY EMBOLISM: ICD-10-CM

## 2021-10-10 DIAGNOSIS — T14.8XXA OTHER INJURY OF UNSPECIFIED BODY REGION, INITIAL ENCOUNTER: ICD-10-CM

## 2021-10-10 LAB
A1C WITH ESTIMATED AVERAGE GLUCOSE RESULT: 6.1 % — HIGH (ref 4–5.6)
ALBUMIN SERPL ELPH-MCNC: 3.5 G/DL — SIGNIFICANT CHANGE UP (ref 3.5–5)
ALP SERPL-CCNC: 92 U/L — SIGNIFICANT CHANGE UP (ref 40–120)
ALT FLD-CCNC: 26 U/L DA — SIGNIFICANT CHANGE UP (ref 10–60)
ANION GAP SERPL CALC-SCNC: 5 MMOL/L — SIGNIFICANT CHANGE UP (ref 5–17)
AST SERPL-CCNC: 15 U/L — SIGNIFICANT CHANGE UP (ref 10–40)
BASOPHILS # BLD AUTO: 0.04 K/UL — SIGNIFICANT CHANGE UP (ref 0–0.2)
BASOPHILS NFR BLD AUTO: 0.3 % — SIGNIFICANT CHANGE UP (ref 0–2)
BILIRUB SERPL-MCNC: 0.3 MG/DL — SIGNIFICANT CHANGE UP (ref 0.2–1.2)
BUN SERPL-MCNC: 20 MG/DL — HIGH (ref 7–18)
CALCIUM SERPL-MCNC: 8.6 MG/DL — SIGNIFICANT CHANGE UP (ref 8.4–10.5)
CHLORIDE SERPL-SCNC: 94 MMOL/L — LOW (ref 96–108)
CHOLEST SERPL-MCNC: 98 MG/DL — SIGNIFICANT CHANGE UP
CO2 SERPL-SCNC: 32 MMOL/L — HIGH (ref 22–31)
CREAT SERPL-MCNC: 1.04 MG/DL — SIGNIFICANT CHANGE UP (ref 0.5–1.3)
EOSINOPHIL # BLD AUTO: 0.45 K/UL — SIGNIFICANT CHANGE UP (ref 0–0.5)
EOSINOPHIL NFR BLD AUTO: 3.8 % — SIGNIFICANT CHANGE UP (ref 0–6)
ESTIMATED AVERAGE GLUCOSE: 128 MG/DL — HIGH (ref 68–114)
GLUCOSE BLDC GLUCOMTR-MCNC: 99 MG/DL — SIGNIFICANT CHANGE UP (ref 70–99)
GLUCOSE SERPL-MCNC: 103 MG/DL — HIGH (ref 70–99)
HCT VFR BLD CALC: 37.6 % — SIGNIFICANT CHANGE UP (ref 34.5–45)
HDLC SERPL-MCNC: 51 MG/DL — SIGNIFICANT CHANGE UP
HGB BLD-MCNC: 11.9 G/DL — SIGNIFICANT CHANGE UP (ref 11.5–15.5)
IMM GRANULOCYTES NFR BLD AUTO: 0.3 % — SIGNIFICANT CHANGE UP (ref 0–1.5)
INR BLD: 2.18 RATIO — HIGH (ref 0.88–1.16)
LIPID PNL WITH DIRECT LDL SERPL: 33 MG/DL — SIGNIFICANT CHANGE UP
LYMPHOCYTES # BLD AUTO: 16.8 % — SIGNIFICANT CHANGE UP (ref 13–44)
LYMPHOCYTES # BLD AUTO: 2 K/UL — SIGNIFICANT CHANGE UP (ref 1–3.3)
MAGNESIUM SERPL-MCNC: 2.1 MG/DL — SIGNIFICANT CHANGE UP (ref 1.6–2.6)
MCHC RBC-ENTMCNC: 29.2 PG — SIGNIFICANT CHANGE UP (ref 27–34)
MCHC RBC-ENTMCNC: 31.6 GM/DL — LOW (ref 32–36)
MCV RBC AUTO: 92.2 FL — SIGNIFICANT CHANGE UP (ref 80–100)
MONOCYTES # BLD AUTO: 1.25 K/UL — HIGH (ref 0–0.9)
MONOCYTES NFR BLD AUTO: 10.5 % — SIGNIFICANT CHANGE UP (ref 2–14)
NEUTROPHILS # BLD AUTO: 8.15 K/UL — HIGH (ref 1.8–7.4)
NEUTROPHILS NFR BLD AUTO: 68.3 % — SIGNIFICANT CHANGE UP (ref 43–77)
NON HDL CHOLESTEROL: 47 MG/DL — SIGNIFICANT CHANGE UP
NRBC # BLD: 0 /100 WBCS — SIGNIFICANT CHANGE UP (ref 0–0)
OSMOLALITY SERPL: 280 MOSMOL/KG — SIGNIFICANT CHANGE UP (ref 280–301)
PLATELET # BLD AUTO: 272 K/UL — SIGNIFICANT CHANGE UP (ref 150–400)
POTASSIUM SERPL-MCNC: 5.2 MMOL/L — SIGNIFICANT CHANGE UP (ref 3.5–5.3)
POTASSIUM SERPL-SCNC: 5.2 MMOL/L — SIGNIFICANT CHANGE UP (ref 3.5–5.3)
PROT SERPL-MCNC: 6.9 G/DL — SIGNIFICANT CHANGE UP (ref 6–8.3)
PROTHROM AB SERPL-ACNC: 25 SEC — HIGH (ref 10.6–13.6)
RBC # BLD: 4.08 M/UL — SIGNIFICANT CHANGE UP (ref 3.8–5.2)
RBC # FLD: 13.4 % — SIGNIFICANT CHANGE UP (ref 10.3–14.5)
SODIUM SERPL-SCNC: 131 MMOL/L — LOW (ref 135–145)
TRIGL SERPL-MCNC: 68 MG/DL — SIGNIFICANT CHANGE UP
TSH SERPL-MCNC: 1.62 UU/ML — SIGNIFICANT CHANGE UP (ref 0.34–4.82)
WBC # BLD: 11.93 K/UL — HIGH (ref 3.8–10.5)
WBC # FLD AUTO: 11.93 K/UL — HIGH (ref 3.8–10.5)

## 2021-10-10 PROCEDURE — 12345: CPT | Mod: NC

## 2021-10-10 PROCEDURE — 99223 1ST HOSP IP/OBS HIGH 75: CPT

## 2021-10-10 PROCEDURE — 93010 ELECTROCARDIOGRAM REPORT: CPT

## 2021-10-10 RX ORDER — MORPHINE SULFATE 50 MG/1
2 CAPSULE, EXTENDED RELEASE ORAL
Refills: 0 | Status: DISCONTINUED | OUTPATIENT
Start: 2021-10-10 | End: 2021-10-11

## 2021-10-10 RX ORDER — DEXTROSE 50 % IN WATER 50 %
25 SYRINGE (ML) INTRAVENOUS ONCE
Refills: 0 | Status: COMPLETED | OUTPATIENT
Start: 2021-10-10 | End: 2021-10-10

## 2021-10-10 RX ORDER — ENOXAPARIN SODIUM 100 MG/ML
40 INJECTION SUBCUTANEOUS DAILY
Refills: 0 | Status: DISCONTINUED | OUTPATIENT
Start: 2021-10-10 | End: 2021-10-10

## 2021-10-10 RX ORDER — INFLUENZA VIRUS VACCINE 15; 15; 15; 15 UG/.5ML; UG/.5ML; UG/.5ML; UG/.5ML
0.5 SUSPENSION INTRAMUSCULAR ONCE
Refills: 0 | Status: DISCONTINUED | OUTPATIENT
Start: 2021-10-10 | End: 2021-10-13

## 2021-10-10 RX ORDER — MORPHINE SULFATE 50 MG/1
2 CAPSULE, EXTENDED RELEASE ORAL ONCE
Refills: 0 | Status: DISCONTINUED | OUTPATIENT
Start: 2021-10-10 | End: 2021-10-10

## 2021-10-10 RX ORDER — SODIUM CHLORIDE 9 MG/ML
1000 INJECTION INTRAMUSCULAR; INTRAVENOUS; SUBCUTANEOUS
Refills: 0 | Status: DISCONTINUED | OUTPATIENT
Start: 2021-10-10 | End: 2021-10-10

## 2021-10-10 RX ORDER — POLYETHYLENE GLYCOL 3350 17 G/17G
17 POWDER, FOR SOLUTION ORAL DAILY
Refills: 0 | Status: DISCONTINUED | OUTPATIENT
Start: 2021-10-10 | End: 2021-10-13

## 2021-10-10 RX ORDER — ACETAMINOPHEN 500 MG
650 TABLET ORAL EVERY 4 HOURS
Refills: 0 | Status: DISCONTINUED | OUTPATIENT
Start: 2021-10-10 | End: 2021-10-11

## 2021-10-10 RX ORDER — CALCIUM GLUCONATE 100 MG/ML
2 VIAL (ML) INTRAVENOUS ONCE
Refills: 0 | Status: COMPLETED | OUTPATIENT
Start: 2021-10-10 | End: 2021-10-10

## 2021-10-10 RX ORDER — INSULIN HUMAN 100 [IU]/ML
10 INJECTION, SOLUTION SUBCUTANEOUS ONCE
Refills: 0 | Status: COMPLETED | OUTPATIENT
Start: 2021-10-10 | End: 2021-10-10

## 2021-10-10 RX ORDER — OXYCODONE AND ACETAMINOPHEN 5; 325 MG/1; MG/1
1 TABLET ORAL EVERY 4 HOURS
Refills: 0 | Status: DISCONTINUED | OUTPATIENT
Start: 2021-10-10 | End: 2021-10-11

## 2021-10-10 RX ORDER — SENNA PLUS 8.6 MG/1
2 TABLET ORAL AT BEDTIME
Refills: 0 | Status: DISCONTINUED | OUTPATIENT
Start: 2021-10-10 | End: 2021-10-13

## 2021-10-10 RX ORDER — SODIUM ZIRCONIUM CYCLOSILICATE 10 G/10G
10 POWDER, FOR SUSPENSION ORAL ONCE
Refills: 0 | Status: COMPLETED | OUTPATIENT
Start: 2021-10-10 | End: 2021-10-10

## 2021-10-10 RX ORDER — SODIUM CHLORIDE 9 MG/ML
500 INJECTION INTRAMUSCULAR; INTRAVENOUS; SUBCUTANEOUS ONCE
Refills: 0 | Status: COMPLETED | OUTPATIENT
Start: 2021-10-10 | End: 2021-10-10

## 2021-10-10 RX ADMIN — OXYCODONE AND ACETAMINOPHEN 1 TABLET(S): 5; 325 TABLET ORAL at 14:26

## 2021-10-10 RX ADMIN — Medication 25 MILLILITER(S): at 06:18

## 2021-10-10 RX ADMIN — INSULIN HUMAN 10 UNIT(S): 100 INJECTION, SOLUTION SUBCUTANEOUS at 06:19

## 2021-10-10 RX ADMIN — Medication 200 GRAM(S): at 06:19

## 2021-10-10 RX ADMIN — MORPHINE SULFATE 2 MILLIGRAM(S): 50 CAPSULE, EXTENDED RELEASE ORAL at 08:31

## 2021-10-10 RX ADMIN — MORPHINE SULFATE 2 MILLIGRAM(S): 50 CAPSULE, EXTENDED RELEASE ORAL at 01:39

## 2021-10-10 RX ADMIN — OXYCODONE AND ACETAMINOPHEN 1 TABLET(S): 5; 325 TABLET ORAL at 13:26

## 2021-10-10 RX ADMIN — POLYETHYLENE GLYCOL 3350 17 GRAM(S): 17 POWDER, FOR SOLUTION ORAL at 13:00

## 2021-10-10 RX ADMIN — SODIUM ZIRCONIUM CYCLOSILICATE 10 GRAM(S): 10 POWDER, FOR SUSPENSION ORAL at 06:20

## 2021-10-10 RX ADMIN — Medication 650 MILLIGRAM(S): at 21:42

## 2021-10-10 RX ADMIN — SODIUM CHLORIDE 500 MILLILITER(S): 9 INJECTION INTRAMUSCULAR; INTRAVENOUS; SUBCUTANEOUS at 00:29

## 2021-10-10 RX ADMIN — Medication 650 MILLIGRAM(S): at 22:35

## 2021-10-10 RX ADMIN — Medication 975 MILLIGRAM(S): at 01:39

## 2021-10-10 RX ADMIN — Medication 975 MILLIGRAM(S): at 00:50

## 2021-10-10 RX ADMIN — MORPHINE SULFATE 2 MILLIGRAM(S): 50 CAPSULE, EXTENDED RELEASE ORAL at 00:51

## 2021-10-10 RX ADMIN — MORPHINE SULFATE 2 MILLIGRAM(S): 50 CAPSULE, EXTENDED RELEASE ORAL at 05:13

## 2021-10-10 RX ADMIN — SENNA PLUS 2 TABLET(S): 8.6 TABLET ORAL at 21:42

## 2021-10-10 NOTE — H&P ADULT - ASSESSMENT
89 year old female on Coumadin for multiple PE here with a left eye hematoma after mechanical fall. 89 year old female on Coumadin for multiple PE here with a left eye hematoma after mechanical fall.    Pt unsure of her medication names and dosages, primary team to confirm meds in the AM

## 2021-10-10 NOTE — H&P ADULT - PROBLEM SELECTOR PLAN 6
IMPROVE VTE Individual Risk Assessment  RISK                                                                Points  [  ] Previous VTE                                                  3  [  ] Thrombophilia                                               2  [  ] Lower limb paralysis                                      2        (unable to hold up >15 seconds)    [  ] Current Cancer                                              2         (within 6 months)  [  ] Immobilization > 24 hrs                                1  [  ] ICU/CCU stay > 24 hours                              1  [  ] Age > 60                                                      1  IMPROVE VTE Score _____5____    Warfarin

## 2021-10-10 NOTE — H&P ADULT - PROBLEM SELECTOR PLAN 1
Pt presented w/ left eye hematoma s/p mechanical fall  On coumadin for Hx of multiple PE. INR 2.5  Vitals stable  Head CT: Small to moderate left frontal scalp hematoma. No acute intracranial hemorrhage, extra-axial collection or calvarial fracture. Mild chronic microvascular changes.  Maxillofacial CT: Moderate left preseptal periorbital soft tissue swelling. No acute facial fracture or dislocation.  Opacified right maxillary sinus with foamy secretions.  Cervical spine CT: No evidence of acute cervical spine fracture or evidence of traumatic malalignment. Cervical spondylosis.  UA negative  f/u PT consult

## 2021-10-10 NOTE — H&P ADULT - PROBLEM SELECTOR PLAN 2
Pt presented w/ K+ of 6.3  Received lokelma 10, calcium gluconate, Dextrose and insulin   F/u daily BMP

## 2021-10-10 NOTE — H&P ADULT - PROBLEM SELECTOR PLAN 5
Pt presented w/ right hip pain due to sciatica 2/2 disk herniation  CT Lumbar spine: No acute fracture identified. Multilevel degenerative changes with likely severe spinal canal stenosis at L4-L5 and possible superiorly migrated L4-L5 disc herniation at mid L4 level.    F/u PT consult

## 2021-10-10 NOTE — H&P ADULT - ATTENDING COMMENTS
Patient is a 89 year old female with a PMH of PE (on Coumadin), Left Eye Blindness s/p corneal transplant who was BIBEMS due to fall.  ( ID: [id number])  Patient states that on the day of current presentation as she was attempting to stand from a rocking chair, she fell to the ground also resulting in the chair falling onto her face.    At time of examination in the ED, patient endorses pain on the left-side of her face.  However, patient denies any headache, dizziness, chest pain, palpitations, shortness of breath, abdominal pain, nausea/vomiting/diarrhea.    T(C): 36.7 (10-10-21 @ 00:20), Max: 36.7 (10-10-21 @ 00:20)  T(F): 98 (10-10-21 @ 00:20), Max: 98 (10-10-21 @ 00:20)  HR: 70 (10-10-21 @ 00:20) (67 - 70)  BP: 150/71 (10-10-21 @ 00:20) (150/71 - 171/78)  RR: 18 (10-10-21 @ 00:20) (18 - 18)  SpO2: 98% (10-10-21 @ 00:20) (98% - 99%)  Wt(kg): --    P/E: As above MAR    A/P:    Trauma to Left Eye:  -CT Head identified small to moderate left frontal scalp hematoma. No acute intracranial hemorrhage, extra-axial collection or calvarial fracture. Mild chronic microvascular changes.  -CT Maxillofacial identified moderate left preseptal periorbital soft tissue swelling. No acute facial fracture or dislocation.  -CT Cervical spine identified no evidence of acute cervical spine fracture or evidence of traumatic malalignment. Cervical spondylosis.  -Will HOLD patient's Coumadin, for now  -Patient will need to follow-up with Optho    Hyperkalemia:  -EKG pending in ED  -Will order Lokelma, Calcium, D50, Insulin  -Will continue to closely monitor    History of Pulmonary Embolism:  -Will HOLD patient's Coumadin, for now    Moderate Hyponatremia:  -Will continue to cautiously and gradually increase patient's serum sodium by 4 to 6 mEq/L (with an absolute rate of no more  8 mEq/L) in the coming a 24 hours in order to avoid ODS  -Will initiate patient on IVNS at a rate of 115mL/h, for the next 10 hours    Uncontrolled Blood Glucose:  -Hemoglobin A1c with AM labs  -Blood Glucose Monitoring ACHS  -Regular Insulin Sliding Scale ACHS  -Carb Controlled, Heart Healthy, Low Sodium diet as tolerated    Chronic Renal Insufficiency:  -eGFR= 46 (Baseline= 37 on 10/9/2021)  -Can consider sending Urinary Electrolytes (Sodium, Potassium, Creatinine, Chloride)  -Will initiate patient on IVNS at a rate of 115mL/h, for the next 10 hours  -If no demonstrated improvement, can consider obtaining Bilateral Renal Sonogram    GI/DVT PPx:  -Intermittent Compression Stockings  -Pepcid Patient is a 89 year old female with a PMH of PE (on Coumadin), Left Eye Blindness s/p corneal transplant who was BIBEMS due to fall.  ( ID: [id number])  Patient states that on the day of current presentation as she was attempting to stand from a rocking chair, she fell to the ground also resulting in the chair falling onto her face.    At time of examination in the ED, patient endorses pain on the left-side of her face.  However, patient denies any headache, dizziness, chest pain, palpitations, shortness of breath, abdominal pain, nausea/vomiting/diarrhea.    T(C): 36.7 (10-10-21 @ 00:20), Max: 36.7 (10-10-21 @ 00:20)  T(F): 98 (10-10-21 @ 00:20), Max: 98 (10-10-21 @ 00:20)  HR: 70 (10-10-21 @ 00:20) (67 - 70)  BP: 150/71 (10-10-21 @ 00:20) (150/71 - 171/78)  RR: 18 (10-10-21 @ 00:20) (18 - 18)  SpO2: 98% (10-10-21 @ 00:20) (98% - 99%)  Wt(kg): --    P/E: As above MAR    A/P:    Trauma to Left Eye:  -CT Head identified small to moderate left frontal scalp hematoma. No acute intracranial hemorrhage, extra-axial collection or calvarial fracture. Mild chronic microvascular changes.  -CT Maxillofacial identified moderate left preseptal periorbital soft tissue swelling. No acute facial fracture or dislocation.  -CT Cervical spine identified no evidence of acute cervical spine fracture or evidence of traumatic malalignment. Cervical spondylosis.  -Will HOLD patient's Coumadin, for now  -Patient will need to follow-up with Optho    Hyperkalemia:  -EKG pending in ED  -Will order Lokelma, Calcium, D50, Insulin  -Will continue to closely monitor    History of Pulmonary Embolism:  -Will HOLD patient's Coumadin, for now    Moderate Hyponatremia:  -Will continue to cautiously and gradually increase patient's serum sodium by 4 to 6 mEq/L (with an absolute rate of no more  8 mEq/L) in the coming a 24 hours in order to avoid ODS  -Will initiate patient on IVNS at a rate of 115mL/h, for the next 10 hours    Lumbar Canal Stenosis:  Multilevel degenerative changes are noted. Slightly hyperdense material noted within the right ventral spinal canal at mid L4 level, likely superiorly migrated right central L4-L5 disc protrusion. Likely severe L4-L5 spinal canal stenosis. Moderate to severe right and severe left L4-L5 neuroforaminal stenosis.  -Patient will need to follow-up with Ortho, at least as an outpatient, if not earlier    Uncontrolled Blood Glucose:  -Hemoglobin A1c with AM labs  -Blood Glucose Monitoring ACHS  -Regular Insulin Sliding Scale ACHS  -Carb Controlled, Heart Healthy, Low Sodium diet as tolerated    Chronic Renal Insufficiency:  -eGFR= 46 (Baseline= 37 on 10/9/2021)  -Can consider sending Urinary Electrolytes (Sodium, Potassium, Creatinine, Chloride)  -Will initiate patient on IVNS at a rate of 115mL/h, for the next 10 hours  -If no demonstrated improvement, can consider obtaining Bilateral Renal Sonogram    GI/DVT PPx:  -Intermittent Compression Stockings  -Pepcid

## 2021-10-10 NOTE — H&P ADULT - HISTORY OF PRESENT ILLNESS
89 year old female on Coumadin for multiple PE was seen here yesterday for severe thoracic back pain radiating to bilateral leg. The imaging of hips and thoracic spine were normal, implied with medication and discharged. Patient is coming back today. Patient states after a mechanical fall, she fell back on a rocking chair and landed on her head, causing a laceration to the left brow, and large hematoma to the left eye incurring in the lower and upper lids. Patient denies LOC, any new pain in the back and trauma anywhere else. Pt also denies nausea, vomiting, chest pain, and shortness of breath. 89 year old female on Coumadin for multiple PE was seen here yesterday for severe thoracic back pain radiating to bilateral leg. The imaging of hips and thoracic spine were normal, implied with medication and discharged. Patient is coming back today. 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 Patient denies LOC, dizziness or any new pain in the back and trauma anywhere else. Pt also denies nausea, vomiting, chest pain, and shortness of breath.

## 2021-10-10 NOTE — H&P ADULT - PROBLEM SELECTOR PLAN 3
Pt on Warfarin due to hx of PE  C/w home warfarin dose once home dose is confirmed  Daily INR    Primary team to confirm home dose of warfarin

## 2021-10-10 NOTE — H&P ADULT - NSHPREVIEWOFSYSTEMS_GEN_ALL_CORE
REVIEW OF SYSTEMS:    CONSTITUTIONAL: +ve weakness in RLE present from before, Denies fevers or chills  EYES/ENT: Left eye blindness;  No vertigo or throat pain   NECK: No pain or stiffness  RESPIRATORY: No cough, wheezing, hemoptysis; No shortness of breath  CARDIOVASCULAR: No chest pain or palpitations  GASTROINTESTINAL: No abdominal or epigastric pain. No nausea, vomiting, or hematemesis; No diarrhea or constipation. No melena or hematochezia.  GENITOURINARY: No dysuria, frequency or hematuria  NEUROLOGICAL: No numbness or weakness  SKIN: No itching, rashes

## 2021-10-10 NOTE — H&P ADULT - NSHPPHYSICALEXAM_GEN_ALL_CORE
Vital Signs Last 24 Hrs  T(C): 36.7 (10 Oct 2021 00:20), Max: 36.7 (10 Oct 2021 00:20)  T(F): 98 (10 Oct 2021 00:20), Max: 98 (10 Oct 2021 00:20)  HR: 70 (10 Oct 2021 00:20) (67 - 70)  BP: 150/71 (10 Oct 2021 00:20) (150/71 - 171/78)  RR: 18 (10 Oct 2021 00:20) (18 - 18)  SpO2: 98% (10 Oct 2021 00:20) (98% - 99%)    GENERAL: NAD, lying in bed comfortably  HEAD:  Atraumatic, Normocephalic  EYES: EOMI, PERRLA, conjunctiva and sclera clear  ENT: Moist mucous membranes  NECK: Supple, No JVD  CHEST/LUNG: Clear to auscultation bilaterally; No rales, rhonchi, wheezing, or rubs. Unlabored respirations  HEART: Regular rate and rhythm; No murmurs, rubs, or gallops  ABDOMEN: Bowel sounds present; Soft, Nontender, Nondistended. No hepatomegaly  EXTREMITIES:  2+ Peripheral Pulses, brisk capillary refill. No clubbing, cyanosis, or edema  NERVOUS SYSTEM:  Alert & Oriented X3, speech clear. No deficits   MSK: FROM all 4 extremities, full and equal strength  SKIN: No rashes or lesions Vital Signs Last 24 Hrs  T(C): 36.7 (10 Oct 2021 00:20), Max: 36.7 (10 Oct 2021 00:20)  T(F): 98 (10 Oct 2021 00:20), Max: 98 (10 Oct 2021 00:20)  HR: 70 (10 Oct 2021 00:20) (67 - 70)  BP: 150/71 (10 Oct 2021 00:20) (150/71 - 171/78)  RR: 18 (10 Oct 2021 00:20) (18 - 18)  SpO2: 98% (10 Oct 2021 00:20) (98% - 99%)    GENERAL: NAD, lying in bed comfortably  HEAD:  Atraumatic, Normocephalic  EYES: left eye hematoma  ENT: Moist mucous membranes  NECK: Supple, No JVD  CHEST/LUNG: Clear to auscultation bilaterally; No rales, rhonchi, wheezing, or rubs. Unlabored respirations  HEART: Regular rate and rhythm; No murmurs, rubs, or gallops  ABDOMEN: Bowel sounds present; Soft, Nontender, Nondistended. No hepatomegaly  EXTREMITIES:  2+ Peripheral Pulses, brisk capillary refill. No clubbing, cyanosis, or edema  NERVOUS SYSTEM:  Alert & Oriented X3, speech clear. No deficits   MSK: Limited range of motion of RLE due to hip tenderness  SKIN: No rashes or lesions

## 2021-10-11 DIAGNOSIS — K57.92 DIVERTICULITIS OF INTESTINE, PART UNSPECIFIED, WITHOUT PERFORATION OR ABSCESS WITHOUT BLEEDING: ICD-10-CM

## 2021-10-11 DIAGNOSIS — M54.16 RADICULOPATHY, LUMBAR REGION: ICD-10-CM

## 2021-10-11 LAB
ALBUMIN SERPL ELPH-MCNC: 3.2 G/DL — LOW (ref 3.5–5)
ALP SERPL-CCNC: 87 U/L — SIGNIFICANT CHANGE UP (ref 40–120)
ALT FLD-CCNC: 25 U/L DA — SIGNIFICANT CHANGE UP (ref 10–60)
ANION GAP SERPL CALC-SCNC: 7 MMOL/L — SIGNIFICANT CHANGE UP (ref 5–17)
AST SERPL-CCNC: 16 U/L — SIGNIFICANT CHANGE UP (ref 10–40)
BASOPHILS # BLD AUTO: 0.05 K/UL — SIGNIFICANT CHANGE UP (ref 0–0.2)
BASOPHILS NFR BLD AUTO: 0.5 % — SIGNIFICANT CHANGE UP (ref 0–2)
BILIRUB SERPL-MCNC: 0.4 MG/DL — SIGNIFICANT CHANGE UP (ref 0.2–1.2)
BUN SERPL-MCNC: 18 MG/DL — SIGNIFICANT CHANGE UP (ref 7–18)
CALCIUM SERPL-MCNC: 8.9 MG/DL — SIGNIFICANT CHANGE UP (ref 8.4–10.5)
CHLORIDE SERPL-SCNC: 95 MMOL/L — LOW (ref 96–108)
CO2 SERPL-SCNC: 30 MMOL/L — SIGNIFICANT CHANGE UP (ref 22–31)
COVID-19 SPIKE DOMAIN AB INTERP: POSITIVE
COVID-19 SPIKE DOMAIN ANTIBODY RESULT: >250 U/ML — HIGH
CREAT SERPL-MCNC: 0.91 MG/DL — SIGNIFICANT CHANGE UP (ref 0.5–1.3)
EOSINOPHIL # BLD AUTO: 0.44 K/UL — SIGNIFICANT CHANGE UP (ref 0–0.5)
EOSINOPHIL NFR BLD AUTO: 4.4 % — SIGNIFICANT CHANGE UP (ref 0–6)
GLUCOSE SERPL-MCNC: 106 MG/DL — HIGH (ref 70–99)
HCT VFR BLD CALC: 35.9 % — SIGNIFICANT CHANGE UP (ref 34.5–45)
HGB BLD-MCNC: 11.2 G/DL — LOW (ref 11.5–15.5)
IMM GRANULOCYTES NFR BLD AUTO: 0.4 % — SIGNIFICANT CHANGE UP (ref 0–1.5)
INR BLD: 1.61 RATIO — HIGH (ref 0.88–1.16)
LYMPHOCYTES # BLD AUTO: 1.55 K/UL — SIGNIFICANT CHANGE UP (ref 1–3.3)
LYMPHOCYTES # BLD AUTO: 15.3 % — SIGNIFICANT CHANGE UP (ref 13–44)
MAGNESIUM SERPL-MCNC: 2.2 MG/DL — SIGNIFICANT CHANGE UP (ref 1.6–2.6)
MCHC RBC-ENTMCNC: 29.8 PG — SIGNIFICANT CHANGE UP (ref 27–34)
MCHC RBC-ENTMCNC: 31.2 GM/DL — LOW (ref 32–36)
MCV RBC AUTO: 95.5 FL — SIGNIFICANT CHANGE UP (ref 80–100)
MONOCYTES # BLD AUTO: 0.85 K/UL — SIGNIFICANT CHANGE UP (ref 0–0.9)
MONOCYTES NFR BLD AUTO: 8.4 % — SIGNIFICANT CHANGE UP (ref 2–14)
NEUTROPHILS # BLD AUTO: 7.18 K/UL — SIGNIFICANT CHANGE UP (ref 1.8–7.4)
NEUTROPHILS NFR BLD AUTO: 71 % — SIGNIFICANT CHANGE UP (ref 43–77)
NRBC # BLD: 0 /100 WBCS — SIGNIFICANT CHANGE UP (ref 0–0)
PHOSPHATE SERPL-MCNC: 4.3 MG/DL — SIGNIFICANT CHANGE UP (ref 2.5–4.5)
PLATELET # BLD AUTO: 276 K/UL — SIGNIFICANT CHANGE UP (ref 150–400)
POTASSIUM SERPL-MCNC: 5.2 MMOL/L — SIGNIFICANT CHANGE UP (ref 3.5–5.3)
POTASSIUM SERPL-SCNC: 5.2 MMOL/L — SIGNIFICANT CHANGE UP (ref 3.5–5.3)
PROT SERPL-MCNC: 6.8 G/DL — SIGNIFICANT CHANGE UP (ref 6–8.3)
PROTHROM AB SERPL-ACNC: 18.7 SEC — HIGH (ref 10.6–13.6)
RBC # BLD: 3.76 M/UL — LOW (ref 3.8–5.2)
RBC # FLD: 13.9 % — SIGNIFICANT CHANGE UP (ref 10.3–14.5)
SARS-COV-2 IGG+IGM SERPL QL IA: >250 U/ML — HIGH
SARS-COV-2 IGG+IGM SERPL QL IA: POSITIVE
SODIUM SERPL-SCNC: 132 MMOL/L — LOW (ref 135–145)
WBC # BLD: 10.11 K/UL — SIGNIFICANT CHANGE UP (ref 3.8–10.5)
WBC # FLD AUTO: 10.11 K/UL — SIGNIFICANT CHANGE UP (ref 3.8–10.5)

## 2021-10-11 PROCEDURE — 70450 CT HEAD/BRAIN W/O DYE: CPT | Mod: 26

## 2021-10-11 PROCEDURE — 99222 1ST HOSP IP/OBS MODERATE 55: CPT

## 2021-10-11 PROCEDURE — 99233 SBSQ HOSP IP/OBS HIGH 50: CPT | Mod: GC

## 2021-10-11 RX ORDER — ACETAMINOPHEN 500 MG
1000 TABLET ORAL EVERY 8 HOURS
Refills: 0 | Status: DISCONTINUED | OUTPATIENT
Start: 2021-10-11 | End: 2021-10-13

## 2021-10-11 RX ORDER — OXYCODONE HYDROCHLORIDE 5 MG/1
2.5 TABLET ORAL EVERY 6 HOURS
Refills: 0 | Status: DISCONTINUED | OUTPATIENT
Start: 2021-10-11 | End: 2021-10-12

## 2021-10-11 RX ORDER — OXYCODONE HYDROCHLORIDE 5 MG/1
5 TABLET ORAL EVERY 4 HOURS
Refills: 0 | Status: DISCONTINUED | OUTPATIENT
Start: 2021-10-11 | End: 2021-10-11

## 2021-10-11 RX ORDER — OXYCODONE HYDROCHLORIDE 5 MG/1
5 TABLET ORAL EVERY 6 HOURS
Refills: 0 | Status: DISCONTINUED | OUTPATIENT
Start: 2021-10-11 | End: 2021-10-11

## 2021-10-11 RX ORDER — LIDOCAINE 4 G/100G
1 CREAM TOPICAL DAILY
Refills: 0 | Status: DISCONTINUED | OUTPATIENT
Start: 2021-10-11 | End: 2021-10-13

## 2021-10-11 RX ADMIN — Medication 1000 MILLIGRAM(S): at 22:33

## 2021-10-11 RX ADMIN — OXYCODONE HYDROCHLORIDE 5 MILLIGRAM(S): 5 TABLET ORAL at 11:02

## 2021-10-11 RX ADMIN — OXYCODONE HYDROCHLORIDE 5 MILLIGRAM(S): 5 TABLET ORAL at 12:02

## 2021-10-11 RX ADMIN — POLYETHYLENE GLYCOL 3350 17 GRAM(S): 17 POWDER, FOR SOLUTION ORAL at 11:03

## 2021-10-11 RX ADMIN — Medication 1000 MILLIGRAM(S): at 21:33

## 2021-10-11 RX ADMIN — LIDOCAINE 1 PATCH: 4 CREAM TOPICAL at 11:03

## 2021-10-11 NOTE — CONSULT NOTE ADULT - PROBLEM SELECTOR RECOMMENDATION 9
Pt with chronic right lumbar back pain which is somatic and neuropathic in nature due to sciatica and severe spinal stenosis. hx multiple cortisone injections.   High risk medications reviewed. Avoid polypharmacy. Avoid IV opioids. Avoid NSAIDs and benzodiazepines. Non-pharmacological sleep aides initiated. Non-opioid medications and non-pharmacological pain management measures initiated.   Opioid pain recommendations   - Continue Oxycodone 5 mg PO q 6 hours PRN severe pain. Monitor for sedation/ respiratory depression.   Non-opioid pain recommendations   - Acetaminophen 1 gram PO q 8 hours x 3 days.   - Lidoderm 4% patch daily.   - Avoid NSAIDs- multiple hematomas. On coumadin.   Bowel Regimen  - Continue Miralax 17G PO daily  - Continue Senna 2 tablets at bedtime for constipation  Mild pain   - Non-pharmacological pain treatment recommendations  - Warm/ Cool packs PRN   - Repositioning extremity, imagery, relaxation, distraction.  - Physical therapy OOB if no contraindications   Recommendations discussed with primary team and RN

## 2021-10-11 NOTE — CONSULT NOTE ADULT - ASSESSMENT
Confidential Drug Utilization Report  Search Terms: Chelsy Munoz, 12/05/1926Search Date: 10/11/2021 11:19:12 AM  The Drug Utilization Report below displays all of the controlled substance prescriptions, if any, that your patient has filled in the last twelve months. The information displayed on this report is compiled from pharmacy submissions to the Department, and accurately reflects the information as submitted by the pharmacies.    This report was requested by: Shagufta Boykin | Reference #: [id number]    There are no results for the search terms that you entered.

## 2021-10-11 NOTE — PROGRESS NOTE ADULT - ASSESSMENT
89 year old female on Coumadin for multiple PE here with a left eye hematoma after mechanical fall.    Pt unsure of her medication names and dosages, primary team to confirm meds in the AM 89 year old female on Coumadin for multiple PE here with a left eye hematoma after mechanical fall.

## 2021-10-11 NOTE — PROGRESS NOTE ADULT - PROBLEM SELECTOR PLAN 4
Pt presented w/ Na+ 125  C/w NS 75 cc/hr  f/u FeNa, UOsm, and Serum Osm  -132 on (10/11)   f/u Daily BMP Pt presented w/ Na+ 125  C/w NS 75 cc/hr  f/u FeNa, UOsm, and Serum Osm  - 131 on (10/10)  -132 on (10/11)   f/u Daily BMP

## 2021-10-11 NOTE — PROGRESS NOTE ADULT - SUBJECTIVE AND OBJECTIVE BOX
PGY-1 Progress Note discussed with attending    PAGER #: [[phone]] TILL 5:00 PM  PLEASE CONTACT ON CALL TEAM:  - On Call Team (Please refer to [facility]) FROM 5:00 PM - 8:30PM  - Nightfloat Team FROM 8:30 -7:30 AM    CHIEF COMPLAINT & BRIEF HOSPITAL COURSE:  89 year old female on Coumadin for multiple PE was seen here 10/8 for severe thoracic back pain radiating to bilateral leg. The imaging of hips and thoracic spine were normal, implied with medication and discharged. Patient came back 10/9 . 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 Patient denies LOC, dizziness or any new pain in the back and trauma anywhere else. Pt also denies nausea, vomiting, chest pain, and shortness of breath.    INTERVAL HPI/OVERNIGHT EVENTS:       REVIEW OF SYSTEMS:  CONSTITUTIONAL: No fever, chills, weight loss, or fatigue  RESPIRATORY: No cough, wheezing, or hemoptysis; No shortness of breath  CARDIOVASCULAR: No chest pain, palpitations, dizziness, or leg swelling  GASTROINTESTINAL: No abdominal pain. No nausea, vomiting, or hematemesis; No diarrhea or constipation. No melena or hematochezia.  GENITOURINARY: No dysuria or hematuria, urinary frequency  NEUROLOGICAL: No headaches, memory loss, loss of strength, numbness, or tremors  SKIN: No itching, burning, rashes, or lesions     Vital Signs Last 24 Hrs  T(C): 37.1 (11 Oct 2021 05:35), Max: 37.1 (11 Oct 2021 05:35)  T(F): 98.8 (11 Oct 2021 05:35), Max: 98.8 (11 Oct 2021 05:35)  HR: 77 (11 Oct 2021 05:35) (62 - 98)  BP: 152/77 (11 Oct 2021 05:35) (112/55 - 152/77)  BP(mean): --  RR: 18 (11 Oct 2021 05:35) (15 - 18)  SpO2: 93% (11 Oct 2021 05:35) (93% - 99%)    PHYSICAL EXAMINATION:  GENERAL: NAD, well built  HEAD:  Atraumatic, Normocephalic  EYES:  conjunctiva and sclera clear  NECK: Supple, No JVD, Normal thyroid  CHEST/LUNG: Clear to auscultation. No wheezing, crackles, rales or rubs  HEART: Regular rate and rhythm; Clear S1 S2, No murmurs, rubs, or gallops  ABDOMEN: Soft, Nontender, Nondistended; Bowel sounds present  NERVOUS SYSTEM:  Alert & Oriented X3, CNII-XII intact, no focal neurological deficits  EXTREMITIES:  2+ Peripheral Pulses, No clubbing, cyanosis, or edema  SKIN: warm dry                          11.2   10.11 )-----------( 276      ( 11 Oct 2021 06:06 )             35.9     10-11    132<L>  |  95<L>  |  18  ----------------------------<  106<H>  5.2   |  30  |  0.91    Ca    8.9      11 Oct 2021 06:06  Phos  4.3     10-11  Mg     2.2     10-11    TPro  6.8  /  Alb  3.2<L>  /  TBili  0.4  /  DBili  x   /  AST  16  /  ALT  25  /  AlkPhos  87  10-11    LIVER FUNCTIONS - ( 11 Oct 2021 06:06 )  Alb: 3.2 g/dL / Pro: 6.8 g/dL / ALK PHOS: 87 U/L / ALT: 25 U/L DA / AST: 16 U/L / GGT: x               PT/INR - ( 11 Oct 2021 06:06 )   PT: 18.7 sec;   INR: 1.61 ratio         PTT - ( 09 Oct 2021 22:36 )  PTT:50.5 sec    RADIOLOGY & ADDITIONAL TESTS:         PGY-1 Progress Note discussed with attending    PAGER #: [[phone]] TILL 5:00 PM  PLEASE CONTACT ON CALL TEAM:  - On Call Team (Please refer to [facility]) FROM 5:00 PM - 8:30PM  - Nightfloat Team FROM 8:30 -7:30 AM    CHIEF COMPLAINT & BRIEF HOSPITAL COURSE:  89 year old female on Coumadin for multiple PE was seen here 10/8 for severe thoracic back pain radiating to bilateral leg. The imaging of hips and thoracic spine were normal, implied with medication and discharged. Patient came back 10/9 . 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 Patient denies LOC, dizziness or any new pain in the back and trauma anywhere else. Pt also denies nausea, vomiting, chest pain, and shortness of breath.    INTERVAL HPI/OVERNIGHT EVENTS:   Patient seen and examined at bedside. No overnight events. Patient had back from CT head this morning. Patient complains of R hip pain but no new complaints. Patient AAOx1 but at baseline mental status per daughter at bedside. Daughter says that pain meds make her a bit confused but no decline in mental status. Good concentration and no focal neurological deficits. Med rec completed this AM. On 3 mg warfarin daily, 4mg on Wednesday per daughter. Holding for another 24 hours d/t hematoma. Daughter concerned that patient was supposed to undergo epidural spinal injection tomorrow for chronic back pain. Ortho will assess today and if dc tomorrow can undergo o/p procedure    REVIEW OF SYSTEMS:  CONSTITUTIONAL: No fever, chills, weight loss, or fatigue  RESPIRATORY: No cough, wheezing, or hemoptysis; No shortness of breath  CARDIOVASCULAR: No chest pain, palpitations, dizziness, or leg swelling  GASTROINTESTINAL: No abdominal pain. No nausea, vomiting, or hematemesis; No diarrhea or constipation. No melena or hematochezia.  GENITOURINARY: No dysuria or hematuria, urinary frequency  NEUROLOGICAL: No headaches, memory loss, loss of strength, numbness, or tremors  SKIN: No itching, burning, rashes, or lesions   MSK: +hip pain    Vital Signs Last 24 Hrs  T(C): 37.1 (11 Oct 2021 05:35), Max: 37.1 (11 Oct 2021 05:35)  T(F): 98.8 (11 Oct 2021 05:35), Max: 98.8 (11 Oct 2021 05:35)  HR: 77 (11 Oct 2021 05:35) (62 - 98)  BP: 152/77 (11 Oct 2021 05:35) (112/55 - 152/77)  BP(mean): --  RR: 18 (11 Oct 2021 05:35) (15 - 18)  SpO2: 93% (11 Oct 2021 05:35) (93% - 99%)    PHYSICAL EXAMINATION:  GENERAL: NAD, Obese  HEAD:  Atraumatic, Normocephalic  EYES:  large L sided perorbital hematoma with swelling, small R sided hematoma   NECK: Supple, No JVD, Normal thyroid  CHEST/LUNG: Clear to auscultation. No wheezing, crackles, rales or rubs  HEART: Regular rate and rhythm; Clear S1 S2, No murmurs, rubs, or gallops  ABDOMEN: Soft, Nontender, Nondistended; Bowel sounds present  NERVOUS SYSTEM:  Alert & Oriented X1, no focal neurological deficits  MSK:  Limited ROM of RLE d/t pain, 2+ Peripheral Pulses, No clubbing, cyanosis, or edema  SKIN: warm dry                          11.2   10.11 )-----------( 276      ( 11 Oct 2021 06:06 )             35.9     10-11    132<L>  |  95<L>  |  18  ----------------------------<  106<H>  5.2   |  30  |  0.91    Ca    8.9      11 Oct 2021 06:06  Phos  4.3     10-11  Mg     2.2     10-11    TPro  6.8  /  Alb  3.2<L>  /  TBili  0.4  /  DBili  x   /  AST  16  /  ALT  25  /  AlkPhos  87  10-11    LIVER FUNCTIONS - ( 11 Oct 2021 06:06 )  Alb: 3.2 g/dL / Pro: 6.8 g/dL / ALK PHOS: 87 U/L / ALT: 25 U/L DA / AST: 16 U/L / GGT: x               PT/INR - ( 11 Oct 2021 06:06 )   PT: 18.7 sec;   INR: 1.61 ratio         PTT - ( 09 Oct 2021 22:36 )  PTT:50.5 sec    RADIOLOGY & ADDITIONAL TESTS:    CT Head noncontrast (10/11): No acute hemorrhage, mass effect or extra-axial collections. Decreasing left frontal and periorbital soft tissue swelling and hematoma.    CT (10/9)  Head CT: Small to moderate left frontal scalp hematoma. No acute intracranial hemorrhage, extra-axial collection or calvarial fracture. Mild chronic microvascular changes.    Maxillofacial CT: Moderate left preseptal periorbital soft tissue swelling. No acute facial fracture or dislocation.  Opacified right maxillary sinus with foamy secretions; please correlate clinically for sinusitis.    Cervical spine CT: No evidence of acute cervical spine fracture or evidence of traumatic malalignment. Cervical spondylosis.

## 2021-10-11 NOTE — CONSULT NOTE ADULT - PROVIDER SPECIALTY LIST ADULT
1. Have you had increased asthma symptoms (chest tightness, increased cough,         wheezing or felt short of breath) in the past week? No    2. Have you had a marked increase in allergy symptoms(itchy eyes or nose, sneezing, runny nose, post nasal drip or throat clearing) in the past week? No    3. Do you have a cold or respiratory tract infection, or flu-like symptoms? No    4. Did you have any problems such as increased allergy or asthma symptoms, hives or generalized itching within 12 hours of receiving your allergy injection or swelling that persisted into the next day? No    5.Are you on any new medications? Any new eye drops? Any new blood pressure or migraine medications? No    Please specify:     6. Are you taking your allergy medicine as prescribed? Yes    7. Do you have your Epi kit with you? Yes    Staff notes (intervention)    
Orthopedics
Pain Medicine

## 2021-10-11 NOTE — CHART NOTE - NSCHARTNOTEFT_GEN_A_CORE
Attempted to obtain pt's home medication list from [facility] [phone], pharmacy is closed today, primary team to follow up tomorrow AM
BRIEF NOTE:      Admission H&P from this morning reviewed. Spoke with pt's daughter at bedside. Will place ice packs on left eye. Continue to hold coumadin, repeat CT head tomorrow morning to assess for delayed bleed, monitor Neuro checks q4h. Obtain PT consult. CT of LS showed multilevel degenerative changes with likely severe spinal canal stenosis at L4-L5 and possible superiorly migrated L4-L5 disc herniation at mid L4 level. Recommended MRI for further evaluation. Will consult ortho spine regarding CT findings prior to obtain MRI. Hyperkalemia and hyponatremia improving, will continue to monitor. Will need opthalmology follow up.      Rest as per admission H&P
Reassessed pt at bedside. Pt laying in bed, daughter at bedside.  ID # [id number]. Pt laying in bed, lethargic opens eyes briefly. Denies pain. left periorbital edema significantly improved since this morning. Will adjust oxycodone dose to 2.5mg PO q6h PRN. Discussed plan with daughter at bedside. Will continue to follow.   Vital Signs Last 24 Hrs  T(C): 37.2 (11 Oct 2021 14:47), Max: 37.2 (11 Oct 2021 14:47)  T(F): 99 (11 Oct 2021 14:47), Max: 99 (11 Oct 2021 14:47)  HR: 67 (11 Oct 2021 14:47) (62 - 77)  BP: 132/60 (11 Oct 2021 14:47) (112/55 - 152/77)  BP(mean): --  RR: 17 (11 Oct 2021 14:47) (15 - 18)  SpO2: 94% (11 Oct 2021 14:47) (93% - 98%)

## 2021-10-11 NOTE — CONSULT NOTE ADULT - SUBJECTIVE AND OBJECTIVE BOX
Source of information: [facility], Chart review  Patient language: Luxembourgish  : [id number] Eugenia    HPI:  89 year old female on Coumadin for multiple PE was seen here yesterday for severe thoracic back pain radiating to bilateral leg. The imaging of hips and thoracic spine were normal, implied with medication and discharged. Patient is coming back today. 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 Patient denies LOC, dizziness or any new pain in the back and trauma anywhere else. Pt also denies nausea, vomiting, chest pain, and shortness of breath. (10 Oct 2021 02:49)    CT head significant for moderate left frontal scalp hematoma, negative for intracranial hemorrhage. Pain consulted for back pain. History also obtained from daughter at bedside. Reports on 10/9 patient was standing when she felt pain over her right side body and attempted to grab a rocking chair for support but fell to the floor hitting her head against the rocking chair. Denies LOC.  Pt seen and examined at bedside. Pt laying in bed, lethargic, answers questions and follows commands appropriately. Reports mild right sided facial pain, aching, soreness, alleviated by ice packs. Reports right lumbar back pain, radiating to right hip and down right leg SCALE USED: (1-10 VNRS). Pt describes pain as shooting, intermittently cramping, not alleviated by Tylenol at home, exacerbated by movement. Pt receives outpatient URMILA every 3 months for her sciatica and severe spinal stenosis. Reports her next appointment is for tomorrow 10/12. Pt tolerating PO diet. Denies lethargy, nausea, vomiting, constipation, itchiness. Reports last BM 10/9 Patient stated goal for pain control: to be able to take deep breaths, get out of bed to chair and ambulate with tolerable pain control.  Ambulates with a cane or walker at home. Pt reports taking Tylenol for pain at home with no relief.     PAST MEDICAL & SURGICAL HISTORY:  Diabetes    Breast cancer    multiple PE    sciatica    spinal stenosis    appendectomy    FAMILY HISTORY: denies pertient family hx      Social History:  Denies smoking, alcohol and illicit drug use (10 Oct 2021 02:49)    Allergies    aspirin (Nausea (Mild))    MEDICATIONS  (STANDING):  acetaminophen   Tablet .. 1000 milliGRAM(s) Oral every 8 hours  influenza   Vaccine 0.5 milliLiter(s) IntraMuscular once  lidocaine   4% Patch 1 Patch Transdermal daily  polyethylene glycol 3350 17 Gram(s) Oral daily  senna 2 Tablet(s) Oral at bedtime    MEDICATIONS  (PRN):  oxyCODONE    IR 5 milliGRAM(s) Oral every 4 hours PRN Severe Pain (7 - 10)      Vital Signs Last 24 Hrs  T(C): 37.1 (11 Oct 2021 05:35), Max: 37.1 (11 Oct 2021 05:35)  T(F): 98.8 (11 Oct 2021 05:35), Max: 98.8 (11 Oct 2021 05:35)  HR: 77 (11 Oct 2021 05:35) (62 - 98)  BP: 152/77 (11 Oct 2021 05:35) (112/55 - 152/77)  BP(mean): --  RR: 18 (11 Oct 2021 05:35) (15 - 18)  SpO2: 93% (11 Oct 2021 05:35) (93% - 99%)    LABS: Reviewed.                          11.2   10.11 )-----------( 276      ( 11 Oct 2021 06:06 )             35.9     10-11    132<L>  |  95<L>  |  18  ----------------------------<  106<H>  5.2   |  30  |  0.91    Ca    8.9      11 Oct 2021 06:06  Phos  4.3     10-11  Mg     2.2     10-11    TPro  6.8  /  Alb  3.2<L>  /  TBili  0.4  /  DBili  x   /  AST  16  /  ALT  25  /  AlkPhos  87  10-11    PT/INR - ( 11 Oct 2021 06:06 )   PT: 18.7 sec;   INR: 1.61 ratio         PTT - ( 09 Oct 2021 22:36 )  PTT:50.5 sec  LIVER FUNCTIONS - ( 11 Oct 2021 06:06 )  Alb: 3.2 g/dL / Pro: 6.8 g/dL / ALK PHOS: 87 U/L / ALT: 25 U/L DA / AST: 16 U/L / GGT: x             CAPILLARY BLOOD GLUCOSE        COVID-19 PCR: NotDetec (09 Oct 2021 22:03)      Radiology: Reviewed.   < from: CT Head No Cont (10.11.21 @ 07:50) >    EXAM:  CT BRAIN                            PROCEDURE DATE:  10/11/2021          INTERPRETATION:  Noncontrast CT of the brain.    CLINICAL INDICATION:  Status post fall at home    TECHNIQUE : Axial CT scanning of the brain was obtained from the skull base to the vertex without the administration of intravenous contrast.    COMPARISON: Brain CT dated 10/9/2021    FINDINGS:  No acute hemorrhage, hydrocephalus, midline shift or extra-axial collections are identified. Age-appropriate involutional changes and mild microvascular ischemic changes are present.    Prominent left frontal and periorbital soft tissue swelling and hematoma have minimally decreased.    Right maxillary sinus mucosal thickening and secretions are unchanged.    IMPRESSION:    No acute hemorrhage, mass effect or extra-axial collections. Decreasing left frontal and periorbital soft tissue swelling and hematoma.      --- End of Report ---            HOLLY MOTLEY MD; Attending Radiologist  This document has been electronically signed. Oct 11 2021  9:01AM    < end of copied text >    < from: CT Maxillofacial No Cont (10.09.21 @ 22:52) >    EXAM:  CT CERVICAL SPINE                          EXAM:  CT MAXILLOFACIAL                          EXAM:  CT BRAIN                            PROCEDURE DATE:  10/09/2021          INTERPRETATION:  CLINICAL HISTORY: Trauma. Status post fall. On Coumadin. Patient states after a mechanical fall, she fell back on a rocking chair and landed on her head, causing a laceration to the left brow, and large hematoma to the left eye incurring in the lower and upper lids. Patient denies LOC, any new pain in the back and trauma anywhere else.    TECHNIQUE: CT of the head, maxillofacial region and cervical spine without contrast dated 10/9/2021.  Head CT consists of transaxial images acquired from the skull base to vertex without contrast. Coronal and sagittal reformatted images are provided.  Maxillofacial CT consists of thin section transaxial images through the facial region with coronal, sagittal and 3-D volume rendered reformatted images provided from the transaxial source data.  Cervical spine CTwith thin section transaxial images acquired from the occiput through to T2. Coronal, sagittal and 3-D volume rendered reformatted images are provided.    COMPARISON: None available.    FINDINGS:    Head CT:  There is a small to moderate left frontalscalp hematoma. The underlying calvarium is intact. There is no acute intracranial hemorrhage, vasogenic edema or evidence for acute large vascular territory infarct. Patchy areas of low-attenuation are seen the bihemispheric white matter, nonspecific, but likely sequela of chronic microvascular change.    Cerebral by loss is present with secondary proportional prominence of the ventricles and sulci. There is no midline shift or abnormal extra-axial fluid collection.    The calvarium is intact. There are no suspicious osteoblastic or lytic calvarial lesions. The visualized mastoid air cells are clear.    Maxillofacial CT:  There is moderate left preseptal periorbital soft tissue swelling. The orbital rims, walls, floors, lamina papyracea and zygomatic arches are intact. The temporomandibular joints are located.    The globes are of normal contour and the lenses are located. There is no preseptal periorbital radiopaque foreign body. There is no retrobulbar hematoma. The optic nerve sheath complexes and extraocular muscles are symmetric and normal appearing bilaterally.    There are no air-fluid levels present within the paranasal sinuses. Near complete opacification of the right maxillary sinus with some foamy secretions. No high density fluid. The remaining paranasal sinuses are clear.    Cervical spine CT:  Mild motion artifact present.    There is straightening of the normal cervical lordosis with slight anterolisthesis of C2 on C3, retrolisthesis of C4 on C5 and anterolisthesisof C7 on T1. There are no acute fractures or evidence of traumatic malalignment. The vertebral body heights are maintained. Multilevel facet alignment is preserved. The atlanto-dental interval is within normal limits and the craniocervical junction is unremarkable. There are no  suspicious osteoblastic or lytic lesions.    There is no evidence of prevertebral soft tissue swelling or epidural hematoma.    There are multilevel degenerative changes including intervertebral disc space narrowing, discosteophyte complexes and facet arthropathy. Findings are superimposed on a narrow canal and contribute to overall multilevel canal and foraminal stenosis, the degree of which is suboptimally assessed on this modality but appears most notable from C3/C4 through C5/C6.    Retropharyngeal courses of the internal carotid arteries. Otherwise the soft tissues of the neck are grossly unremarkable on this noncontrast study. The lung apices are clear.      IMPRESSION:  Head CT: Small to moderate left frontal scalp hematoma. No acute intracranial hemorrhage, extra-axial collection or calvarial fracture. Mild chronic microvascular changes.    Maxillofacial CT: Moderate left preseptal periorbital soft tissue swelling. No acute facial fracture or dislocation.  Opacified right maxillary sinus with foamy secretions; please correlate clinically for sinusitis.    Cervical spine CT: No evidence of acute cervical spine fracture or evidence of traumatic malalignment. Cervical spondylosis.    --- End of Report ---            WAQAS EAGLE MD; Attending Radiologist  This document has been electronically signed. Oct  9 2021 11:46PM    < end of copied text >    < from: CT Lumbar Spine No Cont (10.08.21 @ 19:56) >    EXAM:  CT LUMBAR SPINE                            PROCEDURE DATE:  10/08/2021          INTERPRETATION:  CLINICAL INDICATION: Back pain.    TECHNIQUE: CT of the lumbar spine was performed without the administration of intravenous contrast, accordingto standard protocol.    COMPARISON: None available.    FINDINGS:  BONES AND DISCS:  Leftward lumbar curvature is noted. No acute fracture is identified. No destructive bony lesion identified.    Multilevel degenerative changes are noted. Slightly hyperdense material noted within the right ventral spinal canal at mid L4 level, likely superiorly migrated right central L4-L5 disc protrusion. Likely severe L4-L5 spinal canal stenosis. Moderate to severe right and severe left L4-L5 neuroforaminal stenosis.    SOFT TISSUES:  Paraspinal soft tissues are unremarkable.    OTHER:  Included retroperitoneum and pelvis are unremarkable.      IMPRESSION:  No acute fracture identified.    Multilevel degenerative changes with likely severe spinal canal stenosis at L4-L5 and possible superiorly migrated L4-L5 disc herniation at mid L4 level. Recommend MRI for further evaluation.    --- End of Report ---            GLORIA ZAVALA MD; Attending Radiologist  This document has been electronically signed. Oct  8 2021  8:09PM    < end of copied text >    < from: CT Cervical Spine No Cont (10.09.21 @ 22:51) >    EXAM:  CT CERVICAL SPINE                          EXAM:  CT MAXILLOFACIAL                          EXAM:  CT BRAIN                            PROCEDURE DATE:  10/09/2021          INTERPRETATION:  CLINICAL HISTORY: Trauma. Status post fall. On Coumadin. Patient states after a mechanical fall, she fell back on a rocking chair and landed on her head, causing a laceration to the left brow, and large hematoma to the left eye incurring in the lower and upper lids. Patient denies LOC, any new pain in the back and trauma anywhere else.    TECHNIQUE: CT of the head, maxillofacial region and cervical spine without contrast dated 10/9/2021.  Head CT consists of transaxial images acquired from the skull base to vertex without contrast. Coronal and sagittal reformatted images are provided.  Maxillofacial CT consists of thin section transaxial images through the facial region with coronal, sagittal and 3-D volume rendered reformatted images provided from the transaxial source data.  Cervical spine CTwith thin section transaxial images acquired from the occiput through to T2. Coronal, sagittal and 3-D volume rendered reformatted images are provided.    COMPARISON: None available.    FINDINGS:    Head CT:  There is a small to moderate left frontalscalp hematoma. The underlying calvarium is intact. There is no acute intracranial hemorrhage, vasogenic edema or evidence for acute large vascular territory infarct. Patchy areas of low-attenuation are seen the bihemispheric white matter, nonspecific, but likely sequela of chronic microvascular change.    Cerebral by loss is present with secondary proportional prominence of the ventricles and sulci. There is no midline shift or abnormal extra-axial fluid collection.    The calvarium is intact. There are no suspicious osteoblastic or lytic calvarial lesions. The visualized mastoid air cells are clear.    Maxillofacial CT:  There is moderate left preseptal periorbital soft tissue swelling. The orbital rims, walls, floors, lamina papyracea and zygomatic arches are intact. The temporomandibular joints are located.    The globes are of normal contour and the lenses are located. There is no preseptal periorbital radiopaque foreign body. There is no retrobulbar hematoma. The optic nerve sheath complexes and extraocular muscles are symmetric and normal appearing bilaterally.    There are no air-fluid levels present within the paranasal sinuses. Near complete opacification of the right maxillary sinus with some foamy secretions. No high density fluid. The remaining paranasal sinuses are clear.    Cervical spine CT:  Mild motion artifact present.    There is straightening of the normal cervical lordosis with slight anterolisthesis of C2 on C3, retrolisthesis of C4 on C5 and anterolisthesisof C7 on T1. There are no acute fractures or evidence of traumatic malalignment. The vertebral body heights are maintained. Multilevel facet alignment is preserved. The atlanto-dental interval is within normal limits and the craniocervical junction is unremarkable. There are no  suspicious osteoblastic or lytic lesions.    There is no evidence of prevertebral soft tissue swelling or epidural hematoma.    There are multilevel degenerative changes including intervertebral disc space narrowing, discosteophyte complexes and facet arthropathy. Findings are superimposed on a narrow canal and contribute to overall multilevel canal and foraminal stenosis, the degree of which is suboptimally assessed on this modality but appears most notable from C3/C4 through C5/C6.    Retropharyngeal courses of the internal carotid arteries. Otherwise the soft tissues of the neck are grossly unremarkable on this noncontrast study. The lung apices are clear.      IMPRESSION:  Head CT: Small to moderate left frontal scalp hematoma. No acute intracranial hemorrhage, extra-axial collection or calvarial fracture. Mild chronic microvascular changes.    Maxillofacial CT: Moderate left preseptal periorbital soft tissue swelling. No acute facial fracture or dislocation.  Opacified right maxillary sinus with foamy secretions; please correlate clinically for sinusitis.    Cervical spine CT: No evidence of acute cervical spine fracture or evidence of traumatic malalignment. Cervical spondylosis.    --- End of Report ---            WAQAS EAGLE MD; Attending Radiologist  This document has been electronically signed. Oct  9 2021 11:46PM    < end of copied text >      ORT Score -   Family Hx of substance abuse	Female	      Male  Alcohol 	                                           1                     3  Illegal drugs	                                   2                     3  Rx drugs                                           4 	                  4  Personal Hx of substance abuse		  Alcohol 	                                          3	                  3  Illegal drugs                                     4	                  4  Rx drugs                                            5 	                  5  Age between 16- 45 years	           1                     1  hx preadolescent sexual abuse	   3 	                  0  Psychological disease		  ADD, OCD, bipolar, schizophrenia   2	          2  Depression                                           1 	          1  Total: 0    a score of 3 or lower indicates low risk for opioid abuse		  a score of 4-7 indicates moderate risk for opioid abuse		  a score of 8 or higher indicates high risk for opioid abuse  	    REVIEW OF SYSTEMS:  CONSTITUTIONAL: No fever + fatigue  HEENT: + bilateral periorbital edema left eye> right; + left eye blindness; No difficulty hearing   NECK: No pain or stiffness  RESPIRATORY: No cough, wheezing, chills or hemoptysis; No shortness of breath  CARDIOVASCULAR: + hx multiple PE; No chest pain, palpitations, dizziness, or leg swelling  GASTROINTESTINAL: + loss of appetite and decreased PO intake. + hx hernias; No abdominal or epigastric pain. No nausea, vomiting; No diarrhea or constipation.   GENITOURINARY: No dysuria, frequency, hematuria, retention or incontinence  MUSCULOSKELETAL: + right lumbar back, right hip and right leg pain. + chronic lumbar back pain, + generalized weakness, + right LE weakness; no saddle anesthesia, bowel/bladder incontinence, + falls   NEURO: No headaches, No numbness/tingling b/l LE    PHYSICAL EXAM:  GENERAL:  Alert & Oriented X4, cooperative, NAD, Good concentration. Speech is clear.   RESPIRATORY: Respirations even and unlabored. Clear to auscultation bilaterally; No rales, rhonchi, wheezing, or rubs  CARDIOVASCULAR: Normal S1/S2, regular rate and rhythm; No murmurs, rubs, or gallops. No JVD.   GASTROINTESTINAL:  Soft, Nontender, Nondistended; Bowel sounds present  PERIPHERAL VASCULAR:  Extremities warm without edema. 2+ Peripheral Pulses, No cyanosis, No calf tenderness  MUSCULOSKELETAL: Motor Strength 4/5 B/L upper extremities, 3/5 left lower extremity, 1/5 right lower extremitiy; decreased right LE ROM; negative SLR left LE; + SLR on right LE; + right lumbar back and right hip tenderness on palpation of all joints.   SKIN: + periorbital ecchymosis and edema bilaterally, right > left. Warm, dry, intact. No rashes, lesions, scars or wounds.     Risk factors associated with adverse outcomes related to opioid treatment  [ ]  Concurrent benzodiazepine use  [ ]  History/ Active substance use or alcohol use disorder  [ ] Psychiatric co-morbidity  [ ] Sleep apnea  [ ] COPD  [ ] BMI> 35  [ ] Liver dysfunction  [ ] Renal dysfunction  [ ] CHF  [ ] Smoker  [X ]  Age > 60 years    [X ]  [place]  Reviewed and Copied to Chart. See below.    Plan of care and goal oriented pain management treatment options were discussed with patient and /or primary care giver; all questions and concerns were addressed and care was aligned with patient's wishes.    Educated patient on goal oriented pain management treatment options     
I saw and evaluated pt in bed with daughter at bedside, I also reviewed chart notes incluling pain management. Pt ambulates with walker or cane and has chronic LBP and bilateral leg sciatica seen in pain management with epidural steroid injections every three months.      History of Present Illness:  Reason for Admission: Mechanical Fall and left eye hematoma  History of Present Illness:   89 year old female on Coumadin for multiple PE was seen here yesterday for severe thoracic back pain radiating to bilateral leg. The imaging of hips and thoracic spine were normal, implied with medication and discharged. Patient is coming back today. 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 Patient denies LOC, dizziness or any new pain in the back and trauma anywhere else. Pt also denies nausea, vomiting, chest pain, and shortness of breath.     Review of Systems:  Review of Systems: REVIEW OF SYSTEMS:    CONSTITUTIONAL: +ve weakness in RLE present from before, Denies fevers or chills  EYES/ENT: Left eye blindness;  No vertigo or throat pain   NECK: No pain or stiffness  RESPIRATORY: No cough, wheezing, hemoptysis; No shortness of breath  CARDIOVASCULAR: No chest pain or palpitations  GASTROINTESTINAL: No abdominal or epigastric pain. No nausea, vomiting, or hematemesis; No diarrhea or constipation. No melena or hematochezia.  GENITOURINARY: No dysuria, frequency or hematuria  NEUROLOGICAL: No numbness or weakness  SKIN: No itching, rashes  Other Review of Systems: All other review of systems negative, except as noted in HPI      Allergies and Intolerances:        Allergies:  	aspirin: Drug, Nausea (Mild)    Home Medications:   * Patient Currently Takes Medications as of 08-Oct-2021 21:20 documented in Structured Notes  · 	naproxen 500 mg oral tablet: 1 tab(s) orally 2 times a day, As Needed -for moderate pain     Patient History:    Past Medical, Past Surgical, and Family History:  PAST MEDICAL HISTORY:  Breast cancer     Diabetes.     Social History:  Social History (marital status, living situation, occupation, tobacco use, alcohol and drug use, and sexual history): Denies smoking, alcohol and illicit drug use    PE: Awake and alert with pain controlled lying in bed.        Back has some tenderness across with no groin pain on passive rotation of hips.       Moving legs with gross sensation.    X-rays: CT of C-Spine reviewed (see report) and shows spondylosis with no fractures.             CT of LS Spine reviewed (see report) and shows marked spondylosis with scoliosis and some stenosis most at L4/5 central and foraminal. No fractures.             CT pelvis reviewed (see report) and no fractures seen. No marked DJD of hips.    A/P: Cervical spondylosis.         Marked lumbar spondylosis         Chronic LBP and radiculitis being treated by pain management including epidural steroid injections         Recommend pain control and mobilization.         PT for ambulation         Continue f/u with previous pain management.

## 2021-10-11 NOTE — PROGRESS NOTE ADULT - ATTENDING COMMENTS
89 year old female on Coumadin for a h/o PE, DM, HTN, HLD, GERD, and left eye blindness for 6 years s/p corneal transplant who initially presented to the ED on 10/8 for severe thoracic back pain radiating to bilateral leg, had imaging of hips and thoracic spine and discharged home, however returned 10/9 s/p mechanical fall resulting in a laceration to the left brow and large hematoma to the left eye. CT head/maxillofacial/c-spine negative for ICH, showed small to moderate left frontal scalp hematoma, and moderate left preseptal periorbital soft tissue swelling. Repeat CT head shows decreasing left frontal and periorbital soft tissue swelling and hematoma. Continue neuro checks and continue to hold coumadin, possibly restart in 1-2 days if bleeding stable, patient/family deciding if they would like to resume it. Pending PT consult. CT of LS showed multilevel degenerative changes with likely severe spinal canal stenosis at L4-L5 and possible superiorly migrated L4-L5 disc herniation at mid L4 level. Recommended MRI for further evaluation. Will consult ortho spine regarding CT findings prior to obtain MRI. Hyperkalemia and hyponatremia improving, will continue to monitor. Will need opthalmology follow up.

## 2021-10-12 DIAGNOSIS — I48.0 PAROXYSMAL ATRIAL FIBRILLATION: ICD-10-CM

## 2021-10-12 LAB
ANION GAP SERPL CALC-SCNC: 5 MMOL/L — SIGNIFICANT CHANGE UP (ref 5–17)
APTT BLD: 36.2 SEC — HIGH (ref 27.5–35.5)
BUN SERPL-MCNC: 18 MG/DL — SIGNIFICANT CHANGE UP (ref 7–18)
CALCIUM SERPL-MCNC: 9.2 MG/DL — SIGNIFICANT CHANGE UP (ref 8.4–10.5)
CHLORIDE SERPL-SCNC: 96 MMOL/L — SIGNIFICANT CHANGE UP (ref 96–108)
CO2 SERPL-SCNC: 33 MMOL/L — HIGH (ref 22–31)
CREAT SERPL-MCNC: 1.07 MG/DL — SIGNIFICANT CHANGE UP (ref 0.5–1.3)
GLUCOSE SERPL-MCNC: 97 MG/DL — SIGNIFICANT CHANGE UP (ref 70–99)
HCT VFR BLD CALC: 35.1 % — SIGNIFICANT CHANGE UP (ref 34.5–45)
HGB BLD-MCNC: 10.9 G/DL — LOW (ref 11.5–15.5)
INR BLD: 1.37 RATIO — HIGH (ref 0.88–1.16)
MAGNESIUM SERPL-MCNC: 2.3 MG/DL — SIGNIFICANT CHANGE UP (ref 1.6–2.6)
MCHC RBC-ENTMCNC: 29.9 PG — SIGNIFICANT CHANGE UP (ref 27–34)
MCHC RBC-ENTMCNC: 31.1 GM/DL — LOW (ref 32–36)
MCV RBC AUTO: 96.4 FL — SIGNIFICANT CHANGE UP (ref 80–100)
NRBC # BLD: 0 /100 WBCS — SIGNIFICANT CHANGE UP (ref 0–0)
PHOSPHATE SERPL-MCNC: 4.8 MG/DL — HIGH (ref 2.5–4.5)
PLATELET # BLD AUTO: 264 K/UL — SIGNIFICANT CHANGE UP (ref 150–400)
POTASSIUM SERPL-MCNC: 5.8 MMOL/L — HIGH (ref 3.5–5.3)
POTASSIUM SERPL-SCNC: 5.8 MMOL/L — HIGH (ref 3.5–5.3)
PROTHROM AB SERPL-ACNC: 16.1 SEC — HIGH (ref 10.6–13.6)
RBC # BLD: 3.64 M/UL — LOW (ref 3.8–5.2)
RBC # FLD: 13.9 % — SIGNIFICANT CHANGE UP (ref 10.3–14.5)
SODIUM SERPL-SCNC: 134 MMOL/L — LOW (ref 135–145)
WBC # BLD: 12.52 K/UL — HIGH (ref 3.8–10.5)
WBC # FLD AUTO: 12.52 K/UL — HIGH (ref 3.8–10.5)

## 2021-10-12 PROCEDURE — 99231 SBSQ HOSP IP/OBS SF/LOW 25: CPT

## 2021-10-12 PROCEDURE — 99233 SBSQ HOSP IP/OBS HIGH 50: CPT | Mod: GC

## 2021-10-12 RX ORDER — DEXTROSE 50 % IN WATER 50 %
25 SYRINGE (ML) INTRAVENOUS ONCE
Refills: 0 | Status: COMPLETED | OUTPATIENT
Start: 2021-10-12 | End: 2021-10-12

## 2021-10-12 RX ORDER — INSULIN HUMAN 100 [IU]/ML
5 INJECTION, SOLUTION SUBCUTANEOUS ONCE
Refills: 0 | Status: COMPLETED | OUTPATIENT
Start: 2021-10-12 | End: 2021-10-12

## 2021-10-12 RX ORDER — SODIUM ZIRCONIUM CYCLOSILICATE 10 G/10G
10 POWDER, FOR SUSPENSION ORAL ONCE
Refills: 0 | Status: COMPLETED | OUTPATIENT
Start: 2021-10-12 | End: 2021-10-12

## 2021-10-12 RX ORDER — SODIUM ZIRCONIUM CYCLOSILICATE 10 G/10G
5 POWDER, FOR SUSPENSION ORAL EVERY 8 HOURS
Refills: 0 | Status: DISCONTINUED | OUTPATIENT
Start: 2021-10-12 | End: 2021-10-13

## 2021-10-12 RX ADMIN — Medication 1000 MILLIGRAM(S): at 15:09

## 2021-10-12 RX ADMIN — LIDOCAINE 1 PATCH: 4 CREAM TOPICAL at 00:10

## 2021-10-12 RX ADMIN — Medication 1000 MILLIGRAM(S): at 06:36

## 2021-10-12 RX ADMIN — Medication 1000 MILLIGRAM(S): at 22:20

## 2021-10-12 RX ADMIN — SODIUM ZIRCONIUM CYCLOSILICATE 10 GRAM(S): 10 POWDER, FOR SUSPENSION ORAL at 11:05

## 2021-10-12 RX ADMIN — Medication 25 MILLILITER(S): at 09:57

## 2021-10-12 RX ADMIN — POLYETHYLENE GLYCOL 3350 17 GRAM(S): 17 POWDER, FOR SOLUTION ORAL at 11:07

## 2021-10-12 RX ADMIN — LIDOCAINE 1 PATCH: 4 CREAM TOPICAL at 23:00

## 2021-10-12 RX ADMIN — INSULIN HUMAN 5 UNIT(S): 100 INJECTION, SOLUTION SUBCUTANEOUS at 11:04

## 2021-10-12 RX ADMIN — SODIUM ZIRCONIUM CYCLOSILICATE 5 GRAM(S): 10 POWDER, FOR SUSPENSION ORAL at 21:43

## 2021-10-12 RX ADMIN — Medication 1000 MILLIGRAM(S): at 21:43

## 2021-10-12 RX ADMIN — SENNA PLUS 2 TABLET(S): 8.6 TABLET ORAL at 21:43

## 2021-10-12 RX ADMIN — SODIUM ZIRCONIUM CYCLOSILICATE 5 GRAM(S): 10 POWDER, FOR SUSPENSION ORAL at 14:13

## 2021-10-12 RX ADMIN — LIDOCAINE 1 PATCH: 4 CREAM TOPICAL at 19:48

## 2021-10-12 RX ADMIN — Medication 1000 MILLIGRAM(S): at 14:15

## 2021-10-12 RX ADMIN — LIDOCAINE 1 PATCH: 4 CREAM TOPICAL at 11:07

## 2021-10-12 NOTE — PROGRESS NOTE ADULT - ASSESSMENT
Confidential Drug Utilization Report  Search Terms: Chelsy Munoz, 12/05/1926Search Date: 10/11/2021 11:19:12 AM  The Drug Utilization Report below displays all of the controlled substance prescriptions, if any, that your patient has filled in the last twelve months. The information displayed on this report is compiled from pharmacy submissions to the Department, and accurately reflects the information as submitted by the pharmacies.    This report was requested by: Shagufta Boykin | Reference #: [id number]    There are no results for the search terms that you entered.

## 2021-10-12 NOTE — PROGRESS NOTE ADULT - SUBJECTIVE AND OBJECTIVE BOX
PGY-1 Progress Note discussed with attending    PAGER #: [[phone]] TILL 5:00 PM  PLEASE CONTACT ON CALL TEAM:  - On Call Team (Please refer to [place]) FROM 5:00 PM - 8:30PM  - Nightfloat Team FROM 8:30 -7:30 AM    CHIEF COMPLAINT & BRIEF HOSPITAL COURSE:  89 year old female with PMH of HTN, HLD, DM, PAF, and h/o of DVT, on Coumadin, previously seen at [facility] on 10/8 for severe thoracic back pain radiating to bilateral leg. The imaging of hips and thoracic spine were normal, implied with medication and discharged. Patient came back 10/9 . 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 Patient denies LOC, dizziness or any new pain in the back and trauma anywhere else. Pt also denies nausea, vomiting, chest pain, and shortness of breath.    INTERVAL HPI/OVERNIGHT EVENTS:   Patient seen and examined at bedside. No overnight events. Patient AAOx2 but at baseline mental status per daughter at bedside. Daughter prefers to d/c opiods as it makes her mother confused. Patient cleared by ortho for PT today. Intern contacted primary cardiologist about stoppoing warfarin. Continue holding AC until patient sees cardio outpatient (as patient with PAF has been in sinus for many years and cardio is comfortable stopping AC  for now).  Pt was to undergo epidural spinal injection o/p today for chronic back pain, was rescheduled     REVIEW OF SYSTEMS:  CONSTITUTIONAL: No fever, chills, weight loss, or fatigue  RESPIRATORY: No cough, wheezing, or hemoptysis; No shortness of breath  CARDIOVASCULAR: No chest pain, palpitations, dizziness, or leg swelling  GASTROINTESTINAL: No abdominal pain. No nausea, vomiting, or hematemesis; No diarrhea or constipation. No melena or hematochezia.  GENITOURINARY: No dysuria or hematuria, urinary frequency  NEUROLOGICAL: No headaches, memory loss, loss of strength, numbness, or tremors  SKIN: No itching, burning, rashes, or lesions   MSK: +hip, LLE pain    Vital Signs Last 24 Hrs  T(C): 36.6 (12 Oct 2021 05:11), Max: 37.4 (11 Oct 2021 20:54)  T(F): 97.9 (12 Oct 2021 05:11), Max: 99.3 (11 Oct 2021 20:54)  HR: 67 (12 Oct 2021 05:11) (67 - 72)  BP: 115/50 (12 Oct 2021 05:11) (115/50 - 154/76)  BP(mean): --  RR: 18 (12 Oct 2021 05:11) (17 - 18)  SpO2: 100% (12 Oct 2021 05:11) (94% - 100%)    PHYSICAL EXAMINATION:  GENERAL: NAD, Obese  HEAD:  Atraumatic, Normocephalic  EYES:  large L sided periorbital hematoma with swelling, small R sided periorbital hematoma   NECK: Supple, No JVD, Normal thyroid  CHEST/LUNG: Clear to auscultation. No wheezing, crackles, rales or rubs  HEART: Regular rate and rhythm; Clear S1 S2, No murmurs, rubs, or gallops  ABDOMEN: Soft, Nontender, Nondistended; Bowel sounds present  NERVOUS SYSTEM:  Alert & Oriented X1, no focal neurological deficits  MSK:  Limited ROM of RLE d/t pain, 2+ Peripheral Pulses, No clubbing, cyanosis, or edema  SKIN: warm dry                          10.9   12.52 )-----------( 264      ( 12 Oct 2021 09:09 )             35.1     10-12    134<L>  |  96  |  18  ----------------------------<  97  5.8<H>   |  33<H>  |  1.07    Ca    9.2      12 Oct 2021 09:09  Phos  4.8     10-12  Mg     2.3     10-12    TPro  6.8  /  Alb  3.2<L>  /  TBili  0.4  /  DBili  x   /  AST  16  /  ALT  25  /  AlkPhos  87  10-11    LIVER FUNCTIONS - ( 11 Oct 2021 06:06 )  Alb: 3.2 g/dL / Pro: 6.8 g/dL / ALK PHOS: 87 U/L / ALT: 25 U/L DA / AST: 16 U/L / GGT: x               PT/INR - ( 12 Oct 2021 09:09 )   PT: 16.1 sec;   INR: 1.37 ratio         PTT - ( 12 Oct 2021 09:09 )  PTT:36.2 sec    RADIOLOGY & ADDITIONAL TESTS:       PGY-1 Progress Note discussed with attending    PAGER #: [[phone]] TILL 5:00 PM  PLEASE CONTACT ON CALL TEAM:  - On Call Team (Please refer to [place]) FROM 5:00 PM - 8:30PM  - Nightfloat Team FROM 8:30 -7:30 AM    CHIEF COMPLAINT & BRIEF HOSPITAL COURSE:  89 year old female with PMH of HTN, HLD, DM, PAF, and h/o of DVT, on Coumadin, previously seen at [facility] on 10/8 for severe thoracic back pain radiating to bilateral leg. The imaging of hips and thoracic spine were normal, implied with medication and discharged. Patient came back 10/9 . 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 Patient denies LOC, dizziness or any new pain in the back and trauma anywhere else. Pt also denies nausea, vomiting, chest pain, and shortness of breath.    INTERVAL HPI/OVERNIGHT EVENTS:   Patient seen and examined at bedside. No overnight events. Patient AAOx2 but at baseline mental status per daughter at bedside. Daughter prefers to d/c opiods as it makes her mother confused. Patient cleared by ortho for PT today. Intern contacted primary cardiologist about stoppoing warfarin. Continue holding AC until patient sees cardio outpatient (as patient with PAF has been in sinus for many years and cardio is comfortable stopping AC  for now).  Pt was to undergo epidural spinal injection o/p today for chronic back pain, was rescheduled     REVIEW OF SYSTEMS:  CONSTITUTIONAL: No fever, chills, weight loss, or fatigue  RESPIRATORY: No cough, wheezing, or hemoptysis; No shortness of breath  CARDIOVASCULAR: No chest pain, palpitations, dizziness, or leg swelling  GASTROINTESTINAL: No abdominal pain. No nausea, vomiting, or hematemesis; No diarrhea or constipation. No melena or hematochezia.  GENITOURINARY: No dysuria or hematuria, urinary frequency  NEUROLOGICAL: No headaches, memory loss, loss of strength, numbness, or tremors  SKIN: No itching, burning, rashes, or lesions   MSK: +hip, LLE pain    Vital Signs Last 24 Hrs  T(C): 36.6 (12 Oct 2021 05:11), Max: 37.4 (11 Oct 2021 20:54)  T(F): 97.9 (12 Oct 2021 05:11), Max: 99.3 (11 Oct 2021 20:54)  HR: 67 (12 Oct 2021 05:11) (67 - 72)  BP: 115/50 (12 Oct 2021 05:11) (115/50 - 154/76)  BP(mean): --  RR: 18 (12 Oct 2021 05:11) (17 - 18)  SpO2: 100% (12 Oct 2021 05:11) (94% - 100%)    PHYSICAL EXAMINATION:  GENERAL: NAD, Obese  HEAD:  Atraumatic, Normocephalic  EYES:  large L sided periorbital hematoma with swelling, small R sided periorbital hematoma   NECK: Supple, No JVD, Normal thyroid  CHEST/LUNG: Clear to auscultation. No wheezing, crackles, rales or rubs  HEART: Regular rate and rhythm; Clear S1 S2, No murmurs, rubs, or gallops  ABDOMEN: Soft, Nontender, Nondistended; Bowel sounds present  NERVOUS SYSTEM:  Alert & Oriented X1, no focal neurological deficits  MSK:  Limited ROM of RLE d/t pain, 2+ Peripheral Pulses, No clubbing, cyanosis, or edema  SKIN: warm dry                          10.9   12.52 )-----------( 264      ( 12 Oct 2021 09:09 )             35.1     10-12    134<L>  |  96  |  18  ----------------------------<  97  5.8<H>   |  33<H>  |  1.07    Ca    9.2      12 Oct 2021 09:09  Phos  4.8     10-12  Mg     2.3     10-12    TPro  6.8  /  Alb  3.2<L>  /  TBili  0.4  /  DBili  x   /  AST  16  /  ALT  25  /  AlkPhos  87  10-11    LIVER FUNCTIONS - ( 11 Oct 2021 06:06 )  Alb: 3.2 g/dL / Pro: 6.8 g/dL / ALK PHOS: 87 U/L / ALT: 25 U/L DA / AST: 16 U/L / GGT: x               PT/INR - ( 12 Oct 2021 09:09 )   PT: 16.1 sec;   INR: 1.37 ratio         PTT - ( 12 Oct 2021 09:09 )  PTT:36.2 sec    RADIOLOGY & ADDITIONAL TESTS:    CT 10/11:  IMPRESSION:  No acute hemorrhage, mass effect or extra-axial collections. Decreasing left frontal and periorbital soft tissue swelling and hematoma.    CT 10/9:   IMPRESSION:  Head CT: Small to moderate left frontal scalp hematoma. No acute intracranial hemorrhage, extra-axial collection or calvarial fracture. Mild chronic microvascular changes.    Maxillofacial CT: Moderate left preseptal periorbital soft tissue swelling. No acute facial fracture or dislocation.  Opacified right maxillary sinus with foamy secretions; please correlate clinically for sinusitis.    Cervical spine CT: No evidence of acute cervical spine fracture or evidence of traumatic malalignment. Cervical spondylosis.

## 2021-10-12 NOTE — PROGRESS NOTE ADULT - PROBLEM SELECTOR PLAN 4
pt with h/o of PAF but has been in normal sinus for years and without sx per primary cardio  current regimen is 3 mg warfarin daily, 4mg on Wednesday per daughter.   primary cardio contacted on 10/12 agreed wit decision to continue holding coumadin and suggests holding until patient can see f/u with him outpatient

## 2021-10-12 NOTE — PROGRESS NOTE ADULT - SUBJECTIVE AND OBJECTIVE BOX
Source of information: MT OVERT[facility], Chart review  Patient language: Kinyarwanda  : [id number] Alla     HPI:  89 year old female on Coumadin for multiple PE was seen here yesterday for severe thoracic back pain radiating to bilateral leg. The imaging of hips and thoracic spine were normal, implied with medication and discharged. Patient is coming back today. 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 Patient denies LOC, dizziness or any new pain in the back and trauma anywhere else. Pt also denies nausea, vomiting, chest pain, and shortness of breath. (10 Oct 2021 02:49)    CT head significant for moderate left frontal scalp hematoma, negative for intracranial hemorrhage. Repeat CT 10/11 demonstrates improvement in swelling and hematoma. Pain consulted for back pain. History also obtained from daughter at bedside. Reports on 10/9 patient was standing when she felt pain over her right side body and attempted to grab a rocking chair for support but fell to the floor hitting her head against the rocking chair. Denies LOC. Pt seen and examined at bedside. Pt laying in bed, lethargic, answers questions and follows commands appropriately. Reports mild right sided facial pain, aching, soreness, alleviated by ice packs. Reports right lumbar back pain, radiating to right hip and down right leg SCALE USED: (1-10 VNRS). Pt describes pain as shooting, intermittently cramping, not alleviated by Tylenol at home, (currently well controlled with Tylenol) exacerbated by movement. Daughter at bedside wants to avoid opioids. Pt receives outpatient URMILA every 3 months for her sciatica and severe spinal stenosis. Reports she rescheduled her appointment for tomorrow 10/15. Pt tolerating PO diet. Denies lethargy, nausea, vomiting, constipation, itchiness. Reports last BM 10/9. Patient stated goal for pain control: to be able to take deep breaths, get out of bed to chair and ambulate with tolerable pain control.  Ambulates with a cane or walker at home. Pt reports taking Tylenol for pain at home with no relief.     PAST MEDICAL & SURGICAL HISTORY:  Diabetes    Breast cancer    multiple PE    sciatica    spinal stenosis    appendectomy    FAMILY HISTORY: denies pertient family hx      Social History:  Denies smoking, alcohol and illicit drug use (10 Oct 2021 02:49)    Allergies    aspirin (Nausea (Mild))        MEDICATIONS  (STANDING):  acetaminophen   Tablet .. 1000 milliGRAM(s) Oral every 8 hours  influenza   Vaccine 0.5 milliLiter(s) IntraMuscular once  insulin regular  human recombinant. 5 Unit(s) IV Push once  lidocaine   4% Patch 1 Patch Transdermal daily  polyethylene glycol 3350 17 Gram(s) Oral daily  senna 2 Tablet(s) Oral at bedtime  sodium zirconium cyclosilicate 10 Gram(s) Oral once  sodium zirconium cyclosilicate 5 Gram(s) Oral every 8 hours    MEDICATIONS  (PRN):      Vital Signs Last 24 Hrs  T(C): 36.6 (12 Oct 2021 05:11), Max: 37.4 (11 Oct 2021 20:54)  T(F): 97.9 (12 Oct 2021 05:11), Max: 99.3 (11 Oct 2021 20:54)  HR: 67 (12 Oct 2021 05:11) (67 - 72)  BP: 115/50 (12 Oct 2021 05:11) (115/50 - 154/76)  BP(mean): --  RR: 18 (12 Oct 2021 05:11) (17 - 18)  SpO2: 100% (12 Oct 2021 05:11) (94% - 100%)  COVID-19 PCR: NotDetec (09 Oct 2021 22:03)    LABS: Reviewed                          10.9   12.52 )-----------( 264      ( 12 Oct 2021 09:09 )             35.1     10-12    134<L>  |  96  |  18  ----------------------------<  97  5.8<H>   |  33<H>  |  1.07    Ca    9.2      12 Oct 2021 09:09  Phos  4.8     10-12  Mg     2.3     10-12    TPro  6.8  /  Alb  3.2<L>  /  TBili  0.4  /  DBili  x   /  AST  16  /  ALT  25  /  AlkPhos  87  10-11    PT/INR - ( 12 Oct 2021 09:09 )   PT: 16.1 sec;   INR: 1.37 ratio         PTT - ( 12 Oct 2021 09:09 )  PTT:36.2 sec  LIVER FUNCTIONS - ( 11 Oct 2021 06:06 )  Alb: 3.2 g/dL / Pro: 6.8 g/dL / ALK PHOS: 87 U/L / ALT: 25 U/L DA / AST: 16 U/L / GGT: x             COVID-19 PCR: NotDetec (09 Oct 2021 22:03)      Radiology: Reviewed.     < from: CT Head No Cont (10.11.21 @ 07:50) >    EXAM:  CT BRAIN                            PROCEDURE DATE:  10/11/2021          INTERPRETATION:  Noncontrast CT of the brain.    CLINICAL INDICATION:  Status post fall at home    TECHNIQUE : Axial CT scanning of the brain was obtained from the skull base to the vertex without the administration of intravenous contrast.    COMPARISON: Brain CT dated 10/9/2021    FINDINGS:  No acute hemorrhage, hydrocephalus, midline shift or extra-axial collections are identified. Age-appropriate involutional changes and mild microvascular ischemic changes are present.    Prominent left frontal and periorbital soft tissue swelling and hematoma have minimally decreased.    Right maxillary sinus mucosal thickening and secretions are unchanged.    IMPRESSION:    No acute hemorrhage, mass effect or extra-axial collections. Decreasing left frontal and periorbital soft tissue swelling and hematoma.      --- End of Report ---            HOLLY MOTLEY MD; Attending Radiologist  This document has been electronically signed. Oct 11 2021  9:01AM    < end of copied text >    < from: CT Maxillofacial No Cont (10.09.21 @ 22:52) >    EXAM:  CT CERVICAL SPINE                          EXAM:  CT MAXILLOFACIAL                          EXAM:  CT BRAIN                            PROCEDURE DATE:  10/09/2021          INTERPRETATION:  CLINICAL HISTORY: Trauma. Status post fall. On Coumadin. Patient states after a mechanical fall, she fell back on a rocking chair and landed on her head, causing a laceration to the left brow, and large hematoma to the left eye incurring in the lower and upper lids. Patient denies LOC, any new pain in the back and trauma anywhere else.    TECHNIQUE: CT of the head, maxillofacial region and cervical spine without contrast dated 10/9/2021.  Head CT consists of transaxial images acquired from the skull base to vertex without contrast. Coronal and sagittal reformatted images are provided.  Maxillofacial CT consists of thin section transaxial images through the facial region with coronal, sagittal and 3-D volume rendered reformatted images provided from the transaxial source data.  Cervical spine CTwith thin section transaxial images acquired from the occiput through to T2. Coronal, sagittal and 3-D volume rendered reformatted images are provided.    COMPARISON: None available.    FINDINGS:    Head CT:  There is a small to moderate left frontalscalp hematoma. The underlying calvarium is intact. There is no acute intracranial hemorrhage, vasogenic edema or evidence for acute large vascular territory infarct. Patchy areas of low-attenuation are seen the bihemispheric white matter, nonspecific, but likely sequela of chronic microvascular change.    Cerebral by loss is present with secondary proportional prominence of the ventricles and sulci. There is no midline shift or abnormal extra-axial fluid collection.    The calvarium is intact. There are no suspicious osteoblastic or lytic calvarial lesions. The visualized mastoid air cells are clear.    Maxillofacial CT:  There is moderate left preseptal periorbital soft tissue swelling. The orbital rims, walls, floors, lamina papyracea and zygomatic arches are intact. The temporomandibular joints are located.    The globes are of normal contour and the lenses are located. There is no preseptal periorbital radiopaque foreign body. There is no retrobulbar hematoma. The optic nerve sheath complexes and extraocular muscles are symmetric and normal appearing bilaterally.    There are no air-fluid levels present within the paranasal sinuses. Near complete opacification of the right maxillary sinus with some foamy secretions. No high density fluid. The remaining paranasal sinuses are clear.    Cervical spine CT:  Mild motion artifact present.    There is straightening of the normal cervical lordosis with slight anterolisthesis of C2 on C3, retrolisthesis of C4 on C5 and anterolisthesisof C7 on T1. There are no acute fractures or evidence of traumatic malalignment. The vertebral body heights are maintained. Multilevel facet alignment is preserved. The atlanto-dental interval is within normal limits and the craniocervical junction is unremarkable. There are no  suspicious osteoblastic or lytic lesions.    There is no evidence of prevertebral soft tissue swelling or epidural hematoma.    There are multilevel degenerative changes including intervertebral disc space narrowing, discosteophyte complexes and facet arthropathy. Findings are superimposed on a narrow canal and contribute to overall multilevel canal and foraminal stenosis, the degree of which is suboptimally assessed on this modality but appears most notable from C3/C4 through C5/C6.    Retropharyngeal courses of the internal carotid arteries. Otherwise the soft tissues of the neck are grossly unremarkable on this noncontrast study. The lung apices are clear.      IMPRESSION:  Head CT: Small to moderate left frontal scalp hematoma. No acute intracranial hemorrhage, extra-axial collection or calvarial fracture. Mild chronic microvascular changes.    Maxillofacial CT: Moderate left preseptal periorbital soft tissue swelling. No acute facial fracture or dislocation.  Opacified right maxillary sinus with foamy secretions; please correlate clinically for sinusitis.    Cervical spine CT: No evidence of acute cervical spine fracture or evidence of traumatic malalignment. Cervical spondylosis.    --- End of Report ---            WAQAS EAGLE MD; Attending Radiologist  This document has been electronically signed. Oct  9 2021 11:46PM    < end of copied text >    < from: CT Lumbar Spine No Cont (10.08.21 @ 19:56) >    EXAM:  CT LUMBAR SPINE                            PROCEDURE DATE:  10/08/2021          INTERPRETATION:  CLINICAL INDICATION: Back pain.    TECHNIQUE: CT of the lumbar spine was performed without the administration of intravenous contrast, accordingto standard protocol.    COMPARISON: None available.    FINDINGS:  BONES AND DISCS:  Leftward lumbar curvature is noted. No acute fracture is identified. No destructive bony lesion identified.    Multilevel degenerative changes are noted. Slightly hyperdense material noted within the right ventral spinal canal at mid L4 level, likely superiorly migrated right central L4-L5 disc protrusion. Likely severe L4-L5 spinal canal stenosis. Moderate to severe right and severe left L4-L5 neuroforaminal stenosis.    SOFT TISSUES:  Paraspinal soft tissues are unremarkable.    OTHER:  Included retroperitoneum and pelvis are unremarkable.      IMPRESSION:  No acute fracture identified.    Multilevel degenerative changes with likely severe spinal canal stenosis at L4-L5 and possible superiorly migrated L4-L5 disc herniation at mid L4 level. Recommend MRI for further evaluation.    --- End of Report ---            GLORIA ZAVALA MD; Attending Radiologist  This document has been electronically signed. Oct  8 2021  8:09PM    < end of copied text >    < from: CT Cervical Spine No Cont (10.09.21 @ 22:51) >    EXAM:  CT CERVICAL SPINE                          EXAM:  CT MAXILLOFACIAL                          EXAM:  CT BRAIN                            PROCEDURE DATE:  10/09/2021          INTERPRETATION:  CLINICAL HISTORY: Trauma. Status post fall. On Coumadin. Patient states after a mechanical fall, she fell back on a rocking chair and landed on her head, causing a laceration to the left brow, and large hematoma to the left eye incurring in the lower and upper lids. Patient denies LOC, any new pain in the back and trauma anywhere else.    TECHNIQUE: CT of the head, maxillofacial region and cervical spine without contrast dated 10/9/2021.  Head CT consists of transaxial images acquired from the skull base to vertex without contrast. Coronal and sagittal reformatted images are provided.  Maxillofacial CT consists of thin section transaxial images through the facial region with coronal, sagittal and 3-D volume rendered reformatted images provided from the transaxial source data.  Cervical spine CTwith thin section transaxial images acquired from the occiput through to T2. Coronal, sagittal and 3-D volume rendered reformatted images are provided.    COMPARISON: None available.    FINDINGS:    Head CT:  There is a small to moderate left frontalscalp hematoma. The underlying calvarium is intact. There is no acute intracranial hemorrhage, vasogenic edema or evidence for acute large vascular territory infarct. Patchy areas of low-attenuation are seen the bihemispheric white matter, nonspecific, but likely sequela of chronic microvascular change.    Cerebral by loss is present with secondary proportional prominence of the ventricles and sulci. There is no midline shift or abnormal extra-axial fluid collection.    The calvarium is intact. There are no suspicious osteoblastic or lytic calvarial lesions. The visualized mastoid air cells are clear.    Maxillofacial CT:  There is moderate left preseptal periorbital soft tissue swelling. The orbital rims, walls, floors, lamina papyracea and zygomatic arches are intact. The temporomandibular joints are located.    The globes are of normal contour and the lenses are located. There is no preseptal periorbital radiopaque foreign body. There is no retrobulbar hematoma. The optic nerve sheath complexes and extraocular muscles are symmetric and normal appearing bilaterally.    There are no air-fluid levels present within the paranasal sinuses. Near complete opacification of the right maxillary sinus with some foamy secretions. No high density fluid. The remaining paranasal sinuses are clear.    Cervical spine CT:  Mild motion artifact present.    There is straightening of the normal cervical lordosis with slight anterolisthesis of C2 on C3, retrolisthesis of C4 on C5 and anterolisthesisof C7 on T1. There are no acute fractures or evidence of traumatic malalignment. The vertebral body heights are maintained. Multilevel facet alignment is preserved. The atlanto-dental interval is within normal limits and the craniocervical junction is unremarkable. There are no  suspicious osteoblastic or lytic lesions.    There is no evidence of prevertebral soft tissue swelling or epidural hematoma.    There are multilevel degenerative changes including intervertebral disc space narrowing, discosteophyte complexes and facet arthropathy. Findings are superimposed on a narrow canal and contribute to overall multilevel canal and foraminal stenosis, the degree of which is suboptimally assessed on this modality but appears most notable from C3/C4 through C5/C6.    Retropharyngeal courses of the internal carotid arteries. Otherwise the soft tissues of the neck are grossly unremarkable on this noncontrast study. The lung apices are clear.      IMPRESSION:  Head CT: Small to moderate left frontal scalp hematoma. No acute intracranial hemorrhage, extra-axial collection or calvarial fracture. Mild chronic microvascular changes.    Maxillofacial CT: Moderate left preseptal periorbital soft tissue swelling. No acute facial fracture or dislocation.  Opacified right maxillary sinus with foamy secretions; please correlate clinically for sinusitis.    Cervical spine CT: No evidence of acute cervical spine fracture or evidence of traumatic malalignment. Cervical spondylosis.    --- End of Report ---            WAQAS EAGLE MD; Attending Radiologist  This document has been electronically signed. Oct  9 2021 11:46PM    < end of copied text >      ORT Score -   Family Hx of substance abuse	Female	      Male  Alcohol 	                                           1                     3  Illegal drugs	                                   2                     3  Rx drugs                                           4 	                  4  Personal Hx of substance abuse		  Alcohol 	                                          3	                  3  Illegal drugs                                     4	                  4  Rx drugs                                            5 	                  5  Age between 16- 45 years	           1                     1  hx preadolescent sexual abuse	   3 	                  0  Psychological disease		  ADD, OCD, bipolar, schizophrenia   2	          2  Depression                                           1 	          1  Total: 0    a score of 3 or lower indicates low risk for opioid abuse		  a score of 4-7 indicates moderate risk for opioid abuse		  a score of 8 or higher indicates high risk for opioid abuse  	    REVIEW OF SYSTEMS:  CONSTITUTIONAL: No fever + fatigue  HEENT: + bilateral periorbital edema left eye> right; + left eye blindness; No difficulty hearing   NECK: No pain or stiffness  RESPIRATORY: No cough, wheezing, chills or hemoptysis; No shortness of breath  CARDIOVASCULAR: + hx multiple PE; No chest pain, palpitations, dizziness, or leg swelling  GASTROINTESTINAL: + loss of appetite and decreased PO intake. + hx hernias; No abdominal or epigastric pain. No nausea, vomiting; No diarrhea or constipation.   GENITOURINARY: No dysuria, frequency, hematuria, retention or incontinence  MUSCULOSKELETAL: + right lumbar back, right hip and right leg pain. + chronic lumbar back pain, + generalized weakness, + right LE weakness; no saddle anesthesia, bowel/bladder incontinence, + falls   NEURO: No headaches, No numbness/tingling b/l LE    PHYSICAL EXAM:  GENERAL:  Lethargic & Oriented X2, forgetful to month, cooperative, NAD, Speech is clear.   RESPIRATORY: Respirations even and unlabored. Clear to auscultation bilaterally; No rales, rhonchi, wheezing, or rubs  CARDIOVASCULAR: Normal S1/S2, regular rate and rhythm; No murmurs, rubs, or gallops. No JVD.   GASTROINTESTINAL:  Soft, Nontender, Nondistended; Bowel sounds present  PERIPHERAL VASCULAR:  Extremities warm without edema. 2+ Peripheral Pulses, No cyanosis, No calf tenderness  MUSCULOSKELETAL: Motor Strength 4/5 B/L upper extremities, 3/5 left lower extremity, 1/5 right lower extremity decreased right LE ROM; negative SLR left LE; + SLR on right LE; + right lumbar back and right hip tenderness on palpation of all joints.   SKIN: + periorbital ecchymosis and edema bilaterally, left> right (improving); + mild left facial ecchymosis Warm, dry, intact. No rashes, lesions, scars or wounds.     Risk factors associated with adverse outcomes related to opioid treatment  [ ]  Concurrent benzodiazepine use  [ ]  History/ Active substance use or alcohol use disorder  [ ] Psychiatric co-morbidity  [ ] Sleep apnea  [ ] COPD  [ ] BMI> 35  [ ] Liver dysfunction  [ ] Renal dysfunction  [ ] CHF  [ ] Smoker  [X ]  Age > 60 years    [X ]  [place]  Reviewed and Copied to Chart. See below.    Plan of care and goal oriented pain management treatment options were discussed with patient and /or primary care giver; all questions and concerns were addressed and care was aligned with patient's wishes.    Educated patient on goal oriented pain management treatment options     10-12-21 @ 10:49       Source of information: MT OVERTON, Chart review  Patient language: Tajik  : [id number] Alla     HPI:  89 year old female on Coumadin for multiple PE was seen here yesterday for severe thoracic back pain radiating to bilateral leg. The imaging of hips and thoracic spine were normal, implied with medication and discharged. Patient is coming back today. 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 Patient denies LOC, dizziness or any new pain in the back and trauma anywhere else. Pt also denies nausea, vomiting, chest pain, and shortness of breath. (10 Oct 2021 02:49)    CT head significant for moderate left frontal scalp hematoma, negative for intracranial hemorrhage. Repeat CT 10/11 demonstrates improvement in swelling and hematoma. Pain consulted for back pain. History also obtained from daughter at bedside. Reports on 10/9 patient was standing when she felt pain over her right side body and attempted to grab a rocking chair for support but fell to the floor hitting her head against the rocking chair. Denies LOC. Pt seen and examined at bedside. Pt laying in bed, lethargic, answers questions and follows commands appropriately. Reports mild right sided facial pain, aching, soreness, alleviated by ice packs. Reports right lumbar back pain, radiating to right hip and down right leg SCALE USED: (1-10 VNRS). Pt describes pain as shooting, intermittently cramping, not alleviated by Tylenol at home, (currently well controlled with Tylenol) exacerbated by movement. Daughter at bedside wants to avoid opioids. Pt receives outpatient URMILA every 3 months for her sciatica and severe spinal stenosis. Reports she rescheduled her appointment for tomorrow 10/15. Pt tolerating PO diet. Denies lethargy, nausea, vomiting, constipation, itchiness. Reports last BM 10/9. Patient stated goal for pain control: to be able to take deep breaths, get out of bed to chair and ambulate with tolerable pain control.  Ambulates with a cane or walker at home. Pt reports taking Tylenol for pain at home with no relief.   10/12- K= 5.8, being treated as per primary team     PAST MEDICAL & SURGICAL HISTORY:  Diabetes    Breast cancer    multiple PE    sciatica    spinal stenosis    appendectomy    FAMILY HISTORY: denies pertient family hx      Social History:  Denies smoking, alcohol and illicit drug use (10 Oct 2021 02:49)    Allergies    aspirin (Nausea (Mild))        MEDICATIONS  (STANDING):  acetaminophen   Tablet .. 1000 milliGRAM(s) Oral every 8 hours  influenza   Vaccine 0.5 milliLiter(s) IntraMuscular once  insulin regular  human recombinant. 5 Unit(s) IV Push once  lidocaine   4% Patch 1 Patch Transdermal daily  polyethylene glycol 3350 17 Gram(s) Oral daily  senna 2 Tablet(s) Oral at bedtime  sodium zirconium cyclosilicate 10 Gram(s) Oral once  sodium zirconium cyclosilicate 5 Gram(s) Oral every 8 hours    MEDICATIONS  (PRN):      Vital Signs Last 24 Hrs  T(C): 36.6 (12 Oct 2021 05:11), Max: 37.4 (11 Oct 2021 20:54)  T(F): 97.9 (12 Oct 2021 05:11), Max: 99.3 (11 Oct 2021 20:54)  HR: 67 (12 Oct 2021 05:11) (67 - 72)  BP: 115/50 (12 Oct 2021 05:11) (115/50 - 154/76)  BP(mean): --  RR: 18 (12 Oct 2021 05:11) (17 - 18)  SpO2: 100% (12 Oct 2021 05:11) (94% - 100%)  COVID-19 PCR: NotDetec (09 Oct 2021 22:03)    LABS: Reviewed                          10.9   12.52 )-----------( 264      ( 12 Oct 2021 09:09 )             35.1     10-12    134<L>  |  96  |  18  ----------------------------<  97  5.8<H>   |  33<H>  |  1.07    Ca    9.2      12 Oct 2021 09:09  Phos  4.8     10-12  Mg     2.3     10-12    TPro  6.8  /  Alb  3.2<L>  /  TBili  0.4  /  DBili  x   /  AST  16  /  ALT  25  /  AlkPhos  87  10-11    PT/INR - ( 12 Oct 2021 09:09 )   PT: 16.1 sec;   INR: 1.37 ratio         PTT - ( 12 Oct 2021 09:09 )  PTT:36.2 sec  LIVER FUNCTIONS - ( 11 Oct 2021 06:06 )  Alb: 3.2 g/dL / Pro: 6.8 g/dL / ALK PHOS: 87 U/L / ALT: 25 U/L DA / AST: 16 U/L / GGT: x             COVID-19 PCR: NotDetec (09 Oct 2021 22:03)      Radiology: Reviewed.     < from: CT Head No Cont (10.11.21 @ 07:50) >    EXAM:  CT BRAIN                            PROCEDURE DATE:  10/11/2021          INTERPRETATION:  Noncontrast CT of the brain.    CLINICAL INDICATION:  Status post fall at home    TECHNIQUE : Axial CT scanning of the brain was obtained from the skull base to the vertex without the administration of intravenous contrast.    COMPARISON: Brain CT dated 10/9/2021    FINDINGS:  No acute hemorrhage, hydrocephalus, midline shift or extra-axial collections are identified. Age-appropriate involutional changes and mild microvascular ischemic changes are present.    Prominent left frontal and periorbital soft tissue swelling and hematoma have minimally decreased.    Right maxillary sinus mucosal thickening and secretions are unchanged.    IMPRESSION:    No acute hemorrhage, mass effect or extra-axial collections. Decreasing left frontal and periorbital soft tissue swelling and hematoma.      --- End of Report ---            HOLLY MOTLEY MD; Attending Radiologist  This document has been electronically signed. Oct 11 2021  9:01AM    < end of copied text >    < from: CT Maxillofacial No Cont (10.09.21 @ 22:52) >    EXAM:  CT CERVICAL SPINE                          EXAM:  CT MAXILLOFACIAL                          EXAM:  CT BRAIN                            PROCEDURE DATE:  10/09/2021          INTERPRETATION:  CLINICAL HISTORY: Trauma. Status post fall. On Coumadin. Patient states after a mechanical fall, she fell back on a rocking chair and landed on her head, causing a laceration to the left brow, and large hematoma to the left eye incurring in the lower and upper lids. Patient denies LOC, any new pain in the back and trauma anywhere else.    TECHNIQUE: CT of the head, maxillofacial region and cervical spine without contrast dated 10/9/2021.  Head CT consists of transaxial images acquired from the skull base to vertex without contrast. Coronal and sagittal reformatted images are provided.  Maxillofacial CT consists of thin section transaxial images through the facial region with coronal, sagittal and 3-D volume rendered reformatted images provided from the transaxial source data.  Cervical spine CTwith thin section transaxial images acquired from the occiput through to T2. Coronal, sagittal and 3-D volume rendered reformatted images are provided.    COMPARISON: None available.    FINDINGS:    Head CT:  There is a small to moderate left frontalscalp hematoma. The underlying calvarium is intact. There is no acute intracranial hemorrhage, vasogenic edema or evidence for acute large vascular territory infarct. Patchy areas of low-attenuation are seen the bihemispheric white matter, nonspecific, but likely sequela of chronic microvascular change.    Cerebral by loss is present with secondary proportional prominence of the ventricles and sulci. There is no midline shift or abnormal extra-axial fluid collection.    The calvarium is intact. There are no suspicious osteoblastic or lytic calvarial lesions. The visualized mastoid air cells are clear.    Maxillofacial CT:  There is moderate left preseptal periorbital soft tissue swelling. The orbital rims, walls, floors, lamina papyracea and zygomatic arches are intact. The temporomandibular joints are located.    The globes are of normal contour and the lenses are located. There is no preseptal periorbital radiopaque foreign body. There is no retrobulbar hematoma. The optic nerve sheath complexes and extraocular muscles are symmetric and normal appearing bilaterally.    There are no air-fluid levels present within the paranasal sinuses. Near complete opacification of the right maxillary sinus with some foamy secretions. No high density fluid. The remaining paranasal sinuses are clear.    Cervical spine CT:  Mild motion artifact present.    There is straightening of the normal cervical lordosis with slight anterolisthesis of C2 on C3, retrolisthesis of C4 on C5 and anterolisthesisof C7 on T1. There are no acute fractures or evidence of traumatic malalignment. The vertebral body heights are maintained. Multilevel facet alignment is preserved. The atlanto-dental interval is within normal limits and the craniocervical junction is unremarkable. There are no  suspicious osteoblastic or lytic lesions.    There is no evidence of prevertebral soft tissue swelling or epidural hematoma.    There are multilevel degenerative changes including intervertebral disc space narrowing, discosteophyte complexes and facet arthropathy. Findings are superimposed on a narrow canal and contribute to overall multilevel canal and foraminal stenosis, the degree of which is suboptimally assessed on this modality but appears most notable from C3/C4 through C5/C6.    Retropharyngeal courses of the internal carotid arteries. Otherwise the soft tissues of the neck are grossly unremarkable on this noncontrast study. The lung apices are clear.      IMPRESSION:  Head CT: Small to moderate left frontal scalp hematoma. No acute intracranial hemorrhage, extra-axial collection or calvarial fracture. Mild chronic microvascular changes.    Maxillofacial CT: Moderate left preseptal periorbital soft tissue swelling. No acute facial fracture or dislocation.  Opacified right maxillary sinus with foamy secretions; please correlate clinically for sinusitis.    Cervical spine CT: No evidence of acute cervical spine fracture or evidence of traumatic malalignment. Cervical spondylosis.    --- End of Report ---            WAQAS EAGLE MD; Attending Radiologist  This document has been electronically signed. Oct  9 2021 11:46PM    < end of copied text >    < from: CT Lumbar Spine No Cont (10.08.21 @ 19:56) >    EXAM:  CT LUMBAR SPINE                            PROCEDURE DATE:  10/08/2021          INTERPRETATION:  CLINICAL INDICATION: Back pain.    TECHNIQUE: CT of the lumbar spine was performed without the administration of intravenous contrast, accordingto standard protocol.    COMPARISON: None available.    FINDINGS:  BONES AND DISCS:  Leftward lumbar curvature is noted. No acute fracture is identified. No destructive bony lesion identified.    Multilevel degenerative changes are noted. Slightly hyperdense material noted within the right ventral spinal canal at mid L4 level, likely superiorly migrated right central L4-L5 disc protrusion. Likely severe L4-L5 spinal canal stenosis. Moderate to severe right and severe left L4-L5 neuroforaminal stenosis.    SOFT TISSUES:  Paraspinal soft tissues are unremarkable.    OTHER:  Included retroperitoneum and pelvis are unremarkable.      IMPRESSION:  No acute fracture identified.    Multilevel degenerative changes with likely severe spinal canal stenosis at L4-L5 and possible superiorly migrated L4-L5 disc herniation at mid L4 level. Recommend MRI for further evaluation.    --- End of Report ---            GLORIA ZAVALA MD; Attending Radiologist  This document has been electronically signed. Oct  8 2021  8:09PM    < end of copied text >    < from: CT Cervical Spine No Cont (10.09.21 @ 22:51) >    EXAM:  CT CERVICAL SPINE                          EXAM:  CT MAXILLOFACIAL                          EXAM:  CT BRAIN                            PROCEDURE DATE:  10/09/2021          INTERPRETATION:  CLINICAL HISTORY: Trauma. Status post fall. On Coumadin. Patient states after a mechanical fall, she fell back on a rocking chair and landed on her head, causing a laceration to the left brow, and large hematoma to the left eye incurring in the lower and upper lids. Patient denies LOC, any new pain in the back and trauma anywhere else.    TECHNIQUE: CT of the head, maxillofacial region and cervical spine without contrast dated 10/9/2021.  Head CT consists of transaxial images acquired from the skull base to vertex without contrast. Coronal and sagittal reformatted images are provided.  Maxillofacial CT consists of thin section transaxial images through the facial region with coronal, sagittal and 3-D volume rendered reformatted images provided from the transaxial source data.  Cervical spine CTwith thin section transaxial images acquired from the occiput through to T2. Coronal, sagittal and 3-D volume rendered reformatted images are provided.    COMPARISON: None available.    FINDINGS:    Head CT:  There is a small to moderate left frontalscalp hematoma. The underlying calvarium is intact. There is no acute intracranial hemorrhage, vasogenic edema or evidence for acute large vascular territory infarct. Patchy areas of low-attenuation are seen the bihemispheric white matter, nonspecific, but likely sequela of chronic microvascular change.    Cerebral by loss is present with secondary proportional prominence of the ventricles and sulci. There is no midline shift or abnormal extra-axial fluid collection.    The calvarium is intact. There are no suspicious osteoblastic or lytic calvarial lesions. The visualized mastoid air cells are clear.    Maxillofacial CT:  There is moderate left preseptal periorbital soft tissue swelling. The orbital rims, walls, floors, lamina papyracea and zygomatic arches are intact. The temporomandibular joints are located.    The globes are of normal contour and the lenses are located. There is no preseptal periorbital radiopaque foreign body. There is no retrobulbar hematoma. The optic nerve sheath complexes and extraocular muscles are symmetric and normal appearing bilaterally.    There are no air-fluid levels present within the paranasal sinuses. Near complete opacification of the right maxillary sinus with some foamy secretions. No high density fluid. The remaining paranasal sinuses are clear.    Cervical spine CT:  Mild motion artifact present.    There is straightening of the normal cervical lordosis with slight anterolisthesis of C2 on C3, retrolisthesis of C4 on C5 and anterolisthesisof C7 on T1. There are no acute fractures or evidence of traumatic malalignment. The vertebral body heights are maintained. Multilevel facet alignment is preserved. The atlanto-dental interval is within normal limits and the craniocervical junction is unremarkable. There are no  suspicious osteoblastic or lytic lesions.    There is no evidence of prevertebral soft tissue swelling or epidural hematoma.    There are multilevel degenerative changes including intervertebral disc space narrowing, discosteophyte complexes and facet arthropathy. Findings are superimposed on a narrow canal and contribute to overall multilevel canal and foraminal stenosis, the degree of which is suboptimally assessed on this modality but appears most notable from C3/C4 through C5/C6.    Retropharyngeal courses of the internal carotid arteries. Otherwise the soft tissues of the neck are grossly unremarkable on this noncontrast study. The lung apices are clear.      IMPRESSION:  Head CT: Small to moderate left frontal scalp hematoma. No acute intracranial hemorrhage, extra-axial collection or calvarial fracture. Mild chronic microvascular changes.    Maxillofacial CT: Moderate left preseptal periorbital soft tissue swelling. No acute facial fracture or dislocation.  Opacified right maxillary sinus with foamy secretions; please correlate clinically for sinusitis.    Cervical spine CT: No evidence of acute cervical spine fracture or evidence of traumatic malalignment. Cervical spondylosis.    --- End of Report ---            WAQAS EAGLE MD; Attending Radiologist  This document has been electronically signed. Oct  9 2021 11:46PM    < end of copied text >      ORT Score -   Family Hx of substance abuse	Female	      Male  Alcohol 	                                           1                     3  Illegal drugs	                                   2                     3  Rx drugs                                           4 	                  4  Personal Hx of substance abuse		  Alcohol 	                                          3	                  3  Illegal drugs                                     4	                  4  Rx drugs                                            5 	                  5  Age between 16- 45 years	           1                     1  hx preadolescent sexual abuse	   3 	                  0  Psychological disease		  ADD, OCD, bipolar, schizophrenia   2	          2  Depression                                           1 	          1  Total: 0    a score of 3 or lower indicates low risk for opioid abuse		  a score of 4-7 indicates moderate risk for opioid abuse		  a score of 8 or higher indicates high risk for opioid abuse  	    REVIEW OF SYSTEMS:  CONSTITUTIONAL: No fever + fatigue  HEENT: + bilateral periorbital edema left eye> right; + left eye blindness; No difficulty hearing   NECK: No pain or stiffness  RESPIRATORY: No cough, wheezing, chills or hemoptysis; No shortness of breath  CARDIOVASCULAR: + hx multiple PE; No chest pain, palpitations, dizziness, or leg swelling  GASTROINTESTINAL: + loss of appetite and decreased PO intake. + hx hernias; No abdominal or epigastric pain. No nausea, vomiting; No diarrhea or constipation.   GENITOURINARY: No dysuria, frequency, hematuria, retention or incontinence  MUSCULOSKELETAL: + right lumbar back, right hip and right leg pain. + chronic lumbar back pain, + generalized weakness, + right LE weakness; no saddle anesthesia, bowel/bladder incontinence, + falls   NEURO: No headaches, No numbness/tingling b/l LE    PHYSICAL EXAM:  GENERAL:  Lethargic & Oriented X2, forgetful to month, cooperative, NAD, Speech is clear.   RESPIRATORY: Respirations even and unlabored. Clear to auscultation bilaterally; No rales, rhonchi, wheezing, or rubs  CARDIOVASCULAR: Normal S1/S2, regular rate and rhythm; No murmurs, rubs, or gallops. No JVD.   GASTROINTESTINAL:  Soft, Nontender, Nondistended; Bowel sounds present  PERIPHERAL VASCULAR:  Extremities warm without edema. 2+ Peripheral Pulses, No cyanosis, No calf tenderness  MUSCULOSKELETAL: Motor Strength 4/5 B/L upper extremities, 3/5 left lower extremity, 1/5 right lower extremity decreased right LE ROM; negative SLR left LE; + SLR on right LE; + right lumbar back and right hip tenderness on palpation of all joints.   SKIN: + periorbital ecchymosis and edema bilaterally, left> right (improving); + mild left facial ecchymosis Warm, dry, intact. No rashes, lesions, scars or wounds.     Risk factors associated with adverse outcomes related to opioid treatment  [ ]  Concurrent benzodiazepine use  [ ]  History/ Active substance use or alcohol use disorder  [ ] Psychiatric co-morbidity  [ ] Sleep apnea  [ ] COPD  [ ] BMI> 35  [ ] Liver dysfunction  [ ] Renal dysfunction  [ ] CHF  [ ] Smoker  [X ]  Age > 60 years    [X ]  [place]  Reviewed and Copied to Chart. See below.    Plan of care and goal oriented pain management treatment options were discussed with patient and /or primary care giver; all questions and concerns were addressed and care was aligned with patient's wishes.    Educated patient on goal oriented pain management treatment options     10-12-21 @ 10:49

## 2021-10-12 NOTE — PROGRESS NOTE ADULT - ATTENDING COMMENTS
Palpebral hematoma  and raccoon eyes after a fall.   RLE pain secondary to spinal stenosis.    Pain management consultation, appreciated.   Plan:  PT consultation, possible [facility] vs home PT. Palpebral hematoma  and raccoon eyes after a fall.   RLE pain secondary to spinal stenosis.    Pain management consultation, appreciated.   Plan:  PT consultation, possible [facility] vs home PT.  Discussed risk/benefit of anticoagulation with patient and daughter.

## 2021-10-12 NOTE — PROGRESS NOTE ADULT - ASSESSMENT
89 year old female on Coumadin for multiple PE here with a left eye hematoma after mechanical fall.

## 2021-10-13 ENCOUNTER — TRANSCRIPTION ENCOUNTER (OUTPATIENT)
Age: 86
End: 2021-10-13

## 2021-10-13 VITALS
DIASTOLIC BLOOD PRESSURE: 69 MMHG | SYSTOLIC BLOOD PRESSURE: 163 MMHG | HEART RATE: 83 BPM | TEMPERATURE: 98 F | OXYGEN SATURATION: 94 % | RESPIRATION RATE: 18 BRPM

## 2021-10-13 DIAGNOSIS — I10 ESSENTIAL (PRIMARY) HYPERTENSION: ICD-10-CM

## 2021-10-13 DIAGNOSIS — E11.9 TYPE 2 DIABETES MELLITUS WITHOUT COMPLICATIONS: ICD-10-CM

## 2021-10-13 DIAGNOSIS — K21.9 GASTRO-ESOPHAGEAL REFLUX DISEASE WITHOUT ESOPHAGITIS: ICD-10-CM

## 2021-10-13 DIAGNOSIS — E78.5 HYPERLIPIDEMIA, UNSPECIFIED: ICD-10-CM

## 2021-10-13 DIAGNOSIS — C50.919 MALIGNANT NEOPLASM OF UNSPECIFIED SITE OF UNSPECIFIED FEMALE BREAST: ICD-10-CM

## 2021-10-13 DIAGNOSIS — H05.239 HEMORRHAGE OF UNSPECIFIED ORBIT: ICD-10-CM

## 2021-10-13 LAB
ANION GAP SERPL CALC-SCNC: 4 MMOL/L — LOW (ref 5–17)
BUN SERPL-MCNC: 16 MG/DL — SIGNIFICANT CHANGE UP (ref 7–18)
CALCIUM SERPL-MCNC: 9.7 MG/DL — SIGNIFICANT CHANGE UP (ref 8.4–10.5)
CHLORIDE SERPL-SCNC: 97 MMOL/L — SIGNIFICANT CHANGE UP (ref 96–108)
CO2 SERPL-SCNC: 33 MMOL/L — HIGH (ref 22–31)
CREAT SERPL-MCNC: 0.84 MG/DL — SIGNIFICANT CHANGE UP (ref 0.5–1.3)
GLUCOSE SERPL-MCNC: 102 MG/DL — HIGH (ref 70–99)
HCT VFR BLD CALC: 35.9 % — SIGNIFICANT CHANGE UP (ref 34.5–45)
HGB BLD-MCNC: 11.1 G/DL — LOW (ref 11.5–15.5)
MAGNESIUM SERPL-MCNC: 2.2 MG/DL — SIGNIFICANT CHANGE UP (ref 1.6–2.6)
MCHC RBC-ENTMCNC: 29.5 PG — SIGNIFICANT CHANGE UP (ref 27–34)
MCHC RBC-ENTMCNC: 30.9 GM/DL — LOW (ref 32–36)
MCV RBC AUTO: 95.5 FL — SIGNIFICANT CHANGE UP (ref 80–100)
NRBC # BLD: 0 /100 WBCS — SIGNIFICANT CHANGE UP (ref 0–0)
PHOSPHATE SERPL-MCNC: 4.1 MG/DL — SIGNIFICANT CHANGE UP (ref 2.5–4.5)
PLATELET # BLD AUTO: 268 K/UL — SIGNIFICANT CHANGE UP (ref 150–400)
POTASSIUM SERPL-MCNC: 4.5 MMOL/L — SIGNIFICANT CHANGE UP (ref 3.5–5.3)
POTASSIUM SERPL-SCNC: 4.5 MMOL/L — SIGNIFICANT CHANGE UP (ref 3.5–5.3)
RBC # BLD: 3.76 M/UL — LOW (ref 3.8–5.2)
RBC # FLD: 13.6 % — SIGNIFICANT CHANGE UP (ref 10.3–14.5)
SODIUM SERPL-SCNC: 134 MMOL/L — LOW (ref 135–145)
WBC # BLD: 8.91 K/UL — SIGNIFICANT CHANGE UP (ref 3.8–10.5)
WBC # FLD AUTO: 8.91 K/UL — SIGNIFICANT CHANGE UP (ref 3.8–10.5)

## 2021-10-13 PROCEDURE — 99239 HOSP IP/OBS DSCHRG MGMT >30: CPT | Mod: GC

## 2021-10-13 RX ADMIN — Medication 1000 MILLIGRAM(S): at 13:58

## 2021-10-13 RX ADMIN — SODIUM ZIRCONIUM CYCLOSILICATE 5 GRAM(S): 10 POWDER, FOR SUSPENSION ORAL at 05:15

## 2021-10-13 RX ADMIN — LIDOCAINE 1 PATCH: 4 CREAM TOPICAL at 11:48

## 2021-10-13 RX ADMIN — Medication 1000 MILLIGRAM(S): at 05:15

## 2021-10-13 RX ADMIN — POLYETHYLENE GLYCOL 3350 17 GRAM(S): 17 POWDER, FOR SOLUTION ORAL at 11:48

## 2021-10-13 NOTE — PROGRESS NOTE ADULT - PROBLEM SELECTOR PROBLEM 1
Lumbar back pain with radiculopathy affecting right lower extremity
Traumatic hematoma

## 2021-10-13 NOTE — DISCHARGE NOTE PROVIDER - NSDCMRMEDTOKEN_GEN_ALL_CORE_FT
Advair Diskus 100 mcg-50 mcg inhalation powder: 1 puff(s) inhaled 2 times a day  amLODIPine 5 mg oral tablet: 1 tab(s) orally once a day  Azopt 1% ophthalmic suspension: 1 drop(s) to right eye 3 times a day  carvedilol 3.125 mg oral tablet: 1 tab(s) orally 2 times a day  exemestane 25 mg oral tablet: 1 tab(s) orally once a day  Januvia 50 mg oral tablet: 1 tab(s) orally once a day  latanoprost 0.005% ophthalmic solution: 1 drop(s) to each affected eye once a day (in the evening)  Lipitor 20 mg oral tablet: 1 tab(s) orally once a day (at bedtime)  losartan 100 mg oral tablet: 1 tab(s) orally once a day  montelukast 10 mg oral tablet: 1 tab(s) orally once a day  NexIUM 40 mg oral delayed release capsule: 1 cap(s) orally once a day  Restasis 0.05% ophthalmic emulsion: 1 drop(s) to each affected eye every 12 hours  tiZANidine 2 mg oral tablet: 1 tab(s) orally once a day (at bedtime)  torsemide 10 mg oral tablet: 1 tab(s) orally once a day

## 2021-10-13 NOTE — DISCHARGE NOTE PROVIDER - PROVIDER TOKENS
FREE:[LAST:[Roseann],FIRST:[Elian],PHONE:[phone]],FAX:[(   [phone]    -],ADDRESS:[[address] [place] [address]]]

## 2021-10-13 NOTE — DISCHARGE NOTE PROVIDER - CARE PROVIDER_API CALL
Elian Whitfield  [address] [place] [address]  Phone: [phone]  Fax: (   )    -  Follow Up Time:

## 2021-10-13 NOTE — DISCHARGE NOTE NURSING/CASE MANAGEMENT/SOCIAL WORK - PATIENT PORTAL LINK FT
You can access the FollowMyHealth Patient Portal offered by [facility] by registering at the following website: [email or web address]. By joining [employer]’s FollowMyHealth portal, you will also be able to view your health information using other applications (apps) compatible with our system.

## 2021-10-13 NOTE — PROGRESS NOTE ADULT - PROBLEM SELECTOR PLAN 5
Pt presented w/ Na+ 125  C/w NS 75 cc/hr  f/u FeNa, UOsm, and Serum Osm  - 131 on (10/10)  -132 on (10/11)   -134 (10/12)  f/u Daily BMP
Pt presented w/ Na+ 125  C/w NS 75 cc/hr  f/u FeNa, UOsm, and Serum Osm  - 131 on (10/10)  -132 on (10/11)   f/u Daily BMP
Pt presented w/ right hip pain due to sciatica 2/2 disk herniation  CT Lumbar spine: No acute fracture identified. Multilevel degenerative changes with likely severe spinal canal stenosis at L4-L5 and possible superiorly migrated L4-L5 disc herniation at mid L4 level.  F/u ortho consult   F/u PT consult

## 2021-10-13 NOTE — DISCHARGE NOTE PROVIDER - HOSPITAL COURSE
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 CT head/maxillofacial/c-spine negative for ICH, showed small to moderate left frontal scalp hematoma, and moderate left preseptal periorbital soft tissue swelling. Repeat CT head showed decreasing left frontal and periorbital soft tissue swelling and hematoma. Coumadin was held d/t bleeding and primary cardiologist (Dr. Whitfield from [facility]) suggest holding until patient can see him outpatient. CT of LS showed multilevel degenerative changes with likely severe spinal canal stenosis at L4-L5 and possible superiorly migrated L4-L5 disc herniation at mid L4 level. Ortho spine recommended conservative management and to c/w outpatient LESI for pain management. Cleared for PT consult. Pt also had hyponatremia and hyperkalemia during hospitalization that was addressed. Will need opthalmology follow up. PT recommended home PT.

## 2021-10-13 NOTE — PROGRESS NOTE ADULT - SUBJECTIVE AND OBJECTIVE BOX
PGY-1 Progress Note discussed with attending    PAGER #: [[phone]] TILL 5:00 PM  PLEASE CONTACT ON CALL TEAM:  - On Call Team (Please refer to [place]) FROM 5:00 PM - 8:30PM  - Nightfloat Team FROM 8:30 -7:30 AM    CHIEF COMPLAINT & BRIEF HOSPITAL COURSE:  89 year old female with PMH of HTN, HLD, DM, PAF, and h/o of DVT, on Coumadin, previously seen at [facility] on 10/8 for severe thoracic back pain radiating to bilateral leg. The imaging of hips and thoracic spine were normal, implied with medication and discharged. Patient came back 10/9 . 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 Patient denies LOC, dizziness or any new pain in the back and trauma anywhere else. Pt also denies nausea, vomiting, chest pain, and shortness of breath.    INTERVAL HPI/OVERNIGHT EVENTS:   Patient seen and examined at bedside. No overnight events. [place] translation provided (ID: [id number]). No new complaints today. Pt awaiting PT recs for discharge. O/p LESI rescheduled for Friday per daughter.      REVIEW OF SYSTEMS:  CONSTITUTIONAL: No fever, chills, weight loss, or fatigue  RESPIRATORY: No cough, wheezing, or hemoptysis; No shortness of breath  CARDIOVASCULAR: No chest pain, palpitations, dizziness, or leg swelling  GASTROINTESTINAL: No abdominal pain. No nausea, vomiting, or hematemesis; No diarrhea or constipation. No melena or hematochezia.  GENITOURINARY: No dysuria or hematuria, urinary frequency  NEUROLOGICAL: No headaches, memory loss, loss of strength, numbness, or tremors  SKIN: No itching, burning, rashes, or lesions   MSK: +hip, LLE pain    Vital Signs Last 24 Hrs  T(C): 37.1 (13 Oct 2021 05:17), Max: 37.1 (13 Oct 2021 05:17)  T(F): 98.7 (13 Oct 2021 05:17), Max: 98.7 (13 Oct 2021 05:17)  HR: 66 (13 Oct 2021 05:17) (66 - 101)  BP: 139/54 (13 Oct 2021 05:17) (139/54 - 152/58)  BP(mean): --  RR: 18 (13 Oct 2021 05:17) (18 - 20)  SpO2: 97% (13 Oct 2021 05:17) (92% - 97%)    PHYSICAL EXAMINATION:  GENERAL: NAD, Obese  HEAD:  Atraumatic, Normocephalic  EYES:  large L sided periorbital hematoma with swelling, small R sided periorbital hematoma   NECK: Supple, No JVD, Normal thyroid  CHEST/LUNG: Clear to auscultation. No wheezing, crackles, rales or rubs  HEART: Regular rate and rhythm; Clear S1 S2, No murmurs, rubs, or gallops  ABDOMEN: Soft, Nontender, Nondistended; Bowel sounds present  NERVOUS SYSTEM:  Alert & Oriented X1, no focal neurological deficits  MSK:  Limited ROM of RLE d/t pain, 2+ Peripheral Pulses, No clubbing, cyanosis, or edema  SKIN: warm dry                          11.1   8.91  )-----------( 268      ( 13 Oct 2021 07:58 )             35.9     10-13    134<L>  |  97  |  16  ----------------------------<  102<H>  4.5   |  33<H>  |  0.84    Ca    9.7      13 Oct 2021 07:58  Phos  4.1     10-13  Mg     2.2     10-13            PT/INR - ( 12 Oct 2021 09:09 )   PT: 16.1 sec;   INR: 1.37 ratio         PTT - ( 12 Oct 2021 09:09 )  PTT:36.2 sec    CAPILLARY BLOOD GLUCOSE      RADIOLOGY & ADDITIONAL TESTS:

## 2021-10-13 NOTE — PROGRESS NOTE ADULT - PROBLEM SELECTOR PROBLEM 4
Hyponatremia
History of pulmonary embolism
Paroxysmal atrial fibrillation
Paroxysmal atrial fibrillation

## 2021-10-13 NOTE — PROGRESS NOTE ADULT - PROBLEM SELECTOR PLAN 7
IMPROVE VTE Individual Risk Assessment  RISK                                                                Points  [ x ] Previous VTE                                                  3  [  ] Thrombophilia                                               2  [  ] Lower limb paralysis                                      2        (unable to hold up >15 seconds)    [  ] Current Cancer                                              2         (within 6 months)  [ x ] Immobilization > 24 hrs                                1  [  ] ICU/CCU stay > 24 hours                              1  [ x ] Age > 60                                                      1  IMPROVE VTE Score _____5____    Warfarin
IMPROVE VTE Individual Risk Assessment  RISK                                                                Points  [ x ] Previous VTE                                                  3  [  ] Thrombophilia                                               2  [  ] Lower limb paralysis                                      2        (unable to hold up >15 seconds)    [  ] Current Cancer                                              2         (within 6 months)  [ x ] Immobilization > 24 hrs                                1  [  ] ICU/CCU stay > 24 hours                              1  [ x ] Age > 60                                                      1  IMPROVE VTE Score _____5____    Warfarin

## 2021-10-13 NOTE — PROGRESS NOTE ADULT - PROBLEM SELECTOR PROBLEM 3
Traumatic hematoma
History of pulmonary embolism

## 2021-10-13 NOTE — PROGRESS NOTE ADULT - PROBLEM SELECTOR PLAN 3
Pt on Warfarin due to hx of PE  on 3mg daily, 4mg on wednesdays   Daily INR  -1.61 on 10/11  -Hold AC for now d/t traumatic hematoma
hx of PE 7 years ago   on 3mg daily, 4mg on wednesdays   Daily INR  -1.61 on 10/11  -1.37 on 10/12  -Hold AC for now d/t traumatic hematoma
hx of PE 7 years ago   on 3mg daily, 4mg on wednesdays   Daily INR  -1.61 on 10/11  -1.37 on 10/12  -Hold AC for now d/t traumatic hematoma

## 2021-10-13 NOTE — PROGRESS NOTE ADULT - PROBLEM SELECTOR PLAN 1
Pt presented w/ left eye hematoma s/p mechanical fall  On coumadin for Hx of multiple PE. INR 2.5 on admission  Vitals stable  -Head CT: Small to moderate left frontal scalp hematoma. No acute intracranial hemorrhage, extra-axial collection or calvarial fracture. Mild chronic microvascular changes.  -Maxillofacial CT: Moderate left preseptal periorbital soft tissue swelling. No acute facial fracture or dislocation.  Opacified right maxillary sinus with foamy secretions.  -Cervical spine CT: No evidence of acute cervical spine fracture or evidence of traumatic malalignment. Cervical spondylosis.  -UA negative  -repeat Head CT (10/11): ): No acute hemorrhage, mass effect or extra-axial collections. Decreasing left frontal and periorbital soft tissue swelling and hematoma.    f/u PT consult  Will need o/p opthalmology f/u
Pt presented w/ left eye hematoma s/p mechanical fall  On coumadin for Hx of multiple PE. INR 2.5 on admission  Vitals stable  -Head CT: Small to moderate left frontal scalp hematoma. No acute intracranial hemorrhage, extra-axial collection or calvarial fracture. Mild chronic microvascular changes.  -Maxillofacial CT: Moderate left preseptal periorbital soft tissue swelling. No acute facial fracture or dislocation.  Opacified right maxillary sinus with foamy secretions.  -Cervical spine CT: No evidence of acute cervical spine fracture or evidence of traumatic malalignment. Cervical spondylosis.  -UA negative  -repeat Head CT (10/11): ): No acute hemorrhage, mass effect or extra-axial collections. Decreasing left frontal and periorbital soft tissue swelling and hematoma.  f/u PT consult  Will need o/p opthalmology f/u
Pt with chronic right lumbar back pain which is somatic and neuropathic in nature due to sciatica and severe spinal stenosis. hx multiple cortisone injections. Pt also with left facial tenderness and ecchymosis.   High risk medications reviewed. Avoid polypharmacy. Avoid IV opioids. Avoid NSAIDs and benzodiazepines. Non-pharmacological sleep aides initiated. Non-opioid medications and non-pharmacological pain management measures initiated.   Opioid pain recommendations   - Will discontinue oxycodone PRN per daughters request.   Non-opioid pain recommendations   - Acetaminophen 1 gram PO q 8 hours x 3 days.   - Lidoderm 4% patch daily.   - Avoid NSAIDs- multiple hematomas. On coumadin.   Bowel Regimen  - Continue Miralax 17G PO daily  - Continue Senna 2 tablets at bedtime for constipation  Mild pain   - Non-pharmacological pain treatment recommendations  - Warm/ Cool packs PRN   - Repositioning extremity, imagery, relaxation, distraction.  - Physical therapy OOB if no contraindications   Recommendations discussed with primary team and RN.
Pt presented w/ left eye hematoma s/p mechanical fall  On coumadin for Hx of multiple PE. INR 2.5 on admission  Vitals stable  -Head CT: Small to moderate left frontal scalp hematoma. No acute intracranial hemorrhage, extra-axial collection or calvarial fracture. Mild chronic microvascular changes.  -Maxillofacial CT: Moderate left preseptal periorbital soft tissue swelling. No acute facial fracture or dislocation.  Opacified right maxillary sinus with foamy secretions.  -Cervical spine CT: No evidence of acute cervical spine fracture or evidence of traumatic malalignment. Cervical spondylosis.  -UA negative  -repeat Head CT (10/11): ): No acute hemorrhage, mass effect or extra-axial collections. Decreasing left frontal and periorbital soft tissue swelling and hematoma.    f/u PT consult  Will need o/p opthalmology f/u

## 2021-10-13 NOTE — PROGRESS NOTE ADULT - PROBLEM SELECTOR PLAN 2
Pt presented w/ K+ of 6.3  Received lokelma 10, calcium gluconate, Dextrose and insulin  -resolved  Pt with repeat HyperK+ on 10/12  Received lokelma 10, 5, Dextrose and insulin  -resolved 10/13
Pt presented w/ K+ of 6.3  Received lokelma 10, calcium gluconate, Dextrose and insulin   F/u daily BMP  -resolved
Pt presented w/ K+ of 6.3  Received lokelma 10, calcium gluconate, Dextrose and insulin  -resolved  Pt with repeat HyperK+ on 10/12  Received lokelma 10, 5, Dextrose and insulin  f/u BMP

## 2021-10-13 NOTE — DISCHARGE NOTE PROVIDER - NSDCCPCAREPLAN_GEN_ALL_CORE_FT
PRINCIPAL DISCHARGE DIAGNOSIS  Diagnosis: Traumatic hematoma  Assessment and Plan of Treatment: You came to the emergency department after a fall and injury to your left eye. We did a head CT scan which showed no bleeding in the head. This was important as you are taking coumadin which is a blod thinner and can cause increase risk of bleeding. Accordingly, we held this medication. We repeated a head CT a few days later to make sure there was no bleeding. We monitored you with neuro checks to make sure there were no mental status changes and PT saw you. Please take precaution to minimize falls. After speaking wit [facility] primary cardiologist (Dr. Dora Wilson) he said to hold the Coumadin until you see him in the office. Please follow up with him within one week to discuss reintroducing anticoagulation if indicated      SECONDARY DISCHARGE DIAGNOSES  Diagnosis: Paroxysmal atrial fibrillation  Assessment and Plan of Treatment: According to your primary cardiologist (Dr. Akbar), you have previously been diagnosed with atrial fibrilliation which increases your risk for stroke. accordingly, you were prescribed coumadin, a blood thinner to prevent that. HOWEVER, we held the coumadin d/t bleeding and we confirmed that with the cardiologist. PLEASE FOLLOW UP with Dr. Akbar within a week to discuss the risks and benefits of continuing anticoagulation to reduce risk of stroke while increase risk of bleeding from falls (given your mother's age and diffuciculty walking).    Diagnosis: Traumatic ecchymosis of right eye  Assessment and Plan of Treatment: The head injury caused by your fall caused trauma nad bruising to the eye. 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 Please follow up with an opthomologist to continue work up.    Diagnosis: Lumbar back pain with radiculopathy affecting right lower extremity  Assessment and Plan of Treatment: You have chronic back pain treated with epidural spinal steroid injection. Please continue to see your pain specialist routinely to manage this pain.    Diagnosis: HTN (hypertension)  Assessment and Plan of Treatment: You have previously been diagnosed with hypertension (high blood pressure). We continued to manage your blood pressure during your hospitalization. Continue taking your prescribed medications and follow up with your primary doctor routinely.    Diagnosis: HLD (hyperlipidemia)  Assessment and Plan of Treatment: You have previously been diagnosed with hyperlipidemia (high cholesterol. We continued your home medications during your hospitalization. Continue taking your prescribed medications and follow up with your primary doctor routinely.    Diagnosis: DM (diabetes mellitus)  Assessment and Plan of Treatment: You have previously been diagnosed with diabetes. Your blood sugar was managed during the course of your hospitalization. Please continue to take you prescribed meds and follow up with your primary physician routinely.    Diagnosis: Hyperkalemia  Assessment and Plan of Treatment: Your potassium was elevated during hospitalization. It resolved during the course of hospitalization with the treatment we gave.    Diagnosis: Hyponatremia  Assessment and Plan of Treatment: Your sodium was low upon admission. It is now stable after being treated during your hospitalization.

## 2021-10-24 ENCOUNTER — EMERGENCY (EMERGENCY)
Facility: HOSPITAL | Age: 86
LOS: 1 days | Discharge: HOME | End: 2021-10-24
Attending: EMERGENCY MEDICINE
Payer: COMMERCIAL

## 2021-10-24 VITALS
OXYGEN SATURATION: 96 % | HEIGHT: 61 IN | SYSTOLIC BLOOD PRESSURE: 175 MMHG | TEMPERATURE: 98 F | HEART RATE: 65 BPM | RESPIRATION RATE: 17 BRPM | WEIGHT: 164.91 LBS | DIASTOLIC BLOOD PRESSURE: 65 MMHG

## 2021-10-24 DIAGNOSIS — Z90.11 ACQUIRED ABSENCE OF RIGHT BREAST AND NIPPLE: Chronic | ICD-10-CM

## 2021-10-24 PROCEDURE — 99281 EMR DPT VST MAYX REQ PHY/QHP: CPT

## 2021-10-24 PROCEDURE — G0463: CPT

## 2021-10-24 NOTE — ED PROVIDER NOTE - NSFOLLOWUPINSTRUCTIONS_ED_ALL_ED_FT
-- Please follow up with your primary doctor (or come back to the Emergency Department) within 48-72 hours for wound check.   --Please clean the injury gently with unscented soap & water, apply bacitracin.   -- Remember, a wound doesn't become even close to as strong as normal skin until 6 weeks after the injury.  Please take care to avoid any further trauma to the area to allow the wound to heal.  -- Any increased pain, redness, streaking (red lines), drainage from the wound, swelling, fever, chills please return right away to Emergency Department.  -- You were given a copy of the results from any tests performed today in the Emergency Department which have results available.  Show these to your doctor(s).   Some of the tests we sent may not have results yet so please call or have your doctor call the Emergency Department to follow up on all results.  -- Please continue taking your home medications as directed.  Do not use alcohol when taking any medication (especially antibiotics, tylenol or other pain medication) unless you check with the doctor or pharmacist.

## 2021-10-24 NOTE — ED ADULT NURSE NOTE - IS THE PATIENT ABLE TO BE SCREENED?
Spoke with pharmacist and she says that patient is out of medication and needs a refill for Levothyroxine. Medication refilled with new directions per HE on last visit note. No other questions or concerns at this time.   
Yes

## 2021-10-24 NOTE — ED PROVIDER NOTE - PATIENT PORTAL LINK FT
You can access the FollowMyHealth Patient Portal offered by [facility] by registering at the following website: [email or web address]. By joining [facility]’s FollowMyHealth portal, you will also be able to view your health information using other applications (apps) compatible with our system.

## 2021-10-24 NOTE — ED PROVIDER NOTE - OBJECTIVE STATEMENT
89yo F PMH of COPD on 2L home O2, Breast cancer, HTN, HLD, PE on coumadin, DM, lumbar disc herniation on steroid injections (last was on march 11, 2019) presents for suture removal. States she fell 2 wks ago while on a rocking chair, was hospitalized, discharged home 1.5 wks ago. No F/C/NS/N/V/D. No headache. No bleeding from site or pus output.

## 2021-10-24 NOTE — ED PROVIDER NOTE - PHYSICAL EXAMINATION
G: NAD, cooperative with exam   H: NCAT  E: EOMI, no conjunctival pallor   M: Mucous membranes moist   R: nWOB  C: radial pulses bl   A: Soft, NT/ND, no rebound/guarding   Skin: 1.5cm laceration, 6 sutures in place, no surrounding erythema or fluctuance. small hematoma to left temporal region which per family has been there since injury

## 2021-10-24 NOTE — ED PROVIDER NOTE - CLINICAL SUMMARY MEDICAL DECISION MAKING FREE TEXT BOX
89yo F PMH of COPD on 2L home O2, Breast cancer, HTN, HLD, PE on coumadin, DM, lumbar disc herniation on steroid injections (last was on march 11, 2019) presents for suture removal. 6 sutures removed. No erythema/fluctuance/s/s infection noted on examination. Strict return precautions advised. Pt and pt's son at bedside voice understanding.

## 2021-10-24 NOTE — ED PROVIDER NOTE - ATTENDING CONTRIBUTION TO CARE
seen with resident  here for suture removal to left temporal area  wound clean sutures removed  agree with residents assessment hx and physical and disposition

## 2021-10-24 NOTE — ED PROVIDER NOTE - NS ED ROS FT
Gen: No F/C/NS  Head: No recent falls   Eyes: No changes in vision   Resp: No cough    Cardiovascular: No chest pain    Gastroenteric: No N/V/D  :  No change in urine output   MS: No joint or muscle pain  Neuro: No headache   Skin: No new rash

## 2021-10-24 NOTE — ED PROVIDER NOTE - NSCAREINITIATED _GEN_ER
Patient: Bridgett Brady    Procedure(s):  REPEAT  SECTION    Diagnosis: Previous  delivery, antepartum [O34.219]  Diagnosis Additional Information: No value filed.    Anesthesia Type:   Spinal     Note:    Oropharynx: oropharynx clear of all foreign objects and spontaneously breathing  Level of Consciousness: drowsy  Oxygen Supplementation: room air    Independent Airway: airway patency satisfactory and stable  Dentition: dentition unchanged  Vital Signs Stable: post-procedure vital signs reviewed and stable  Report to RN Given: handoff report given  Patient transferred to: Labor and Delivery  Comments: Transferred to OB PACU recovery, spontaneous respirations on room air with oxygen saturations maintained greater than 98%. SpO2, NiBP, and EKG monitors and alarms on and functioning, report on patient's clinical status given to OB recovery RN, RN questions answered, patient in hospital cart with siderails up Oxytocin 30 units in 500mL infusion connected to IV infusion pump in recovery bay and programmed to 100 mL/hr at handoff of care.    Handoff Report: Identifed the Patient, Identified the Reponsible Provider, Reviewed the pertinent medical history, Discussed the surgical course, Reviewed Intra-OP anesthesia mangement and issues during anesthesia, Set expectations for post-procedure period and Allowed opportunity for questions and acknowledgement of understanding      Vitals:  Vitals Value Taken Time   /56    Temp 98    Pulse 62    Resp 14    SpO2 98        Electronically Signed By: LITZY Gaspar CRNA  2021  8:34 AM  
Palma Rosales(Resident)

## 2022-03-28 NOTE — DISCHARGE NOTE ADULT - NS AS DC STROKE DX YN
Received report. Patient is awake, lying in bed, A/Ox3. Denies pain at this time. No signs 
of acute distress noted. Bed at lowest position, brakes on, bed alarm on, siderails x2. Call 
light within reach. Will continue to monitor. 

-------------------------------------------------------------------------------

Addendum: 03/29/22 at 0000 by Minal Sam RN

-------------------------------------------------------------------------------

Padded siderails for seizure precaution. no

## 2022-07-22 NOTE — PROGRESS NOTE ADULT - REASON FOR ADMISSION
Mechanical Fall and left eye hematoma
Yes

## 2022-12-14 ENCOUNTER — FORM ENCOUNTER (OUTPATIENT)
Age: 87
End: 2022-12-14

## 2022-12-15 PROBLEM — Z00.00 ENCOUNTER FOR PREVENTIVE HEALTH EXAMINATION: Noted: 2022-12-15

## 2022-12-16 ENCOUNTER — APPOINTMENT (OUTPATIENT)
Dept: HOME HEALTH SERVICES | Facility: HOME HEALTH | Age: 87
End: 2022-12-16

## 2022-12-16 VITALS
TEMPERATURE: 98.2 F | RESPIRATION RATE: 16 BRPM | OXYGEN SATURATION: 96 % | HEART RATE: 60 BPM | SYSTOLIC BLOOD PRESSURE: 113 MMHG | DIASTOLIC BLOOD PRESSURE: 74 MMHG

## 2022-12-16 DIAGNOSIS — H54.40 BLINDNESS, ONE EYE, UNSPECIFIED EYE: ICD-10-CM

## 2022-12-16 DIAGNOSIS — M48.00 SPINAL STENOSIS, SITE UNSPECIFIED: ICD-10-CM

## 2022-12-16 DIAGNOSIS — Z51.5 ENCOUNTER FOR PALLIATIVE CARE: ICD-10-CM

## 2022-12-16 DIAGNOSIS — K21.9 GASTRO-ESOPHAGEAL REFLUX DISEASE W/OUT ESOPHAGITIS: ICD-10-CM

## 2022-12-16 DIAGNOSIS — H40.9 UNSPECIFIED GLAUCOMA: ICD-10-CM

## 2022-12-16 DIAGNOSIS — M51.26 OTHER INTERVERTEBRAL DISC DISPLACEMENT, LUMBAR REGION: ICD-10-CM

## 2022-12-16 DIAGNOSIS — K42.9 UMBILICAL HERNIA W/OUT OBSTRUCTION OR GANGRENE: ICD-10-CM

## 2022-12-16 DIAGNOSIS — Z23 ENCOUNTER FOR IMMUNIZATION: ICD-10-CM

## 2022-12-16 DIAGNOSIS — K57.90 DIVERTICULOSIS OF INTESTINE, PART UNSPECIFIED, W/OUT PERFORATION OR ABSCESS W/OUT BLEEDING: ICD-10-CM

## 2022-12-16 PROCEDURE — 99345 HOME/RES VST NEW HIGH MDM 75: CPT | Mod: 25,95

## 2022-12-19 PROBLEM — Z51.5 HOSPICE CARE PATIENT: Status: ACTIVE | Noted: 2022-12-19

## 2022-12-19 PROBLEM — K57.90 DIVERTICULOSIS: Status: ACTIVE | Noted: 2022-12-19

## 2022-12-19 PROBLEM — H54.40 BLINDNESS OF RIGHT EYE: Status: ACTIVE | Noted: 2022-12-19

## 2022-12-19 PROBLEM — M48.00 SPINAL STENOSIS: Status: ACTIVE | Noted: 2022-12-16

## 2022-12-19 PROBLEM — H40.9 GLAUCOMA: Status: ACTIVE | Noted: 2022-12-19

## 2022-12-19 PROBLEM — Z23 ENCOUNTER FOR IMMUNIZATION: Status: ACTIVE | Noted: 2022-12-19

## 2022-12-19 PROBLEM — K21.9 GERD (GASTROESOPHAGEAL REFLUX DISEASE): Status: ACTIVE | Noted: 2022-12-19

## 2022-12-19 PROBLEM — M51.26 LUMBAR DISC HERNIATION: Status: ACTIVE | Noted: 2022-12-19

## 2022-12-19 PROBLEM — K42.9 UMBILICAL HERNIA WITHOUT OBSTRUCTION AND WITHOUT GANGRENE: Status: ACTIVE | Noted: 2022-12-19

## 2022-12-19 NOTE — PHYSICAL EXAM
[No Acute Distress] : no acute distress [Normal Sclera/Conjunctiva] : normal sclera/conjunctiva [Normal Outer Ear/Nose] : the ears and nose were normal in appearance [No Respiratory Distress] : no respiratory distress [Clear to Auscultation] : lungs were clear to auscultation bilaterally [Normal S1, S2] : normal S1 and S2 [Normal Bowel Sounds] : normal bowel sounds [No Edema] : there was no peripheral edema [Non Tender] : non-tender [Soft] : abdomen soft [No Rash] : no rash [No Skin Lesions] : no skin lesions [de-identified] : Vitals taken by RN and reviewed by me. Physical exam done by RN.  [de-identified] : Nasal cannula  [de-identified] : reducible umbilical hernia [de-identified] : left breast masectomy

## 2022-12-19 NOTE — REASON FOR VISIT
[Initial Eval - Existing Diagnosis] : an initial evaluation of an existing diagnosis [Family Member] : family member [Pre-Visit Preparation] : pre-visit preparation was done [[employer] ] : provided by [employer]   [Time Spent: ____ minutes] : Total time spent using  services: [unfilled] minutes. The patient's primary language is not English thus required  services. [Interpreters_IDNumber] : [id number] [Interpreters_FullName] : Peter [FreeTextEntry2] : medical records

## 2022-12-19 NOTE — HEALTH RISK ASSESSMENT
[Some assistance needed] : using telephone [Full assistance needed] : managing finances [Yes] : The patient does have visual impairment [Patient not ambulatory (Wheelchair)] : Patient is not ambulatory (Wheelchair) [TimeGetUpGo] : 0

## 2022-12-19 NOTE — COUNSELING
[Continue diet as tolerated] : continue diet as tolerated based on goals of care [Non - Smoker] : non-smoker [Use assistive device to avoid falls] : use assistive device to avoid falls [Remove clutter and unsafe carpeting to avoid falls] : remove clutter and unsafe carpeting to avoid falls [] : foot exam [Not Recommended] : Aspirin use not recommended due to overall prognosis [Improve pain control] : improve pain control [Decrease hospital use] : decrease hospital use [Minimize unnecessary interventions] : minimize unnecessary interventions [Discussed disease trajectory with patient/caregiver] : discussed disease trajectory with patient/caregiver [Advanced Directives discussed: ____] : Advanced directives discussed: [unfilled] [Full Code] : Code Status: Full Code [No Limitations] : Treatment Guidelines: No limitations [Long Term Intubation] : Intubation: Long term intubation [Last Verification Date: _____] : [facility] Completion/last verification date: [unfilled] [de-identified] : No HCP and mother has no capacity to make decision. Daughter will talk with family to get consensus and elect two people as representatives

## 2022-12-19 NOTE — HISTORY OF PRESENT ILLNESS
[Patient] : patient [Family Member] : family member [FreeTextEntry1] : unsteady gait  [FreeTextEntry2] : MT OVERTON is being seen for a visit provided via [employer] Teams real-time audio visual technology. MT OVERTON was located at their home, [address] [place] [address], at the time of the visit.\par \par The [facility] clinician, TAIWO GOODMAN, was located remotely at their home in [place] at the time of the visit. The patient, MT OVERTON, and the [facility] clinician, TAIWO GOODMAN, participated in the telehealth encounter. Other participants included the RN, who was in the home with the patient, performed vitals monitoring and physical exam, and facilitated the video visit with TAIWO GOODMAN. The assessment and plan was made on the basis of the information provided by the RN.\par \par MT OVERTON is a 89 year female with PMH of right breast cancer (s/p surgery and chemotherapy), DM, right eye blindness (s/p corneal transplant), HTN, CAD, GERD, severe spinal canal stenosis \par at L4-L5 and possible superiorly migrated L4-L5 disc herniation at mid L4 level, reducible umbilical hernia, diverticulitis, asthma, DVT/PE  (2012), PAF (on Xarelto) and Glaucoma being seen for initial visit. History provided by daughter Sola. Patient was on [facility] program and being enrolled into hospice currently. Uses Oxygen supplementation on ambulation (~2L). \par \par Interval Events: none \par \par Subjective:\par -Appetite/weight: appetite fair. bite size consistency. weight stable \par -Gait/falls: unsteady gait. Uses walker. No falls in the past year. \par -Pain: back pain due to spinal stenosis. Always. Uses tylenol prn with some relief. \par -Sleep: sleeps during day and usually awake at night. No confusion. \par -BMs: regular. continent. \par -Urine: continent. no concerns. Uses diapers for leaks \par -Skin: intact \par -DME: walker, commode, shower chair, wheelchair, adjustable bed\par -Mood/memory: mood is ok. memory deficits with short term. \par -Communication: few words only, not conversational. Incoherent words\par -Health Maintenance: no recommended screening. Vaccine: Flu (11/2022), Pneumonia and shingles not given. \par -Hospitalizations in the past year: none \par \par \par Medical Issues:\par a) Hospice: recently enrolled in hospice. Services haven't started. \par b) Asthma: no exacerbation. Continue albuterol prn and loratidine. \par c) DVT/PE: Continue Xarelto\par d) CAD: No angina symptom. Continue NTG,  Coreg 3.125mg BID, Lipitor 20mg, Torsemide 10mg\par e) GERD: Chronic. Continue omeprazole\par f) DMII: No BG to review. Continue Januvia 50mg\par g) Umbilical hernia: Reducible. Counselled warning signs for incarcerated hernia. \par h) Spinal stenosis and disc herniation: Chronic. Continue Tizanidine\par i) HTN: chronic. 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 \par k) Right breast cancer: s/p chemotherapy and surgery. Continue Exemestane. FU with oncology. \par \par \par Specialists:\par PCP: Dr Basil Hopper \par Oncology: Dr Carpenter\par Cardiology: Dr Olson [phone] \par \par \par Social hx: lives with family. From  [place]. Has 6 children.  \par Caregivers: [facility] 8.5 hr x 6 days. The daughters splits the CD Pas. \par MOLST: Full Code. \par HCP: Sola Aviso and Leanne by mother but mother had no capacity on evaluation. \par

## 2022-12-19 NOTE — CHRONIC CARE ASSESSMENT
[Limited decision making ability] : limited decision making ability [PPS Score: ____] : Palliative Performance Scale (PPS) Score: [unfilled] [FAST Score: ____] : Functional Assessment Scale (FAST) Score: [unfilled]

## 2022-12-20 ENCOUNTER — LABORATORY RESULT (OUTPATIENT)
Age: 87
End: 2022-12-20

## 2022-12-20 DIAGNOSIS — E87.5 HYPERKALEMIA: ICD-10-CM

## 2022-12-21 PROBLEM — E87.5 HYPERKALEMIA: Status: ACTIVE | Noted: 2022-12-21

## 2022-12-22 ENCOUNTER — LABORATORY RESULT (OUTPATIENT)
Age: 87
End: 2022-12-22

## 2022-12-22 ENCOUNTER — APPOINTMENT (OUTPATIENT)
Dept: AFTER HOURS CARE | Facility: EMERGENCY ROOM | Age: 87
End: 2022-12-22

## 2022-12-22 ENCOUNTER — TRANSCRIPTION ENCOUNTER (OUTPATIENT)
Age: 87
End: 2022-12-22

## 2022-12-23 ENCOUNTER — TRANSCRIPTION ENCOUNTER (OUTPATIENT)
Age: 87
End: 2022-12-23

## 2022-12-23 DIAGNOSIS — E87.5 HYPERKALEMIA: ICD-10-CM

## 2022-12-23 PROCEDURE — 99205 OFFICE O/P NEW HI 60 MIN: CPT | Mod: 95

## 2022-12-23 RX ORDER — MONTELUKAST 4 MG/1
1 TABLET, CHEWABLE ORAL
Qty: 0 | Refills: 0 | DISCHARGE

## 2022-12-23 RX ORDER — WARFARIN SODIUM 2.5 MG/1
1.5 TABLET ORAL
Qty: 0 | Refills: 0 | DISCHARGE

## 2022-12-23 RX ORDER — ATORVASTATIN CALCIUM 80 MG/1
1 TABLET, FILM COATED ORAL
Qty: 0 | Refills: 0 | DISCHARGE

## 2022-12-23 NOTE — PHYSICAL EXAM
[No Acute Distress] : no acute distress [Well-Appearing] : well-appearing [Normal Voice/Communication] : normal voice/communication [Normal Sclera/Conjunctiva] : normal sclera/conjunctiva [EOMI] : extraocular movements intact [Normal Outer Ear/Nose] : the outer ears and nose were normal in appearance [No Respiratory Distress] : no respiratory distress  [No Accessory Muscle Use] : no accessory muscle use [Pedal Pulses Present] : the pedal pulses are present [No Edema] : there was no peripheral edema [Soft] : abdomen soft [Non Tender] : non-tender [No Joint Swelling] : no joint swelling [No Rash] : no rash [No Focal Deficits] : no focal deficits [Speech Grossly Normal] : speech grossly normal [Normal Affect] : the affect was normal [Normal Mood] : the mood was normal [de-identified] : CP performed

## 2022-12-23 NOTE — PLAN
[FreeTextEntry1] : -40mg IV lasix given\par -Lokelma administered\par -strict education with family as to adminstering lokelma TID as instructed\par  -EKG w/ NSR, normal intervals\par -CMP to be repeated in AM for K trending \par - HC NP aware to be ordered\par  -close f/u with HC physician in AM

## 2022-12-23 NOTE — ASSESSMENT
[Assessment and Treatment] : [facility] Outcome: Assessment and Treatment [No] : Did the information provided by the [facility] evaluation change your medical management?  No [CP formulary medication given] : CP formulary medication given [Provided reassurance that there was no need to escalate care/change care plan] : Provided reassurance that there was no need to escalate care/change care plan [Yes] : If the [facility] evaluation had not been available would you have advised the patient to go to the ER?  Yes [FreeTextEntry1] : Concerning uptrend and potassium from 6.1 to 6.3. As cannot evaluate the patient over the phone will dispatch CP to perform EKG, physical exam, patient may benefit from IV Lasix and instructed to take lokelma. Performed extensive chart review. \par \par On CP arrival, Patient is asymptomatic, alert and oriented. EKG w/ NSR, no peaked T waves, normal DE and QRS interval. Plan to give 40mg IV lasix, repeat CMP in AM to continue to trend K.

## 2022-12-23 NOTE — HISTORY OF PRESENT ILLNESS
[Home] : at home, [unfilled] , at the time of the visit. [Other Location: e.g. Home (Enter Location, City,State)___] : at [unfilled] [Verbal consent obtained from patient] : the patient, [unfilled] [FreeTextEntry4] : Juan A richmond)  [FreeTextEntry8] : 89y F w/ PMHx HLD, T2DM, Asthma, hx of breast CA with call for elevated K to 6.3. Patient had lab work done on 12/20 which showed a potassium level of 6.1 was prescribed lokelma. Per patient's daughter who is healthcare proxy, patient has not received any doses of her lokelma, but states has the medication at the home. When asked why patient has not received medication, daughter states "I don't know". Translation was provided by patient's grandson Juan A as patient and patient's daughter are both English-speaking. Reiterated to grandson and daughter that it is imperative patient take the prescribed medication as her potassium is continuing to trend upwards and describe the dangers with an uptrend in potassium at length with grandson and daughter. Grandson states patient has not been complaining of nausea or vomiting but has been complaining of mild chest discomfort although he states she's been dealing with a cold and causes chest congestion. At this time with up-trending potassium discuss with family plan to dispatch CP to the home for an EKG, grandson instructed to administer a dose of lokelma to patient now. Per chart review patient listed to have been taking lassie 20 mg qd, but per grandson patient does not that take this medication any longer. Both grandson and daughter agreeable with plan for CP dispatch.

## 2022-12-23 NOTE — REVIEW OF SYSTEMS
[Fever] : no fever [Chills] : no chills [Vision Problems] : no vision problems [Chest Pain] : no chest pain [Lower Ext Edema] : no lower extremity edema [Shortness Of Breath] : no shortness of breath [Dyspnea on Exertion] : no dyspnea on exertion [Nausea] : no nausea [Constipation] : no constipation [Vomiting] : no vomiting [Dysuria] : no dysuria [Hematuria] : no hematuria [Muscle Pain] : no muscle pain [Headache] : no headache [Dizziness] : no dizziness

## 2022-12-26 ENCOUNTER — LABORATORY RESULT (OUTPATIENT)
Age: 87
End: 2022-12-26

## 2022-12-30 ENCOUNTER — NON-APPOINTMENT (OUTPATIENT)
Age: 87
End: 2022-12-30

## 2023-01-03 ENCOUNTER — LABORATORY RESULT (OUTPATIENT)
Age: 88
End: 2023-01-03

## 2023-01-04 ENCOUNTER — LABORATORY RESULT (OUTPATIENT)
Age: 88
End: 2023-01-04

## 2023-01-18 ENCOUNTER — NON-APPOINTMENT (OUTPATIENT)
Age: 88
End: 2023-01-18

## 2023-01-26 ENCOUNTER — NON-APPOINTMENT (OUTPATIENT)
Age: 88
End: 2023-01-26

## 2023-01-27 PROBLEM — Z90.49 ACQUIRED ABSENCE OF OTHER SPECIFIED PARTS OF DIGESTIVE TRACT: Chronic | Status: ACTIVE | Noted: 2021-10-11

## 2023-01-27 PROBLEM — M47.816 SPONDYLOSIS WITHOUT MYELOPATHY OR RADICULOPATHY, LUMBAR REGION: Chronic | Status: ACTIVE | Noted: 2021-10-12

## 2023-01-27 PROBLEM — M48.00 SPINAL STENOSIS, SITE UNSPECIFIED: Chronic | Status: ACTIVE | Noted: 2021-10-11

## 2023-01-27 PROBLEM — E11.9 TYPE 2 DIABETES MELLITUS WITHOUT COMPLICATIONS: Chronic | Status: ACTIVE | Noted: 2021-10-08

## 2023-01-27 PROBLEM — H54.40 BLINDNESS, ONE EYE, UNSPECIFIED EYE: Chronic | Status: ACTIVE | Noted: 2021-10-11

## 2023-01-27 PROBLEM — I26.99 OTHER PULMONARY EMBOLISM WITHOUT ACUTE COR PULMONALE: Chronic | Status: ACTIVE | Noted: 2021-10-11
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
## 2023-01-27 PROBLEM — C50.919 MALIGNANT NEOPLASM OF UNSPECIFIED SITE OF UNSPECIFIED FEMALE BREAST: Chronic | Status: ACTIVE | Noted: 2021-10-08

## 2023-01-27 PROBLEM — M54.50 LOW BACK PAIN, UNSPECIFIED: Chronic | Status: ACTIVE | Noted: 2021-10-12

## 2023-01-30 ENCOUNTER — NON-APPOINTMENT (OUTPATIENT)
Age: 88
End: 2023-01-30

## 2023-01-30 ENCOUNTER — APPOINTMENT (OUTPATIENT)
Dept: HOME HEALTH SERVICES | Facility: HOME HEALTH | Age: 88
End: 2023-01-30

## 2023-01-30 DIAGNOSIS — J44.1 CHRONIC OBSTRUCTIVE PULMONARY DISEASE WITH (ACUTE) EXACERBATION: ICD-10-CM

## 2023-01-31 ENCOUNTER — LABORATORY RESULT (OUTPATIENT)
Age: 88
End: 2023-01-31

## 2023-02-01 ENCOUNTER — NON-APPOINTMENT (OUTPATIENT)
Age: 88
End: 2023-02-01

## 2023-03-20 ENCOUNTER — APPOINTMENT (OUTPATIENT)
Dept: HOME HEALTH SERVICES | Facility: HOME HEALTH | Age: 88
End: 2023-03-20

## 2023-03-20 ENCOUNTER — APPOINTMENT (OUTPATIENT)
Dept: HOME HEALTH SERVICES | Facility: HOME HEALTH | Age: 88
End: 2023-03-20
Payer: MEDICARE

## 2023-03-20 ENCOUNTER — NON-APPOINTMENT (OUTPATIENT)
Age: 88
End: 2023-03-20

## 2023-03-20 VITALS
DIASTOLIC BLOOD PRESSURE: 68 MMHG | TEMPERATURE: 97.7 F | SYSTOLIC BLOOD PRESSURE: 148 MMHG | RESPIRATION RATE: 17 BRPM | OXYGEN SATURATION: 95 % | BODY MASS INDEX: 30.63 KG/M2 | HEART RATE: 61 BPM | WEIGHT: 156 LBS | HEIGHT: 60 IN

## 2023-03-20 DIAGNOSIS — K59.09 OTHER CONSTIPATION: ICD-10-CM

## 2023-03-20 DIAGNOSIS — F41.9 ANXIETY DISORDER, UNSPECIFIED: ICD-10-CM

## 2023-03-20 DIAGNOSIS — R52 PAIN, UNSPECIFIED: ICD-10-CM

## 2023-03-20 DIAGNOSIS — E11.9 TYPE 2 DIABETES MELLITUS W/OUT COMPLICATIONS: ICD-10-CM

## 2023-03-20 PROCEDURE — 99349 HOME/RES VST EST MOD MDM 40: CPT | Mod: 95

## 2023-03-20 RX ORDER — AMOXICILLIN AND CLAVULANATE POTASSIUM 500; 125 MG/1; MG/1
500-125 TABLET, FILM COATED ORAL
Qty: 14 | Refills: 0 | Status: COMPLETED | COMMUNITY
Start: 2023-01-30 | End: 2023-03-20

## 2023-03-20 RX ORDER — WARFARIN 3 MG/1
3 TABLET ORAL
Refills: 0 | Status: COMPLETED | COMMUNITY
Start: 2018-03-08 | End: 2023-03-20

## 2023-03-20 RX ORDER — LOSARTAN POTASSIUM 100 MG/1
100 TABLET, FILM COATED ORAL
Refills: 0 | Status: COMPLETED | COMMUNITY
Start: 2018-03-08 | End: 2023-03-20

## 2023-03-20 RX ORDER — ATORVASTATIN CALCIUM 20 MG/1
20 TABLET, FILM COATED ORAL
Qty: 1 | Refills: 3 | Status: COMPLETED | COMMUNITY
Start: 2022-12-16 | End: 2023-03-20

## 2023-03-20 RX ORDER — EXEMESTANE 25 MG/1
TABLET, FILM COATED ORAL
Refills: 0 | Status: COMPLETED | COMMUNITY
End: 2023-03-20

## 2023-03-20 RX ORDER — SITAGLIPTIN 50 MG/1
50 TABLET, FILM COATED ORAL
Refills: 0 | Status: COMPLETED | COMMUNITY
Start: 2018-03-08 | End: 2023-03-20

## 2023-03-20 RX ORDER — FLUTICASONE PROPIONATE AND SALMETEROL 50; 100 UG/1; UG/1
100-50 POWDER RESPIRATORY (INHALATION)
Refills: 0 | Status: COMPLETED | COMMUNITY
Start: 2018-03-08 | End: 2023-03-20

## 2023-03-20 RX ORDER — ATORVASTATIN CALCIUM 10 MG/1
10 TABLET, FILM COATED ORAL
Refills: 0 | Status: COMPLETED | COMMUNITY
Start: 2018-03-08 | End: 2023-03-20

## 2023-03-20 RX ORDER — DORZOLAMIDE HYDROCHLORIDE 20 MG/ML
2 SOLUTION OPHTHALMIC DAILY
Refills: 0 | Status: COMPLETED | COMMUNITY
Start: 2022-12-16 | End: 2023-03-20

## 2023-03-20 RX ORDER — ALBUTEROL SULFATE 90 UG/1
108 (90 BASE) INHALANT RESPIRATORY (INHALATION)
Qty: 3 | Refills: 5 | Status: COMPLETED | COMMUNITY
Start: 2022-12-16 | End: 2023-03-20

## 2023-03-20 RX ORDER — SODIUM ZIRCONIUM CYCLOSILICATE 10 G/10G
10 POWDER, FOR SUSPENSION ORAL 3 TIMES DAILY
Qty: 6 | Refills: 0 | Status: COMPLETED | COMMUNITY
Start: 2022-12-21 | End: 2023-03-20

## 2023-03-20 RX ORDER — ESOMEPRAZOLE MAGNESIUM 20 MG/1
20 CAPSULE, DELAYED RELEASE ORAL
Refills: 0 | Status: COMPLETED | COMMUNITY
Start: 2018-03-08 | End: 2023-03-20

## 2023-03-20 RX ORDER — BENZONATATE 100 MG/1
100 CAPSULE ORAL
Qty: 20 | Refills: 3 | Status: COMPLETED | COMMUNITY
Start: 2023-01-30 | End: 2023-03-20

## 2023-03-20 RX ORDER — FUROSEMIDE 20 MG/1
20 TABLET ORAL
Refills: 0 | Status: COMPLETED | COMMUNITY
Start: 2018-03-08 | End: 2023-03-20

## 2023-03-20 RX ORDER — EXEMESTANE 25 MG/1
25 TABLET, FILM COATED ORAL DAILY
Refills: 0 | Status: COMPLETED | COMMUNITY
Start: 2022-12-16 | End: 2023-03-20

## 2023-03-20 RX ORDER — TIZANIDINE HYDROCHLORIDE 2 MG/1
2 CAPSULE ORAL AT BEDTIME
Refills: 0 | Status: COMPLETED | COMMUNITY
Start: 2022-12-16 | End: 2023-03-20

## 2023-03-20 RX ORDER — CARVEDILOL 3.12 MG/1
3.12 TABLET, FILM COATED ORAL
Qty: 60 | Refills: 0 | Status: COMPLETED | COMMUNITY
Start: 2022-12-16 | End: 2023-03-20

## 2023-03-20 RX ORDER — TIZANIDINE HYDROCHLORIDE 6 MG/1
CAPSULE ORAL
Refills: 0 | Status: COMPLETED | COMMUNITY
End: 2023-03-20

## 2023-03-20 RX ORDER — TORSEMIDE 10 MG/1
10 TABLET ORAL
Qty: 90 | Refills: 3 | Status: COMPLETED | COMMUNITY
Start: 2022-12-16 | End: 2023-03-20

## 2023-03-20 RX ORDER — SITAGLIPTIN 50 MG/1
50 TABLET, FILM COATED ORAL DAILY
Qty: 90 | Refills: 3 | Status: COMPLETED | COMMUNITY
Start: 2022-12-16 | End: 2023-03-20

## 2023-03-20 NOTE — COUNSELING
[Continue diet as tolerated] : continue diet as tolerated based on goals of care [Non - Smoker] : non-smoker [Use assistive device to avoid falls] : use assistive device to avoid falls [Remove clutter and unsafe carpeting to avoid falls] : remove clutter and unsafe carpeting to avoid falls [] : foot exam [Not Recommended] : Aspirin use not recommended due to overall prognosis [Improve pain control] : improve pain control [Decrease hospital use] : decrease hospital use [Minimize unnecessary interventions] : minimize unnecessary interventions [Discussed disease trajectory with patient/caregiver] : discussed disease trajectory with patient/caregiver [Advanced Directives discussed: ____] : Advanced directives discussed: [unfilled] [Full Code] : Code Status: Full Code [No Limitations] : Treatment Guidelines: No limitations [Long Term Intubation] : Intubation: Long term intubation [[facility] Date: _____] [facility]/last verification date: [unfilled] [de-identified] : No HCP and mother has no capacity to make decision. Daughter will talk with family to get consensus and elect two people as representatives

## 2023-03-20 NOTE — CHRONIC CARE ASSESSMENT
[PPS Score: ____] : Palliative Performance Scale (PPS) Score: [unfilled] [FAST Score: ____] : Functional Assessment Scale (FAST) Score: [unfilled] [Limited decision making ability] : limited decision making ability

## 2023-03-20 NOTE — HISTORY OF PRESENT ILLNESS
[Patient] : patient [Family Member] : family member [Patient is stable] : patient is stable [FreeTextEntry1] : unsteady gait  [FreeTextEntry2] : History obtained from patient \par Tea Tyler is being seen for a visit provided via [employer] real-time audio visual technology. Sheree Tyler was located at their home at the time of the visit.\par The [facility] clinician, Alycia Giang, was located remotely in [place] at the time of the visit. The patient,and the [facility] clinician, Alycia Giang, participated in the telehealth encounter. Other participants included the RN, who was in the home with the patient, performed vitals monitoring and physical exam, and facilitated the video visit with Alycia Giang. The assessment and plan was made on the basis of the information provided by the RN.\par \par \par MT OVERTON is a 89 year female with PMH of right breast cancer (s/p surgery and chemotherapy), DM, right eye blindness (s/p corneal transplant), HTN, CAD, GERD, severe spinal canal stenosis \par at L4-L5 and possible superiorly migrated L4-L5 disc herniation at mid L4 level, reducible umbilical hernia, diverticulitis, asthma, DVT/PE  (2012), PAF (on Xarelto) and Glaucoma being seen for initial visit. History provided by daughter Sola. Patient was on [facility] program and being enrolled into hospice currently. Uses Oxygen supplementation on ambulation (~2L). \par \par Assessment completed by RN \par \par Interval Events: \par - currently on [facility]\par - 1/30/2023 treated for COPD exacerbation with Augmentin and Benzonatate \par \par Subjective:\par -Appetite/weight: appetite fair. soft -puree. weight stable 156 lbs\par -Gait/falls: unsteady gait. Uses rolling walker. No falls in the past year. \par -Pain: back pain due to spinal stenosis. Always. Uses tylenol prn with some relief. \par -Sleep: sleeps during day and usually awake at night. No confusion. \par -BMs: regular. continent. \par -Urine: continent. no concerns. Uses diapers for leaks \par -Skin: intact \par -DME: walker, commode, shower chair, wheelchair, adjustable bed\par -Mood/memory: mood is ok. memory deficits with short term. \par -Communication: few words only, not conversational. Incoherent words\par -Health Maintenance: no recommended screening. Vaccine: Flu (11/2022), Pneumonia and shingles not given. \par -Hospitalizations in the past year: [facility] December 2021 for COPD exacerbation, Use O2 as needed\par \par \par Medical Issues:\par a) Hospice: recently enrolled in hospice. - [facility]\par b) Asthma: no exacerbation. Continue albuterol prn and loratidine. \par c) DVT/PE: Continue Xarelto\par d) CAD: No angina symptom. Continue current management\par e) GERD: Chronic. Continue omeprazole\par f) DMII: No BG to review. Continue Januvia \par g) Umbilical hernia: Reducible. Counselled warning signs for incarcerated hernia. \par h) Spinal stenosis and disc herniation: Chronic. Continue Tizanidine\par i) HTN: chronic. Continue Losartan\par j) Right eye blindness and glaucoma of left eye: Continue Xalatan and Dorzolamide, fall precautions. \par k) Right breast cancer: s/p chemotherapy and surgery. Continue Exemestane. FU with oncology. \par \par \par Specialists:\par PCP: Dr Basil Hopper \par Oncology: Dr Carpenter\par Cardiology: Dr Olson [phone] \par \par \par Social hx: lives with family. From  [place] speaking. Has 6 children.  \par Caregivers: CD Pas 8.5 hr x 6 days. The daughters splits the CD Pass. \par MOLST: Full Code. \par HCP: Sola Albarrano and Leanne by mother but mother had no capacity on evaluation. \par

## 2023-03-20 NOTE — PHYSICAL EXAM
[No Acute Distress] : no acute distress [Normal Sclera/Conjunctiva] : normal sclera/conjunctiva [Normal Outer Ear/Nose] : the ears and nose were normal in appearance [No Respiratory Distress] : no respiratory distress [Clear to Auscultation] : lungs were clear to auscultation bilaterally [No Edema] : there was no peripheral edema [No Rash] : no rash [No Skin Lesions] : no skin lesions [de-identified] : Vitals taken by RN and reviewed by me. Physical exam done by RN.  [de-identified] : Nasal cannula  [de-identified] : Right breast masectomy  [de-identified] : reducible umbilical hernia

## 2023-03-20 NOTE — HEALTH RISK ASSESSMENT
[Some assistance needed] : using telephone [Full assistance needed] : managing finances [Patient not ambulatory (Wheelchair)] : Patient is not ambulatory (Wheelchair) [Yes] : The patient does have visual impairment [TimeGetUpGo] : 0

## 2023-03-20 NOTE — REASON FOR VISIT
[Family Member] : family member [[employer] ] : provided by [employer]   [Pre-Visit Preparation] : pre-visit preparation was done [Follow-Up] : a follow-up visit [Interpreters_IDNumber] : [id number] [Interpreters_FullName] : Peter [FreeTextEntry2] : review chart

## 2023-06-02 ENCOUNTER — APPOINTMENT (OUTPATIENT)
Dept: HOME HEALTH SERVICES | Facility: HOME HEALTH | Age: 88
End: 2023-06-02

## 2023-06-02 VITALS
RESPIRATION RATE: 17 BRPM | OXYGEN SATURATION: 94 % | HEART RATE: 74 BPM | TEMPERATURE: 98.4 F | DIASTOLIC BLOOD PRESSURE: 80 MMHG | SYSTOLIC BLOOD PRESSURE: 130 MMHG

## 2023-08-23 NOTE — PATIENT PROFILE ADULT - NSPROEDAREADYLEARN_GEN_A_NUR
Metanephrine, free 13.0 0.0 - 88.0 pg/mL     Narrative     Test(s) 126085-Wyuxhsjeihcfejv, Pl; 846418-Pnmjfwxclrfi, Pl  was developed and its performance characteristics determined  by Ivania Sams. It has not been cleared or approved by the Food  and Drug Administration. Performed at:  [address]  [address], [place], [place]  [id number]  : Bakari Galeano MD, Phone:  [phone]   SALIVARY CORTISOL (2), TIMED   Result Value Ref Range     Salivary cortisol #1 <0.010 ug/dL     Salivary cortisol #2 <0.010 ug/dL     Narrative     Performed at:  [address] [address], [facility]  [de-identified]  : Kristin Durant MD, Phone:  [phone]      Assessment/Plan: This is a very pleasant 40-year-old female seen for discussion related to elevated serum cortisol levels. Sarah and I were not able to find the cortisol level itself. We will obtain 2 midnight salivary cortisol levels and a 24-hour urine  cortisol. We repeated prolactin in the fasting state and obtain free T4 with concomitant TSH and they were normal as were 2 midnight salivary cortisol levels. FSH/LH/estradiol suggest that Rivka Zapata is premenopausal.  Plasma metanephrines were normal. Reviewed risks surrounding assessment of random salivary cortisol levels, reverse T3. No indication for medications at this time.      #elevated serum cortisol levels on 1 occasion  -both urinary and 2 midnight salivary cortisol levels normal  -no need for further assessment     #hx of thyroid \"goiter\"  -obtain thyroid ultrasound to assess for nodules      Copy sent to:MD Anaid      RTC 3 mo vv      Giuliana Segundo MD   [facility]
none

## 2023-08-29 ENCOUNTER — APPOINTMENT (OUTPATIENT)
Dept: HOME HEALTH SERVICES | Facility: HOME HEALTH | Age: 88
End: 2023-08-29
Payer: MEDICARE

## 2023-08-29 VITALS
DIASTOLIC BLOOD PRESSURE: 70 MMHG | TEMPERATURE: 36.6 F | SYSTOLIC BLOOD PRESSURE: 110 MMHG | RESPIRATION RATE: 16 BRPM | OXYGEN SATURATION: 96 % | HEART RATE: 66 BPM

## 2023-08-29 DIAGNOSIS — E11.22 TYPE 2 DIABETES MELLITUS WITH DIABETIC CHRONIC KIDNEY DISEASE: ICD-10-CM

## 2023-08-29 DIAGNOSIS — Z99.81 DEPENDENCE ON SUPPLEMENTAL OXYGEN: ICD-10-CM

## 2023-08-29 DIAGNOSIS — N18.30 TYPE 2 DIABETES MELLITUS WITH DIABETIC CHRONIC KIDNEY DISEASE: ICD-10-CM

## 2023-08-29 DIAGNOSIS — L29.9 PRURITUS, UNSPECIFIED: ICD-10-CM

## 2023-08-29 PROCEDURE — 99342 HOME/RES VST NEW LOW MDM 30: CPT

## 2023-08-29 RX ORDER — DEXTROMETHORPHAN HYDROBROMIDE, GUAIFENESIN 20; 200 MG/20ML; MG/20ML
20-200 SOLUTION ORAL
Refills: 0 | Status: COMPLETED | COMMUNITY
Start: 2023-03-20 | End: 2023-08-29

## 2023-08-29 RX ORDER — LEVOTHYROXINE SODIUM 0.2 MG/1
200 TABLET ORAL
Refills: 0 | Status: COMPLETED | COMMUNITY
Start: 2018-03-08 | End: 2023-08-29

## 2023-08-29 RX ORDER — LORAZEPAM 0.5 MG/1
0.5 TABLET ORAL DAILY
Qty: 30 | Refills: 0 | Status: COMPLETED | COMMUNITY
Start: 2023-03-20 | End: 2023-08-29

## 2023-08-29 RX ORDER — PREDNISONE 50 MG/1
50 TABLET ORAL
Refills: 0 | Status: COMPLETED | COMMUNITY
Start: 2022-12-22 | End: 2023-08-29

## 2023-08-29 RX ORDER — OMEPRAZOLE 20 MG/1
TABLET, DELAYED RELEASE ORAL
Refills: 0 | Status: COMPLETED | COMMUNITY
End: 2023-08-29

## 2023-08-29 RX ORDER — EXEMESTANE 25 MG/1
25 TABLET, FILM COATED ORAL DAILY
Refills: 0 | Status: COMPLETED | COMMUNITY
Start: 2023-03-20 | End: 2023-08-29

## 2023-08-29 NOTE — COUNSELING
[Continue diet as tolerated] : continue diet as tolerated based on goals of care [Non - Smoker] : non-smoker [Use assistive device to avoid falls] : use assistive device to avoid falls [Remove clutter and unsafe carpeting to avoid falls] : remove clutter and unsafe carpeting to avoid falls [Not Recommended] : Aspirin use not recommended due to overall prognosis [Improve pain control] : improve pain control [Decrease hospital use] : decrease hospital use [Minimize unnecessary interventions] : minimize unnecessary interventions [Discussed disease trajectory with patient/caregiver] : discussed disease trajectory with patient/caregiver [Advanced Directives discussed: ____] : Advanced directives discussed: [unfilled] [Full Code] : Code Status: Full Code [No Limitations] : Treatment Guidelines: No limitations [Long Term Intubation] : Intubation: Long term intubation [[facility] Date: _____] [facility] Com[facility]/last verification date: [unfilled] [] : mammogram [de-identified] : No HCP and mother has no capacity to make decision. Daughter will talk with family to get consensus and elect two people as representatives

## 2023-08-29 NOTE — REASON FOR VISIT
[Follow-Up] : a follow-up visit [Intercurrent Specialty/Sub-specialty Visits] : the patient has intercurrent specialty/sub-specialty visits [Formal Caregiver] : formal caregiver [Pre-Visit Preparation] : pre-visit preparation was done [FreeTextEntry1] :  right breast cancer (s/p surgery and chemotherapy), DM, right eye blindness (s/p corneal transplant), HTN,  [FreeTextEntry2] : medical record [FreeTextEntry3] : PCP Dr Basil Tejeda

## 2023-08-29 NOTE — PHYSICAL EXAM
[No Edema] : there was no peripheral edema [No Skin Lesions] : no skin lesions [de-identified] : Vitals taken by RN and reviewed by me. Physical exam done by RN.  [de-identified] : reducible umbilical hernia [No Acute Distress] : no acute distress [Normal Sclera/Conjunctiva] : normal sclera/conjunctiva [Normal Outer Ear/Nose] : the ears and nose were normal in appearance [No JVD] : no jugular venous distention [No Respiratory Distress] : no respiratory distress [Clear to Auscultation] : lungs were clear to auscultation bilaterally [Normal Rate] : heart rate was normal  [Regular Rhythm] : with a regular rhythm [No Joint Swelling] : no joint swelling seen [No Rash] : no rash [de-identified] : oxygen use as needed [de-identified] : right breast mastectomy [de-identified] : complain of back pain [de-identified] : use walker with assistance [de-identified] : a+o x2

## 2023-08-29 NOTE — HISTORY OF PRESENT ILLNESS
[Patient is stable] : patient is stable [Patient] : patient [Family Member] : family member [[facility] Co-Management Patient] : [unfilled] is a [facility] co-management patient [A] : A [Education provided] : education provided [Patient is stable - had PCP appoinment] : patient is stable - had PCP appointment [LastPCPVisitDate] : 2 weeks ago [FreeTextEntry4] : [facility] [LastSpecialistVisitDate] : 1 year ago [FreeTextEntry1] : unsteady gait  [FreeTextEntry2] : PMH of right breast cancer (s/p surgery and chemotherapy), DM, right eye blindness (s/p corneal transplant), HTN, CAD, GERD, severe spinal canal stenosis at L4-L5 and possible superiorly migrated L4-L5 disc herniation at mid L4 level, reducible umbilical hernia, diverticulitis, asthma, DVT/PE  (2012), PAF (on Xarelto) and Glaucoma being seen for initial visit. History provided by daughter Sola and grandson Juan A. Patient was on [facility] got discharge as off last week. Patients have Oxygen supplementation in the house (~2L).   Interval Events:  - As per patient daughter [facility] discharge patient from hospice program. -Patients complain of ear itchiness for a few days now. start on neomycin ear drop. Have supplemental oxygen, Nebulizer machine.  Subjective: -Appetite/weight: appetite fair.  weight stable  -Gait/falls: unsteady gait. Uses rolling walker with assistance. No falls in the past year.  -Pain: back pain due to spinal stenosis. Always. Uses Tylenol prn with some relief and muscle relaxer (Tizanidine) -Sleep: sleeps well.  -BMs: regular daily BM, continent.  -Urine: continent. no concerns. Uses diapers for leaks.  -Skin: intact  -DME: walker, commode, shower chair, wheelchair, adjustable bed -Mood/memory: mood is ok. memory fair. -Communication: few words only, not conversational.  -Health Maintenance: no recommended screening. Vaccine: Flu (11/2022), Pneumonia and shingles not given.  -Hospitalizations in the past year: None   Medical Issues: a) Hospice: recently discharge in hospice. - [facility] b) Asthma: no exacerbation. on albuterol prn and loratadine.  c) DVT/PE: on Xarelto d) CAD: No angina symptom. Continue current management, on nitro as needed. e) GERD: Chronic. on omeprazole. f) DMII: No BG to review. on Januvia  g) Umbilical hernia: Reducible. Counselled warning signs for incarcerated hernia.  h) Spinal stenosis and disc herniation: Chronic. Continue Tizanidine, Tylenol i) HTN: chronic. 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  k) Right breast cancer: s/p chemotherapy and surgery. on Letrozole FU with oncology.    Specialists: PCP: Dr Basil Hopper - last visit 2 weeks ago Oncology: Dr Carpenter last visit 1 year ago. Cardiology: Dr Olson [phone] - last visit 1 year ago   Social hx: lives with family. From  [place]. Has 6 children.   Caregivers: CD Pas 8.5 hr x 6 days. The daughters split the CD Pass.  MOLST: Full Code.  HCP: Sola New and Leanne by mother but mother had no capacity on evaluation.

## 2023-08-29 NOTE — HEALTH RISK ASSESSMENT
[Some assistance needed] : using telephone [No falls in past year] : Patient reported no falls in the past year [Yes] : The patient does have visual impairment [Full assistance needed] : transferring [TimeGetUpGo] : 60

## 2023-10-09 ENCOUNTER — APPOINTMENT (OUTPATIENT)
Dept: HOME HEALTH SERVICES | Facility: HOME HEALTH | Age: 88
End: 2023-10-09

## 2023-10-09 VITALS
RESPIRATION RATE: 18 BRPM | OXYGEN SATURATION: 97 % | SYSTOLIC BLOOD PRESSURE: 139 MMHG | TEMPERATURE: 98.1 F | DIASTOLIC BLOOD PRESSURE: 78 MMHG | HEART RATE: 88 BPM

## 2023-10-10 ENCOUNTER — LABORATORY RESULT (OUTPATIENT)
Age: 88
End: 2023-10-10

## 2023-10-13 ENCOUNTER — NON-APPOINTMENT (OUTPATIENT)
Age: 88
End: 2023-10-13

## 2023-10-13 LAB
25(OH)D3 SERPL-MCNC: 232 NG/ML
A ALTERNATA IGE QN: <0.1 KUA/L
A FUMIGATUS IGE QN: <0.1 KUA/L
ALBUMIN SERPL ELPH-MCNC: 3.8 G/DL
ALP BLD-CCNC: 86 U/L
ALT SERPL-CCNC: 21 U/L
ANION GAP SERPL CALC-SCNC: 9 MMOL/L
AST SERPL-CCNC: 18 U/L
BARLEY IGE QN: <0.1 KUA/L
BERMUDA GRASS IGE QN: <0.1 KUA/L
BILIRUB SERPL-MCNC: 0.2 MG/DL
BOXELDER IGE QN: <0.1 KUA/L
BUN SERPL-MCNC: 16 MG/DL
C HERBARUM IGE QN: <0.1 KUA/L
CALCIUM SERPL-MCNC: 9.7 MG/DL
CALIF WALNUT IGE QN: <0.1 KUA/L
CAT DANDER IGE QN: <0.1 KUA/L
CHERRY IGE QN: <0.1 KUA/L
CHLORIDE SERPL-SCNC: 98 MMOL/L
CHOLEST SERPL-MCNC: 122 MG/DL
CMN PIGWEED IGE QN: <0.1 KUA/L
CO2 SERPL-SCNC: 26 MMOL/L
COMMON RAGWEED IGE QN: <0.1 KUA/L
COTTONWOOD IGE QN: <0.1 KUA/L
COW MILK IGE QN: <0.1 KUA/L
CRAB IGE QN: <0.1 KUA/L
CREAT SERPL-MCNC: 0.88 MG/DL
CRP SERPL-MCNC: <3 MG/L
D FARINAE IGE QN: <0.1 KUA/L
D PTERONYSS IGE QN: <0.1 KUA/L
DEPRECATED A ALTERNATA IGE RAST QL: 0
DEPRECATED A FUMIGATUS IGE RAST QL: 0
DEPRECATED BARLEY IGE RAST QL: 0
DEPRECATED BERMUDA GRASS IGE RAST QL: 0
DEPRECATED BOXELDER IGE RAST QL: 0
DEPRECATED C HERBARUM IGE RAST QL: 0
DEPRECATED CAT DANDER IGE RAST QL: 0
DEPRECATED CHERRY IGE RAST QL: 0
DEPRECATED COMMON PIGWEED IGE RAST QL: 0
DEPRECATED COMMON RAGWEED IGE RAST QL: 0
DEPRECATED COTTONWOOD IGE RAST QL: 0
DEPRECATED COW MILK IGE RAST QL: 0
DEPRECATED CRAB IGE RAST QL: 0
DEPRECATED D FARINAE IGE RAST QL: 0
DEPRECATED D PTERONYSS IGE RAST QL: 0
DEPRECATED DOG DANDER IGE RAST QL: 0
DEPRECATED EGG WHITE IGE RAST QL: 0
DEPRECATED GOOSEFOOT IGE RAST QL: 0
DEPRECATED LONDON PLANE IGE RAST QL: 0
DEPRECATED MOUSE URINE PROT IGE RAST QL: 0
DEPRECATED MUGWORT IGE RAST QL: 0
DEPRECATED OAT IGE RAST QL: 0
DEPRECATED P NOTATUM IGE RAST QL: 0
DEPRECATED PEANUT IGE RAST QL: 0
DEPRECATED RED CEDAR IGE RAST QL: 0
DEPRECATED ROACH IGE RAST QL: 0
DEPRECATED RYE IGE RAST QL: 0
DEPRECATED SHEEP SORREL IGE RAST QL: 0
DEPRECATED SILVER BIRCH IGE RAST QL: 0
DEPRECATED SOYBEAN IGE RAST QL: 0
DEPRECATED TIMOTHY IGE RAST QL: 0
DEPRECATED WHEAT IGE RAST QL: 0
DEPRECATED WHITE ASH IGE RAST QL: 0
DEPRECATED WHITE OAK IGE RAST QL: 0
DOG DANDER IGE QN: <0.1 KUA/L
EGFR: 60 ML/MIN/1.73M2
EGG WHITE IGE QN: <0.1 KUA/L
ERYTHROCYTE [SEDIMENTATION RATE] IN BLOOD BY WESTERGREN METHOD: < 2 MM/HR
ESTIMATED AVERAGE GLUCOSE: 126 MG/DL
GGT SERPL-CCNC: 46 U/L
GLUCOSE SERPL-MCNC: 80 MG/DL
GOOSEFOOT IGE QN: <0.1 KUA/L
HAV IGM SER QL: NONREACTIVE
HBA1C MFR BLD HPLC: 6 %
HBV CORE IGG+IGM SER QL: REACTIVE
HBV SURFACE AB SER QL: REACTIVE
HBV SURFACE AG SER QL: NONREACTIVE
HCT VFR BLD CALC: 36.4 %
HDLC SERPL-MCNC: 63 MG/DL
HGB BLD-MCNC: 11.2 G/DL
LDLC SERPL CALC-MCNC: 46 MG/DL
LONDON PLANE IGE QN: <0.1 KUA/L
MCHC RBC-ENTMCNC: 29.8 PG
MCHC RBC-ENTMCNC: 30.8 GM/DL
MCV RBC AUTO: 96.8 FL
MOUSE URINE PROT IGE QN: <0.1 KUA/L
MUGWORT IGE QN: <0.1 KUA/L
MULBERRY (T70) CLASS: 0
MULBERRY (T70) CONC: <0.1 KUA/L
NONHDLC SERPL-MCNC: 59 MG/DL
OAT IGE QN: <0.1 KUA/L
P NOTATUM IGE QN: <0.1 KUA/L
PEANUT IGE QN: <0.1 KUA/L
PLATELET # BLD AUTO: 222 K/UL
POTASSIUM SERPL-SCNC: 5.4 MMOL/L
PROT SERPL-MCNC: 5.7 G/DL
RBC # BLD: 3.76 M/UL
RBC # FLD: 14 %
RED CEDAR IGE QN: <0.1 KUA/L
ROACH IGE QN: <0.1 KUA/L
RYE IGE QN: <0.1 KUA/L
SHEEP SORREL IGE QN: <0.1 KUA/L
SILVER BIRCH IGE QN: <0.1 KUA/L
SODIUM SERPL-SCNC: 134 MMOL/L
SOYBEAN IGE QN: <0.1 KUA/L
THYROPEROXIDASE AB SERPL IA-ACNC: 15 IU/ML
TIMOTHY IGE QN: <0.1 KUA/L
TOTAL IGE SMQN RAST: 10 KU/L
TOTAL IGE SMQN RAST: 10 KU/L
TREE ALLERG MIX1 IGE QL: 0
TRIGL SERPL-MCNC: 62 MG/DL
TSH SERPL-ACNC: 2.48 UIU/ML
URATE SERPL-MCNC: 3.8 MG/DL
WBC # FLD AUTO: 6.75 K/UL
WHEAT IGE QN: <0.1 KUA/L
WHITE ASH IGE QN: <0.1 KUA/L
WHITE ELM IGE QN: 0
WHITE ELM IGE QN: <0.1 KUA/L
WHITE OAK IGE QN: <0.1 KUA/L

## 2023-12-08 ENCOUNTER — TRANSCRIPTION ENCOUNTER (OUTPATIENT)
Age: 88
End: 2023-12-08

## 2023-12-09 ENCOUNTER — NON-APPOINTMENT (OUTPATIENT)
Age: 88
End: 2023-12-09

## 2023-12-09 ENCOUNTER — INPATIENT (INPATIENT)
Facility: HOSPITAL | Age: 88
LOS: 4 days | Discharge: HOME | DRG: 640 | End: 2023-12-14
Attending: INTERNAL MEDICINE | Admitting: INTERNAL MEDICINE
Payer: COMMERCIAL

## 2023-12-09 VITALS
HEIGHT: 61 IN | SYSTOLIC BLOOD PRESSURE: 109 MMHG | OXYGEN SATURATION: 93 % | RESPIRATION RATE: 16 BRPM | TEMPERATURE: 97 F | HEART RATE: 67 BPM | DIASTOLIC BLOOD PRESSURE: 64 MMHG | WEIGHT: 165.35 LBS

## 2023-12-09 DIAGNOSIS — R41.82 ALTERED MENTAL STATUS, UNSPECIFIED: ICD-10-CM

## 2023-12-09 DIAGNOSIS — C50.919 MALIGNANT NEOPLASM OF UNSPECIFIED SITE OF UNSPECIFIED FEMALE BREAST: ICD-10-CM

## 2023-12-09 DIAGNOSIS — E87.5 HYPERKALEMIA: ICD-10-CM

## 2023-12-09 DIAGNOSIS — J96.21 ACUTE AND CHRONIC RESPIRATORY FAILURE WITH HYPOXIA: ICD-10-CM

## 2023-12-09 DIAGNOSIS — E87.1 HYPO-OSMOLALITY AND HYPONATREMIA: ICD-10-CM

## 2023-12-09 DIAGNOSIS — I10 ESSENTIAL (PRIMARY) HYPERTENSION: ICD-10-CM

## 2023-12-09 DIAGNOSIS — J45.909 UNSPECIFIED ASTHMA, UNCOMPLICATED: ICD-10-CM

## 2023-12-09 DIAGNOSIS — I26.99 OTHER PULMONARY EMBOLISM WITHOUT ACUTE COR PULMONALE: ICD-10-CM

## 2023-12-09 DIAGNOSIS — J44.9 CHRONIC OBSTRUCTIVE PULMONARY DISEASE, UNSPECIFIED: ICD-10-CM

## 2023-12-09 DIAGNOSIS — Z29.9 ENCOUNTER FOR PROPHYLACTIC MEASURES, UNSPECIFIED: ICD-10-CM

## 2023-12-09 DIAGNOSIS — E11.9 TYPE 2 DIABETES MELLITUS WITHOUT COMPLICATIONS: ICD-10-CM

## 2023-12-09 DIAGNOSIS — E78.5 HYPERLIPIDEMIA, UNSPECIFIED: ICD-10-CM

## 2023-12-09 DIAGNOSIS — E87.8 OTHER DISORDERS OF ELECTROLYTE AND FLUID BALANCE, NOT ELSEWHERE CLASSIFIED: ICD-10-CM

## 2023-12-09 DIAGNOSIS — Z90.11 ACQUIRED ABSENCE OF RIGHT BREAST AND NIPPLE: Chronic | ICD-10-CM

## 2023-12-09 DIAGNOSIS — M54.9 DORSALGIA, UNSPECIFIED: ICD-10-CM

## 2023-12-09 LAB
ALBUMIN SERPL ELPH-MCNC: 3.2 G/DL — LOW (ref 3.5–5)
ALBUMIN SERPL ELPH-MCNC: 3.2 G/DL — LOW (ref 3.5–5)
ALP SERPL-CCNC: 80 U/L — SIGNIFICANT CHANGE UP (ref 40–120)
ALP SERPL-CCNC: 80 U/L — SIGNIFICANT CHANGE UP (ref 40–120)
ALT FLD-CCNC: 37 U/L DA — SIGNIFICANT CHANGE UP (ref 10–60)
ALT FLD-CCNC: 37 U/L DA — SIGNIFICANT CHANGE UP (ref 10–60)
ANION GAP SERPL CALC-SCNC: 2 MMOL/L — LOW (ref 5–17)
ANION GAP SERPL CALC-SCNC: 2 MMOL/L — LOW (ref 5–17)
ANION GAP SERPL CALC-SCNC: 4 MMOL/L — LOW (ref 5–17)
APPEARANCE UR: CLEAR — SIGNIFICANT CHANGE UP
APPEARANCE UR: CLEAR — SIGNIFICANT CHANGE UP
APTT BLD: 44.2 SEC — HIGH (ref 24.5–35.6)
APTT BLD: 44.2 SEC — HIGH (ref 24.5–35.6)
AST SERPL-CCNC: 26 U/L — SIGNIFICANT CHANGE UP (ref 10–40)
AST SERPL-CCNC: 26 U/L — SIGNIFICANT CHANGE UP (ref 10–40)
BASOPHILS # BLD AUTO: 0.01 K/UL — SIGNIFICANT CHANGE UP (ref 0–0.2)
BASOPHILS # BLD AUTO: 0.01 K/UL — SIGNIFICANT CHANGE UP (ref 0–0.2)
BASOPHILS NFR BLD AUTO: 0.1 % — SIGNIFICANT CHANGE UP (ref 0–2)
BASOPHILS NFR BLD AUTO: 0.1 % — SIGNIFICANT CHANGE UP (ref 0–2)
BILIRUB SERPL-MCNC: 0.2 MG/DL — SIGNIFICANT CHANGE UP (ref 0.2–1.2)
BILIRUB SERPL-MCNC: 0.2 MG/DL — SIGNIFICANT CHANGE UP (ref 0.2–1.2)
BILIRUB UR-MCNC: NEGATIVE — SIGNIFICANT CHANGE UP
BILIRUB UR-MCNC: NEGATIVE — SIGNIFICANT CHANGE UP
BUN SERPL-MCNC: 14 MG/DL — SIGNIFICANT CHANGE UP (ref 7–18)
BUN SERPL-MCNC: 17 MG/DL — SIGNIFICANT CHANGE UP (ref 7–18)
BUN SERPL-MCNC: 17 MG/DL — SIGNIFICANT CHANGE UP (ref 7–18)
BUN SERPL-MCNC: 21 MG/DL — HIGH (ref 7–18)
BUN SERPL-MCNC: 21 MG/DL — HIGH (ref 7–18)
CALCIUM SERPL-MCNC: 8.6 MG/DL — SIGNIFICANT CHANGE UP (ref 8.4–10.5)
CALCIUM SERPL-MCNC: 8.6 MG/DL — SIGNIFICANT CHANGE UP (ref 8.4–10.5)
CALCIUM SERPL-MCNC: 8.9 MG/DL — SIGNIFICANT CHANGE UP (ref 8.4–10.5)
CALCIUM SERPL-MCNC: 8.9 MG/DL — SIGNIFICANT CHANGE UP (ref 8.4–10.5)
CALCIUM SERPL-MCNC: 9.1 MG/DL — SIGNIFICANT CHANGE UP (ref 8.4–10.5)
CALCIUM SERPL-MCNC: 9.1 MG/DL — SIGNIFICANT CHANGE UP (ref 8.4–10.5)
CALCIUM SERPL-MCNC: 9.7 MG/DL — SIGNIFICANT CHANGE UP (ref 8.4–10.5)
CALCIUM SERPL-MCNC: 9.7 MG/DL — SIGNIFICANT CHANGE UP (ref 8.4–10.5)
CHLORIDE SERPL-SCNC: 87 MMOL/L — LOW (ref 96–108)
CHLORIDE SERPL-SCNC: 90 MMOL/L — LOW (ref 96–108)
CHLORIDE SERPL-SCNC: 90 MMOL/L — LOW (ref 96–108)
CHLORIDE SERPL-SCNC: 92 MMOL/L — LOW (ref 96–108)
CHLORIDE SERPL-SCNC: 92 MMOL/L — LOW (ref 96–108)
CO2 SERPL-SCNC: 25 MMOL/L — SIGNIFICANT CHANGE UP (ref 22–31)
CO2 SERPL-SCNC: 25 MMOL/L — SIGNIFICANT CHANGE UP (ref 22–31)
CO2 SERPL-SCNC: 31 MMOL/L — SIGNIFICANT CHANGE UP (ref 22–31)
CO2 SERPL-SCNC: 32 MMOL/L — HIGH (ref 22–31)
CO2 SERPL-SCNC: 32 MMOL/L — HIGH (ref 22–31)
COLOR SPEC: YELLOW — SIGNIFICANT CHANGE UP
COLOR SPEC: YELLOW — SIGNIFICANT CHANGE UP
CREAT ?TM UR-MCNC: 35 MG/DL — SIGNIFICANT CHANGE UP
CREAT ?TM UR-MCNC: 35 MG/DL — SIGNIFICANT CHANGE UP
CREAT SERPL-MCNC: 0.72 MG/DL — SIGNIFICANT CHANGE UP (ref 0.5–1.3)
CREAT SERPL-MCNC: 0.72 MG/DL — SIGNIFICANT CHANGE UP (ref 0.5–1.3)
CREAT SERPL-MCNC: 0.85 MG/DL — SIGNIFICANT CHANGE UP (ref 0.5–1.3)
CREAT SERPL-MCNC: 0.85 MG/DL — SIGNIFICANT CHANGE UP (ref 0.5–1.3)
CREAT SERPL-MCNC: 0.87 MG/DL — SIGNIFICANT CHANGE UP (ref 0.5–1.3)
CREAT SERPL-MCNC: 0.87 MG/DL — SIGNIFICANT CHANGE UP (ref 0.5–1.3)
CREAT SERPL-MCNC: 0.96 MG/DL — SIGNIFICANT CHANGE UP (ref 0.5–1.3)
CREAT SERPL-MCNC: 0.96 MG/DL — SIGNIFICANT CHANGE UP (ref 0.5–1.3)
DIFF PNL FLD: NEGATIVE — SIGNIFICANT CHANGE UP
DIFF PNL FLD: NEGATIVE — SIGNIFICANT CHANGE UP
EGFR: 54 ML/MIN/1.73M2 — LOW
EGFR: 54 ML/MIN/1.73M2 — LOW
EGFR: 61 ML/MIN/1.73M2 — SIGNIFICANT CHANGE UP
EGFR: 61 ML/MIN/1.73M2 — SIGNIFICANT CHANGE UP
EGFR: 62 ML/MIN/1.73M2 — SIGNIFICANT CHANGE UP
EGFR: 62 ML/MIN/1.73M2 — SIGNIFICANT CHANGE UP
EGFR: 76 ML/MIN/1.73M2 — SIGNIFICANT CHANGE UP
EGFR: 76 ML/MIN/1.73M2 — SIGNIFICANT CHANGE UP
EOSINOPHIL # BLD AUTO: 0.01 K/UL — SIGNIFICANT CHANGE UP (ref 0–0.5)
EOSINOPHIL # BLD AUTO: 0.01 K/UL — SIGNIFICANT CHANGE UP (ref 0–0.5)
EOSINOPHIL NFR BLD AUTO: 0.1 % — SIGNIFICANT CHANGE UP (ref 0–6)
EOSINOPHIL NFR BLD AUTO: 0.1 % — SIGNIFICANT CHANGE UP (ref 0–6)
FLUAV AG NPH QL: SIGNIFICANT CHANGE UP
FLUAV AG NPH QL: SIGNIFICANT CHANGE UP
FLUBV AG NPH QL: SIGNIFICANT CHANGE UP
FLUBV AG NPH QL: SIGNIFICANT CHANGE UP
GLUCOSE BLDC GLUCOMTR-MCNC: 101 MG/DL — HIGH (ref 70–99)
GLUCOSE BLDC GLUCOMTR-MCNC: 101 MG/DL — HIGH (ref 70–99)
GLUCOSE BLDC GLUCOMTR-MCNC: 115 MG/DL — HIGH (ref 70–99)
GLUCOSE BLDC GLUCOMTR-MCNC: 115 MG/DL — HIGH (ref 70–99)
GLUCOSE BLDC GLUCOMTR-MCNC: 91 MG/DL — SIGNIFICANT CHANGE UP (ref 70–99)
GLUCOSE BLDC GLUCOMTR-MCNC: 91 MG/DL — SIGNIFICANT CHANGE UP (ref 70–99)
GLUCOSE SERPL-MCNC: 112 MG/DL — HIGH (ref 70–99)
GLUCOSE SERPL-MCNC: 112 MG/DL — HIGH (ref 70–99)
GLUCOSE SERPL-MCNC: 119 MG/DL — HIGH (ref 70–99)
GLUCOSE SERPL-MCNC: 119 MG/DL — HIGH (ref 70–99)
GLUCOSE SERPL-MCNC: 124 MG/DL — HIGH (ref 70–99)
GLUCOSE SERPL-MCNC: 124 MG/DL — HIGH (ref 70–99)
GLUCOSE SERPL-MCNC: 96 MG/DL — SIGNIFICANT CHANGE UP (ref 70–99)
GLUCOSE SERPL-MCNC: 96 MG/DL — SIGNIFICANT CHANGE UP (ref 70–99)
GLUCOSE UR QL: NEGATIVE MG/DL — SIGNIFICANT CHANGE UP
GLUCOSE UR QL: NEGATIVE MG/DL — SIGNIFICANT CHANGE UP
HCT VFR BLD CALC: 33.6 % — LOW (ref 34.5–45)
HCT VFR BLD CALC: 33.6 % — LOW (ref 34.5–45)
HGB BLD-MCNC: 11.2 G/DL — LOW (ref 11.5–15.5)
HGB BLD-MCNC: 11.2 G/DL — LOW (ref 11.5–15.5)
IMM GRANULOCYTES NFR BLD AUTO: 0.5 % — SIGNIFICANT CHANGE UP (ref 0–0.9)
IMM GRANULOCYTES NFR BLD AUTO: 0.5 % — SIGNIFICANT CHANGE UP (ref 0–0.9)
INR BLD: 1.69 RATIO — HIGH (ref 0.85–1.18)
INR BLD: 1.69 RATIO — HIGH (ref 0.85–1.18)
KETONES UR-MCNC: NEGATIVE MG/DL — SIGNIFICANT CHANGE UP
KETONES UR-MCNC: NEGATIVE MG/DL — SIGNIFICANT CHANGE UP
LACTATE SERPL-SCNC: 1.8 MMOL/L — SIGNIFICANT CHANGE UP (ref 0.7–2)
LACTATE SERPL-SCNC: 1.8 MMOL/L — SIGNIFICANT CHANGE UP (ref 0.7–2)
LEUKOCYTE ESTERASE UR-ACNC: NEGATIVE — SIGNIFICANT CHANGE UP
LEUKOCYTE ESTERASE UR-ACNC: NEGATIVE — SIGNIFICANT CHANGE UP
LYMPHOCYTES # BLD AUTO: 0.96 K/UL — LOW (ref 1–3.3)
LYMPHOCYTES # BLD AUTO: 0.96 K/UL — LOW (ref 1–3.3)
LYMPHOCYTES # BLD AUTO: 12.9 % — LOW (ref 13–44)
LYMPHOCYTES # BLD AUTO: 12.9 % — LOW (ref 13–44)
MAGNESIUM SERPL-MCNC: 1.7 MG/DL — SIGNIFICANT CHANGE UP (ref 1.6–2.6)
MAGNESIUM SERPL-MCNC: 1.7 MG/DL — SIGNIFICANT CHANGE UP (ref 1.6–2.6)
MCHC RBC-ENTMCNC: 30.4 PG — SIGNIFICANT CHANGE UP (ref 27–34)
MCHC RBC-ENTMCNC: 30.4 PG — SIGNIFICANT CHANGE UP (ref 27–34)
MCHC RBC-ENTMCNC: 33.3 GM/DL — SIGNIFICANT CHANGE UP (ref 32–36)
MCHC RBC-ENTMCNC: 33.3 GM/DL — SIGNIFICANT CHANGE UP (ref 32–36)
MCV RBC AUTO: 91.3 FL — SIGNIFICANT CHANGE UP (ref 80–100)
MCV RBC AUTO: 91.3 FL — SIGNIFICANT CHANGE UP (ref 80–100)
MONOCYTES # BLD AUTO: 0.37 K/UL — SIGNIFICANT CHANGE UP (ref 0–0.9)
MONOCYTES # BLD AUTO: 0.37 K/UL — SIGNIFICANT CHANGE UP (ref 0–0.9)
MONOCYTES NFR BLD AUTO: 5 % — SIGNIFICANT CHANGE UP (ref 2–14)
MONOCYTES NFR BLD AUTO: 5 % — SIGNIFICANT CHANGE UP (ref 2–14)
NEUTROPHILS # BLD AUTO: 6.08 K/UL — SIGNIFICANT CHANGE UP (ref 1.8–7.4)
NEUTROPHILS # BLD AUTO: 6.08 K/UL — SIGNIFICANT CHANGE UP (ref 1.8–7.4)
NEUTROPHILS NFR BLD AUTO: 81.4 % — HIGH (ref 43–77)
NEUTROPHILS NFR BLD AUTO: 81.4 % — HIGH (ref 43–77)
NITRITE UR-MCNC: NEGATIVE — SIGNIFICANT CHANGE UP
NITRITE UR-MCNC: NEGATIVE — SIGNIFICANT CHANGE UP
NRBC # BLD: 0 /100 WBCS — SIGNIFICANT CHANGE UP (ref 0–0)
NRBC # BLD: 0 /100 WBCS — SIGNIFICANT CHANGE UP (ref 0–0)
NT-PROBNP SERPL-SCNC: 597 PG/ML — HIGH (ref 0–450)
NT-PROBNP SERPL-SCNC: 597 PG/ML — HIGH (ref 0–450)
NT-PROBNP SERPL-SCNC: 686 PG/ML — HIGH (ref 0–450)
NT-PROBNP SERPL-SCNC: 686 PG/ML — HIGH (ref 0–450)
OSMOLALITY SERPL: 264 MOSMOL/KG — LOW (ref 280–301)
OSMOLALITY SERPL: 264 MOSMOL/KG — LOW (ref 280–301)
OSMOLALITY UR: 263 MOS/KG — SIGNIFICANT CHANGE UP (ref 50–1200)
OSMOLALITY UR: 263 MOS/KG — SIGNIFICANT CHANGE UP (ref 50–1200)
PH UR: 6.5 — SIGNIFICANT CHANGE UP (ref 5–8)
PH UR: 6.5 — SIGNIFICANT CHANGE UP (ref 5–8)
PHOSPHATE SERPL-MCNC: 3.8 MG/DL — SIGNIFICANT CHANGE UP (ref 2.5–4.5)
PHOSPHATE SERPL-MCNC: 3.8 MG/DL — SIGNIFICANT CHANGE UP (ref 2.5–4.5)
PLATELET # BLD AUTO: 298 K/UL — SIGNIFICANT CHANGE UP (ref 150–400)
PLATELET # BLD AUTO: 298 K/UL — SIGNIFICANT CHANGE UP (ref 150–400)
POTASSIUM SERPL-MCNC: 4.6 MMOL/L — SIGNIFICANT CHANGE UP (ref 3.5–5.3)
POTASSIUM SERPL-MCNC: 4.6 MMOL/L — SIGNIFICANT CHANGE UP (ref 3.5–5.3)
POTASSIUM SERPL-MCNC: 4.7 MMOL/L — SIGNIFICANT CHANGE UP (ref 3.5–5.3)
POTASSIUM SERPL-MCNC: 4.7 MMOL/L — SIGNIFICANT CHANGE UP (ref 3.5–5.3)
POTASSIUM SERPL-MCNC: 5.1 MMOL/L — SIGNIFICANT CHANGE UP (ref 3.5–5.3)
POTASSIUM SERPL-MCNC: 5.1 MMOL/L — SIGNIFICANT CHANGE UP (ref 3.5–5.3)
POTASSIUM SERPL-MCNC: 6.7 MMOL/L — CRITICAL HIGH (ref 3.5–5.3)
POTASSIUM SERPL-MCNC: 6.7 MMOL/L — CRITICAL HIGH (ref 3.5–5.3)
POTASSIUM SERPL-SCNC: 4.6 MMOL/L — SIGNIFICANT CHANGE UP (ref 3.5–5.3)
POTASSIUM SERPL-SCNC: 4.6 MMOL/L — SIGNIFICANT CHANGE UP (ref 3.5–5.3)
POTASSIUM SERPL-SCNC: 4.7 MMOL/L — SIGNIFICANT CHANGE UP (ref 3.5–5.3)
POTASSIUM SERPL-SCNC: 4.7 MMOL/L — SIGNIFICANT CHANGE UP (ref 3.5–5.3)
POTASSIUM SERPL-SCNC: 5.1 MMOL/L — SIGNIFICANT CHANGE UP (ref 3.5–5.3)
POTASSIUM SERPL-SCNC: 5.1 MMOL/L — SIGNIFICANT CHANGE UP (ref 3.5–5.3)
POTASSIUM SERPL-SCNC: 6.7 MMOL/L — CRITICAL HIGH (ref 3.5–5.3)
POTASSIUM SERPL-SCNC: 6.7 MMOL/L — CRITICAL HIGH (ref 3.5–5.3)
POTASSIUM UR-SCNC: 9 MMOL/L — SIGNIFICANT CHANGE UP
POTASSIUM UR-SCNC: 9 MMOL/L — SIGNIFICANT CHANGE UP
PROT SERPL-MCNC: 6.2 G/DL — SIGNIFICANT CHANGE UP (ref 6–8.3)
PROT SERPL-MCNC: 6.2 G/DL — SIGNIFICANT CHANGE UP (ref 6–8.3)
PROT UR-MCNC: NEGATIVE MG/DL — SIGNIFICANT CHANGE UP
PROT UR-MCNC: NEGATIVE MG/DL — SIGNIFICANT CHANGE UP
PROTHROM AB SERPL-ACNC: 19 SEC — HIGH (ref 9.5–13)
PROTHROM AB SERPL-ACNC: 19 SEC — HIGH (ref 9.5–13)
RBC # BLD: 3.68 M/UL — LOW (ref 3.8–5.2)
RBC # BLD: 3.68 M/UL — LOW (ref 3.8–5.2)
RBC # FLD: 13 % — SIGNIFICANT CHANGE UP (ref 10.3–14.5)
RBC # FLD: 13 % — SIGNIFICANT CHANGE UP (ref 10.3–14.5)
SARS-COV-2 RNA SPEC QL NAA+PROBE: SIGNIFICANT CHANGE UP
SARS-COV-2 RNA SPEC QL NAA+PROBE: SIGNIFICANT CHANGE UP
SODIUM SERPL-SCNC: 116 MMOL/L — CRITICAL LOW (ref 135–145)
SODIUM SERPL-SCNC: 116 MMOL/L — CRITICAL LOW (ref 135–145)
SODIUM SERPL-SCNC: 122 MMOL/L — LOW (ref 135–145)
SODIUM SERPL-SCNC: 122 MMOL/L — LOW (ref 135–145)
SODIUM SERPL-SCNC: 124 MMOL/L — LOW (ref 135–145)
SODIUM SERPL-SCNC: 124 MMOL/L — LOW (ref 135–145)
SODIUM SERPL-SCNC: 127 MMOL/L — LOW (ref 135–145)
SODIUM SERPL-SCNC: 127 MMOL/L — LOW (ref 135–145)
SODIUM UR-SCNC: 36 MMOL/L — SIGNIFICANT CHANGE UP
SODIUM UR-SCNC: 36 MMOL/L — SIGNIFICANT CHANGE UP
SP GR SPEC: 1.01 — SIGNIFICANT CHANGE UP (ref 1–1.03)
SP GR SPEC: 1.01 — SIGNIFICANT CHANGE UP (ref 1–1.03)
TROPONIN I, HIGH SENSITIVITY RESULT: 14.1 NG/L — SIGNIFICANT CHANGE UP
TROPONIN I, HIGH SENSITIVITY RESULT: 14.1 NG/L — SIGNIFICANT CHANGE UP
UROBILINOGEN FLD QL: 0.2 MG/DL — SIGNIFICANT CHANGE UP (ref 0.2–1)
UROBILINOGEN FLD QL: 0.2 MG/DL — SIGNIFICANT CHANGE UP (ref 0.2–1)
WBC # BLD: 7.47 K/UL — SIGNIFICANT CHANGE UP (ref 3.8–10.5)
WBC # BLD: 7.47 K/UL — SIGNIFICANT CHANGE UP (ref 3.8–10.5)
WBC # FLD AUTO: 7.47 K/UL — SIGNIFICANT CHANGE UP (ref 3.8–10.5)
WBC # FLD AUTO: 7.47 K/UL — SIGNIFICANT CHANGE UP (ref 3.8–10.5)

## 2023-12-09 PROCEDURE — 99291 CRITICAL CARE FIRST HOUR: CPT | Mod: 25

## 2023-12-09 PROCEDURE — 71045 X-RAY EXAM CHEST 1 VIEW: CPT | Mod: 26

## 2023-12-09 PROCEDURE — 70450 CT HEAD/BRAIN W/O DYE: CPT | Mod: 26,MA

## 2023-12-09 RX ORDER — EXEMESTANE 25 MG/1
1 TABLET, SUGAR COATED ORAL
Qty: 0 | Refills: 0 | DISCHARGE

## 2023-12-09 RX ORDER — SITAGLIPTIN 50 MG/1
1 TABLET, FILM COATED ORAL
Qty: 0 | Refills: 0 | DISCHARGE

## 2023-12-09 RX ORDER — LOSARTAN POTASSIUM 100 MG/1
1 TABLET, FILM COATED ORAL
Qty: 0 | Refills: 0 | DISCHARGE

## 2023-12-09 RX ORDER — MONTELUKAST 4 MG/1
1 TABLET, CHEWABLE ORAL
Qty: 0 | Refills: 0 | DISCHARGE

## 2023-12-09 RX ORDER — BUDESONIDE AND FORMOTEROL FUMARATE DIHYDRATE 160; 4.5 UG/1; UG/1
2 AEROSOL RESPIRATORY (INHALATION)
Refills: 0 | Status: DISCONTINUED | OUTPATIENT
Start: 2023-12-09 | End: 2023-12-14

## 2023-12-09 RX ORDER — ACETAMINOPHEN 500 MG
650 TABLET ORAL EVERY 6 HOURS
Refills: 0 | Status: DISCONTINUED | OUTPATIENT
Start: 2023-12-09 | End: 2023-12-14

## 2023-12-09 RX ORDER — ATORVASTATIN CALCIUM 80 MG/1
20 TABLET, FILM COATED ORAL AT BEDTIME
Refills: 0 | Status: DISCONTINUED | OUTPATIENT
Start: 2023-12-09 | End: 2023-12-14

## 2023-12-09 RX ORDER — LETROZOLE 2.5 MG/1
2.5 TABLET, FILM COATED ORAL DAILY
Refills: 0 | Status: DISCONTINUED | OUTPATIENT
Start: 2023-12-09 | End: 2023-12-14

## 2023-12-09 RX ORDER — SODIUM BICARBONATE 1 MEQ/ML
75 SYRINGE (ML) INTRAVENOUS ONCE
Refills: 0 | Status: COMPLETED | OUTPATIENT
Start: 2023-12-09 | End: 2023-12-09

## 2023-12-09 RX ORDER — TIZANIDINE 4 MG/1
2 TABLET ORAL
Qty: 0 | Refills: 0 | DISCHARGE

## 2023-12-09 RX ORDER — ALBUTEROL 90 UG/1
2 AEROSOL, METERED ORAL EVERY 6 HOURS
Refills: 0 | Status: DISCONTINUED | OUTPATIENT
Start: 2023-12-09 | End: 2023-12-14

## 2023-12-09 RX ORDER — ALBUTEROL 90 UG/1
2 AEROSOL, METERED ORAL
Refills: 0 | DISCHARGE

## 2023-12-09 RX ORDER — CIPROFLOXACIN LACTATE 400MG/40ML
500 VIAL (ML) INTRAVENOUS EVERY 12 HOURS
Refills: 0 | Status: COMPLETED | OUTPATIENT
Start: 2023-12-09 | End: 2023-12-11

## 2023-12-09 RX ORDER — ESOMEPRAZOLE MAGNESIUM 40 MG/1
1 CAPSULE, DELAYED RELEASE ORAL
Qty: 0 | Refills: 0 | DISCHARGE

## 2023-12-09 RX ORDER — ENOXAPARIN SODIUM 100 MG/ML
40 INJECTION SUBCUTANEOUS EVERY 24 HOURS
Refills: 0 | Status: DISCONTINUED | OUTPATIENT
Start: 2023-12-09 | End: 2023-12-09

## 2023-12-09 RX ORDER — PANTOPRAZOLE SODIUM 20 MG/1
40 TABLET, DELAYED RELEASE ORAL
Refills: 0 | Status: DISCONTINUED | OUTPATIENT
Start: 2023-12-09 | End: 2023-12-14

## 2023-12-09 RX ORDER — BRINZOLAMIDE 10 MG/ML
1 SUSPENSION/ DROPS OPHTHALMIC
Qty: 0 | Refills: 0 | DISCHARGE

## 2023-12-09 RX ORDER — POLYETHYLENE GLYCOL 3350 17 G/17G
17 POWDER, FOR SOLUTION ORAL DAILY
Refills: 0 | Status: DISCONTINUED | OUTPATIENT
Start: 2023-12-09 | End: 2023-12-14

## 2023-12-09 RX ORDER — LATANOPROST 0.05 MG/ML
1 SOLUTION/ DROPS OPHTHALMIC; TOPICAL
Qty: 0 | Refills: 0 | DISCHARGE

## 2023-12-09 RX ORDER — SODIUM CHLORIDE 9 MG/ML
1000 INJECTION, SOLUTION INTRAVENOUS
Refills: 0 | Status: DISCONTINUED | OUTPATIENT
Start: 2023-12-09 | End: 2023-12-09

## 2023-12-09 RX ORDER — LORATADINE 10 MG/1
1 TABLET ORAL
Qty: 0 | Refills: 0 | DISCHARGE

## 2023-12-09 RX ORDER — LINACLOTIDE 145 UG/1
1 CAPSULE, GELATIN COATED ORAL
Refills: 0 | DISCHARGE

## 2023-12-09 RX ORDER — ONDANSETRON 8 MG/1
4 TABLET, FILM COATED ORAL EVERY 8 HOURS
Refills: 0 | Status: DISCONTINUED | OUTPATIENT
Start: 2023-12-09 | End: 2023-12-14

## 2023-12-09 RX ORDER — DEXTROSE 50 % IN WATER 50 %
50 SYRINGE (ML) INTRAVENOUS ONCE
Refills: 0 | Status: COMPLETED | OUTPATIENT
Start: 2023-12-09 | End: 2023-12-09

## 2023-12-09 RX ORDER — FLUTICASONE PROPIONATE AND SALMETEROL 50; 250 UG/1; UG/1
1 POWDER ORAL; RESPIRATORY (INHALATION)
Qty: 0 | Refills: 0 | DISCHARGE

## 2023-12-09 RX ORDER — AMLODIPINE BESYLATE 2.5 MG/1
1 TABLET ORAL
Qty: 0 | Refills: 0 | DISCHARGE

## 2023-12-09 RX ORDER — AMLODIPINE BESYLATE 2.5 MG/1
5 TABLET ORAL DAILY
Refills: 0 | Status: DISCONTINUED | OUTPATIENT
Start: 2023-12-09 | End: 2023-12-14

## 2023-12-09 RX ORDER — CALCIUM GLUCONATE 100 MG/ML
1 VIAL (ML) INTRAVENOUS ONCE
Refills: 0 | Status: COMPLETED | OUTPATIENT
Start: 2023-12-09 | End: 2023-12-09

## 2023-12-09 RX ORDER — OMEPRAZOLE 10 MG/1
1 CAPSULE, DELAYED RELEASE ORAL
Qty: 0 | Refills: 0 | DISCHARGE

## 2023-12-09 RX ORDER — DESMOPRESSIN ACETATE 0.1 MG/1
1 TABLET ORAL ONCE
Refills: 0 | Status: COMPLETED | OUTPATIENT
Start: 2023-12-09 | End: 2023-12-09

## 2023-12-09 RX ORDER — RIVAROXABAN 15 MG-20MG
20 KIT ORAL
Refills: 0 | Status: DISCONTINUED | OUTPATIENT
Start: 2023-12-09 | End: 2023-12-14

## 2023-12-09 RX ORDER — LATANOPROST 0.05 MG/ML
1 SOLUTION/ DROPS OPHTHALMIC; TOPICAL AT BEDTIME
Refills: 0 | Status: DISCONTINUED | OUTPATIENT
Start: 2023-12-09 | End: 2023-12-14

## 2023-12-09 RX ORDER — ATORVASTATIN CALCIUM 80 MG/1
1 TABLET, FILM COATED ORAL
Qty: 0 | Refills: 0 | DISCHARGE

## 2023-12-09 RX ORDER — CARVEDILOL PHOSPHATE 80 MG/1
1 CAPSULE, EXTENDED RELEASE ORAL
Qty: 0 | Refills: 0 | DISCHARGE

## 2023-12-09 RX ORDER — INSULIN LISPRO 100/ML
VIAL (ML) SUBCUTANEOUS
Refills: 0 | Status: DISCONTINUED | OUTPATIENT
Start: 2023-12-09 | End: 2023-12-14

## 2023-12-09 RX ORDER — LETROZOLE 2.5 MG/1
1 TABLET, FILM COATED ORAL
Refills: 0 | DISCHARGE

## 2023-12-09 RX ORDER — CYCLOSPORINE 0.5 MG/ML
1 EMULSION OPHTHALMIC
Qty: 0 | Refills: 0 | DISCHARGE

## 2023-12-09 RX ORDER — SODIUM CHLORIDE 9 MG/ML
1000 INJECTION INTRAMUSCULAR; INTRAVENOUS; SUBCUTANEOUS ONCE
Refills: 0 | Status: COMPLETED | OUTPATIENT
Start: 2023-12-09 | End: 2023-12-09

## 2023-12-09 RX ORDER — INSULIN HUMAN 100 [IU]/ML
10 INJECTION, SOLUTION SUBCUTANEOUS ONCE
Refills: 0 | Status: COMPLETED | OUTPATIENT
Start: 2023-12-09 | End: 2023-12-09

## 2023-12-09 RX ORDER — MONTELUKAST 4 MG/1
10 TABLET, CHEWABLE ORAL AT BEDTIME
Refills: 0 | Status: DISCONTINUED | OUTPATIENT
Start: 2023-12-09 | End: 2023-12-14

## 2023-12-09 RX ORDER — TIZANIDINE 4 MG/1
1 TABLET ORAL
Qty: 0 | Refills: 0 | DISCHARGE

## 2023-12-09 RX ORDER — CARVEDILOL PHOSPHATE 80 MG/1
3.12 CAPSULE, EXTENDED RELEASE ORAL EVERY 12 HOURS
Refills: 0 | Status: DISCONTINUED | OUTPATIENT
Start: 2023-12-09 | End: 2023-12-14

## 2023-12-09 RX ORDER — DORZOLAMIDE HYDROCHLORIDE 20 MG/ML
1 SOLUTION/ DROPS OPHTHALMIC THREE TIMES A DAY
Refills: 0 | Status: DISCONTINUED | OUTPATIENT
Start: 2023-12-09 | End: 2023-12-14

## 2023-12-09 RX ORDER — SODIUM CHLORIDE 9 MG/ML
2500 INJECTION INTRAMUSCULAR; INTRAVENOUS; SUBCUTANEOUS ONCE
Refills: 0 | Status: DISCONTINUED | OUTPATIENT
Start: 2023-12-09 | End: 2023-12-09

## 2023-12-09 RX ORDER — SODIUM CHLORIDE 9 MG/ML
500 INJECTION, SOLUTION INTRAVENOUS
Refills: 0 | Status: DISCONTINUED | OUTPATIENT
Start: 2023-12-09 | End: 2023-12-09

## 2023-12-09 RX ORDER — SODIUM ZIRCONIUM CYCLOSILICATE 10 G/10G
10 POWDER, FOR SUSPENSION ORAL ONCE
Refills: 0 | Status: COMPLETED | OUTPATIENT
Start: 2023-12-09 | End: 2023-12-09

## 2023-12-09 RX ADMIN — RIVAROXABAN 20 MILLIGRAM(S): KIT at 18:25

## 2023-12-09 RX ADMIN — Medication 50 MILLILITER(S): at 04:51

## 2023-12-09 RX ADMIN — CARVEDILOL PHOSPHATE 3.12 MILLIGRAM(S): 80 CAPSULE, EXTENDED RELEASE ORAL at 18:27

## 2023-12-09 RX ADMIN — SODIUM CHLORIDE 500 MILLILITER(S): 9 INJECTION, SOLUTION INTRAVENOUS at 18:24

## 2023-12-09 RX ADMIN — Medication 75 MILLIEQUIVALENT(S): at 04:52

## 2023-12-09 RX ADMIN — POLYETHYLENE GLYCOL 3350 17 GRAM(S): 17 POWDER, FOR SOLUTION ORAL at 22:44

## 2023-12-09 RX ADMIN — ENOXAPARIN SODIUM 40 MILLIGRAM(S): 100 INJECTION SUBCUTANEOUS at 11:53

## 2023-12-09 RX ADMIN — INSULIN HUMAN 10 UNIT(S): 100 INJECTION, SOLUTION SUBCUTANEOUS at 04:51

## 2023-12-09 RX ADMIN — Medication 100 GRAM(S): at 04:52

## 2023-12-09 RX ADMIN — DORZOLAMIDE HYDROCHLORIDE 1 DROP(S): 20 SOLUTION/ DROPS OPHTHALMIC at 22:44

## 2023-12-09 RX ADMIN — SODIUM CHLORIDE 100 MILLILITER(S): 9 INJECTION, SOLUTION INTRAVENOUS at 17:29

## 2023-12-09 RX ADMIN — Medication 500 MILLIGRAM(S): at 18:25

## 2023-12-09 RX ADMIN — SODIUM ZIRCONIUM CYCLOSILICATE 10 GRAM(S): 10 POWDER, FOR SUSPENSION ORAL at 06:58

## 2023-12-09 RX ADMIN — SODIUM CHLORIDE 60 MILLILITER(S): 9 INJECTION, SOLUTION INTRAVENOUS at 11:58

## 2023-12-09 RX ADMIN — SODIUM CHLORIDE 1000 MILLILITER(S): 9 INJECTION INTRAMUSCULAR; INTRAVENOUS; SUBCUTANEOUS at 03:45

## 2023-12-09 RX ADMIN — LATANOPROST 1 DROP(S): 0.05 SOLUTION/ DROPS OPHTHALMIC; TOPICAL at 22:44

## 2023-12-09 RX ADMIN — SODIUM CHLORIDE 100 MILLILITER(S): 9 INJECTION, SOLUTION INTRAVENOUS at 19:08

## 2023-12-09 RX ADMIN — SODIUM CHLORIDE 1000 MILLILITER(S): 9 INJECTION INTRAMUSCULAR; INTRAVENOUS; SUBCUTANEOUS at 02:45

## 2023-12-09 RX ADMIN — Medication 650 MILLIGRAM(S): at 12:52

## 2023-12-09 RX ADMIN — MONTELUKAST 10 MILLIGRAM(S): 4 TABLET, CHEWABLE ORAL at 22:44

## 2023-12-09 RX ADMIN — Medication 1 GRAM(S): at 05:58

## 2023-12-09 RX ADMIN — Medication 650 MILLIGRAM(S): at 11:52

## 2023-12-09 RX ADMIN — DESMOPRESSIN ACETATE 1 MICROGRAM(S): 0.1 TABLET ORAL at 19:00

## 2023-12-09 RX ADMIN — SODIUM CHLORIDE 100 MILLILITER(S): 9 INJECTION, SOLUTION INTRAVENOUS at 19:39

## 2023-12-09 RX ADMIN — ATORVASTATIN CALCIUM 20 MILLIGRAM(S): 80 TABLET, FILM COATED ORAL at 22:43

## 2023-12-09 RX ADMIN — BUDESONIDE AND FORMOTEROL FUMARATE DIHYDRATE 2 PUFF(S): 160; 4.5 AEROSOL RESPIRATORY (INHALATION) at 22:44

## 2023-12-09 RX ADMIN — DORZOLAMIDE HYDROCHLORIDE 1 DROP(S): 20 SOLUTION/ DROPS OPHTHALMIC at 14:12

## 2023-12-09 NOTE — ED ADULT TRIAGE NOTE - NS ED NURSE AMBULANCES
[facility]

## 2023-12-09 NOTE — H&P ADULT - ATTENDING COMMENTS
Patient is a 89 y/o female with PMH of HTN, DM, hypothyroidism, COPD, breast cancer and multiple PEs on coumadin who presents to the ED with a chief  complaint of weakness and slurred speech. She was evaluated and code stroke called but cancelled after electrolytes revealed hyponatremia ( serum Na 116 ). The patient was given 1L of fluid and the plan is to repeat the sodium. If the response is adequate the patient will be sent to the floor. If not, ICU. Suspect hypovolemic hyponatremia. Urine sodium, osmolarity sent. r/o SIADH. I reviewed all laboratory and roentgenographic data and any previous medical record from [facility] that existed. I also discussed the case with the ED attending or referring physician. Patient is a 89 y/o female with PMH of HTN, DM, hypothyroidism, COPD, breast cancer and multiple PEs on coumadin who presents to the ED with a chief  complaint of weakness and slurred speech. She was evaluated and code stroke called but cancelled after electrolytes revealed hyponatremia ( serum Na 116 ). The patient was given 1L of fluid and the plan is to repeat the sodium. If the response is adequate the patient will be sent to the floor. If not, ICU. Suspect hypovolemic hyponatremia. Urine sodium, osmolarity sent. r/o SIADH. I reviewed all laboratory and roentgenographic data and any previous medical record from [facility] that existed. I also discussed the case with the ED attending or referring physician.

## 2023-12-09 NOTE — ED ADULT NURSE NOTE - CHIEF COMPLAINT QUOTE
she has a general weakness, slurred speech and hypotension started 1 hour ago  as per family  Dr Berg notified , code stroke called she has a general weakness, slurred speech and hypotension started 1 hour ago  as per family  Dr Begr notified , code stroke called

## 2023-12-09 NOTE — H&P ADULT - PROBLEM SELECTOR PLAN 6
Pt has a history of Asthma, takes montelukast 10mg daily, albuterol PRN and Advair diskus  - C/w Montelukast  - C/w Albuterol PRN  - C/w Advair diskus

## 2023-12-09 NOTE — CONSULT NOTE ADULT - ASSESSMENT
This is a 89 yo F from home, ambulates with walker with pmhx of HTN, DM, breast CA (s/p R. sided mastectomy) that presents to the ED complaining of slurred speech, ICU consulted for hyponatremia    DX:  1. Symptomatic hyponatremia  2. Acute metabolic encephalopathy  3. Hyperkalemia  4. HTN  5. DM      =================== Neuro============================  #Acute metabolic encephalopathy  Patient with slurred speech and AMS, likely in setting of hyponatremia  Currently alert and oriented x 2 at base line   CTH: No acute changes    ================= Cardiovascular==========================  #HTN  Hx of HTN on losartan  Will hold all home meds for now  ================- Pulm=================================  No issues    ==================ID===================================  No issues    ================= Nephro================================  #Symptomatic Hyponatremia  Pt w/ symptomatic hyponatremia  s/p 1L NS in ED  Na 116 > 120  Would recommend to repeat BMP in 2 hours, including Serum Osm, Urine Osm and U Na  do not correct by more than 8 meq in 24hr        #Hyperkalemia  Pt with hyperkalemia on Losartan  hold home meds for now  K 6.7 > 6.1  s/p insulin 10 and d50 in ED  =================GI====================================  No issues    ================ Heme==================================  No issues    =================Endocrine===============================  #DM  Hx of DM (unknown home meds)  Will require med rec  Start on SSI, adjust as needed  ================= Skin/Catheters============================  Peripheral IV's  Cantor catheter    =================Prophylaxis =============================  Lovenox for DVT ppx    Patient stable for medical floors, does not require critical care services at this time. Please reconsult as needed

## 2023-12-09 NOTE — CONSULT NOTE ADULT - SUBJECTIVE AND OBJECTIVE BOX
Patient is a 89y old  Female who presents with a chief complaint of Hyponatremia (09 Dec 2023 04:36)      HPI:  This is a 89 yo F from home, ambulates with walker with pmhx of HTN, DM, breast CA (s/p R. sided mastectomy) that presents to the ED complaining of slurred speech. Per grandson, who is at bedside, at 11:30 pm he was ambulating patient to her bed when he noticed she was slurring her speech, weak and very altered. He states that normally she is AAOx2 at baseline, however, she was not responding to commands appropriately and was not making any sense at the time. He mentions that 2 weeks ago she had URI symptoms, which included a cough w/ clear productive sputum, and hasn't been eating well since.  (09 Dec 2023 04:36)      Allergies    aspirin (Stomach Upset)  aspirin (Nausea (Mild))    Intolerances        MEDICATIONS  (STANDING):  enoxaparin Injectable 40 milliGRAM(s) SubCutaneous every 24 hours  insulin lispro (ADMELOG) corrective regimen sliding scale   SubCutaneous Before meals and at bedtime    MEDICATIONS  (PRN):      Daily     Daily     Drug Dosing Weight      PAST MEDICAL & SURGICAL HISTORY:  DM (diabetes mellitus)      HTN (hypertension)      HLD (hyperlipidemia)      Breast cancer in female      COPD (chronic obstructive pulmonary disease)      PE (pulmonary thromboembolism)      Diabetes      Breast cancer      Blindness of left eye      History of appendectomy      Spinal stenosis      Spinal stenosis      Multiple pulmonary emboli      Fall      Chronic low back pain      Lumbar spondylosis      H/O mastectomy, right          FAMILY HISTORY:      SOCIAL HISTORY:    ADVANCE DIRECTIVES:    REVIEW OF SYSTEMS:    CONSTITUTIONAL: +weakness. No fevers or chills  EYES/ENT: No visual changes;  No vertigo or throat pain   NECK: No pain or stiffness  RESPIRATORY: No cough, wheezing, hemoptysis; No shortness of breath  CARDIOVASCULAR: No chest pain or palpitations  GASTROINTESTINAL: No abdominal or epigastric pain. No nausea, vomiting, or hematemesis; No diarrhea or constipation. No melena or hematochezia.  GENITOURINARY: No dysuria, frequency or hematuria  NEUROLOGICAL: No numbness or weakness  SKIN: No itching, rashes            ICU Vital Signs Last 24 Hrs  T(C): 36.2 (09 Dec 2023 04:33), Max: 36.3 (09 Dec 2023 02:25)  T(F): 97.2 (09 Dec 2023 04:33), Max: 97.3 (09 Dec 2023 02:25)  HR: 72 (09 Dec 2023 05:23) (66 - 75)  BP: 119/54 (09 Dec 2023 05:23) (107/59 - 119/59)  BP(mean): --  ABP: --  ABP(mean): --  RR: 17 (09 Dec 2023 05:23) (16 - 19)  SpO2: 98% (09 Dec 2023 05:23) (93% - 100%)    O2 Parameters below as of 09 Dec 2023 05:23  Patient On (Oxygen Delivery Method): nasal cannula  O2 Flow (L/min): 2              I&O's Detail      PHYSICAL EXAM:      GENERAL: elderly female, NAD, lying in bed comfortably  HEAD:  Atraumatic, Normocephalic  EYES: EOMI, PERRLA, conjunctiva and sclera clear  ENT: +Dry mucous membranes  NECK: Supple, No JVD  CHEST/LUNG: Clear to auscultation bilaterally; No rales, rhonchi, wheezing, or rubs. Unlabored respirations  HEART: Regular rate and rhythm; No murmurs, rubs, or gallops  ABDOMEN: BSx4; Soft, nontender, nondistended  EXTREMITIES:  2+ Peripheral Pulses, brisk capillary refill. No clubbing, cyanosis, or edema  NERVOUS SYSTEM:  A&Ox2, no focal deficits   SKIN: No rashes or lesions      LABS:  CBC Full  -  ( 09 Dec 2023 02:57 )  WBC Count : 7.47 K/uL  RBC Count : 3.68 M/uL  Hemoglobin : 11.2 g/dL  Hematocrit : 33.6 %  Platelet Count - Automated : 298 K/uL  Mean Cell Volume : 91.3 fl  Mean Cell Hemoglobin : 30.4 pg  Mean Cell Hemoglobin Concentration : 33.3 gm/dL  Auto Neutrophil # : 6.08 K/uL  Auto Lymphocyte # : 0.96 K/uL  Auto Monocyte # : 0.37 K/uL  Auto Eosinophil # : 0.01 K/uL  Auto Basophil # : 0.01 K/uL  Auto Neutrophil % : 81.4 %  Auto Lymphocyte % : 12.9 %  Auto Monocyte % : 5.0 %  Auto Eosinophil % : 0.1 %  Auto Basophil % : 0.1 %    12-09    120<LL>  |  88<L>  |  19<H>  ----------------------------<  127<H>  6.1<H>   |  28  |  0.85    Ca    8.7      09 Dec 2023 04:35    TPro  5.9<L>  /  Alb  3.2<L>  /  TBili  0.3  /  DBili  x   /  AST  18  /  ALT  31  /  AlkPhos  78  12-09    CAPILLARY BLOOD GLUCOSE      POCT Blood Glucose.: 208 mg/dL (09 Dec 2023 05:10)    PT/INR - ( 09 Dec 2023 02:57 )   PT: 19.0 sec;   INR: 1.69 ratio         PTT - ( 09 Dec 2023 02:57 )  PTT:44.2 sec  Urinalysis Basic - ( 09 Dec 2023 04:35 )    Color: x / Appearance: x / SG: x / pH: x  Gluc: 127 mg/dL / Ketone: x  / Bili: x / Urobili: x   Blood: x / Protein: x / Nitrite: x   Leuk Esterase: x / RBC: x / WBC x   Sq Epi: x / Non Sq Epi: x / Bacteria: x              EKG:    ECHO, US:    RADIOLOGY:    CRITICAL CARE TIME SPENT:   Patient is a 89y old  Female who presents with a chief complaint of Hyponatremia (09 Dec 2023 04:36)      HPI:  This is a 89 yo F from home, ambulates with walker with pmhx of HTN, DM, breast CA (s/p R. sided mastectomy) that presents to the ED complaining of slurred speech. Per grandson, who is at bedside, at 11:30 pm he was ambulating patient to her bed when he noticed she was slurring her speech, weak and very altered. He states that normally she is AAOx2 at baseline, however, she was not responding to commands appropriately and was not making any sense at the time. He mentions that 2 weeks ago she had URI symptoms, which included a cough w/ clear productive sputum, and hasn't been eating well since.  (09 Dec 2023 04:36)      Allergies    aspirin (Stomach Upset)  aspirin (Nausea (Mild))    Intolerances        MEDICATIONS  (STANDING):  enoxaparin Injectable 40 milliGRAM(s) SubCutaneous every 24 hours  insulin lispro (ADMELOG) corrective regimen sliding scale   SubCutaneous Before meals and at bedtime    MEDICATIONS  (PRN):      Daily     Daily     Drug Dosing Weight      PAST MEDICAL & SURGICAL HISTORY:  DM (diabetes mellitus)      HTN (hypertension)      HLD (hyperlipidemia)      Breast cancer in female      COPD (chronic obstructive pulmonary disease)      PE (pulmonary thromboembolism)      Diabetes      Breast cancer      Blindness of left eye      History of appendectomy      Spinal stenosis      Spinal stenosis      Multiple pulmonary emboli      Fall      Chronic low back pain      Lumbar spondylosis      H/O mastectomy, right          FAMILY HISTORY:      SOCIAL HISTORY:    ADVANCE DIRECTIVES:    REVIEW OF SYSTEMS:    CONSTITUTIONAL: +weakness. No fevers or chills  EYES/ENT: No visual changes;  No vertigo or throat pain   NECK: No pain or stiffness  RESPIRATORY: No cough, wheezing, hemoptysis; No shortness of breath  CARDIOVASCULAR: No chest pain or palpitations  GASTROINTESTINAL: No abdominal or epigastric pain. No nausea, vomiting, or hematemesis; No diarrhea or constipation. No melena or hematochezia.  GENITOURINARY: No dysuria, frequency or hematuria  NEUROLOGICAL: No numbness or weakness  SKIN: No itching, rashes            ICU Vital Signs Last 24 Hrs  T(C): 36.2 (09 Dec 2023 04:33), Max: 36.3 (09 Dec 2023 02:25)  T(F): 97.2 (09 Dec 2023 04:33), Max: 97.3 (09 Dec 2023 02:25)  HR: 72 (09 Dec 2023 05:23) (66 - 75)  BP: 119/54 (09 Dec 2023 05:23) (107/59 - 119/59)  BP(mean): --  ABP: --  ABP(mean): --  RR: 17 (09 Dec 2023 05:23) (16 - 19)  SpO2: 98% (09 Dec 2023 05:23) (93% - 100%)    O2 Parameters below as of 09 Dec 2023 05:23  Patient On (Oxygen Delivery Method): nasal cannula  O2 Flow (L/min): 2              I&O's Detail      PHYSICAL EXAM:      GENERAL: elderly female, NAD, lying in bed comfortably  HEAD:  Atraumatic, Normocephalic  EYES: EOMI, PERRLA, conjunctiva and sclera clear  ENT: +Dry mucous membranes  NECK: Supple, No JVD  CHEST/LUNG: Clear to auscultation bilaterally; No rales, rhonchi, wheezing, or rubs. Unlabored respirations  HEART: Regular rate and rhythm; No murmurs, rubs, or gallops  ABDOMEN: BSx4; Soft, nontender, nondistended  EXTREMITIES:  2+ Peripheral Pulses, brisk capillary refill. No clubbing, cyanosis, or edema  NERVOUS SYSTEM:  A&Ox2, no focal deficits   SKIN: No rashes or lesions      LABS:  CBC Full  -  ( 09 Dec 2023 02:57 )  WBC Count : 7.47 K/uL  RBC Count : 3.68 M/uL  Hemoglobin : 11.2 g/dL  Hematocrit : 33.6 %  Platelet Count - Automated : 298 K/uL  Mean Cell Volume : 91.3 fl  Mean Cell Hemoglobin : 30.4 pg  Mean Cell Hemoglobin Concentration : 33.3 gm/dL  Auto Neutrophil # : 6.08 K/uL  Auto Lymphocyte # : 0.96 K/uL  Auto Monocyte # : 0.37 K/uL  Auto Eosinophil # : 0.01 K/uL  Auto Basophil # : 0.01 K/uL  Auto Neutrophil % : 81.4 %  Auto Lymphocyte % : 12.9 %  Auto Monocyte % : 5.0 %  Auto Eosinophil % : 0.1 %  Auto Basophil % : 0.1 %    12-09    120<LL>  |  88<L>  |  19<H>  ----------------------------<  127<H>  6.1<H>   |  28  |  0.85    Ca    8.7      09 Dec 2023 04:35    TPro  5.9<L>  /  Alb  3.2<L>  /  TBili  0.3  /  DBili  x   /  AST  18  /  ALT  31  /  AlkPhos  78  12-09    CAPILLARY BLOOD GLUCOSE      POCT Blood Glucose.: 208 mg/dL (09 Dec 2023 05:10)    PT/INR - ( 09 Dec 2023 02:57 )   PT: 19.0 sec;   INR: 1.69 ratio         PTT - ( 09 Dec 2023 02:57 )  PTT:44.2 sec  Urinalysis Basic - ( 09 Dec 2023 04:35 )    Color: x / Appearance: x / SG: x / pH: x  Gluc: 127 mg/dL / Ketone: x  / Bili: x / Urobili: x   Blood: x / Protein: x / Nitrite: x   Leuk Esterase: x / RBC: x / WBC x   Sq Epi: x / Non Sq Epi: x / Bacteria: x              EKG:    ECHO, US:    RADIOLOGY:    CRITICAL CARE TIME SPENT:

## 2023-12-09 NOTE — H&P ADULT - PROBLEM SELECTOR PLAN 1
Sodium 116 on admission. Likely hypovolemic hyponatremia  Corrected to 124 in 7 hours, s/p 1LNS in ED  - C/w BMP q6 hours   - C/w D5 at 60cc/hr  - Avoid overcorrection  - Goal 122-124 at 3AM 12/10    Dr. Lomas Nephrology Consulted

## 2023-12-09 NOTE — ED CLERICAL - NS ED CLERK NOTE PRE-ARRIVAL INFORMATION; ADDITIONAL PRE-ARRIVAL INFORMATION
This patient is enrolled in the [facility] Program and receives comprehensive home-based primary care.    - To obtain additional clinical information , or to discuss any questions or concerns, you can call the [facility] team at [phone], 24 hours a day.    - If discharged, this patient will be followed up by the [facility] team within 2 days.    - If this patient requires admission, please use the hospitalist service. This patient is enrolled in the [facility] Program and receives comprehensive home-based primary care.    - To obtain additional clinical information , or to discuss any questions or concerns, you can call the [facility] team at [phone], 24 hours a day.    - If discharged, this patient will be followed up by the [facility] team within 2 days.    - If this patient requires admission, please use the hospitalist service. DC instructions

## 2023-12-09 NOTE — CONSULT NOTE ADULT - SUBJECTIVE AND OBJECTIVE BOX
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 Pt AOx1 unable to provide meaningful history. Daughter at bedside states pt has been sick for a few days complaining of symptoms of cough and recently completed 3 days of ciprofloxacin for UTI. Daughter also reports pt has not been drinking or eating much for the past few days. Daughter reports pt was to have a steroid injection in the back this week due to spinal stenosis. * Patient Currently Takes Medications as of 09-Dec-2023 13:01 documented in Structured Notes  · 	carvedilol 3.125 mg oral tablet: Last Dose Taken:  , 1 tab(s) orally every 12 hours  · 	atorvastatin 20 mg oral tablet: Last Dose Taken:  , 1 tab(s) orally once a day  · 	Linzess 72 mcg oral capsule: 1 cap(s) orally once a day  · 	Xarelto 20 mg oral tablet: 1 tab(s) orally  · 	Restasis 0.05% ophthalmic emulsion: Last Dose Taken:  , 1 drop(s) to each affected eye every 12 hours  · 	Advair Diskus 100 mcg-50 mcg inhalation powder: Last Dose Taken:  , 1 puff(s) inhaled 2 times a day  · 	Azopt 1% ophthalmic suspension: Last Dose Taken:  , 1 drop(s) to right eye 3 times a day  · 	letrozole 2.5 mg oral tablet: 1 tab(s) orally once a day  · 	latanoprost 0.005% ophthalmic solution: Last Dose Taken:  , 1 drop(s) to each affected eye once a day (in the evening)  · 	torsemide 10 mg oral tablet: 1 tab(s) orally once a day  · 	Albuterol (Eqv-ProAir HFA) 90 mcg/inh inhalation aerosol: 2 puff(s) inhaled every 6 hours as needed for  bronchospasm  · 	amLODIPine 5 mg oral tablet: Last Dose Taken:  , 1 tab(s) orally once a day  · 	omeprazole 40 mg oral delayed release capsule: Last Dose Taken:  , 1 cap(s) orally once a day  · 	Januvia 50 mg oral tablet: Last Dose Taken:  , 1 tab(s) orally once a day  · 	Singulair 10 mg oral tablet: Last Dose Taken:  , 1 tab(s) orally once a day  · 	tiZANidine 2 mg oral tablet: Last Dose Taken:  , 2 tab(s) orally every 8 hours  · 	losartan 100 mg oral tablet: Last Dose Taken:  , 1 tab(s) orally once a day    PAST MEDICAL & SURGICAL HISTORY:  DM (diabetes mellitus)      HTN (hypertension)      HLD (hyperlipidemia)      Breast cancer in female      COPD (chronic obstructive pulmonary disease)      PE (pulmonary thromboembolism)      Diabetes      Breast cancer      Blindness of left eye      History of appendectomy      Spinal stenosis      Spinal stenosis      Multiple pulmonary emboli      Fall      Chronic low back pain      Lumbar spondylosis      H/O mastectomy, right          Home Medications Reviewed    Hospital Medications:   MEDICATIONS  (STANDING):  amLODIPine   Tablet 5 milliGRAM(s) Oral daily  atorvastatin 20 milliGRAM(s) Oral at bedtime  budesonide 160 MICROgram(s)/formoterol 4.5 MICROgram(s) Inhaler 2 Puff(s) Inhalation two times a day  carvedilol 3.125 milliGRAM(s) Oral every 12 hours  ciprofloxacin     Tablet 500 milliGRAM(s) Oral every 12 hours  dextrose 5%. 1000 milliLiter(s) (100 mL/Hr) IV Continuous <Continuous>  dorzolamide 2% Ophthalmic Solution 1 Drop(s) Right EYE three times a day  insulin lispro (ADMELOG) corrective regimen sliding scale   SubCutaneous Before meals and at bedtime  latanoprost 0.005% Ophthalmic Solution 1 Drop(s) Both EYES at bedtime  letrozole 2.5 milliGRAM(s) Oral daily  montelukast 10 milliGRAM(s) Oral at bedtime  pantoprazole    Tablet 40 milliGRAM(s) Oral before breakfast  rivaroxaban 20 milliGRAM(s) Oral with dinner    MEDICATIONS  (PRN):  acetaminophen     Tablet .. 650 milliGRAM(s) Oral every 6 hours PRN Temp greater or equal to 38C (100.4F), Mild Pain (1 - 3), Moderate Pain (4 - 6)  albuterol    90 MICROgram(s) HFA Inhaler 2 Puff(s) Inhalation every 6 hours PRN for bronchospasm  aluminum hydroxide/magnesium hydroxide/simethicone Suspension 30 milliLiter(s) Oral every 4 hours PRN Dyspepsia  ondansetron Injectable 4 milliGRAM(s) IV Push every 8 hours PRN Nausea and/or Vomiting      Allergies    aspirin (Stomach Upset)  aspirin (Nausea (Mild))    Intolerances    < from: Xray Chest 1 View-PORTABLE IMMEDIATE (12.09.23 @ 03:40) >  Comparison:  4/24/2019    Portable exam limits assessment of the heart with no change in the   appearance the mediastinum including tracheal deviation to the right and   tortuous uncoiled great vessels. Lungs reveal diffuse coarsened lung   markings. Angles sharp. Bones without acute abnormality.    IMPRESSION: When allowing for technique no change. No acute parenchymal   disease    < end of copied text >    Sodium: 116: TYPE:(C=Critical, N=Notification, A=Abnormal) C Sodium: 120: TYPE:(C=Critical, N=Notification, A=Abnormal) C Sodium: 124 mmol/L (12.09.23 @ 10:11)                         11.2   7.47  )-----------( 298      ( 09 Dec 2023 02:57 )             33.6     12-09    127<L>  |  92<L>  |  14  ----------------------------<  96  4.6   |  31  |  0.72    Ca    9.1      09 Dec 2023 16:40  Phos  3.8     12-09  Mg     1.7     12-09    TPro  5.9<L>  /  Alb  3.2<L>  /  TBili  0.3  /  DBili  x   /  AST  18  /  ALT  31  /  AlkPhos  78  12-09    PT/INR - ( 09 Dec 2023 02:57 )   PT: 19.0 sec;   INR: 1.69 ratio         PTT - ( 09 Dec 2023 02:57 )  PTT:44.2 sec  Urinalysis (12.09.23 @ 03:55)   pH Urine: 6.5  Glucose Qualitative, Urine: Negative mg/dL  Blood, Urine: Negative  Color: Yellow  Urine Appearance: Clear  Bilirubin: Negative  Ketone - Urine: Negative mg/dL  Specific Gravity: 1.009  Protein, Urine: Negative mg/dL  Urobilinogen: 0.2 mg/dL  Nitrite: Negative  Leukocyte Esterase Concentration: Negative    Potassium, Random Urine: 9 mmol/L (12-09 @ 12:15)  Sodium, Random Urine: 36 mmol/L (12-09 @ 04:35)  Creatinine, Random Urine: 35 mg/dL (12-09 @ 04:35)  Osmolality, Random Urine: 263 mos/kg (12-09 @ 04:35)        RADIOLOGY & ADDITIONAL STUDIES:    SOCIAL HISTORY: Denies ETOh,Smoking,     FAMILY HISTORY:      REVIEW OF SYSTEMS:  CONSTITUTIONAL: No malaise, No fatigue, No fevers or chills, well developed, no diaphoresis  EYES/ENT: No visual changes;  No vertigo or throat pain   NECK: No pain or stiffness  RESPIRATORY: No cough, wheezing, hemoptysis; No shortness of breath  CARDIOVASCULAR: No chest pain or palpitations. No edema  GASTROINTESTINAL: No abdominal or epigastric pain. No nausea, vomiting, or hematemesis; No diarrhea or constipation. No melena or hematochezia.  GENITOURINARY: No dysuria, frequency, foamy urine, urinary urgency, incontinence or hematuria  NEUROLOGICAL: No numbness or weakness, No tremor  SKIN: No itching, burning, rashes, or lesions   VASCULAR: No claudication  Musculoskeletal: no arthralgia, no myalgia  All other review of systems is negative unless indicated above.    VITALS:  Vital Signs Last 24 Hrs  T(C): 36.9 (09 Dec 2023 15:58), Max: 36.9 (09 Dec 2023 15:58)  T(F): 98.4 (09 Dec 2023 15:58), Max: 98.4 (09 Dec 2023 15:58)  HR: 76 (09 Dec 2023 15:58) (66 - 76)  BP: 98/73 (09 Dec 2023 15:58) (98/73 - 140/58)  BP(mean): --  RR: 18 (09 Dec 2023 15:58) (14 - 19)  SpO2: 96% (09 Dec 2023 15:58) (93% - 100%)    Parameters below as of 09 Dec 2023 15:58  Patient On (Oxygen Delivery Method): nasal cannula  O2 Flow (L/min): 3      12-09 @ 07:01  -  12-09 @ 17:43  --------------------------------------------------------  IN: 0 mL / OUT: 300 mL / NET: -300 mL      Height (cm): 154.9 (12-09 @ 05:46)  Weight (kg): 75 (12-09 @ 05:46)  BMI (kg/m2): 31.3 (12-09 @ 05:46)  BSA (m2): 1.74 (12-09 @ 05:46)    PHYSICAL EXAM:  Constitutional: NAD  HEENT: anicteric sclera, oropharynx clear, MMM  Neck: No JVD  Respiratory: good air entrance B/L, no wheezes, rales or rhonchi  Cardiovascular: S1, S2, RRR, no pericardial rub, no murmur  Gastrointestinal: BS+, soft, no tenderness, no distension, no bruit  Pelvis: bladder non-distended, no CVA tenderness  Extremities: No cyanosis or clubbing. 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 Pt AOx1 unable to provide meaningful history. Daughter at bedside states pt has been sick for a few days complaining of symptoms of cough and recently completed 3 days of ciprofloxacin for UTI. Daughter also reports pt has not been drinking or eating much for the past few days. Daughter reports pt was to have a steroid injection in the back this week due to spinal stenosis. * Patient Currently Takes Medications as of 09-Dec-2023 13:01 documented in Structured Notes  · 	carvedilol 3.125 mg oral tablet: Last Dose Taken:  , 1 tab(s) orally every 12 hours  · 	atorvastatin 20 mg oral tablet: Last Dose Taken:  , 1 tab(s) orally once a day  · 	Linzess 72 mcg oral capsule: 1 cap(s) orally once a day  · 	Xarelto 20 mg oral tablet: 1 tab(s) orally  · 	Restasis 0.05% ophthalmic emulsion: Last Dose Taken:  , 1 drop(s) to each affected eye every 12 hours  · 	Advair Diskus 100 mcg-50 mcg inhalation powder: Last Dose Taken:  , 1 puff(s) inhaled 2 times a day  · 	Azopt 1% ophthalmic suspension: Last Dose Taken:  , 1 drop(s) to right eye 3 times a day  · 	letrozole 2.5 mg oral tablet: 1 tab(s) orally once a day  · 	latanoprost 0.005% ophthalmic solution: Last Dose Taken:  , 1 drop(s) to each affected eye once a day (in the evening)  · 	torsemide 10 mg oral tablet: 1 tab(s) orally once a day  · 	Albuterol (Eqv-ProAir HFA) 90 mcg/inh inhalation aerosol: 2 puff(s) inhaled every 6 hours as needed for  bronchospasm  · 	amLODIPine 5 mg oral tablet: Last Dose Taken:  , 1 tab(s) orally once a day  · 	omeprazole 40 mg oral delayed release capsule: Last Dose Taken:  , 1 cap(s) orally once a day  · 	Januvia 50 mg oral tablet: Last Dose Taken:  , 1 tab(s) orally once a day  · 	Singulair 10 mg oral tablet: Last Dose Taken:  , 1 tab(s) orally once a day  · 	tiZANidine 2 mg oral tablet: Last Dose Taken:  , 2 tab(s) orally every 8 hours  · 	losartan 100 mg oral tablet: Last Dose Taken:  , 1 tab(s) orally once a day    PAST MEDICAL & SURGICAL HISTORY:  DM (diabetes mellitus)      HTN (hypertension)      HLD (hyperlipidemia)      Breast cancer in female      COPD (chronic obstructive pulmonary disease)      PE (pulmonary thromboembolism)      Diabetes      Breast cancer      Blindness of left eye      History of appendectomy      Spinal stenosis      Spinal stenosis      Multiple pulmonary emboli      Fall      Chronic low back pain      Lumbar spondylosis      H/O mastectomy, right          Home Medications Reviewed    Hospital Medications:   MEDICATIONS  (STANDING):  amLODIPine   Tablet 5 milliGRAM(s) Oral daily  atorvastatin 20 milliGRAM(s) Oral at bedtime  budesonide 160 MICROgram(s)/formoterol 4.5 MICROgram(s) Inhaler 2 Puff(s) Inhalation two times a day  carvedilol 3.125 milliGRAM(s) Oral every 12 hours  ciprofloxacin     Tablet 500 milliGRAM(s) Oral every 12 hours  dextrose 5%. 1000 milliLiter(s) (100 mL/Hr) IV Continuous <Continuous>  dorzolamide 2% Ophthalmic Solution 1 Drop(s) Right EYE three times a day  insulin lispro (ADMELOG) corrective regimen sliding scale   SubCutaneous Before meals and at bedtime  latanoprost 0.005% Ophthalmic Solution 1 Drop(s) Both EYES at bedtime  letrozole 2.5 milliGRAM(s) Oral daily  montelukast 10 milliGRAM(s) Oral at bedtime  pantoprazole    Tablet 40 milliGRAM(s) Oral before breakfast  rivaroxaban 20 milliGRAM(s) Oral with dinner    MEDICATIONS  (PRN):  acetaminophen     Tablet .. 650 milliGRAM(s) Oral every 6 hours PRN Temp greater or equal to 38C (100.4F), Mild Pain (1 - 3), Moderate Pain (4 - 6)  albuterol    90 MICROgram(s) HFA Inhaler 2 Puff(s) Inhalation every 6 hours PRN for bronchospasm  aluminum hydroxide/magnesium hydroxide/simethicone Suspension 30 milliLiter(s) Oral every 4 hours PRN Dyspepsia  ondansetron Injectable 4 milliGRAM(s) IV Push every 8 hours PRN Nausea and/or Vomiting      Allergies    aspirin (Stomach Upset)  aspirin (Nausea (Mild))    Intolerances    < from: Xray Chest 1 View-PORTABLE IMMEDIATE (12.09.23 @ 03:40) >  Comparison:  4/24/2019    Portable exam limits assessment of the heart with no change in the   appearance the mediastinum including tracheal deviation to the right and   tortuous uncoiled great vessels. Lungs reveal diffuse coarsened lung   markings. Angles sharp. Bones without acute abnormality.    IMPRESSION: When allowing for technique no change. No acute parenchymal   disease    < end of copied text >    Sodium: 116: TYPE:(C=Critical, N=Notification, A=Abnormal) C Sodium: 120: TYPE:(C=Critical, N=Notification, A=Abnormal) C Sodium: 124 mmol/L (12.09.23 @ 10:11)                         11.2   7.47  )-----------( 298      ( 09 Dec 2023 02:57 )             33.6     12-09    127<L>  |  92<L>  |  14  ----------------------------<  96  4.6   |  31  |  0.72    Ca    9.1      09 Dec 2023 16:40  Phos  3.8     12-09  Mg     1.7     12-09    TPro  5.9<L>  /  Alb  3.2<L>  /  TBili  0.3  /  DBili  x   /  AST  18  /  ALT  31  /  AlkPhos  78  12-09    PT/INR - ( 09 Dec 2023 02:57 )   PT: 19.0 sec;   INR: 1.69 ratio         PTT - ( 09 Dec 2023 02:57 )  PTT:44.2 sec  Urinalysis (12.09.23 @ 03:55)   pH Urine: 6.5  Glucose Qualitative, Urine: Negative mg/dL  Blood, Urine: Negative  Color: Yellow  Urine Appearance: Clear  Bilirubin: Negative  Ketone - Urine: Negative mg/dL  Specific Gravity: 1.009  Protein, Urine: Negative mg/dL  Urobilinogen: 0.2 mg/dL  Nitrite: Negative  Leukocyte Esterase Concentration: Negative    Potassium, Random Urine: 9 mmol/L (12-09 @ 12:15)  Sodium, Random Urine: 36 mmol/L (12-09 @ 04:35)  Creatinine, Random Urine: 35 mg/dL (12-09 @ 04:35)  Osmolality, Random Urine: 263 mos/kg (12-09 @ 04:35)        RADIOLOGY & ADDITIONAL STUDIES:    SOCIAL HISTORY: Denies ETOh,Smoking,     FAMILY HISTORY:      REVIEW OF SYSTEMS:  CONSTITUTIONAL: No malaise, No fatigue, No fevers or chills, well developed, no diaphoresis  EYES/ENT: No visual changes;  No vertigo or throat pain   NECK: No pain or stiffness  RESPIRATORY: No cough, wheezing, hemoptysis; No shortness of breath  CARDIOVASCULAR: No chest pain or palpitations. No edema  GASTROINTESTINAL: No abdominal or epigastric pain. No nausea, vomiting, or hematemesis; No diarrhea or constipation. No melena or hematochezia.  GENITOURINARY: No dysuria, frequency, foamy urine, urinary urgency, incontinence or hematuria  NEUROLOGICAL: No numbness or weakness, No tremor  SKIN: No itching, burning, rashes, or lesions   VASCULAR: No claudication  Musculoskeletal: no arthralgia, no myalgia  All other review of systems is negative unless indicated above.    VITALS:  Vital Signs Last 24 Hrs  T(C): 36.9 (09 Dec 2023 15:58), Max: 36.9 (09 Dec 2023 15:58)  T(F): 98.4 (09 Dec 2023 15:58), Max: 98.4 (09 Dec 2023 15:58)  HR: 76 (09 Dec 2023 15:58) (66 - 76)  BP: 98/73 (09 Dec 2023 15:58) (98/73 - 140/58)  BP(mean): --  RR: 18 (09 Dec 2023 15:58) (14 - 19)  SpO2: 96% (09 Dec 2023 15:58) (93% - 100%)    Parameters below as of 09 Dec 2023 15:58  Patient On (Oxygen Delivery Method): nasal cannula  O2 Flow (L/min): 3      12-09 @ 07:01  -  12-09 @ 17:43  --------------------------------------------------------  IN: 0 mL / OUT: 300 mL / NET: -300 mL      Height (cm): 154.9 (12-09 @ 05:46)  Weight (kg): 75 (12-09 @ 05:46)  BMI (kg/m2): 31.3 (12-09 @ 05:46)  BSA (m2): 1.74 (12-09 @ 05:46)    PHYSICAL EXAM:  Constitutional: NAD  HEENT: anicteric sclera, oropharynx clear, MMM  Neck: No JVD  Respiratory: good air entrance B/L, no wheezes, rales or rhonchi  Cardiovascular: S1, S2, RRR, no pericardial rub, no murmur  Gastrointestinal: BS+, soft, no tenderness, no distension, no bruit  Pelvis: bladder non-distended, no CVA tenderness  Extremities: No cyanosis or clubbing. 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 Pt AOx1 unable to provide meaningful history. Daughter at bedside states pt has been sick for a few days complaining of symptoms of cough and recently completed 3 days of ciprofloxacin for UTI. Daughter also reports pt has not been drinking or eating much for the past few days. Daughter reports pt was to have a steroid injection in the back this week due to spinal stenosis. *   Patient Currently Takes Medications as of 09-Dec-2023 13:01 documented in Structured Notes  · 	carvedilol 3.125 mg oral tablet: Last Dose Taken:  , 1 tab(s) orally every 12 hours  · 	atorvastatin 20 mg oral tablet: Last Dose Taken:  , 1 tab(s) orally once a day  · 	Linzess 72 mcg oral capsule: 1 cap(s) orally once a day  · 	Xarelto 20 mg oral tablet: 1 tab(s) orally  · 	Restasis 0.05% ophthalmic emulsion: Last Dose Taken:  , 1 drop(s) to each affected eye every 12 hours  · 	Advair Diskus 100 mcg-50 mcg inhalation powder: Last Dose Taken:  , 1 puff(s) inhaled 2 times a day  · 	Azopt 1% ophthalmic suspension: Last Dose Taken:  , 1 drop(s) to right eye 3 times a day  · 	letrozole 2.5 mg oral tablet: 1 tab(s) orally once a day  · 	latanoprost 0.005% ophthalmic solution: Last Dose Taken:  , 1 drop(s) to each affected eye once a day (in the evening)  · 	torsemide 10 mg oral tablet: 1 tab(s) orally once a day  · 	Albuterol (Eqv-ProAir HFA) 90 mcg/inh inhalation aerosol: 2 puff(s) inhaled every 6 hours as needed for  bronchospasm  · 	amLODIPine 5 mg oral tablet: Last Dose Taken:  , 1 tab(s) orally once a day  · 	omeprazole 40 mg oral delayed release capsule: Last Dose Taken:  , 1 cap(s) orally once a day  · 	Januvia 50 mg oral tablet: Last Dose Taken:  , 1 tab(s) orally once a day  · 	Singulair 10 mg oral tablet: Last Dose Taken:  , 1 tab(s) orally once a day  · 	tiZANidine 2 mg oral tablet: Last Dose Taken:  , 2 tab(s) orally every 8 hours  · 	losartan 100 mg oral tablet: Last Dose Taken:  , 1 tab(s) orally once a day    PAST MEDICAL & SURGICAL HISTORY:  DM (diabetes mellitus)      HTN (hypertension)      HLD (hyperlipidemia)      Breast cancer in female      COPD (chronic obstructive pulmonary disease)      PE (pulmonary thromboembolism)      Diabetes      Breast cancer      Blindness of left eye      History of appendectomy      Spinal stenosis      Spinal stenosis      Multiple pulmonary emboli      Fall      Chronic low back pain      Lumbar spondylosis      H/O mastectomy, right          Home Medications Reviewed    Hospital Medications:   MEDICATIONS  (STANDING):  amLODIPine   Tablet 5 milliGRAM(s) Oral daily  atorvastatin 20 milliGRAM(s) Oral at bedtime  budesonide 160 MICROgram(s)/formoterol 4.5 MICROgram(s) Inhaler 2 Puff(s) Inhalation two times a day  carvedilol 3.125 milliGRAM(s) Oral every 12 hours  ciprofloxacin     Tablet 500 milliGRAM(s) Oral every 12 hours  dextrose 5%. 1000 milliLiter(s) (100 mL/Hr) IV Continuous <Continuous>  dorzolamide 2% Ophthalmic Solution 1 Drop(s) Right EYE three times a day  insulin lispro (ADMELOG) corrective regimen sliding scale   SubCutaneous Before meals and at bedtime  latanoprost 0.005% Ophthalmic Solution 1 Drop(s) Both EYES at bedtime  letrozole 2.5 milliGRAM(s) Oral daily  montelukast 10 milliGRAM(s) Oral at bedtime  pantoprazole    Tablet 40 milliGRAM(s) Oral before breakfast  rivaroxaban 20 milliGRAM(s) Oral with dinner    MEDICATIONS  (PRN):  acetaminophen     Tablet .. 650 milliGRAM(s) Oral every 6 hours PRN Temp greater or equal to 38C (100.4F), Mild Pain (1 - 3), Moderate Pain (4 - 6)  albuterol    90 MICROgram(s) HFA Inhaler 2 Puff(s) Inhalation every 6 hours PRN for bronchospasm  aluminum hydroxide/magnesium hydroxide/simethicone Suspension 30 milliLiter(s) Oral every 4 hours PRN Dyspepsia  ondansetron Injectable 4 milliGRAM(s) IV Push every 8 hours PRN Nausea and/or Vomiting      Allergies    aspirin (Stomach Upset)  aspirin (Nausea (Mild))    Intolerances    < from: Xray Chest 1 View-PORTABLE IMMEDIATE (12.09.23 @ 03:40) >  Comparison:  4/24/2019    Portable exam limits assessment of the heart with no change in the   appearance the mediastinum including tracheal deviation to the right and   tortuous uncoiled great vessels. Lungs reveal diffuse coarsened lung   markings. Angles sharp. Bones without acute abnormality.    IMPRESSION: When allowing for technique no change. No acute parenchymal   disease    < end of copied text >    Sodium: 116: TYPE:(C=Critical, N=Notification, A=Abnormal) C Sodium: 120: TYPE:(C=Critical, N=Notification, A=Abnormal) C Sodium: 124 mmol/L (12.09.23 @ 10:11)                         11.2   7.47  )-----------( 298      ( 09 Dec 2023 02:57 )             33.6     12-09    127<L>  |  92<L>  |  14  ----------------------------<  96  4.6   |  31  |  0.72    Ca    9.1      09 Dec 2023 16:40  Phos  3.8     12-09  Mg     1.7     12-09    TPro  5.9<L>  /  Alb  3.2<L>  /  TBili  0.3  /  DBili  x   /  AST  18  /  ALT  31  /  AlkPhos  78  12-09    PT/INR - ( 09 Dec 2023 02:57 )   PT: 19.0 sec;   INR: 1.69 ratio         PTT - ( 09 Dec 2023 02:57 )  PTT:44.2 sec  Urinalysis (12.09.23 @ 03:55)   pH Urine: 6.5  Glucose Qualitative, Urine: Negative mg/dL  Blood, Urine: Negative  Color: Yellow  Urine Appearance: Clear  Bilirubin: Negative  Ketone - Urine: Negative mg/dL  Specific Gravity: 1.009  Protein, Urine: Negative mg/dL  Urobilinogen: 0.2 mg/dL  Nitrite: Negative  Leukocyte Esterase Concentration: Negative    Potassium, Random Urine: 9 mmol/L (12-09 @ 12:15)  Sodium, Random Urine: 36 mmol/L (12-09 @ 04:35)  Creatinine, Random Urine: 35 mg/dL (12-09 @ 04:35)  Osmolality, Random Urine: 263 mos/kg (12-09 @ 04:35)        RADIOLOGY & ADDITIONAL STUDIES:    SOCIAL HISTORY: Denies ETOh,Smoking,     FAMILY HISTORY:      REVIEW OF SYSTEMS: as per the family mental status with no changes .   CONSTITUTIONAL: c/o  malaiseand  fatigue, No fevers or chills.    RESPIRATORY: c/o cough no SOB.   CARDIOVASCULAR: No chest pain or palpitations. No edema  GASTROINTESTINAL: No abdominal or epigastric pain. No nausea, vomiting, or hematemesis; Reduced appetite..  GENITOURINARY: No dysuria, frequency, foamy urine, urinary urgency, incontinence or hematuria  NEUROLOGICAL: No numbness or weakness, No tremor      VITALS:  Vital Signs Last 24 Hrs  T(C): 36.9 (09 Dec 2023 15:58), Max: 36.9 (09 Dec 2023 15:58)  T(F): 98.4 (09 Dec 2023 15:58), Max: 98.4 (09 Dec 2023 15:58)  HR: 76 (09 Dec 2023 15:58) (66 - 76)  BP: 98/73 (09 Dec 2023 15:58) (98/73 - 140/58)  BP(mean): --  RR: 18 (09 Dec 2023 15:58) (14 - 19)  SpO2: 96% (09 Dec 2023 15:58) (93% - 100%)    Parameters below as of 09 Dec 2023 15:58  Patient On (Oxygen Delivery Method): nasal cannula  O2 Flow (L/min): 3      12-09 @ 07:01  -  12-09 @ 17:43  --------------------------------------------------------  IN: 0 mL / OUT: 300 mL / NET: -300 mL      Height (cm): 154.9 (12-09 @ 05:46)  Weight (kg): 75 (12-09 @ 05:46)  BMI (kg/m2): 31.3 (12-09 @ 05:46)  BSA (m2): 1.74 (12-09 @ 05:46)    PHYSICAL EXAM:  Constitutional: NAD    Neck: No JVD  Respiratory:  air entrance B/L, crepitations at the bases  Cardiovascular: S1, S2, RRR, no pericardial rub, no murmur  Gastrointestinal: BS+, soft, no tenderness, no distension, no bruit  Pelvis: bladder non-distended, no CVA tenderness  Extremities: No cyanosis or clubbing. No peripheral edema  Neurological: A/O x 3.

## 2023-12-09 NOTE — H&P ADULT - PROBLEM SELECTOR PLAN 4
Pt has a history of HTN, takes losartan 100mg daily, and amlodipine 5mg daily and torsemide 10mg daily, coreg 3.125mg BID at home  - C/w amlodipine and Coreg  - hold torsemide and losartan as may be worsening hyponatremia

## 2023-12-09 NOTE — H&P ADULT - HISTORY OF PRESENT ILLNESS
This is a 89 yo F from home, ambulates with walker with pmhx of HTN, DM, breast CA (s/p R. sided mastectomy) that presents to the ED complaining of slurred speech. Per grandson, who is at bedside, at 11:30 pm he was ambulating patient to her bed when he noticed she was slurring her speech, weak and very altered. He states that normally she is AAOx2 at baseline, however, she was not responding to commands appropriately and was not making any sense at the time. He mentions that 2 weeks ago she had URI symptoms, which included a cough w/ clear productive sputum, and hasn't been eating well since.

## 2023-12-09 NOTE — H&P ADULT - VTE RISK ASSESSMENT
Pt stated she need a new rx stating she can take 2 pills a day , Only have 2 pills left, Pharmacy will not let her refill because it's too soon to Refill taking 1 day  Stated she was told to take 2   Please advise on new rx             ASSESSMENT/PLAN:   1
VTE Assessment already completed for this visit
<<--- Click to launch

## 2023-12-09 NOTE — H&P ADULT - NSHPPHYSICALEXAM_GEN_ALL_CORE
LOS:     VITALS:   T(C): 36.2 (12-09-23 @ 04:33), Max: 36.3 (12-09-23 @ 02:25)  HR: 66 (12-09-23 @ 04:33) (66 - 75)  BP: 118/59 (12-09-23 @ 04:33) (107/59 - 119/59)  RR: 19 (12-09-23 @ 04:33) (16 - 19)  SpO2: 98% (12-09-23 @ 04:33) (93% - 98%)    GENERAL: elderly female, NAD, lying in bed comfortably  HEAD:  Atraumatic, Normocephalic  EYES: EOMI, PERRLA, conjunctiva and sclera clear  ENT: +Dry mucous membranes  NECK: Supple, No JVD  CHEST/LUNG: Clear to auscultation bilaterally; No rales, rhonchi, wheezing, or rubs. Unlabored respirations  HEART: Regular rate and rhythm; No murmurs, rubs, or gallops  ABDOMEN: BSx4; Soft, nontender, nondistended  EXTREMITIES:  2+ Peripheral Pulses, brisk capillary refill. No clubbing, cyanosis, or edema  NERVOUS SYSTEM:  A&Ox2, no focal deficits   SKIN: No rashes or lesions
GENERAL: NAD, laying in bed, obese female  HEAD:  Atraumatic, Normocephalic  EYES: Left eye blindness  NECK: Supple  CHEST/LUNG: wheezing b/l exam  HEART: Regular rate and rhythm; No murmurs, rubs, or gallops  ABDOMEN: Soft, Nontender, mildly distended; Bowel sounds present  EXTREMITIES:  2+ Peripheral Pulses, No edema  PSYCH: AAOx1  NEUROLOGY: non-focal  SKIN: No rashes or lesions

## 2023-12-09 NOTE — H&P ADULT - NSICDXPASTMEDICALHX_GEN_ALL_CORE_FT
PAST MEDICAL HISTORY:  Blindness of left eye     Breast cancer     Breast cancer in female     Chronic low back pain     COPD (chronic obstructive pulmonary disease)     Diabetes     DM (diabetes mellitus)     Fall     History of appendectomy     HLD (hyperlipidemia)     HTN (hypertension)     Lumbar spondylosis     Multiple pulmonary emboli     PE (pulmonary thromboembolism)     Spinal stenosis     Spinal stenosis     
PAST MEDICAL HISTORY:  Blindness of left eye     Breast cancer     Breast cancer in female     Chronic low back pain     COPD (chronic obstructive pulmonary disease)     Diabetes     DM (diabetes mellitus)     Fall     History of appendectomy     HLD (hyperlipidemia)     HTN (hypertension)     Lumbar spondylosis     Multiple pulmonary emboli     PE (pulmonary thromboembolism)     Spinal stenosis     Spinal stenosis

## 2023-12-09 NOTE — H&P ADULT - NSHPREVIEWOFSYSTEMS_GEN_ALL_CORE
REVIEW OF SYSTEMS:    CONSTITUTIONAL: +weakness. No fevers or chills  EYES/ENT: No visual changes;  No vertigo or throat pain   NECK: No pain or stiffness  RESPIRATORY: No cough, wheezing, hemoptysis; No shortness of breath  CARDIOVASCULAR: No chest pain or palpitations  GASTROINTESTINAL: No abdominal or epigastric pain. No nausea, vomiting, or hematemesis; No diarrhea or constipation. No melena or hematochezia.  GENITOURINARY: No dysuria, frequency or hematuria  NEUROLOGICAL: No numbness or weakness  SKIN: No itching, rashes

## 2023-12-09 NOTE — ED PROVIDER NOTE - OBJECTIVE STATEMENT
Patient presents as a possible stroke notification.  According to family patient blood pressure was low this evening around 80 systolic when they tried to get her off her chair in the living room she seemed generalized weak not able to stand on her own for the mother noticed her speech was slurred but called the ambulance.  Daughter states patient is been treated for UTI.  No fever no chills no nausea vomiting or diarrhea.  Patient takes torsemide and Xarelto in addition to carvedilol.

## 2023-12-09 NOTE — ED PROVIDER NOTE - PHYSICAL EXAMINATION
Patient is awake answering appropriately mild speech slurring noted.  Generalized weakness but patient is able to lift and hold up both arms and both legs.  No facial droop.  Left eye cataract.  Sensation intact.  NIH stroke scale 1 for slurred speech

## 2023-12-09 NOTE — H&P ADULT - PROBLEM SELECTOR PLAN 10
Pt has a history of chronic back pain, takes tizanidine 2mg daily for spasms  - daughter request to hold medication for now due to adverse effects on BP  - restart as needed

## 2023-12-09 NOTE — ED ADULT TRIAGE NOTE - CHIEF COMPLAINT QUOTE
she has a general weakness, slurred speech and hypotension started 1 hour ago  as per family she has a general weakness, slurred speech and hypotension started 1 hour ago  as per family  Dr Berg notified , code stroke called

## 2023-12-09 NOTE — CONSULT NOTE ADULT - SUBJECTIVE AND OBJECTIVE BOX
PULMONARY CONSULT NOTE      SIMONA CARLTON  MRN-[id number]      HISTORY OF PRESENT ILLNESS:    MEDICATIONS  (STANDING):  enoxaparin Injectable 40 milliGRAM(s) SubCutaneous every 24 hours  insulin lispro (ADMELOG) corrective regimen sliding scale   SubCutaneous Before meals and at bedtime      MEDICATIONS  (PRN):      Allergies    aspirin (Stomach Upset)  aspirin (Nausea (Mild))    Intolerances        PAST MEDICAL & SURGICAL HISTORY:  DM (diabetes mellitus)      HTN (hypertension)      HLD (hyperlipidemia)      Breast cancer in female      COPD (chronic obstructive pulmonary disease)      PE (pulmonary thromboembolism)      Diabetes      Breast cancer      Blindness of left eye      History of appendectomy      Spinal stenosis      Spinal stenosis      Multiple pulmonary emboli      Fall      Chronic low back pain      Lumbar spondylosis      H/O mastectomy, right          FAMILY HISTORY:      SOCIAL HISTORY  Smoking History:     REVIEW OF SYSTEMS:    CONSTITUTIONAL:  No fevers, chills, sweats    HEENT:  Eyes:  No diplopia or blurred vision. ENT:  No earache, sore throat or runny nose.    CARDIOVASCULAR:  No pressure, squeezing, tightness, or heaviness about the chest; no palpitations.    RESPIRATORY:  Per HPI    GASTROINTESTINAL:  No abdominal pain, nausea, vomiting or diarrhea.    GENITOURINARY:  No dysuria, frequency or urgency.    NEUROLOGIC:  No paresthesias, fasciculations, seizures or weakness.    PSYCHIATRIC:  No disorder of thought or mood.    Vital Signs Last 24 Hrs  T(C): 36.3 (09 Dec 2023 10:39), Max: 36.3 (09 Dec 2023 02:25)  T(F): 97.4 (09 Dec 2023 10:39), Max: 97.4 (09 Dec 2023 10:39)  HR: 76 (09 Dec 2023 10:39) (66 - 76)  BP: 126/60 (09 Dec 2023 10:39) (107/59 - 130/57)  BP(mean): --  RR: 14 (09 Dec 2023 10:39) (14 - 19)  SpO2: 99% (09 Dec 2023 10:39) (93% - 100%)    Parameters below as of 09 Dec 2023 10:39  Patient On (Oxygen Delivery Method): nasal cannula  O2 Flow (L/min): 3    I&O's Detail      PHYSICAL EXAMINATION:    GENERAL: The patient is a well-developed, well-nourished _____in no apparent distress.     HEENT: Head is normocephalic and atraumatic. Extraocular muscles are intact. Mucous membranes are moist.     NECK: Supple.     LUNGS: Clear to auscultation without wheezing, rales, or rhonchi. Respirations unlabored    HEART: Regular rate and rhythm without murmur.    ABDOMEN: Soft, nontender, and nondistended.  No hepatosplenomegaly is noted.    EXTREMITIES: Without any cyanosis, clubbing, rash, lesions or edema.    NEUROLOGIC: Grossly intact.      LABS:                        11.2   7.47  )-----------( 298      ( 09 Dec 2023 02:57 )             33.6     12-09    124<L>  |  90<L>  |  17  ----------------------------<  112<H>  5.1   |  32<H>  |  x     Ca    9.7      09 Dec 2023 10:11  Mg     1.7     12-09    TPro  5.9<L>  /  Alb  3.2<L>  /  TBili  0.3  /  DBili  x   /  AST  18  /  ALT  31  /  AlkPhos  78  12-09    PT/INR - ( 09 Dec 2023 02:57 )   PT: 19.0 sec;   INR: 1.69 ratio         PTT - ( 09 Dec 2023 02:57 )  PTT:44.2 sec  Urinalysis Basic - ( 09 Dec 2023 10:11 )    Color: x / Appearance: x / SG: x / pH: x  Gluc: 112 mg/dL / Ketone: x  / Bili: x / Urobili: x   Blood: x / Protein: x / Nitrite: x   Leuk Esterase: x / RBC: x / WBC x   Sq Epi: x / Non Sq Epi: x / Bacteria: x                Lactate, Blood: 1.8 mmol/L (12-09-23 @ 02:57)        MICROBIOLOGY:    RADIOLOGY & ADDITIONAL STUDIES:    CXR:    Ct scan chest;    ekg;    echo: PULMONARY CONSULT NOTE      SIMONA CARLTON  MRN-[id number]      HISTORY OF PRESENT ILLNESS:    MEDICATIONS  (STANDING):  enoxaparin Injectable 40 milliGRAM(s) SubCutaneous every 24 hours  insulin lispro (ADMELOG) corrective regimen sliding scale   SubCutaneous Before meals and at bedtime      MEDICATIONS  (PRN):      Allergies    aspirin (Stomach Upset)  aspirin (Nausea (Mild))    Intolerances        PAST MEDICAL & SURGICAL HISTORY:  DM (diabetes mellitus)      HTN (hypertension)      HLD (hyperlipidemia)      Breast cancer in female      COPD (chronic obstructive pulmonary disease)      PE (pulmonary thromboembolism)      Diabetes      Breast cancer      Blindness of left eye      History of appendectomy      Spinal stenosis      Spinal stenosis      Multiple pulmonary emboli      Fall      Chronic low back pain      Lumbar spondylosis      H/O mastectomy, right          FAMILY HISTORY:      SOCIAL HISTORY  Smoking History:     REVIEW OF SYSTEMS:    CONSTITUTIONAL:  No fevers, chills, sweats    HEENT:  Eyes:  No diplopia or blurred vision. ENT:  No earache, sore throat or runny nose.    CARDIOVASCULAR:  No pressure, squeezing, tightness, or heaviness about the chest; no palpitations.    RESPIRATORY:  Per HPI    GASTROINTESTINAL:  No abdominal pain, nausea, vomiting or diarrhea.    GENITOURINARY:  No dysuria, frequency or urgency.    NEUROLOGIC:  No paresthesias, fasciculations, seizures or weakness.    PSYCHIATRIC:  No disorder of thought or mood.    Vital Signs Last 24 Hrs  T(C): 36.3 (09 Dec 2023 10:39), Max: 36.3 (09 Dec 2023 02:25)  T(F): 97.4 (09 Dec 2023 10:39), Max: 97.4 (09 Dec 2023 10:39)  HR: 76 (09 Dec 2023 10:39) (66 - 76)  BP: 126/60 (09 Dec 2023 10:39) (107/59 - 130/57)  BP(mean): --  RR: 14 (09 Dec 2023 10:39) (14 - 19)  SpO2: 99% (09 Dec 2023 10:39) (93% - 100%)    Parameters below as of 09 Dec 2023 10:39  Patient On (Oxygen Delivery Method): nasal cannula  O2 Flow (L/min): 3    I&O's Detail      PHYSICAL EXAMINATION:    GENERAL: The patient is a well-developed, well-nourished _____in no apparent distress.     HEENT: Head is normocephalic and atraumatic. Extraocular muscles are intact. Mucous membranes are moist.     NECK: Supple.     LUNGS: Clear to auscultation without wheezing, rales, or rhonchi. Respirations unlabored    HEART: Regular rate and rhythm without murmur.    ABDOMEN: Soft, nontender, and nondistended.  No hepatosplenomegaly is noted.    EXTREMITIES: Without any cyanosis, clubbing, rash, lesions or edema.    NEUROLOGIC: Grossly intact.      LABS:                        11.2   7.47  )-----------( 298      ( 09 Dec 2023 02:57 )             33.6     12-09    124<L>  |  90<L>  |  17  ----------------------------<  112<H>  5.1   |  32<H>  |  x     Ca    9.7      09 Dec 2023 10:11  Mg     1.7     12-09    TPro  5.9<L>  /  Alb  3.2<L>  /  TBili  0.3  /  DBili  x   /  AST  18  /  ALT  31  /  AlkPhos  78  12-09    PT/INR - ( 09 Dec 2023 02:57 )   PT: 19.0 sec;   INR: 1.69 ratio         PTT - ( 09 Dec 2023 02:57 )  PTT:44.2 sec  Urinalysis Basic - ( 09 Dec 2023 10:11 )    Color: x / Appearance: x / SG: x / pH: x  Gluc: 112 mg/dL / Ketone: x  / Bili: x / Urobili: x   Blood: x / Protein: x / Nitrite: x   Leuk Esterase: x / RBC: x / WBC x   Sq Epi: x / Non Sq Epi: x / Bacteria: x                Lactate, Blood: 1.8 mmol/L (12-09-23 @ 02:57)        MICROBIOLOGY:    RADIOLOGY & ADDITIONAL STUDIES:    CXR:    Ct scan chest;    ekg;    echo: PULMONARY CONSULT NOTE      SIMONA CARLTON  MRN-[id number]    History of Present Illness:   89 y/o F with PMHx of, DM, HTN, HLD, GERD, DVT and PAF/DVT/PE on Xarelto and 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 Pt AOx1 unable to provide meaningful history. Daughter at bedside states pt has been sick for a few days complaining of symptoms of cough.     HISTORY OF PRESENT ILLNESS: As above. Awake, alert and lying in bed in NAD with oxygen supp via NC.    MEDICATIONS  (STANDING):  enoxaparin Injectable 40 milliGRAM(s) SubCutaneous every 24 hours  insulin lispro (ADMELOG) corrective regimen sliding scale   SubCutaneous Before meals and at bedtime      MEDICATIONS  (PRN):      Allergies    aspirin (Stomach Upset)  aspirin (Nausea (Mild))    Intolerances        PAST MEDICAL & SURGICAL HISTORY:  DM (diabetes mellitus)      HTN (hypertension)      HLD (hyperlipidemia)      Breast cancer in female      COPD (chronic obstructive pulmonary disease)      PE (pulmonary thromboembolism)      Diabetes      Breast cancer      Blindness of left eye      History of appendectomy      Spinal stenosis      Spinal stenosis      Multiple pulmonary emboli      Fall      Chronic low back pain      Lumbar spondylosis      H/O mastectomy, right          FAMILY HISTORY:      SOCIAL HISTORY  Smoking History:     REVIEW OF SYSTEMS:    CONSTITUTIONAL:  No fevers, chills, sweats    HEENT:  Eyes:  No diplopia or blurred vision. ENT:  No earache, sore throat or runny nose.    CARDIOVASCULAR:  No pressure, squeezing, tightness, or heaviness about the chest; no palpitations.    RESPIRATORY:  Per HPI    GASTROINTESTINAL:  No abdominal pain, nausea, vomiting or diarrhea.    GENITOURINARY:  No dysuria, frequency or urgency.    NEUROLOGIC:  No paresthesias, fasciculations, seizures or weakness.    PSYCHIATRIC:  No disorder of thought or mood.    Vital Signs Last 24 Hrs  T(C): 36.3 (09 Dec 2023 10:39), Max: 36.3 (09 Dec 2023 02:25)  T(F): 97.4 (09 Dec 2023 10:39), Max: 97.4 (09 Dec 2023 10:39)  HR: 76 (09 Dec 2023 10:39) (66 - 76)  BP: 126/60 (09 Dec 2023 10:39) (107/59 - 130/57)  BP(mean): --  RR: 14 (09 Dec 2023 10:39) (14 - 19)  SpO2: 99% (09 Dec 2023 10:39) (93% - 100%)    Parameters below as of 09 Dec 2023 10:39  Patient On (Oxygen Delivery Method): nasal cannula  O2 Flow (L/min): 3    I&O's Detail      PHYSICAL EXAMINATION:    GENERAL: The patient is a well-developed, well-nourished _____in no apparent distress.     HEENT: Head is normocephalic and atraumatic. Extraocular muscles are intact. Mucous membranes are moist.     NECK: Supple.     LUNGS: Rales(+) anteriorly.    HEART: Regular rate and rhythm without murmur.    ABDOMEN: Soft, nontender, and nondistended.  No hepatosplenomegaly is noted.    EXTREMITIES: Without any cyanosis, clubbing, rash, lesions or edema.    NEUROLOGIC: Grossly intact.      LABS:                        11.2   7.47  )-----------( 298      ( 09 Dec 2023 02:57 )             33.6     12-09    124<L>  |  90<L>  |  17  ----------------------------<  112<H>  5.1   |  32<H>  |  x     Ca    9.7      09 Dec 2023 10:11  Mg     1.7     12-09    TPro  5.9<L>  /  Alb  3.2<L>  /  TBili  0.3  /  DBili  x   /  AST  18  /  ALT  31  /  AlkPhos  78  12-09    PT/INR - ( 09 Dec 2023 02:57 )   PT: 19.0 sec;   INR: 1.69 ratio         PTT - ( 09 Dec 2023 02:57 )  PTT:44.2 sec  Urinalysis Basic - ( 09 Dec 2023 10:11 )    Color: x / Appearance: x / SG: x / pH: x  Gluc: 112 mg/dL / Ketone: x  / Bili: x / Urobili: x   Blood: x / Protein: x / Nitrite: x   Leuk Esterase: x / RBC: x / WBC x   Sq Epi: x / Non Sq Epi: x / Bacteria: x                Lactate, Blood: 1.8 mmol/L (12-09-23 @ 02:57)        MICROBIOLOGY:    RADIOLOGY & ADDITIONAL STUDIES:    CXR:    Ct scan chest;    ekg;    echo: PULMONARY CONSULT NOTE      SIMONA CARLTON  MRN-[id number]    History of Present Illness:   89 y/o F with PMHx of, DM, HTN, HLD, GERD, DVT and PAF/DVT/PE on Xarelto and 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 Pt AOx1 unable to provide meaningful history. Daughter at bedside states pt has been sick for a few days complaining of symptoms of cough.     HISTORY OF PRESENT ILLNESS: As above. Awake, alert and lying in bed in NAD with oxygen supp via NC.    MEDICATIONS  (STANDING):  enoxaparin Injectable 40 milliGRAM(s) SubCutaneous every 24 hours  insulin lispro (ADMELOG) corrective regimen sliding scale   SubCutaneous Before meals and at bedtime      MEDICATIONS  (PRN):      Allergies    aspirin (Stomach Upset)  aspirin (Nausea (Mild))    Intolerances        PAST MEDICAL & SURGICAL HISTORY:  DM (diabetes mellitus)      HTN (hypertension)      HLD (hyperlipidemia)      Breast cancer in female      COPD (chronic obstructive pulmonary disease)      PE (pulmonary thromboembolism)      Diabetes      Breast cancer      Blindness of left eye      History of appendectomy      Spinal stenosis      Spinal stenosis      Multiple pulmonary emboli      Fall      Chronic low back pain      Lumbar spondylosis      H/O mastectomy, right          FAMILY HISTORY:      SOCIAL HISTORY  Smoking History:     REVIEW OF SYSTEMS:    CONSTITUTIONAL:  No fevers, chills, sweats    HEENT:  Eyes:  No diplopia or blurred vision. ENT:  No earache, sore throat or runny nose.    CARDIOVASCULAR:  No pressure, squeezing, tightness, or heaviness about the chest; no palpitations.    RESPIRATORY:  Per HPI    GASTROINTESTINAL:  No abdominal pain, nausea, vomiting or diarrhea.    GENITOURINARY:  No dysuria, frequency or urgency.    NEUROLOGIC:  No paresthesias, fasciculations, seizures or weakness.    PSYCHIATRIC:  No disorder of thought or mood.    Vital Signs Last 24 Hrs  T(C): 36.3 (09 Dec 2023 10:39), Max: 36.3 (09 Dec 2023 02:25)  T(F): 97.4 (09 Dec 2023 10:39), Max: 97.4 (09 Dec 2023 10:39)  HR: 76 (09 Dec 2023 10:39) (66 - 76)  BP: 126/60 (09 Dec 2023 10:39) (107/59 - 130/57)  BP(mean): --  RR: 14 (09 Dec 2023 10:39) (14 - 19)  SpO2: 99% (09 Dec 2023 10:39) (93% - 100%)    Parameters below as of 09 Dec 2023 10:39  Patient On (Oxygen Delivery Method): nasal cannula  O2 Flow (L/min): 3    I&O's Detail      PHYSICAL EXAMINATION:    GENERAL: The patient is a well-developed, well-nourished _____in no apparent distress.     HEENT: Head is normocephalic and atraumatic. Extraocular muscles are intact. Mucous membranes are moist.     NECK: Supple.     LUNGS: Rales(+) anteriorly.    HEART: Regular rate and rhythm without murmur.    ABDOMEN: Soft, nontender, and nondistended.  No hepatosplenomegaly is noted.    EXTREMITIES: Without any cyanosis, clubbing, rash, lesions or edema.    NEUROLOGIC: Grossly intact.      LABS:                        11.2   7.47  )-----------( 298      ( 09 Dec 2023 02:57 )             33.6     12-09    124<L>  |  90<L>  |  17  ----------------------------<  112<H>  5.1   |  32<H>  |  x     Ca    9.7      09 Dec 2023 10:11  Mg     1.7     12-09    TPro  5.9<L>  /  Alb  3.2<L>  /  TBili  0.3  /  DBili  x   /  AST  18  /  ALT  31  /  AlkPhos  78  12-09    PT/INR - ( 09 Dec 2023 02:57 )   PT: 19.0 sec;   INR: 1.69 ratio         PTT - ( 09 Dec 2023 02:57 )  PTT:44.2 sec  Urinalysis Basic - ( 09 Dec 2023 10:11 )    Color: x / Appearance: x / SG: x / pH: x  Gluc: 112 mg/dL / Ketone: x  / Bili: x / Urobili: x   Blood: x / Protein: x / Nitrite: x   Leuk Esterase: x / RBC: x / WBC x   Sq Epi: x / Non Sq Epi: x / Bacteria: x                Lactate, Blood: 1.8 mmol/L (12-09-23 @ 02:57)        MICROBIOLOGY:    RADIOLOGY & ADDITIONAL STUDIES:    CXR:    Ct scan chest;    ekg;    echo: PULMONARY CONSULT NOTE      SIMONA CARLTON  MRN-[id number]    History of Present Illness:   89 y/o F with PMHx of, DM, HTN, HLD, GERD, DVT and PAF/DVT/PE on Xarelto and 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 Pt AOx1 unable to provide meaningful history. Daughter at bedside states pt has been sick for a few days complaining of symptoms of cough.     HISTORY OF PRESENT ILLNESS: As above. Awake, alert and lying in bed in NAD with oxygen supp via NC.    MEDICATIONS  (STANDING):  enoxaparin Injectable 40 milliGRAM(s) SubCutaneous every 24 hours  insulin lispro (ADMELOG) corrective regimen sliding scale   SubCutaneous Before meals and at bedtime      MEDICATIONS  (PRN):      Allergies    aspirin (Stomach Upset)  aspirin (Nausea (Mild))    Intolerances        PAST MEDICAL & SURGICAL HISTORY:  DM (diabetes mellitus)      HTN (hypertension)      HLD (hyperlipidemia)      Breast cancer in female      COPD (chronic obstructive pulmonary disease)      PE (pulmonary thromboembolism)      Diabetes      Breast cancer      Blindness of left eye      History of appendectomy      Spinal stenosis      Spinal stenosis      Multiple pulmonary emboli      Fall      Chronic low back pain      Lumbar spondylosis      H/O mastectomy, right          FAMILY HISTORY:      SOCIAL HISTORY  Smoking History:     REVIEW OF SYSTEMS:    CONSTITUTIONAL:  No fevers, chills, sweats    HEENT:  Eyes:  No diplopia or blurred vision. ENT:  No earache, sore throat or runny nose.    CARDIOVASCULAR:  No pressure, squeezing, tightness, or heaviness about the chest; no palpitations.    RESPIRATORY:  Per HPI    GASTROINTESTINAL:  No abdominal pain, nausea, vomiting or diarrhea.    GENITOURINARY:  No dysuria, frequency or urgency.    NEUROLOGIC:  No paresthesias, fasciculations, seizures or weakness.    PSYCHIATRIC:  No disorder of thought or mood.    Vital Signs Last 24 Hrs  T(C): 36.3 (09 Dec 2023 10:39), Max: 36.3 (09 Dec 2023 02:25)  T(F): 97.4 (09 Dec 2023 10:39), Max: 97.4 (09 Dec 2023 10:39)  HR: 76 (09 Dec 2023 10:39) (66 - 76)  BP: 126/60 (09 Dec 2023 10:39) (107/59 - 130/57)  BP(mean): --  RR: 14 (09 Dec 2023 10:39) (14 - 19)  SpO2: 99% (09 Dec 2023 10:39) (93% - 100%)    Parameters below as of 09 Dec 2023 10:39  Patient On (Oxygen Delivery Method): nasal cannula  O2 Flow (L/min): 3    I&O's Detail      PHYSICAL EXAMINATION:    GENERAL: The patient is a well-developed, well-nourished _____in no apparent distress.     HEENT: Head is normocephalic and atraumatic. Extraocular muscles are intact. Mucous membranes are moist.     NECK: Supple.     LUNGS: Rales(+) anteriorly.    HEART: Regular rate and rhythm without murmur.    ABDOMEN: Soft, nontender, and nondistended.  No hepatosplenomegaly is noted.    EXTREMITIES: Without any cyanosis, clubbing, rash, lesions or edema.    NEUROLOGIC: Grossly intact.      LABS:                        11.2   7.47  )-----------( 298      ( 09 Dec 2023 02:57 )             33.6     12-09    124<L>  |  90<L>  |  17  ----------------------------<  112<H>  5.1   |  32<H>  |  x     Ca    9.7      09 Dec 2023 10:11  Mg     1.7     12-09    TPro  5.9<L>  /  Alb  3.2<L>  /  TBili  0.3  /  DBili  x   /  AST  18  /  ALT  31  /  AlkPhos  78  12-09    PT/INR - ( 09 Dec 2023 02:57 )   PT: 19.0 sec;   INR: 1.69 ratio         PTT - ( 09 Dec 2023 02:57 )  PTT:44.2 sec  Urinalysis Basic - ( 09 Dec 2023 10:11 )    Color: x / Appearance: x / SG: x / pH: x  Gluc: 112 mg/dL / Ketone: x  / Bili: x / Urobili: x   Blood: x / Protein: x / Nitrite: x   Leuk Esterase: x / RBC: x / WBC x   Sq Epi: x / Non Sq Epi: x / Bacteria: x                Lactate, Blood: 1.8 mmol/L (12-09-23 @ 02:57)        MICROBIOLOGY:    RADIOLOGY & ADDITIONAL STUDIES:    CXR:  < from: Xray Chest 1 View-PORTABLE IMMEDIATE (12.09.23 @ 03:40) >  IMPRESSION: When allowing for technique no change. No acute parenchymal   disease    --- End of Report ---    < end of copied text >    Ct scan chest;  < from: CT Head No Cont (12.09.23 @ 03:15) >  IMPRESSION:  No acute intracranial hemorrhage or mass effect.    --- End of Report ---    < end of copied text >      ekg;    echo: PULMONARY CONSULT NOTE      SIMONA CARLTON  MRN-[id number]    History of Present Illness:   89 y/o F with PMHx of, DM, HTN, HLD, GERD, DVT and PAF/DVT/PE on Xarelto and 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 Pt AOx1 unable to provide meaningful history. Daughter at bedside states pt has been sick for a few days complaining of symptoms of cough.     HISTORY OF PRESENT ILLNESS: As above. Awake, alert and lying in bed in NAD with oxygen supp via NC.    MEDICATIONS  (STANDING):  enoxaparin Injectable 40 milliGRAM(s) SubCutaneous every 24 hours  insulin lispro (ADMELOG) corrective regimen sliding scale   SubCutaneous Before meals and at bedtime      MEDICATIONS  (PRN):      Allergies    aspirin (Stomach Upset)  aspirin (Nausea (Mild))    Intolerances        PAST MEDICAL & SURGICAL HISTORY:  DM (diabetes mellitus)      HTN (hypertension)      HLD (hyperlipidemia)      Breast cancer in female      COPD (chronic obstructive pulmonary disease)      PE (pulmonary thromboembolism)      Diabetes      Breast cancer      Blindness of left eye      History of appendectomy      Spinal stenosis      Spinal stenosis      Multiple pulmonary emboli      Fall      Chronic low back pain      Lumbar spondylosis      H/O mastectomy, right          FAMILY HISTORY:      SOCIAL HISTORY  Smoking History:     REVIEW OF SYSTEMS:    CONSTITUTIONAL:  No fevers, chills, sweats    HEENT:  Eyes:  No diplopia or blurred vision. ENT:  No earache, sore throat or runny nose.    CARDIOVASCULAR:  No pressure, squeezing, tightness, or heaviness about the chest; no palpitations.    RESPIRATORY:  Per HPI    GASTROINTESTINAL:  No abdominal pain, nausea, vomiting or diarrhea.    GENITOURINARY:  No dysuria, frequency or urgency.    NEUROLOGIC:  No paresthesias, fasciculations, seizures or weakness.    PSYCHIATRIC:  No disorder of thought or mood.    Vital Signs Last 24 Hrs  T(C): 36.3 (09 Dec 2023 10:39), Max: 36.3 (09 Dec 2023 02:25)  T(F): 97.4 (09 Dec 2023 10:39), Max: 97.4 (09 Dec 2023 10:39)  HR: 76 (09 Dec 2023 10:39) (66 - 76)  BP: 126/60 (09 Dec 2023 10:39) (107/59 - 130/57)  BP(mean): --  RR: 14 (09 Dec 2023 10:39) (14 - 19)  SpO2: 99% (09 Dec 2023 10:39) (93% - 100%)    Parameters below as of 09 Dec 2023 10:39  Patient On (Oxygen Delivery Method): nasal cannula  O2 Flow (L/min): 3    I&O's Detail      PHYSICAL EXAMINATION:    GENERAL: The patient is a well-developed, well-nourished _____in no apparent distress.     HEENT: Head is normocephalic and atraumatic. Extraocular muscles are intact. Mucous membranes are moist.     NECK: Supple.     LUNGS: Rales(+) anteriorly.    HEART: Regular rate and rhythm without murmur.    ABDOMEN: Soft, nontender, and nondistended.  No hepatosplenomegaly is noted.    EXTREMITIES: Without any cyanosis, clubbing, rash, lesions or edema.    NEUROLOGIC: Grossly intact.      LABS:                        11.2   7.47  )-----------( 298      ( 09 Dec 2023 02:57 )             33.6     12-09    124<L>  |  90<L>  |  17  ----------------------------<  112<H>  5.1   |  32<H>  |  x     Ca    9.7      09 Dec 2023 10:11  Mg     1.7     12-09    TPro  5.9<L>  /  Alb  3.2<L>  /  TBili  0.3  /  DBili  x   /  AST  18  /  ALT  31  /  AlkPhos  78  12-09    PT/INR - ( 09 Dec 2023 02:57 )   PT: 19.0 sec;   INR: 1.69 ratio         PTT - ( 09 Dec 2023 02:57 )  PTT:44.2 sec  Urinalysis Basic - ( 09 Dec 2023 10:11 )    Color: x / Appearance: x / SG: x / pH: x  Gluc: 112 mg/dL / Ketone: x  / Bili: x / Urobili: x   Blood: x / Protein: x / Nitrite: x   Leuk Esterase: x / RBC: x / WBC x   Sq Epi: x / Non Sq Epi: x / Bacteria: x                Lactate, Blood: 1.8 mmol/L (12-09-23 @ 02:57)        MICROBIOLOGY:    RADIOLOGY & ADDITIONAL STUDIES:    CXR:  < from: Xray Chest 1 View-PORTABLE IMMEDIATE (12.09.23 @ 03:40) >  IMPRESSION: When allowing for technique no change. No acute parenchymal   disease    --- End of Report ---    < end of copied text >    Ct scan chest;  < from: CT Head No Cont (12.09.23 @ 03:15) >  IMPRESSION:  No acute intracranial hemorrhage or mass effect.    --- End of Report ---    < end of copied text >      ekg;    echo:

## 2023-12-09 NOTE — H&P ADULT - PROBLEM SELECTOR PLAN 2
K 6.1 on admission   s/p hyperkalemia cocktail and lokelma  - Monitor potassium  - F/u urine potassium

## 2023-12-09 NOTE — ED PROVIDER NOTE - CARE PLAN
1 Principal Discharge DX:	Hyponatremia  Secondary Diagnosis:	Hyperkalemia  Secondary Diagnosis:	Acute encephalopathy  Secondary Diagnosis:	Breast cancer  Secondary Diagnosis:	Slurred speech

## 2023-12-09 NOTE — ED ADULT NURSE NOTE - NSFALLUNIVINTERV_ED_ALL_ED
Bed/Stretcher in lowest position, wheels locked, appropriate side rails in place/Call bell, personal items and telephone in reach/Instruct patient to call for assistance before getting out of bed/chair/stretcher/Non-slip footwear applied when patient is off stretcher/[place] to call system/Physically safe environment - no spills, clutter or unnecessary equipment/Purposeful proactive rounding/Room/bathroom lighting operational, light cord in reach Bed/Stretcher in lowest position, wheels locked, appropriate side rails in place/Call bell, personal items and telephone in reach/Instruct patient to call for assistance before getting out of bed/chair/stretcher/Non-slip footwear applied when patient is off stretcher/[place] to call system/Physically safe environment - no spills, clutter or unnecessary equipment/Purposeful proactive rounding/Room/bathroom lighting operational, light cord in reach

## 2023-12-09 NOTE — PATIENT PROFILE ADULT - OVER THE PAST TWO WEEKS, HAVE YOU FELT LITTLE INTEREST OR PLEASURE IN DOING THINGS?
Pt has hard splint applied to the R arm, applied by MD. Strong equal strength bilaterally, sensory in tact. no

## 2023-12-09 NOTE — CONSULT NOTE ADULT - ASSESSMENT
89 years old with hyponatremia admitted for Hyponatremia, c/c n feeling sick and cough. Low BP on admission to the ER improved with NS.   Sodium increased from  116 to 120 in 2 hours and later 124 in am. This fast response  follow correction of volume and reduced ADH .   Etiology of hyponatremia is due to sodium and fluid losses due to use of diuretics, reduced solid food intake , fluid intake and use of ARB , increased ADH due to Hypovolemia  Hyperkalemia improved.   Suggest to start Hypotonic solutions, bolus 500 ml now and continue D5W 100 ML per hour. If no improvement , can start Desmopressin IV or SQ  1 mcg , need to evaluate effect 6 hours and dose if need to be given every 6-8 hours as need.  Strict monitor intake and output.   Follow TSH  Hold all ACE I , ARB at present

## 2023-12-09 NOTE — H&P ADULT - ASSESSMENT
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 Admitted for hyponatremia management. 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 Admitted for hyponatremia management.

## 2023-12-09 NOTE — ED ADULT NURSE NOTE - OBJECTIVE STATEMENT
Pt BIBEMS with c/o slurred speech and c/o generalized weakness an hour ago as per family . Code stroke activated

## 2023-12-09 NOTE — H&P ADULT - PROBLEM SELECTOR PLAN 3
Pt requriring 2LNC , saturating 100%, however desats to 80s when on RA. Pt uses oxygen at home as needed  - C/w Albuterol PRN  - C/w Singulair  - Wean Oxygen as tolerated

## 2023-12-09 NOTE — PATIENT PROFILE ADULT - FUNCTIONAL ASSESSMENT - BASIC MOBILITY 6.
2-calculated by average/Not able to assess (calculate score using [facility] averaging method)  2-calculated by average/Not able to assess (calculate score using [facility] averaging method)
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
## 2023-12-09 NOTE — H&P ADULT - HISTORY OF PRESENT ILLNESS
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 Pt AOx1 unable to provide meaningful history. Daughter at bedside states pt has been sick for a few days complaining of symptoms of cough.  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 Pt AOx1 unable to provide meaningful history. Daughter at bedside states pt has been sick for a few days complaining of symptoms of cough and recently completed 3 days of ciprofloxacin for UTI. Daughter also reports pt has not been drinking or eating much for the past few days. Daughter reports pt was to have a steroid injection in the back this week due to spinal stenosis.

## 2023-12-09 NOTE — CONSULT NOTE ADULT - PROBLEM SELECTOR RECOMMENDATION 9
likely secondary to infection vs electrolytes imbalance  Ct noted  panculture  neuro eval  correct lytes

## 2023-12-09 NOTE — H&P ADULT - NSICDXPASTSURGICALHX_GEN_ALL_CORE_FT
PAST SURGICAL HISTORY:  H/O mastectomy, right     
PAST SURGICAL HISTORY:  H/O mastectomy, right

## 2023-12-09 NOTE — PATIENT PROFILE ADULT - FALL HARM RISK - HARM RISK INTERVENTIONS
Assistance with ambulation/Assistance OOB with selected safe patient handling equipment/Communicate Risk of Fall with Harm to all staff/Discuss with provider need for PT consult/Monitor gait and stability/Provide patient with walking aids - walker, cane, crutches/Reinforce activity limits and safety measures with patient and family/Tailored Fall Risk Interventions/Visual Cue: Yellow wristband and red socks/Bed in lowest position, wheels locked, appropriate side rails in place/Call bell, personal items and telephone in reach/Instruct patient to call for assistance before getting out of bed or chair/Non-slip footwear when patient is out of bed/[place] to call system/Physically safe environment - no spills, clutter or unnecessary equipment/Purposeful Proactive Rounding/Room/bathroom lighting operational, light cord in reach Assistance with ambulation/Assistance OOB with selected safe patient handling equipment/Communicate Risk of Fall with Harm to all staff/Discuss with provider need for PT consult/Monitor gait and stability/Provide patient with walking aids - walker, cane, crutches/Reinforce activity limits and safety measures with patient and family/Tailored Fall Risk Interventions/Visual Cue: Yellow wristband and red socks/Bed in lowest position, wheels locked, appropriate side rails in place/Call bell, personal items and telephone in reach/Instruct patient to call for assistance before getting out of bed or chair/Non-slip footwear when patient is out of bed/[place] to call system/Physically safe environment - no spills, clutter or unnecessary equipment/Purposeful Proactive Rounding/Room/bathroom lighting operational, light cord in reach

## 2023-12-09 NOTE — ED PROVIDER NOTE - CLINICAL SUMMARY MEDICAL DECISION MAKING FREE TEXT BOX
Patient with slurred speech concern for possible stroke code stroke called by triage nurse.  Stroke code stroke canceled by me given that patient had other medical reasons for her generalized weakness and slurred speech.  CT head obtained unremarkable.  Patient not a candidate for thrombolytics given minor deficit, alternative explanation for her altered mental status and slurred speech, and advanced age, and her use of Xarelto.  Passed dysphagia eval.ICU consulted for hyponatremia patient was downgraded once hyponatremia improved with fluids.  Patient received treatment for hyperkalemia.

## 2023-12-09 NOTE — H&P ADULT - ASSESSMENT
This is a 89 yo F from home, ambulates with walker with pmhx of HTN, DM, breast CA (s/p R. sided mastectomy) that presents to the ED complaining of slurred speech, admitted to ICU for hyponatremia    DX:  1. Symptomatic hyponatremia  2. Hyperkalemia  3. HTN  4. DM      =================== Neuro============================  Alert and oriented x 2 at base line       ================= Cardiovascular==========================  #HTN    ================- Pulm=================================  No issues    ==================ID===================================  No issues    ================= Nephro================================  #Symptomatic Hyponatremia    #Hyperkalemia    =================GI====================================  No issues    ================ Heme==================================  No issues    =================Endocrine===============================  #DM    ================= Skin/Catheters============================  Peripheral IV's  Cantor catheter    =================Prophylaxis =============================  Lovenox for DVT ppx    ==================GOC==================================  FULL CODE

## 2023-12-09 NOTE — H&P ADULT - ATTENDING COMMENTS
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 sided mastectomy),  who p/w altered mental status. Pt AOx1 unable to provide meaningful history. Daughter at bedside states pt has been sick for a few days complaining of symptoms of cough.     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 sided mastectomy),  who p/w altered mental status. Pt AOx1 unable to provide meaningful history. Daughter at bedside states pt has been sick for a few days complaining of symptoms of cough.     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

## 2023-12-10 LAB
ALBUMIN SERPL ELPH-MCNC: 3.3 G/DL — LOW (ref 3.5–5)
ALBUMIN SERPL ELPH-MCNC: 3.3 G/DL — LOW (ref 3.5–5)
ALP SERPL-CCNC: 76 U/L — SIGNIFICANT CHANGE UP (ref 40–120)
ALP SERPL-CCNC: 76 U/L — SIGNIFICANT CHANGE UP (ref 40–120)
ALT FLD-CCNC: 26 U/L DA — SIGNIFICANT CHANGE UP (ref 10–60)
ALT FLD-CCNC: 26 U/L DA — SIGNIFICANT CHANGE UP (ref 10–60)
ANION GAP SERPL CALC-SCNC: 4 MMOL/L — LOW (ref 5–17)
ANION GAP SERPL CALC-SCNC: 4 MMOL/L — LOW (ref 5–17)
ANION GAP SERPL CALC-SCNC: 5 MMOL/L — SIGNIFICANT CHANGE UP (ref 5–17)
ANION GAP SERPL CALC-SCNC: 5 MMOL/L — SIGNIFICANT CHANGE UP (ref 5–17)
ANION GAP SERPL CALC-SCNC: 6 MMOL/L — SIGNIFICANT CHANGE UP (ref 5–17)
ANION GAP SERPL CALC-SCNC: 6 MMOL/L — SIGNIFICANT CHANGE UP (ref 5–17)
AST SERPL-CCNC: 16 U/L — SIGNIFICANT CHANGE UP (ref 10–40)
AST SERPL-CCNC: 16 U/L — SIGNIFICANT CHANGE UP (ref 10–40)
BASOPHILS # BLD AUTO: 0.05 K/UL — SIGNIFICANT CHANGE UP (ref 0–0.2)
BASOPHILS # BLD AUTO: 0.05 K/UL — SIGNIFICANT CHANGE UP (ref 0–0.2)
BASOPHILS NFR BLD AUTO: 0.6 % — SIGNIFICANT CHANGE UP (ref 0–2)
BASOPHILS NFR BLD AUTO: 0.6 % — SIGNIFICANT CHANGE UP (ref 0–2)
BILIRUB SERPL-MCNC: 0.4 MG/DL — SIGNIFICANT CHANGE UP (ref 0.2–1.2)
BILIRUB SERPL-MCNC: 0.4 MG/DL — SIGNIFICANT CHANGE UP (ref 0.2–1.2)
BUN SERPL-MCNC: 10 MG/DL — SIGNIFICANT CHANGE UP (ref 7–18)
BUN SERPL-MCNC: 10 MG/DL — SIGNIFICANT CHANGE UP (ref 7–18)
BUN SERPL-MCNC: 9 MG/DL — SIGNIFICANT CHANGE UP (ref 7–18)
CALCIUM SERPL-MCNC: 8.3 MG/DL — LOW (ref 8.4–10.5)
CALCIUM SERPL-MCNC: 8.3 MG/DL — LOW (ref 8.4–10.5)
CALCIUM SERPL-MCNC: 8.6 MG/DL — SIGNIFICANT CHANGE UP (ref 8.4–10.5)
CALCIUM SERPL-MCNC: 8.6 MG/DL — SIGNIFICANT CHANGE UP (ref 8.4–10.5)
CALCIUM SERPL-MCNC: 8.8 MG/DL — SIGNIFICANT CHANGE UP (ref 8.4–10.5)
CALCIUM SERPL-MCNC: 8.8 MG/DL — SIGNIFICANT CHANGE UP (ref 8.4–10.5)
CHLORIDE SERPL-SCNC: 85 MMOL/L — LOW (ref 96–108)
CHLORIDE SERPL-SCNC: 86 MMOL/L — LOW (ref 96–108)
CHLORIDE SERPL-SCNC: 86 MMOL/L — LOW (ref 96–108)
CO2 SERPL-SCNC: 29 MMOL/L — SIGNIFICANT CHANGE UP (ref 22–31)
CO2 SERPL-SCNC: 29 MMOL/L — SIGNIFICANT CHANGE UP (ref 22–31)
CO2 SERPL-SCNC: 30 MMOL/L — SIGNIFICANT CHANGE UP (ref 22–31)
CREAT SERPL-MCNC: 0.65 MG/DL — SIGNIFICANT CHANGE UP (ref 0.5–1.3)
CREAT SERPL-MCNC: 0.65 MG/DL — SIGNIFICANT CHANGE UP (ref 0.5–1.3)
CREAT SERPL-MCNC: 0.66 MG/DL — SIGNIFICANT CHANGE UP (ref 0.5–1.3)
CREAT SERPL-MCNC: 0.66 MG/DL — SIGNIFICANT CHANGE UP (ref 0.5–1.3)
CREAT SERPL-MCNC: 0.78 MG/DL — SIGNIFICANT CHANGE UP (ref 0.5–1.3)
CREAT SERPL-MCNC: 0.78 MG/DL — SIGNIFICANT CHANGE UP (ref 0.5–1.3)
CULTURE RESULTS: NO GROWTH — SIGNIFICANT CHANGE UP
CULTURE RESULTS: NO GROWTH — SIGNIFICANT CHANGE UP
EGFR: 69 ML/MIN/1.73M2 — SIGNIFICANT CHANGE UP
EGFR: 69 ML/MIN/1.73M2 — SIGNIFICANT CHANGE UP
EGFR: 80 ML/MIN/1.73M2 — SIGNIFICANT CHANGE UP
EOSINOPHIL # BLD AUTO: 0.33 K/UL — SIGNIFICANT CHANGE UP (ref 0–0.5)
EOSINOPHIL # BLD AUTO: 0.33 K/UL — SIGNIFICANT CHANGE UP (ref 0–0.5)
EOSINOPHIL NFR BLD AUTO: 4.1 % — SIGNIFICANT CHANGE UP (ref 0–6)
EOSINOPHIL NFR BLD AUTO: 4.1 % — SIGNIFICANT CHANGE UP (ref 0–6)
GLUCOSE BLDC GLUCOMTR-MCNC: 100 MG/DL — HIGH (ref 70–99)
GLUCOSE BLDC GLUCOMTR-MCNC: 100 MG/DL — HIGH (ref 70–99)
GLUCOSE BLDC GLUCOMTR-MCNC: 110 MG/DL — HIGH (ref 70–99)
GLUCOSE BLDC GLUCOMTR-MCNC: 110 MG/DL — HIGH (ref 70–99)
GLUCOSE BLDC GLUCOMTR-MCNC: 124 MG/DL — HIGH (ref 70–99)
GLUCOSE BLDC GLUCOMTR-MCNC: 124 MG/DL — HIGH (ref 70–99)
GLUCOSE BLDC GLUCOMTR-MCNC: 98 MG/DL — SIGNIFICANT CHANGE UP (ref 70–99)
GLUCOSE BLDC GLUCOMTR-MCNC: 98 MG/DL — SIGNIFICANT CHANGE UP (ref 70–99)
GLUCOSE SERPL-MCNC: 102 MG/DL — HIGH (ref 70–99)
GLUCOSE SERPL-MCNC: 102 MG/DL — HIGH (ref 70–99)
GLUCOSE SERPL-MCNC: 115 MG/DL — HIGH (ref 70–99)
GLUCOSE SERPL-MCNC: 115 MG/DL — HIGH (ref 70–99)
GLUCOSE SERPL-MCNC: 99 MG/DL — SIGNIFICANT CHANGE UP (ref 70–99)
GLUCOSE SERPL-MCNC: 99 MG/DL — SIGNIFICANT CHANGE UP (ref 70–99)
HCT VFR BLD CALC: 34.1 % — LOW (ref 34.5–45)
HCT VFR BLD CALC: 34.1 % — LOW (ref 34.5–45)
HGB BLD-MCNC: 11.2 G/DL — LOW (ref 11.5–15.5)
HGB BLD-MCNC: 11.2 G/DL — LOW (ref 11.5–15.5)
IMM GRANULOCYTES NFR BLD AUTO: 0.4 % — SIGNIFICANT CHANGE UP (ref 0–0.9)
IMM GRANULOCYTES NFR BLD AUTO: 0.4 % — SIGNIFICANT CHANGE UP (ref 0–0.9)
LYMPHOCYTES # BLD AUTO: 2.13 K/UL — SIGNIFICANT CHANGE UP (ref 1–3.3)
LYMPHOCYTES # BLD AUTO: 2.13 K/UL — SIGNIFICANT CHANGE UP (ref 1–3.3)
LYMPHOCYTES # BLD AUTO: 26.7 % — SIGNIFICANT CHANGE UP (ref 13–44)
LYMPHOCYTES # BLD AUTO: 26.7 % — SIGNIFICANT CHANGE UP (ref 13–44)
MAGNESIUM SERPL-MCNC: 1.5 MG/DL — LOW (ref 1.6–2.6)
MAGNESIUM SERPL-MCNC: 1.5 MG/DL — LOW (ref 1.6–2.6)
MCHC RBC-ENTMCNC: 30.1 PG — SIGNIFICANT CHANGE UP (ref 27–34)
MCHC RBC-ENTMCNC: 30.1 PG — SIGNIFICANT CHANGE UP (ref 27–34)
MCHC RBC-ENTMCNC: 32.8 GM/DL — SIGNIFICANT CHANGE UP (ref 32–36)
MCHC RBC-ENTMCNC: 32.8 GM/DL — SIGNIFICANT CHANGE UP (ref 32–36)
MCV RBC AUTO: 91.7 FL — SIGNIFICANT CHANGE UP (ref 80–100)
MCV RBC AUTO: 91.7 FL — SIGNIFICANT CHANGE UP (ref 80–100)
MONOCYTES # BLD AUTO: 0.84 K/UL — SIGNIFICANT CHANGE UP (ref 0–0.9)
MONOCYTES # BLD AUTO: 0.84 K/UL — SIGNIFICANT CHANGE UP (ref 0–0.9)
MONOCYTES NFR BLD AUTO: 10.5 % — SIGNIFICANT CHANGE UP (ref 2–14)
MONOCYTES NFR BLD AUTO: 10.5 % — SIGNIFICANT CHANGE UP (ref 2–14)
NEUTROPHILS # BLD AUTO: 4.59 K/UL — SIGNIFICANT CHANGE UP (ref 1.8–7.4)
NEUTROPHILS # BLD AUTO: 4.59 K/UL — SIGNIFICANT CHANGE UP (ref 1.8–7.4)
NEUTROPHILS NFR BLD AUTO: 57.7 % — SIGNIFICANT CHANGE UP (ref 43–77)
NEUTROPHILS NFR BLD AUTO: 57.7 % — SIGNIFICANT CHANGE UP (ref 43–77)
NRBC # BLD: 0 /100 WBCS — SIGNIFICANT CHANGE UP (ref 0–0)
NRBC # BLD: 0 /100 WBCS — SIGNIFICANT CHANGE UP (ref 0–0)
PHOSPHATE SERPL-MCNC: 3.6 MG/DL — SIGNIFICANT CHANGE UP (ref 2.5–4.5)
PHOSPHATE SERPL-MCNC: 3.6 MG/DL — SIGNIFICANT CHANGE UP (ref 2.5–4.5)
PLATELET # BLD AUTO: 281 K/UL — SIGNIFICANT CHANGE UP (ref 150–400)
PLATELET # BLD AUTO: 281 K/UL — SIGNIFICANT CHANGE UP (ref 150–400)
POTASSIUM SERPL-MCNC: 4.7 MMOL/L — SIGNIFICANT CHANGE UP (ref 3.5–5.3)
POTASSIUM SERPL-MCNC: 4.8 MMOL/L — SIGNIFICANT CHANGE UP (ref 3.5–5.3)
POTASSIUM SERPL-MCNC: 4.8 MMOL/L — SIGNIFICANT CHANGE UP (ref 3.5–5.3)
POTASSIUM SERPL-SCNC: 4.7 MMOL/L — SIGNIFICANT CHANGE UP (ref 3.5–5.3)
POTASSIUM SERPL-SCNC: 4.8 MMOL/L — SIGNIFICANT CHANGE UP (ref 3.5–5.3)
POTASSIUM SERPL-SCNC: 4.8 MMOL/L — SIGNIFICANT CHANGE UP (ref 3.5–5.3)
PROT SERPL-MCNC: 6.2 G/DL — SIGNIFICANT CHANGE UP (ref 6–8.3)
PROT SERPL-MCNC: 6.2 G/DL — SIGNIFICANT CHANGE UP (ref 6–8.3)
RBC # BLD: 3.72 M/UL — LOW (ref 3.8–5.2)
RBC # BLD: 3.72 M/UL — LOW (ref 3.8–5.2)
RBC # FLD: 13.2 % — SIGNIFICANT CHANGE UP (ref 10.3–14.5)
RBC # FLD: 13.2 % — SIGNIFICANT CHANGE UP (ref 10.3–14.5)
SODIUM SERPL-SCNC: 119 MMOL/L — CRITICAL LOW (ref 135–145)
SODIUM SERPL-SCNC: 119 MMOL/L — CRITICAL LOW (ref 135–145)
SODIUM SERPL-SCNC: 120 MMOL/L — CRITICAL LOW (ref 135–145)
SODIUM SERPL-SCNC: 120 MMOL/L — CRITICAL LOW (ref 135–145)
SODIUM SERPL-SCNC: 121 MMOL/L — LOW (ref 135–145)
SODIUM SERPL-SCNC: 121 MMOL/L — LOW (ref 135–145)
SPECIMEN SOURCE: SIGNIFICANT CHANGE UP
SPECIMEN SOURCE: SIGNIFICANT CHANGE UP
WBC # BLD: 7.97 K/UL — SIGNIFICANT CHANGE UP (ref 3.8–10.5)
WBC # BLD: 7.97 K/UL — SIGNIFICANT CHANGE UP (ref 3.8–10.5)
WBC # FLD AUTO: 7.97 K/UL — SIGNIFICANT CHANGE UP (ref 3.8–10.5)
WBC # FLD AUTO: 7.97 K/UL — SIGNIFICANT CHANGE UP (ref 3.8–10.5)

## 2023-12-10 RX ORDER — SODIUM CHLORIDE 9 MG/ML
1000 INJECTION INTRAMUSCULAR; INTRAVENOUS; SUBCUTANEOUS
Refills: 0 | Status: DISCONTINUED | OUTPATIENT
Start: 2023-12-10 | End: 2023-12-11

## 2023-12-10 RX ORDER — SODIUM CHLORIDE 9 MG/ML
1000 INJECTION INTRAMUSCULAR; INTRAVENOUS; SUBCUTANEOUS
Refills: 0 | Status: DISCONTINUED | OUTPATIENT
Start: 2023-12-10 | End: 2023-12-10

## 2023-12-10 RX ORDER — MAGNESIUM SULFATE 500 MG/ML
1 VIAL (ML) INJECTION ONCE
Refills: 0 | Status: COMPLETED | OUTPATIENT
Start: 2023-12-10 | End: 2023-12-10

## 2023-12-10 RX ADMIN — PANTOPRAZOLE SODIUM 40 MILLIGRAM(S): 20 TABLET, DELAYED RELEASE ORAL at 05:43

## 2023-12-10 RX ADMIN — SODIUM CHLORIDE 80 MILLILITER(S): 9 INJECTION INTRAMUSCULAR; INTRAVENOUS; SUBCUTANEOUS at 11:07

## 2023-12-10 RX ADMIN — Medication 500 MILLIGRAM(S): at 17:37

## 2023-12-10 RX ADMIN — SODIUM CHLORIDE 50 MILLILITER(S): 9 INJECTION INTRAMUSCULAR; INTRAVENOUS; SUBCUTANEOUS at 08:49

## 2023-12-10 RX ADMIN — CARVEDILOL PHOSPHATE 3.12 MILLIGRAM(S): 80 CAPSULE, EXTENDED RELEASE ORAL at 05:43

## 2023-12-10 RX ADMIN — LETROZOLE 2.5 MILLIGRAM(S): 2.5 TABLET, FILM COATED ORAL at 11:07

## 2023-12-10 RX ADMIN — Medication 500 MILLIGRAM(S): at 05:43

## 2023-12-10 RX ADMIN — LATANOPROST 1 DROP(S): 0.05 SOLUTION/ DROPS OPHTHALMIC; TOPICAL at 21:28

## 2023-12-10 RX ADMIN — DORZOLAMIDE HYDROCHLORIDE 1 DROP(S): 20 SOLUTION/ DROPS OPHTHALMIC at 21:28

## 2023-12-10 RX ADMIN — RIVAROXABAN 20 MILLIGRAM(S): KIT at 17:37

## 2023-12-10 RX ADMIN — BUDESONIDE AND FORMOTEROL FUMARATE DIHYDRATE 2 PUFF(S): 160; 4.5 AEROSOL RESPIRATORY (INHALATION) at 10:37

## 2023-12-10 RX ADMIN — DORZOLAMIDE HYDROCHLORIDE 1 DROP(S): 20 SOLUTION/ DROPS OPHTHALMIC at 13:10

## 2023-12-10 RX ADMIN — Medication 100 GRAM(S): at 09:52

## 2023-12-10 RX ADMIN — MONTELUKAST 10 MILLIGRAM(S): 4 TABLET, CHEWABLE ORAL at 21:29

## 2023-12-10 RX ADMIN — POLYETHYLENE GLYCOL 3350 17 GRAM(S): 17 POWDER, FOR SOLUTION ORAL at 11:08

## 2023-12-10 RX ADMIN — DORZOLAMIDE HYDROCHLORIDE 1 DROP(S): 20 SOLUTION/ DROPS OPHTHALMIC at 05:42

## 2023-12-10 RX ADMIN — ATORVASTATIN CALCIUM 20 MILLIGRAM(S): 80 TABLET, FILM COATED ORAL at 21:28

## 2023-12-10 RX ADMIN — CARVEDILOL PHOSPHATE 3.12 MILLIGRAM(S): 80 CAPSULE, EXTENDED RELEASE ORAL at 17:37

## 2023-12-10 RX ADMIN — AMLODIPINE BESYLATE 5 MILLIGRAM(S): 2.5 TABLET ORAL at 05:43

## 2023-12-10 RX ADMIN — BUDESONIDE AND FORMOTEROL FUMARATE DIHYDRATE 2 PUFF(S): 160; 4.5 AEROSOL RESPIRATORY (INHALATION) at 21:29

## 2023-12-10 NOTE — PROGRESS NOTE ADULT - SUBJECTIVE AND OBJECTIVE BOX
PGY-1 Progress Note discussed with attending    PAGER #: [[phone]] TILL 5:00 PM  PLEASE CONTACT ON CALL TEAM:  - On Call Team (Please refer to [facility]) FROM 5:00 PM - 8:30PM  - Nightfloat Team FROM 8:30 -7:30 AM    CHIEF COMPLAINT & BRIEF HOSPITAL COURSE:      INTERVAL HPI/OVERNIGHT EVENTS:       REVIEW OF SYSTEMS:  CONSTITUTIONAL: No fever, weight loss, or fatigue  RESPIRATORY: No cough, wheezing, chills or hemoptysis; No shortness of breath  CARDIOVASCULAR: No chest pain, palpitations, dizziness, or leg swelling  GASTROINTESTINAL: No abdominal pain. No nausea, vomiting, or hematemesis; No diarrhea or constipation. No melena or hematochezia.  GENITOURINARY: No dysuria or hematuria, urinary frequency  NEUROLOGICAL: No headaches, memory loss, loss of strength, numbness, or tremors  SKIN: No itching, burning, rashes, or lesions     MEDICATIONS  (STANDING):  amLODIPine   Tablet 5 milliGRAM(s) Oral daily  atorvastatin 20 milliGRAM(s) Oral at bedtime  budesonide 160 MICROgram(s)/formoterol 4.5 MICROgram(s) Inhaler 2 Puff(s) Inhalation two times a day  carvedilol 3.125 milliGRAM(s) Oral every 12 hours  ciprofloxacin     Tablet 500 milliGRAM(s) Oral every 12 hours  dorzolamide 2% Ophthalmic Solution 1 Drop(s) Right EYE three times a day  insulin lispro (ADMELOG) corrective regimen sliding scale   SubCutaneous Before meals and at bedtime  latanoprost 0.005% Ophthalmic Solution 1 Drop(s) Both EYES at bedtime  letrozole 2.5 milliGRAM(s) Oral daily  magnesium sulfate  IVPB 1 Gram(s) IV Intermittent once  montelukast 10 milliGRAM(s) Oral at bedtime  pantoprazole    Tablet 40 milliGRAM(s) Oral before breakfast  polyethylene glycol 3350 17 Gram(s) Oral daily  rivaroxaban 20 milliGRAM(s) Oral with dinner  sodium chloride 0.9%. 1000 milliLiter(s) (50 mL/Hr) IV Continuous <Continuous>    MEDICATIONS  (PRN):  acetaminophen     Tablet .. 650 milliGRAM(s) Oral every 6 hours PRN Temp greater or equal to 38C (100.4F), Mild Pain (1 - 3), Moderate Pain (4 - 6)  albuterol    90 MICROgram(s) HFA Inhaler 2 Puff(s) Inhalation every 6 hours PRN for bronchospasm  aluminum hydroxide/magnesium hydroxide/simethicone Suspension 30 milliLiter(s) Oral every 4 hours PRN Dyspepsia  ondansetron Injectable 4 milliGRAM(s) IV Push every 8 hours PRN Nausea and/or Vomiting      Vital Signs Last 24 Hrs  T(C): 36.8 (10 Dec 2023 07:37), Max: 37.1 (09 Dec 2023 20:30)  T(F): 98.2 (10 Dec 2023 07:37), Max: 98.8 (09 Dec 2023 20:30)  HR: 79 (10 Dec 2023 07:37) (67 - 79)  BP: 137/51 (10 Dec 2023 07:37) (98/73 - 153/53)  BP(mean): --  RR: 18 (10 Dec 2023 07:37) (14 - 18)  SpO2: 96% (10 Dec 2023 07:37) (95% - 100%)    Parameters below as of 10 Dec 2023 07:37  Patient On (Oxygen Delivery Method): nasal cannula  O2 Flow (L/min): 3      PHYSICAL EXAMINATION:  GENERAL: NAD, well built  HEAD:  Atraumatic, Normocephalic  EYES:  conjunctiva and sclera clear  NECK: Supple, No JVD, Normal thyroid  CHEST/LUNG: Clear to auscultation. Clear to percussion bilaterally; No rales, rhonchi, wheezing, or rubs  HEART: Regular rate and rhythm; No murmurs, rubs, or gallops  ABDOMEN: Soft, Nontender, Nondistended; Bowel sounds present, no pain or masses on palpation  NERVOUS SYSTEM:  Alert & Oriented X3  : voiding well  EXTREMITIES:  2+ Peripheral Pulses, No clubbing, cyanosis, or edema  SKIN: warm dry                          11.2   7.97  )-----------( 281      ( 10 Dec 2023 07:01 )             34.1     12-10    121<L>  |  86<L>  |  10  ----------------------------<  102<H>  4.7   |  29  |  0.78    Ca    8.8      10 Dec 2023 07:01  Phos  3.6     12-10  Mg     1.5     12-10    TPro  6.2  /  Alb  3.3<L>  /  TBili  0.4  /  DBili  x   /  AST  16  /  ALT  26  /  AlkPhos  76  12-10    LIVER FUNCTIONS - ( 10 Dec 2023 07:01 )  Alb: 3.3 g/dL / Pro: 6.2 g/dL / ALK PHOS: 76 U/L / ALT: 26 U/L DA / AST: 16 U/L / GGT: x               PT/INR - ( 09 Dec 2023 02:57 )   PT: 19.0 sec;   INR: 1.69 ratio         PTT - ( 09 Dec 2023 02:57 )  PTT:44.2 sec    I&O's Summary    09 Dec 2023 07:01  -  10 Dec 2023 07:00  --------------------------------------------------------  IN: 300 mL / OUT: 900 mL / NET: -600 mL          Culture - Blood (collected 09 Dec 2023 02:57)  Source: .Blood Blood-Peripheral  Preliminary Report (10 Dec 2023 07:02):    No growth at 24 hours    Culture - Blood (collected 09 Dec 2023 02:57)  Source: .Blood Blood-Peripheral  Preliminary Report (10 Dec 2023 07:02):    No growth at 24 hours        CAPILLARY BLOOD GLUCOSE      RADIOLOGY & ADDITIONAL TESTS:                   PGY-1 Progress Note discussed with attending    PAGER #: [[phone]] TILL 5:00 PM  PLEASE CONTACT ON CALL TEAM:  - On Call Team (Please refer to [place]) FROM 5:00 PM - 8:30PM  - Nightfloat Team FROM 8:30 -7:30 AM    CHIEF COMPLAINT & BRIEF HOSPITAL COURSE:      INTERVAL HPI/OVERNIGHT EVENTS:       REVIEW OF SYSTEMS:  CONSTITUTIONAL: No fever, weight loss, or fatigue  RESPIRATORY: No cough, wheezing, chills or hemoptysis; No shortness of breath  CARDIOVASCULAR: No chest pain, palpitations, dizziness, or leg swelling  GASTROINTESTINAL: No abdominal pain. No nausea, vomiting, or hematemesis; No diarrhea or constipation. No melena or hematochezia.  GENITOURINARY: No dysuria or hematuria, urinary frequency  NEUROLOGICAL: No headaches, memory loss, loss of strength, numbness, or tremors  SKIN: No itching, burning, rashes, or lesions     MEDICATIONS  (STANDING):  amLODIPine   Tablet 5 milliGRAM(s) Oral daily  atorvastatin 20 milliGRAM(s) Oral at bedtime  budesonide 160 MICROgram(s)/formoterol 4.5 MICROgram(s) Inhaler 2 Puff(s) Inhalation two times a day  carvedilol 3.125 milliGRAM(s) Oral every 12 hours  ciprofloxacin     Tablet 500 milliGRAM(s) Oral every 12 hours  dorzolamide 2% Ophthalmic Solution 1 Drop(s) Right EYE three times a day  insulin lispro (ADMELOG) corrective regimen sliding scale   SubCutaneous Before meals and at bedtime  latanoprost 0.005% Ophthalmic Solution 1 Drop(s) Both EYES at bedtime  letrozole 2.5 milliGRAM(s) Oral daily  magnesium sulfate  IVPB 1 Gram(s) IV Intermittent once  montelukast 10 milliGRAM(s) Oral at bedtime  pantoprazole    Tablet 40 milliGRAM(s) Oral before breakfast  polyethylene glycol 3350 17 Gram(s) Oral daily  rivaroxaban 20 milliGRAM(s) Oral with dinner  sodium chloride 0.9%. 1000 milliLiter(s) (50 mL/Hr) IV Continuous <Continuous>    MEDICATIONS  (PRN):  acetaminophen     Tablet .. 650 milliGRAM(s) Oral every 6 hours PRN Temp greater or equal to 38C (100.4F), Mild Pain (1 - 3), Moderate Pain (4 - 6)  albuterol    90 MICROgram(s) HFA Inhaler 2 Puff(s) Inhalation every 6 hours PRN for bronchospasm  aluminum hydroxide/magnesium hydroxide/simethicone Suspension 30 milliLiter(s) Oral every 4 hours PRN Dyspepsia  ondansetron Injectable 4 milliGRAM(s) IV Push every 8 hours PRN Nausea and/or Vomiting      Vital Signs Last 24 Hrs  T(C): 36.8 (10 Dec 2023 07:37), Max: 37.1 (09 Dec 2023 20:30)  T(F): 98.2 (10 Dec 2023 07:37), Max: 98.8 (09 Dec 2023 20:30)  HR: 79 (10 Dec 2023 07:37) (67 - 79)  BP: 137/51 (10 Dec 2023 07:37) (98/73 - 153/53)  BP(mean): --  RR: 18 (10 Dec 2023 07:37) (14 - 18)  SpO2: 96% (10 Dec 2023 07:37) (95% - 100%)    Parameters below as of 10 Dec 2023 07:37  Patient On (Oxygen Delivery Method): nasal cannula  O2 Flow (L/min): 3      PHYSICAL EXAMINATION:  GENERAL: NAD, well built  HEAD:  Atraumatic, Normocephalic  EYES:  conjunctiva and sclera clear  NECK: Supple, No JVD, Normal thyroid  CHEST/LUNG: Clear to auscultation. Clear to percussion bilaterally; No rales, rhonchi, wheezing, or rubs  HEART: Regular rate and rhythm; No murmurs, rubs, or gallops  ABDOMEN: Soft, Nontender, Nondistended; Bowel sounds present, no pain or masses on palpation  NERVOUS SYSTEM:  Alert & Oriented X3  : voiding well  EXTREMITIES:  2+ Peripheral Pulses, No clubbing, cyanosis, or edema  SKIN: warm dry                          11.2   7.97  )-----------( 281      ( 10 Dec 2023 07:01 )             34.1     12-10    121<L>  |  86<L>  |  10  ----------------------------<  102<H>  4.7   |  29  |  0.78    Ca    8.8      10 Dec 2023 07:01  Phos  3.6     12-10  Mg     1.5     12-10    TPro  6.2  /  Alb  3.3<L>  /  TBili  0.4  /  DBili  x   /  AST  16  /  ALT  26  /  AlkPhos  76  12-10    LIVER FUNCTIONS - ( 10 Dec 2023 07:01 )  Alb: 3.3 g/dL / Pro: 6.2 g/dL / ALK PHOS: 76 U/L / ALT: 26 U/L DA / AST: 16 U/L / GGT: x               PT/INR - ( 09 Dec 2023 02:57 )   PT: 19.0 sec;   INR: 1.69 ratio         PTT - ( 09 Dec 2023 02:57 )  PTT:44.2 sec    I&O's Summary    09 Dec 2023 07:01  -  10 Dec 2023 07:00  --------------------------------------------------------  IN: 300 mL / OUT: 900 mL / NET: -600 mL          Culture - Blood (collected 09 Dec 2023 02:57)  Source: .Blood Blood-Peripheral  Preliminary Report (10 Dec 2023 07:02):    No growth at 24 hours    Culture - Blood (collected 09 Dec 2023 02:57)  Source: .Blood Blood-Peripheral  Preliminary Report (10 Dec 2023 07:02):    No growth at 24 hours        CAPILLARY BLOOD GLUCOSE      RADIOLOGY & ADDITIONAL TESTS:                   PGY-1 Progress Note discussed with attending    PAGER #: [[phone]] TILL 5:00 PM  PLEASE CONTACT ON CALL TEAM:  - On Call Team (Please refer to [place]) FROM 5:00 PM - 8:30PM  - Nightfloat Team FROM 8:30 -7:30 AM    CHIEF COMPLAINT & BRIEF HOSPITAL COURSE:      INTERVAL HPI/OVERNIGHT EVENTS: NO acute events    As per family, bedside- no change in Mental status      REVIEW OF SYSTEMS:  CONSTITUTIONAL: No fever, weight loss, or fatigue  RESPIRATORY: No cough, wheezing, chills or hemoptysis; No shortness of breath  CARDIOVASCULAR: No chest pain, palpitations, dizziness, or leg swelling  GASTROINTESTINAL: No abdominal pain. No nausea, vomiting, or hematemesis; No diarrhea or constipation. No melena or hematochezia.  GENITOURINARY: No dysuria or hematuria, urinary frequency  NEUROLOGICAL: No headaches, memory loss, loss of strength, numbness, or tremors  SKIN: No itching, burning, rashes, or lesions     MEDICATIONS  (STANDING):  amLODIPine   Tablet 5 milliGRAM(s) Oral daily  atorvastatin 20 milliGRAM(s) Oral at bedtime  budesonide 160 MICROgram(s)/formoterol 4.5 MICROgram(s) Inhaler 2 Puff(s) Inhalation two times a day  carvedilol 3.125 milliGRAM(s) Oral every 12 hours  ciprofloxacin     Tablet 500 milliGRAM(s) Oral every 12 hours  dorzolamide 2% Ophthalmic Solution 1 Drop(s) Right EYE three times a day  insulin lispro (ADMELOG) corrective regimen sliding scale   SubCutaneous Before meals and at bedtime  latanoprost 0.005% Ophthalmic Solution 1 Drop(s) Both EYES at bedtime  letrozole 2.5 milliGRAM(s) Oral daily  magnesium sulfate  IVPB 1 Gram(s) IV Intermittent once  montelukast 10 milliGRAM(s) Oral at bedtime  pantoprazole    Tablet 40 milliGRAM(s) Oral before breakfast  polyethylene glycol 3350 17 Gram(s) Oral daily  rivaroxaban 20 milliGRAM(s) Oral with dinner  sodium chloride 0.9%. 1000 milliLiter(s) (50 mL/Hr) IV Continuous <Continuous>    MEDICATIONS  (PRN):  acetaminophen     Tablet .. 650 milliGRAM(s) Oral every 6 hours PRN Temp greater or equal to 38C (100.4F), Mild Pain (1 - 3), Moderate Pain (4 - 6)  albuterol    90 MICROgram(s) HFA Inhaler 2 Puff(s) Inhalation every 6 hours PRN for bronchospasm  aluminum hydroxide/magnesium hydroxide/simethicone Suspension 30 milliLiter(s) Oral every 4 hours PRN Dyspepsia  ondansetron Injectable 4 milliGRAM(s) IV Push every 8 hours PRN Nausea and/or Vomiting      Vital Signs Last 24 Hrs  T(C): 36.8 (10 Dec 2023 07:37), Max: 37.1 (09 Dec 2023 20:30)  T(F): 98.2 (10 Dec 2023 07:37), Max: 98.8 (09 Dec 2023 20:30)  HR: 79 (10 Dec 2023 07:37) (67 - 79)  BP: 137/51 (10 Dec 2023 07:37) (98/73 - 153/53)  BP(mean): --  RR: 18 (10 Dec 2023 07:37) (14 - 18)  SpO2: 96% (10 Dec 2023 07:37) (95% - 100%)    Parameters below as of 10 Dec 2023 07:37  Patient On (Oxygen Delivery Method): nasal cannula  O2 Flow (L/min): 3      PHYSICAL EXAMINATION:  GENERAL: NAD, obese  HEAD:  Atraumatic, Normocephalic  EYES:  conjunctiva and sclera clear  NECK: Supple, No JVD, Normal thyroid  CHEST/LUNG: Clear to auscultation. Clear to percussion bilaterally; No rales, rhonchi, wheezing, or rubs  HEART: Regular rate and rhythm; No murmurs, rubs, or gallops  ABDOMEN: Soft, Nontender, Nondistended; Bowel sounds present, no pain or masses on palpation  NERVOUS SYSTEM:  Alert & Oriented X2-3  : voiding well  EXTREMITIES:  2+ Peripheral Pulses, No clubbing, cyanosis, or edema  SKIN: warm dry                          11.2   7.97  )-----------( 281      ( 10 Dec 2023 07:01 )             34.1     12-10    121<L>  |  86<L>  |  10  ----------------------------<  102<H>  4.7   |  29  |  0.78    Ca    8.8      10 Dec 2023 07:01  Phos  3.6     12-10  Mg     1.5     12-10    TPro  6.2  /  Alb  3.3<L>  /  TBili  0.4  /  DBili  x   /  AST  16  /  ALT  26  /  AlkPhos  76  12-10    LIVER FUNCTIONS - ( 10 Dec 2023 07:01 )  Alb: 3.3 g/dL / Pro: 6.2 g/dL / ALK PHOS: 76 U/L / ALT: 26 U/L DA / AST: 16 U/L / GGT: x               PT/INR - ( 09 Dec 2023 02:57 )   PT: 19.0 sec;   INR: 1.69 ratio         PTT - ( 09 Dec 2023 02:57 )  PTT:44.2 sec    I&O's Summary    09 Dec 2023 07:01  -  10 Dec 2023 07:00  --------------------------------------------------------  IN: 300 mL / OUT: 900 mL / NET: -600 mL          Culture - Blood (collected 09 Dec 2023 02:57)  Source: .Blood Blood-Peripheral  Preliminary Report (10 Dec 2023 07:02):    No growth at 24 hours    Culture - Blood (collected 09 Dec 2023 02:57)  Source: .Blood Blood-Peripheral  Preliminary Report (10 Dec 2023 07:02):    No growth at 24 hours        CAPILLARY BLOOD GLUCOSE      RADIOLOGY & ADDITIONAL TESTS:                   PGY-1 Progress Note discussed with attending    PAGER #: [[phone]] TILL 5:00 PM  PLEASE CONTACT ON CALL TEAM:  - On Call Team (Please refer to [place]) FROM 5:00 PM - 8:30PM  - Nightfloat Team FROM 8:30 -7:30 AM    CHIEF COMPLAINT & BRIEF HOSPITAL COURSE:      INTERVAL HPI/OVERNIGHT EVENTS: NO acute events    As per family, bedside- no change in Mental status      REVIEW OF SYSTEMS:  CONSTITUTIONAL: No fever, weight loss, or fatigue  RESPIRATORY: No cough, wheezing, chills or hemoptysis; No shortness of breath  CARDIOVASCULAR: No chest pain, palpitations, dizziness, or leg swelling  GASTROINTESTINAL: No abdominal pain. No nausea, vomiting, or hematemesis; No diarrhea or constipation. No melena or hematochezia.  GENITOURINARY: No dysuria or hematuria, urinary frequency  NEUROLOGICAL: No headaches, memory loss, loss of strength, numbness, or tremors  SKIN: No itching, burning, rashes, or lesions     MEDICATIONS  (STANDING):  amLODIPine   Tablet 5 milliGRAM(s) Oral daily  atorvastatin 20 milliGRAM(s) Oral at bedtime  budesonide 160 MICROgram(s)/formoterol 4.5 MICROgram(s) Inhaler 2 Puff(s) Inhalation two times a day  carvedilol 3.125 milliGRAM(s) Oral every 12 hours  ciprofloxacin     Tablet 500 milliGRAM(s) Oral every 12 hours  dorzolamide 2% Ophthalmic Solution 1 Drop(s) Right EYE three times a day  insulin lispro (ADMELOG) corrective regimen sliding scale   SubCutaneous Before meals and at bedtime  latanoprost 0.005% Ophthalmic Solution 1 Drop(s) Both EYES at bedtime  letrozole 2.5 milliGRAM(s) Oral daily  magnesium sulfate  IVPB 1 Gram(s) IV Intermittent once  montelukast 10 milliGRAM(s) Oral at bedtime  pantoprazole    Tablet 40 milliGRAM(s) Oral before breakfast  polyethylene glycol 3350 17 Gram(s) Oral daily  rivaroxaban 20 milliGRAM(s) Oral with dinner  sodium chloride 0.9%. 1000 milliLiter(s) (50 mL/Hr) IV Continuous <Continuous>    MEDICATIONS  (PRN):  acetaminophen     Tablet .. 650 milliGRAM(s) Oral every 6 hours PRN Temp greater or equal to 38C (100.4F), Mild Pain (1 - 3), Moderate Pain (4 - 6)  albuterol    90 MICROgram(s) HFA Inhaler 2 Puff(s) Inhalation every 6 hours PRN for bronchospasm  aluminum hydroxide/magnesium hydroxide/simethicone Suspension 30 milliLiter(s) Oral every 4 hours PRN Dyspepsia  ondansetron Injectable 4 milliGRAM(s) IV Push every 8 hours PRN Nausea and/or Vomiting      Vital Signs Last 24 Hrs  T(C): 36.8 (10 Dec 2023 07:37), Max: 37.1 (09 Dec 2023 20:30)  T(F): 98.2 (10 Dec 2023 07:37), Max: 98.8 (09 Dec 2023 20:30)  HR: 79 (10 Dec 2023 07:37) (67 - 79)  BP: 137/51 (10 Dec 2023 07:37) (98/73 - 153/53)  BP(mean): --  RR: 18 (10 Dec 2023 07:37) (14 - 18)  SpO2: 96% (10 Dec 2023 07:37) (95% - 100%)    Parameters below as of 10 Dec 2023 07:37  Patient On (Oxygen Delivery Method): nasal cannula  O2 Flow (L/min): 3      PHYSICAL EXAMINATION:  GENERAL: NAD, obese  HEAD:  Atraumatic, Normocephalic  EYES:  conjunctiva and sclera clear  NECK: Supple, No JVD, Normal thyroid  CHEST/LUNG: Clear to auscultation. Clear to percussion bilaterally; No rales, rhonchi, wheezing, or rubs  HEART: Regular rate and rhythm; No murmurs, rubs, or gallops  ABDOMEN: Soft, Nontender, Nondistended; Bowel sounds present, no pain or masses on palpation  NERVOUS SYSTEM:  Alert & Oriented X2-3  : voiding well  EXTREMITIES:  2+ Peripheral Pulses, No clubbing, cyanosis, or edema  SKIN: warm dry                          11.2   7.97  )-----------( 281      ( 10 Dec 2023 07:01 )             34.1     12-10    121<L>  |  86<L>  |  10  ----------------------------<  102<H>  4.7   |  29  |  0.78    Ca    8.8      10 Dec 2023 07:01  Phos  3.6     12-10  Mg     1.5     12-10    TPro  6.2  /  Alb  3.3<L>  /  TBili  0.4  /  DBili  x   /  AST  16  /  ALT  26  /  AlkPhos  76  12-10    LIVER FUNCTIONS - ( 10 Dec 2023 07:01 )  Alb: 3.3 g/dL / Pro: 6.2 g/dL / ALK PHOS: 76 U/L / ALT: 26 U/L DA / AST: 16 U/L / GGT: x               PT/INR - ( 09 Dec 2023 02:57 )   PT: 19.0 sec;   INR: 1.69 ratio         PTT - ( 09 Dec 2023 02:57 )  PTT:44.2 sec    I&O's Summary    09 Dec 2023 07:01  -  10 Dec 2023 07:00  --------------------------------------------------------  IN: 300 mL / OUT: 900 mL / NET: -600 mL          Culture - Blood (collected 09 Dec 2023 02:57)  Source: .Blood Blood-Peripheral  Preliminary Report (10 Dec 2023 07:02):    No growth at 24 hours    Culture - Blood (collected 09 Dec 2023 02:57)  Source: .Blood Blood-Peripheral  Preliminary Report (10 Dec 2023 07:02):    No growth at 24 hours        CAPILLARY BLOOD GLUCOSE      RADIOLOGY & ADDITIONAL TESTS:

## 2023-12-10 NOTE — PROGRESS NOTE ADULT - SUBJECTIVE AND OBJECTIVE BOX
Problem List:  Hyponatremia due to hypovolemia diuretics and ARB.   Post desmopressin due to over correction of sodium,  sodium 122-121. Admission sodium 116. At present on NS.     PAST MEDICAL & SURGICAL HISTORY:  DM (diabetes mellitus)      HTN (hypertension)      HLD (hyperlipidemia)      Breast cancer in female      COPD (chronic obstructive pulmonary disease)      PE (pulmonary thromboembolism)      Diabetes      Breast cancer      Blindness of left eye      History of appendectomy      Spinal stenosis      Spinal stenosis      Multiple pulmonary emboli      Fall      Chronic low back pain      Lumbar spondylosis      H/O mastectomy, right          aspirin (Stomach Upset)  aspirin (Nausea (Mild))      MEDICATIONS  (STANDING):  amLODIPine   Tablet 5 milliGRAM(s) Oral daily  atorvastatin 20 milliGRAM(s) Oral at bedtime  budesonide 160 MICROgram(s)/formoterol 4.5 MICROgram(s) Inhaler 2 Puff(s) Inhalation two times a day  carvedilol 3.125 milliGRAM(s) Oral every 12 hours  ciprofloxacin     Tablet 500 milliGRAM(s) Oral every 12 hours  dorzolamide 2% Ophthalmic Solution 1 Drop(s) Right EYE three times a day  insulin lispro (ADMELOG) corrective regimen sliding scale   SubCutaneous Before meals and at bedtime  latanoprost 0.005% Ophthalmic Solution 1 Drop(s) Both EYES at bedtime  letrozole 2.5 milliGRAM(s) Oral daily  montelukast 10 milliGRAM(s) Oral at bedtime  pantoprazole    Tablet 40 milliGRAM(s) Oral before breakfast  polyethylene glycol 3350 17 Gram(s) Oral daily  rivaroxaban 20 milliGRAM(s) Oral with dinner  sodium chloride 0.9%. 1000 milliLiter(s) (80 mL/Hr) IV Continuous <Continuous>    MEDICATIONS  (PRN):  acetaminophen     Tablet .. 650 milliGRAM(s) Oral every 6 hours PRN Temp greater or equal to 38C (100.4F), Mild Pain (1 - 3), Moderate Pain (4 - 6)  albuterol    90 MICROgram(s) HFA Inhaler 2 Puff(s) Inhalation every 6 hours PRN for bronchospasm  aluminum hydroxide/magnesium hydroxide/simethicone Suspension 30 milliLiter(s) Oral every 4 hours PRN Dyspepsia  ondansetron Injectable 4 milliGRAM(s) IV Push every 8 hours PRN Nausea and/or Vomiting                            11.2   7.97  )-----------( 281      ( 10 Dec 2023 07:01 )             34.1     12-10    121<L>  |  86<L>  |  10  ----------------------------<  102<H>  4.7   |  29  |  0.78    Ca    8.8      10 Dec 2023 07:01  Phos  3.6     12-10  Mg     1.5     12-10    TPro  6.2  /  Alb  3.3<L>  /  TBili  0.4  /  DBili  x   /  AST  16  /  ALT  26  /  AlkPhos  76  12-10    PT/INR - ( 09 Dec 2023 02:57 )   PT: 19.0 sec;   INR: 1.69 ratio         PTT - ( 09 Dec 2023 02:57 )  PTT:44.2 sec    Potassium, Random Urine: 9 mmol/L (12-09 @ 12:15)  Sodium, Random Urine: 36 mmol/L (12-09 @ 04:35)  Creatinine, Random Urine: 35 mg/dL (12-09 @ 04:35)  Osmolality, Random Urine: 263 mos/kg (12-09 @ 04:35)      REVIEW OF SYSTEMS:  General: no fever no chills.    RESPIRATORY: c/o cough.   CARDIOVASCULAR: No chest pain or palpitations. No Edema  GASTROINTESTINAL: No abdominal or epigastric pain. No nausea, vomiting. No diarrhea, reduced appetite.   GENITOURINARY: No dysuria, frequency, foamy urine, urinary urgency, incontinence or hematuria  NEUROLOGICAL: No numbness or weakness, no tremor , no dizziness.   Muscle skeletal : no joint pain and no swelling of joints and limbs.  SKIN: No itching, burning, rashes.        VITALS:  T(F): 98.2 (12-10-23 @ 07:37), Max: 98.8 (12-09-23 @ 20:30)  HR: 79 (12-10-23 @ 07:37)  BP: 137/51 (12-10-23 @ 07:37)  RR: 18 (12-10-23 @ 07:37)  SpO2: 96% (12-10-23 @ 07:37)  Wt(kg): --    12-09 @ 07:01  -  12-10 @ 07:00  --------------------------------------------------------  IN: 300 mL / OUT: 900 mL / NET: -600 mL    12-10 @ 07:01  -  12-10 @ 10:58  --------------------------------------------------------  IN: 250 mL / OUT: 0 mL / NET: 250 mL        PHYSICAL EXAM:  Neck: No JVD, no carotid bruit, supple, no adenopathy  Respiratory: air entrance B/L, no wheezes, rales or rhonchi, crepitations at the bases  Cardiovascular: S1, S2, RRR, no pericardial rub, no murmur  Abdomen: BS+, soft, no tenderness, no bruit  Pelvis: bladder nondistended  Extremities: No cyanosis or clubbing. No peripheral edema.

## 2023-12-10 NOTE — PROGRESS NOTE ADULT - PROBLEM SELECTOR PLAN 1
Sodium 116 on admission. Likely hypovolemic hyponatremia  Corrected to 124 in 7 hours, s/p 1LNS in ED  - C/w BMP q6 hours   - C/w D5 at 60cc/hr  - Avoid overcorrection  - Goal 122-124 at 3AM 12/10    Dr. Lomas Nephrology Consulted Sodium 116 on admission. Likely hypovolemic hyponatremia  Corrected to 124 in 7 hours, s/p 1LNS in ED  Patient remains asymptomatic  - C/w BMP q6 hours   - s/p  D5 at 60cc/hr  - Avoid overcorrection  - c/w NS     Dr. Lomas Nephrology Consulted

## 2023-12-10 NOTE — PROGRESS NOTE ADULT - SUBJECTIVE AND OBJECTIVE BOX
Patient is a 89y old  Female who presents with a chief complaint of Hyponatremia (09 Dec 2023 05:36)  Awake, alert and OOB to chair in NAD. Doing well on RA.    INTERVAL HPI/OVERNIGHT EVENTS:      VITAL SIGNS:  T(F): 98.5 (12-10-23 @ 11:04)  HR: 75 (12-10-23 @ 11:04)  BP: 139/63 (12-10-23 @ 11:04)  RR: 18 (12-10-23 @ 11:04)  SpO2: 97% (12-10-23 @ 11:04)  Wt(kg): --  I&O's Detail    09 Dec 2023 07:01  -  10 Dec 2023 07:00  --------------------------------------------------------  IN:    dextrose 5%: 300 mL  Total IN: 300 mL    OUT:    Indwelling Catheter - Urethral (mL): 900 mL  Total OUT: 900 mL    Total NET: -600 mL      10 Dec 2023 07:01  -  10 Dec 2023 11:07  --------------------------------------------------------  IN:    Oral Fluid: 200 mL    sodium chloride 0.9%: 50 mL  Total IN: 250 mL    OUT:  Total OUT: 0 mL    Total NET: 250 mL              REVIEW OF SYSTEMS:    CONSTITUTIONAL:  No fevers, chills, sweats    HEENT:  Eyes:  No diplopia or blurred vision. ENT:  No earache, sore throat or runny nose.    CARDIOVASCULAR:  No pressure, squeezing, tightness, or heaviness about the chest; no palpitations.    RESPIRATORY:  Per HPI    GASTROINTESTINAL:  No abdominal pain, nausea, vomiting or diarrhea.    GENITOURINARY:  No dysuria, frequency or urgency.    NEUROLOGIC:  No paresthesias, fasciculations, seizures or weakness.    PSYCHIATRIC:  No disorder of thought or mood.      PHYSICAL EXAM:    Constitutional: Well developed and nourished  Eyes:Perrla  ENMT: normal  Neck:supple  Respiratory: good air entry  Cardiovascular: S1 S2 regular  Gastrointestinal: Soft, Non tender  Extremities: No edema  Vascular:normal  Neurological:Awake, alert,Ox3  Musculoskeletal:Normal      MEDICATIONS  (STANDING):  amLODIPine   Tablet 5 milliGRAM(s) Oral daily  atorvastatin 20 milliGRAM(s) Oral at bedtime  budesonide 160 MICROgram(s)/formoterol 4.5 MICROgram(s) Inhaler 2 Puff(s) Inhalation two times a day  carvedilol 3.125 milliGRAM(s) Oral every 12 hours  ciprofloxacin     Tablet 500 milliGRAM(s) Oral every 12 hours  dorzolamide 2% Ophthalmic Solution 1 Drop(s) Right EYE three times a day  insulin lispro (ADMELOG) corrective regimen sliding scale   SubCutaneous Before meals and at bedtime  latanoprost 0.005% Ophthalmic Solution 1 Drop(s) Both EYES at bedtime  letrozole 2.5 milliGRAM(s) Oral daily  montelukast 10 milliGRAM(s) Oral at bedtime  pantoprazole    Tablet 40 milliGRAM(s) Oral before breakfast  polyethylene glycol 3350 17 Gram(s) Oral daily  rivaroxaban 20 milliGRAM(s) Oral with dinner  sodium chloride 0.9%. 1000 milliLiter(s) (80 mL/Hr) IV Continuous <Continuous>    MEDICATIONS  (PRN):  acetaminophen     Tablet .. 650 milliGRAM(s) Oral every 6 hours PRN Temp greater or equal to 38C (100.4F), Mild Pain (1 - 3), Moderate Pain (4 - 6)  albuterol    90 MICROgram(s) HFA Inhaler 2 Puff(s) Inhalation every 6 hours PRN for bronchospasm  aluminum hydroxide/magnesium hydroxide/simethicone Suspension 30 milliLiter(s) Oral every 4 hours PRN Dyspepsia  ondansetron Injectable 4 milliGRAM(s) IV Push every 8 hours PRN Nausea and/or Vomiting      Allergies    aspirin (Stomach Upset)  aspirin (Nausea (Mild))    Intolerances        LABS:                        11.2   7.97  )-----------( 281      ( 10 Dec 2023 07:01 )             34.1     12-10    121<L>  |  86<L>  |  10  ----------------------------<  102<H>  4.7   |  29  |  0.78    Ca    8.8      10 Dec 2023 07:01  Phos  3.6     12-10  Mg     1.5     12-10    TPro  6.2  /  Alb  3.3<L>  /  TBili  0.4  /  DBili  x   /  AST  16  /  ALT  26  /  AlkPhos  76  12-10    PT/INR - ( 09 Dec 2023 02:57 )   PT: 19.0 sec;   INR: 1.69 ratio         PTT - ( 09 Dec 2023 02:57 )  PTT:44.2 sec  Urinalysis Basic - ( 10 Dec 2023 07:01 )    Color: x / Appearance: x / SG: x / pH: x  Gluc: 102 mg/dL / Ketone: x  / Bili: x / Urobili: x   Blood: x / Protein: x / Nitrite: x   Leuk Esterase: x / RBC: x / WBC x   Sq Epi: x / Non Sq Epi: x / Bacteria: x            CAPILLARY BLOOD GLUCOSE      POCT Blood Glucose.: 98 mg/dL (10 Dec 2023 07:32)  POCT Blood Glucose.: 115 mg/dL (09 Dec 2023 21:03)  POCT Blood Glucose.: 91 mg/dL (09 Dec 2023 16:49)  POCT Blood Glucose.: 101 mg/dL (09 Dec 2023 11:59)        RADIOLOGY & ADDITIONAL TESTS:    CXR:    Ct scan chest:    ekg;    echo: Patient is a 89y old  Female who presents with a chief complaint of Hyponatremia (09 Dec 2023 05:36)  Awake, alert and OOB to chair in NAD. Doing well on RA.    INTERVAL HPI/OVERNIGHT EVENTS:      VITAL SIGNS:  T(F): 98.5 (12-10-23 @ 11:04)  HR: 75 (12-10-23 @ 11:04)  BP: 139/63 (12-10-23 @ 11:04)  RR: 18 (12-10-23 @ 11:04)  SpO2: 97% (12-10-23 @ 11:04)  Wt(kg): --  I&O's Detail    09 Dec 2023 07:01  -  10 Dec 2023 07:00  --------------------------------------------------------  IN:    dextrose 5%: 300 mL  Total IN: 300 mL    OUT:    Indwelling Catheter - Urethral (mL): 900 mL  Total OUT: 900 mL    Total NET: -600 mL      10 Dec 2023 07:01  -  10 Dec 2023 11:07  --------------------------------------------------------  IN:    Oral Fluid: 200 mL    sodium chloride 0.9%: 50 mL  Total IN: 250 mL    OUT:  Total OUT: 0 mL    Total NET: 250 mL              REVIEW OF SYSTEMS:    CONSTITUTIONAL:  No fevers, chills, sweats    HEENT:  Eyes:  No diplopia or blurred vision. ENT:  No earache, sore throat or runny nose.    CARDIOVASCULAR:  No pressure, squeezing, tightness, or heaviness about the chest; no palpitations.    RESPIRATORY:  Per HPI    GASTROINTESTINAL:  No abdominal pain, nausea, vomiting or diarrhea.    GENITOURINARY:  No dysuria, frequency or urgency.    NEUROLOGIC:  No paresthesias, fasciculations, seizures or weakness.    PSYCHIATRIC:  No disorder of thought or mood.      PHYSICAL EXAM:    Constitutional: Well developed and nourished  Eyes:Perrla  ENMT: normal  Neck:supple  Respiratory: good air entry  Cardiovascular: S1 S2 regular  Gastrointestinal: Soft, Non tender  Extremities: No edema  Vascular:normal  Neurological:Awake, alert,Ox3  Musculoskeletal:Normal      MEDICATIONS  (STANDING):  amLODIPine   Tablet 5 milliGRAM(s) Oral daily  atorvastatin 20 milliGRAM(s) Oral at bedtime  budesonide 160 MICROgram(s)/formoterol 4.5 MICROgram(s) Inhaler 2 Puff(s) Inhalation two times a day  carvedilol 3.125 milliGRAM(s) Oral every 12 hours  ciprofloxacin     Tablet 500 milliGRAM(s) Oral every 12 hours  dorzolamide 2% Ophthalmic Solution 1 Drop(s) Right EYE three times a day  insulin lispro (ADMELOG) corrective regimen sliding scale   SubCutaneous Before meals and at bedtime  latanoprost 0.005% Ophthalmic Solution 1 Drop(s) Both EYES at bedtime  letrozole 2.5 milliGRAM(s) Oral daily  montelukast 10 milliGRAM(s) Oral at bedtime  pantoprazole    Tablet 40 milliGRAM(s) Oral before breakfast  polyethylene glycol 3350 17 Gram(s) Oral daily  rivaroxaban 20 milliGRAM(s) Oral with dinner  sodium chloride 0.9%. 1000 milliLiter(s) (80 mL/Hr) IV Continuous <Continuous>    MEDICATIONS  (PRN):  acetaminophen     Tablet .. 650 milliGRAM(s) Oral every 6 hours PRN Temp greater or equal to 38C (100.4F), Mild Pain (1 - 3), Moderate Pain (4 - 6)  albuterol    90 MICROgram(s) HFA Inhaler 2 Puff(s) Inhalation every 6 hours PRN for bronchospasm  aluminum hydroxide/magnesium hydroxide/simethicone Suspension 30 milliLiter(s) Oral every 4 hours PRN Dyspepsia  ondansetron Injectable 4 milliGRAM(s) IV Push every 8 hours PRN Nausea and/or Vomiting      Allergies    aspirin (Stomach Upset)  aspirin (Nausea (Mild))    Intolerances        LABS:                        11.2   7.97  )-----------( 281      ( 10 Dec 2023 07:01 )             34.1     12-10    121<L>  |  86<L>  |  10  ----------------------------<  102<H>  4.7   |  29  |  0.78    Ca    8.8      10 Dec 2023 07:01  Phos  3.6     12-10  Mg     1.5     12-10    TPro  6.2  /  Alb  3.3<L>  /  TBili  0.4  /  DBili  x   /  AST  16  /  ALT  26  /  AlkPhos  76  12-10    PT/INR - ( 09 Dec 2023 02:57 )   PT: 19.0 sec;   INR: 1.69 ratio         PTT - ( 09 Dec 2023 02:57 )  PTT:44.2 sec  Urinalysis Basic - ( 10 Dec 2023 07:01 )    Color: x / Appearance: x / SG: x / pH: x  Gluc: 102 mg/dL / Ketone: x  / Bili: x / Urobili: x   Blood: x / Protein: x / Nitrite: x   Leuk Esterase: x / RBC: x / WBC x   Sq Epi: x / Non Sq Epi: x / Bacteria: x            CAPILLARY BLOOD GLUCOSE      POCT Blood Glucose.: 98 mg/dL (10 Dec 2023 07:32)  POCT Blood Glucose.: 115 mg/dL (09 Dec 2023 21:03)  POCT Blood Glucose.: 91 mg/dL (09 Dec 2023 16:49)  POCT Blood Glucose.: 101 mg/dL (09 Dec 2023 11:59)        RADIOLOGY & ADDITIONAL TESTS:    CXR:  < from: Xray Chest 1 View-PORTABLE IMMEDIATE (12.09.23 @ 03:40) >    IMPRESSION: When allowing for technique no change. No acute parenchymal   disease    --- End of Report ---    < end of copied text >    Ct scan chest:    ekg;    echo:

## 2023-12-10 NOTE — PROGRESS NOTE ADULT - PROBLEM SELECTOR PLAN 2
K 6.1 on admission   s/p hyperkalemia cocktail and lokelma  - Monitor potassium  - F/u urine potassium K 6.1 on admission   s/p hyperkalemia cocktail and lokelma  urine potassium - 9  NOW RESOLVED  - Monitor potassium

## 2023-12-10 NOTE — PROGRESS NOTE ADULT - SUBJECTIVE AND OBJECTIVE BOX
Patient is a 89y old  Female who presents with a chief complaint of Hyponatremia (09 Dec 2023 05:36)    pt seen in icu [  ], reg med floor [   ], bed [  ], chair at bedside [   ], a+o x3 [  ], lethargic [  ],  nad [  ]    valle [  ], ngt [  ], peg [  ], et tube [  ], cent line [  ], picc line [  ]        Allergies    aspirin (Stomach Upset)  aspirin (Nausea (Mild))        Vitals    T(F): 97.2 (12-10-23 @ 05:11), Max: 98.8 (12-09-23 @ 20:30)  HR: 72 (12-10-23 @ 05:11) (67 - 76)  BP: 124/47 (12-10-23 @ 05:11) (98/73 - 153/53)  RR: 18 (12-10-23 @ 05:11) (14 - 18)  SpO2: 95% (12-10-23 @ 05:11) (95% - 100%)  Wt(kg): --  CAPILLARY BLOOD GLUCOSE      POCT Blood Glucose.: 115 mg/dL (09 Dec 2023 21:03)      Labs                          11.2   7.47  )-----------( 298      ( 09 Dec 2023 02:57 )             33.6       12-09    122<L>  |  87<L>  |  14  ----------------------------<  119<H>  4.7   |  31  |  0.85    Ca    8.6      09 Dec 2023 22:10  Phos  3.8     12-09  Mg     1.7     12-09    TPro  5.9<L>  /  Alb  3.2<L>  /  TBili  0.3  /  DBili  x   /  AST  18  /  ALT  31  /  AlkPhos  78  12-09          Troponin I, High Sensitivity Result: 14.1 ng/L (12-09-23 @ 02:57)        Radiology Results      Meds    MEDICATIONS  (STANDING):  amLODIPine   Tablet 5 milliGRAM(s) Oral daily  atorvastatin 20 milliGRAM(s) Oral at bedtime  budesonide 160 MICROgram(s)/formoterol 4.5 MICROgram(s) Inhaler 2 Puff(s) Inhalation two times a day  carvedilol 3.125 milliGRAM(s) Oral every 12 hours  ciprofloxacin     Tablet 500 milliGRAM(s) Oral every 12 hours  dorzolamide 2% Ophthalmic Solution 1 Drop(s) Right EYE three times a day  insulin lispro (ADMELOG) corrective regimen sliding scale   SubCutaneous Before meals and at bedtime  latanoprost 0.005% Ophthalmic Solution 1 Drop(s) Both EYES at bedtime  letrozole 2.5 milliGRAM(s) Oral daily  montelukast 10 milliGRAM(s) Oral at bedtime  pantoprazole    Tablet 40 milliGRAM(s) Oral before breakfast  polyethylene glycol 3350 17 Gram(s) Oral daily  rivaroxaban 20 milliGRAM(s) Oral with dinner      MEDICATIONS  (PRN):  acetaminophen     Tablet .. 650 milliGRAM(s) Oral every 6 hours PRN Temp greater or equal to 38C (100.4F), Mild Pain (1 - 3), Moderate Pain (4 - 6)  albuterol    90 MICROgram(s) HFA Inhaler 2 Puff(s) Inhalation every 6 hours PRN for bronchospasm  aluminum hydroxide/magnesium hydroxide/simethicone Suspension 30 milliLiter(s) Oral every 4 hours PRN Dyspepsia  ondansetron Injectable 4 milliGRAM(s) IV Push every 8 hours PRN Nausea and/or Vomiting      Physical Exam    Neuro :  no focal deficits  Respiratory: CTA B/L  CV: RRR, S1S2, no murmurs,   Abdominal: Soft, NT, ND +BS,  Extremities: No edema, + peripheral pulses    ASSESSMENT    metabolic encephalopathy,   hyponatremia,   hyperkalemia,   metabolic encephalopathy, ,   h/o  DM,   HTN,   HLD,   GERD,   hypothyroid,   COPD,   DVT,   PAF/DVT/PE on Xarelto,   glaucoma,   left eye blindness,   cataracts,     Hyponatremia, hypo-osmolarity, or hypo-osmolar hyponatremia    DM (diabetes mellitus)    HTN (hypertension)    HLD (hyperlipidemia)    Breast cancer in female    COPD (chronic obstructive pulmonary disease)    PE (pulmonary thromboembolism)    Diabetes    Breast cancer    Blindness of left eye    History of appendectomy    Spinal stenosis    Spinal stenosis    Multiple pulmonary emboli    Fall    Chronic low back pain    Lumbar spondylosis    H/O mastectomy, right        PLAN    admit to med,   ivf ns,   lokelma,   lispro ss,   hold losartan,   cont antibiotics to complete course for uti,   cont preadmit home meds,   gi and dvt prophylaxis  cbc,   bmp,   mg,   phos,   lipids,   tsh,   urine lytes, and osm,  hgba1c,   ua,   ucx,        renal cons   pulm cons.       Patient is a 89y old  Female who presents with a chief complaint of Hyponatremia (09 Dec 2023 05:36)    pt seen in tele [x  ], reg med floor [   ], bed [ x ], chair at bedside [   ], awake and responsive [ x ], lethargic [  ],  nad [ x ]      Allergies    aspirin (Stomach Upset)  aspirin (Nausea (Mild))        Vitals    T(F): 97.2 (12-10-23 @ 05:11), Max: 98.8 (12-09-23 @ 20:30)  HR: 72 (12-10-23 @ 05:11) (67 - 76)  BP: 124/47 (12-10-23 @ 05:11) (98/73 - 153/53)  RR: 18 (12-10-23 @ 05:11) (14 - 18)  SpO2: 95% (12-10-23 @ 05:11) (95% - 100%)  Wt(kg): --  CAPILLARY BLOOD GLUCOSE      POCT Blood Glucose.: 115 mg/dL (09 Dec 2023 21:03)      Labs                          11.2   7.47  )-----------( 298      ( 09 Dec 2023 02:57 )             33.6       12-09    122<L>  |  87<L>  |  14  ----------------------------<  119<H>  4.7   |  31  |  0.85    Ca    8.6      09 Dec 2023 22:10  Phos  3.8     12-09  Mg     1.7     12-09    TPro  5.9<L>  /  Alb  3.2<L>  /  TBili  0.3  /  DBili  x   /  AST  18  /  ALT  31  /  AlkPhos  78  12-09          Troponin I, High Sensitivity Result: 14.1 ng/L (12-09-23 @ 02:57)        Radiology Results      Meds    MEDICATIONS  (STANDING):  amLODIPine   Tablet 5 milliGRAM(s) Oral daily  atorvastatin 20 milliGRAM(s) Oral at bedtime  budesonide 160 MICROgram(s)/formoterol 4.5 MICROgram(s) Inhaler 2 Puff(s) Inhalation two times a day  carvedilol 3.125 milliGRAM(s) Oral every 12 hours  ciprofloxacin     Tablet 500 milliGRAM(s) Oral every 12 hours  dorzolamide 2% Ophthalmic Solution 1 Drop(s) Right EYE three times a day  insulin lispro (ADMELOG) corrective regimen sliding scale   SubCutaneous Before meals and at bedtime  latanoprost 0.005% Ophthalmic Solution 1 Drop(s) Both EYES at bedtime  letrozole 2.5 milliGRAM(s) Oral daily  montelukast 10 milliGRAM(s) Oral at bedtime  pantoprazole    Tablet 40 milliGRAM(s) Oral before breakfast  polyethylene glycol 3350 17 Gram(s) Oral daily  rivaroxaban 20 milliGRAM(s) Oral with dinner      MEDICATIONS  (PRN):  acetaminophen     Tablet .. 650 milliGRAM(s) Oral every 6 hours PRN Temp greater or equal to 38C (100.4F), Mild Pain (1 - 3), Moderate Pain (4 - 6)  albuterol    90 MICROgram(s) HFA Inhaler 2 Puff(s) Inhalation every 6 hours PRN for bronchospasm  aluminum hydroxide/magnesium hydroxide/simethicone Suspension 30 milliLiter(s) Oral every 4 hours PRN Dyspepsia  ondansetron Injectable 4 milliGRAM(s) IV Push every 8 hours PRN Nausea and/or Vomiting      Physical Exam    Neuro :  no focal deficits  Respiratory: CTA B/L  CV: RRR, S1S2, no murmurs,   Abdominal: Soft, NT, ND +BS,  Extremities: No edema, + peripheral pulses      ASSESSMENT    metabolic encephalopathy,   hyponatremia,   hyperkalemia,   metabolic encephalopathy, ,   h/o  DM,   HTN,   HLD,   GERD,   hypothyroid,   COPD,   DVT,   PAF/DVT/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  hgba1c,   lispro ss  f/u ucx,  cont current meds

## 2023-12-10 NOTE — PROGRESS NOTE ADULT - ASSESSMENT
Hyponatremia - admission sodium 116 AM sodium 122-121.  No changes in MS as per the family.  Etiology , diuretics , ARB reduced solid food intake and continue fluid intake.  Hyperkalemia due to hypovolemia and ARB.   Restart NS and monitor Sodium q 6 hours , next 24 hours serum sodium 128-129.

## 2023-12-10 NOTE — PROGRESS NOTE ADULT - ASSESSMENT
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 Admitted for hyponatremia management. 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 Admitted for hyponatremia management.

## 2023-12-11 LAB
ALBUMIN SERPL ELPH-MCNC: 1.8 G/DL — LOW (ref 3.5–5)
ALBUMIN SERPL ELPH-MCNC: 1.8 G/DL — LOW (ref 3.5–5)
ALBUMIN SERPL ELPH-MCNC: 3 G/DL — LOW (ref 3.5–5)
ALBUMIN SERPL ELPH-MCNC: 3 G/DL — LOW (ref 3.5–5)
ALBUMIN SERPL ELPH-MCNC: 3.1 G/DL — LOW (ref 3.5–5)
ALBUMIN SERPL ELPH-MCNC: 3.1 G/DL — LOW (ref 3.5–5)
ALP SERPL-CCNC: 42 U/L — SIGNIFICANT CHANGE UP (ref 40–120)
ALP SERPL-CCNC: 42 U/L — SIGNIFICANT CHANGE UP (ref 40–120)
ALP SERPL-CCNC: 68 U/L — SIGNIFICANT CHANGE UP (ref 40–120)
ALP SERPL-CCNC: 68 U/L — SIGNIFICANT CHANGE UP (ref 40–120)
ALP SERPL-CCNC: 75 U/L — SIGNIFICANT CHANGE UP (ref 40–120)
ALP SERPL-CCNC: 75 U/L — SIGNIFICANT CHANGE UP (ref 40–120)
ALT FLD-CCNC: 15 U/L DA — SIGNIFICANT CHANGE UP (ref 10–60)
ALT FLD-CCNC: 15 U/L DA — SIGNIFICANT CHANGE UP (ref 10–60)
ALT FLD-CCNC: 25 U/L DA — SIGNIFICANT CHANGE UP (ref 10–60)
ALT FLD-CCNC: 25 U/L DA — SIGNIFICANT CHANGE UP (ref 10–60)
ALT FLD-CCNC: 26 U/L DA — SIGNIFICANT CHANGE UP (ref 10–60)
ALT FLD-CCNC: 26 U/L DA — SIGNIFICANT CHANGE UP (ref 10–60)
ANION GAP SERPL CALC-SCNC: 4 MMOL/L — LOW (ref 5–17)
ANION GAP SERPL CALC-SCNC: 5 MMOL/L — SIGNIFICANT CHANGE UP (ref 5–17)
AST SERPL-CCNC: 16 U/L — SIGNIFICANT CHANGE UP (ref 10–40)
AST SERPL-CCNC: 16 U/L — SIGNIFICANT CHANGE UP (ref 10–40)
AST SERPL-CCNC: 22 U/L — SIGNIFICANT CHANGE UP (ref 10–40)
AST SERPL-CCNC: 22 U/L — SIGNIFICANT CHANGE UP (ref 10–40)
AST SERPL-CCNC: 9 U/L — LOW (ref 10–40)
AST SERPL-CCNC: 9 U/L — LOW (ref 10–40)
BASOPHILS # BLD AUTO: 0.05 K/UL — SIGNIFICANT CHANGE UP (ref 0–0.2)
BASOPHILS # BLD AUTO: 0.05 K/UL — SIGNIFICANT CHANGE UP (ref 0–0.2)
BASOPHILS NFR BLD AUTO: 0.6 % — SIGNIFICANT CHANGE UP (ref 0–2)
BASOPHILS NFR BLD AUTO: 0.6 % — SIGNIFICANT CHANGE UP (ref 0–2)
BILIRUB SERPL-MCNC: 0.2 MG/DL — SIGNIFICANT CHANGE UP (ref 0.2–1.2)
BILIRUB SERPL-MCNC: 0.2 MG/DL — SIGNIFICANT CHANGE UP (ref 0.2–1.2)
BILIRUB SERPL-MCNC: 0.5 MG/DL — SIGNIFICANT CHANGE UP (ref 0.2–1.2)
BUN SERPL-MCNC: 5 MG/DL — LOW (ref 7–18)
BUN SERPL-MCNC: 5 MG/DL — LOW (ref 7–18)
BUN SERPL-MCNC: 6 MG/DL — LOW (ref 7–18)
BUN SERPL-MCNC: 7 MG/DL — SIGNIFICANT CHANGE UP (ref 7–18)
BUN SERPL-MCNC: 9 MG/DL — SIGNIFICANT CHANGE UP (ref 7–18)
BUN SERPL-MCNC: 9 MG/DL — SIGNIFICANT CHANGE UP (ref 7–18)
CALCIUM SERPL-MCNC: 6.7 MG/DL — LOW (ref 8.4–10.5)
CALCIUM SERPL-MCNC: 6.7 MG/DL — LOW (ref 8.4–10.5)
CALCIUM SERPL-MCNC: 8.1 MG/DL — LOW (ref 8.4–10.5)
CALCIUM SERPL-MCNC: 8.1 MG/DL — LOW (ref 8.4–10.5)
CALCIUM SERPL-MCNC: 8.3 MG/DL — LOW (ref 8.4–10.5)
CALCIUM SERPL-MCNC: 8.3 MG/DL — LOW (ref 8.4–10.5)
CALCIUM SERPL-MCNC: 8.5 MG/DL — SIGNIFICANT CHANGE UP (ref 8.4–10.5)
CALCIUM SERPL-MCNC: 8.5 MG/DL — SIGNIFICANT CHANGE UP (ref 8.4–10.5)
CALCIUM SERPL-MCNC: 8.8 MG/DL — SIGNIFICANT CHANGE UP (ref 8.4–10.5)
CALCIUM SERPL-MCNC: 8.8 MG/DL — SIGNIFICANT CHANGE UP (ref 8.4–10.5)
CALCIUM SERPL-MCNC: <5 MG/DL — CRITICAL LOW (ref 8.4–10.5)
CALCIUM SERPL-MCNC: <5 MG/DL — CRITICAL LOW (ref 8.4–10.5)
CHLORIDE SERPL-SCNC: 107 MMOL/L — SIGNIFICANT CHANGE UP (ref 96–108)
CHLORIDE SERPL-SCNC: 107 MMOL/L — SIGNIFICANT CHANGE UP (ref 96–108)
CHLORIDE SERPL-SCNC: 86 MMOL/L — LOW (ref 96–108)
CHLORIDE SERPL-SCNC: 88 MMOL/L — LOW (ref 96–108)
CHLORIDE SERPL-SCNC: 88 MMOL/L — LOW (ref 96–108)
CHLORIDE SERPL-SCNC: 95 MMOL/L — LOW (ref 96–108)
CHLORIDE SERPL-SCNC: 95 MMOL/L — LOW (ref 96–108)
CO2 SERPL-SCNC: 21 MMOL/L — LOW (ref 22–31)
CO2 SERPL-SCNC: 21 MMOL/L — LOW (ref 22–31)
CO2 SERPL-SCNC: 27 MMOL/L — SIGNIFICANT CHANGE UP (ref 22–31)
CO2 SERPL-SCNC: 27 MMOL/L — SIGNIFICANT CHANGE UP (ref 22–31)
CO2 SERPL-SCNC: 29 MMOL/L — SIGNIFICANT CHANGE UP (ref 22–31)
CO2 SERPL-SCNC: 30 MMOL/L — SIGNIFICANT CHANGE UP (ref 22–31)
CO2 SERPL-SCNC: 30 MMOL/L — SIGNIFICANT CHANGE UP (ref 22–31)
CREAT ?TM UR-MCNC: 16 MG/DL — SIGNIFICANT CHANGE UP
CREAT ?TM UR-MCNC: 16 MG/DL — SIGNIFICANT CHANGE UP
CREAT SERPL-MCNC: 0.21 MG/DL — LOW (ref 0.5–1.3)
CREAT SERPL-MCNC: 0.21 MG/DL — LOW (ref 0.5–1.3)
CREAT SERPL-MCNC: 0.47 MG/DL — LOW (ref 0.5–1.3)
CREAT SERPL-MCNC: 0.47 MG/DL — LOW (ref 0.5–1.3)
CREAT SERPL-MCNC: 0.63 MG/DL — SIGNIFICANT CHANGE UP (ref 0.5–1.3)
CREAT SERPL-MCNC: 0.63 MG/DL — SIGNIFICANT CHANGE UP (ref 0.5–1.3)
CREAT SERPL-MCNC: 0.64 MG/DL — SIGNIFICANT CHANGE UP (ref 0.5–1.3)
CREAT SERPL-MCNC: 0.64 MG/DL — SIGNIFICANT CHANGE UP (ref 0.5–1.3)
CREAT SERPL-MCNC: 0.66 MG/DL — SIGNIFICANT CHANGE UP (ref 0.5–1.3)
CREAT SERPL-MCNC: 0.66 MG/DL — SIGNIFICANT CHANGE UP (ref 0.5–1.3)
CREAT SERPL-MCNC: 0.82 MG/DL — SIGNIFICANT CHANGE UP (ref 0.5–1.3)
CREAT SERPL-MCNC: 0.82 MG/DL — SIGNIFICANT CHANGE UP (ref 0.5–1.3)
EGFR: 105 ML/MIN/1.73M2 — SIGNIFICANT CHANGE UP
EGFR: 105 ML/MIN/1.73M2 — SIGNIFICANT CHANGE UP
EGFR: 65 ML/MIN/1.73M2 — SIGNIFICANT CHANGE UP
EGFR: 65 ML/MIN/1.73M2 — SIGNIFICANT CHANGE UP
EGFR: 80 ML/MIN/1.73M2 — SIGNIFICANT CHANGE UP
EGFR: 81 ML/MIN/1.73M2 — SIGNIFICANT CHANGE UP
EGFR: 81 ML/MIN/1.73M2 — SIGNIFICANT CHANGE UP
EGFR: 87 ML/MIN/1.73M2 — SIGNIFICANT CHANGE UP
EGFR: 87 ML/MIN/1.73M2 — SIGNIFICANT CHANGE UP
EOSINOPHIL # BLD AUTO: 0.43 K/UL — SIGNIFICANT CHANGE UP (ref 0–0.5)
EOSINOPHIL # BLD AUTO: 0.43 K/UL — SIGNIFICANT CHANGE UP (ref 0–0.5)
EOSINOPHIL NFR BLD AUTO: 5 % — SIGNIFICANT CHANGE UP (ref 0–6)
EOSINOPHIL NFR BLD AUTO: 5 % — SIGNIFICANT CHANGE UP (ref 0–6)
GLUCOSE BLDC GLUCOMTR-MCNC: 105 MG/DL — HIGH (ref 70–99)
GLUCOSE BLDC GLUCOMTR-MCNC: 105 MG/DL — HIGH (ref 70–99)
GLUCOSE BLDC GLUCOMTR-MCNC: 131 MG/DL — HIGH (ref 70–99)
GLUCOSE BLDC GLUCOMTR-MCNC: 131 MG/DL — HIGH (ref 70–99)
GLUCOSE BLDC GLUCOMTR-MCNC: 146 MG/DL — HIGH (ref 70–99)
GLUCOSE BLDC GLUCOMTR-MCNC: 146 MG/DL — HIGH (ref 70–99)
GLUCOSE BLDC GLUCOMTR-MCNC: 91 MG/DL — SIGNIFICANT CHANGE UP (ref 70–99)
GLUCOSE BLDC GLUCOMTR-MCNC: 91 MG/DL — SIGNIFICANT CHANGE UP (ref 70–99)
GLUCOSE SERPL-MCNC: 102 MG/DL — HIGH (ref 70–99)
GLUCOSE SERPL-MCNC: 102 MG/DL — HIGH (ref 70–99)
GLUCOSE SERPL-MCNC: 112 MG/DL — HIGH (ref 70–99)
GLUCOSE SERPL-MCNC: 112 MG/DL — HIGH (ref 70–99)
GLUCOSE SERPL-MCNC: 73 MG/DL — SIGNIFICANT CHANGE UP (ref 70–99)
GLUCOSE SERPL-MCNC: 73 MG/DL — SIGNIFICANT CHANGE UP (ref 70–99)
GLUCOSE SERPL-MCNC: 93 MG/DL — SIGNIFICANT CHANGE UP (ref 70–99)
GLUCOSE SERPL-MCNC: 93 MG/DL — SIGNIFICANT CHANGE UP (ref 70–99)
GLUCOSE SERPL-MCNC: 97 MG/DL — SIGNIFICANT CHANGE UP (ref 70–99)
GLUCOSE SERPL-MCNC: 97 MG/DL — SIGNIFICANT CHANGE UP (ref 70–99)
GLUCOSE SERPL-MCNC: 99 MG/DL — SIGNIFICANT CHANGE UP (ref 70–99)
GLUCOSE SERPL-MCNC: 99 MG/DL — SIGNIFICANT CHANGE UP (ref 70–99)
HCT VFR BLD CALC: 32.7 % — LOW (ref 34.5–45)
HCT VFR BLD CALC: 32.7 % — LOW (ref 34.5–45)
HGB BLD-MCNC: 10.9 G/DL — LOW (ref 11.5–15.5)
HGB BLD-MCNC: 10.9 G/DL — LOW (ref 11.5–15.5)
IMM GRANULOCYTES NFR BLD AUTO: 0.3 % — SIGNIFICANT CHANGE UP (ref 0–0.9)
IMM GRANULOCYTES NFR BLD AUTO: 0.3 % — SIGNIFICANT CHANGE UP (ref 0–0.9)
LYMPHOCYTES # BLD AUTO: 1.94 K/UL — SIGNIFICANT CHANGE UP (ref 1–3.3)
LYMPHOCYTES # BLD AUTO: 1.94 K/UL — SIGNIFICANT CHANGE UP (ref 1–3.3)
LYMPHOCYTES # BLD AUTO: 22.5 % — SIGNIFICANT CHANGE UP (ref 13–44)
LYMPHOCYTES # BLD AUTO: 22.5 % — SIGNIFICANT CHANGE UP (ref 13–44)
MAGNESIUM SERPL-MCNC: 1.3 MG/DL — LOW (ref 1.6–2.6)
MAGNESIUM SERPL-MCNC: 1.3 MG/DL — LOW (ref 1.6–2.6)
MCHC RBC-ENTMCNC: 30.5 PG — SIGNIFICANT CHANGE UP (ref 27–34)
MCHC RBC-ENTMCNC: 30.5 PG — SIGNIFICANT CHANGE UP (ref 27–34)
MCHC RBC-ENTMCNC: 33.3 GM/DL — SIGNIFICANT CHANGE UP (ref 32–36)
MCHC RBC-ENTMCNC: 33.3 GM/DL — SIGNIFICANT CHANGE UP (ref 32–36)
MCV RBC AUTO: 91.6 FL — SIGNIFICANT CHANGE UP (ref 80–100)
MCV RBC AUTO: 91.6 FL — SIGNIFICANT CHANGE UP (ref 80–100)
MONOCYTES # BLD AUTO: 0.9 K/UL — SIGNIFICANT CHANGE UP (ref 0–0.9)
MONOCYTES # BLD AUTO: 0.9 K/UL — SIGNIFICANT CHANGE UP (ref 0–0.9)
MONOCYTES NFR BLD AUTO: 10.5 % — SIGNIFICANT CHANGE UP (ref 2–14)
MONOCYTES NFR BLD AUTO: 10.5 % — SIGNIFICANT CHANGE UP (ref 2–14)
NEUTROPHILS # BLD AUTO: 5.26 K/UL — SIGNIFICANT CHANGE UP (ref 1.8–7.4)
NEUTROPHILS # BLD AUTO: 5.26 K/UL — SIGNIFICANT CHANGE UP (ref 1.8–7.4)
NEUTROPHILS NFR BLD AUTO: 61.1 % — SIGNIFICANT CHANGE UP (ref 43–77)
NEUTROPHILS NFR BLD AUTO: 61.1 % — SIGNIFICANT CHANGE UP (ref 43–77)
NRBC # BLD: 0 /100 WBCS — SIGNIFICANT CHANGE UP (ref 0–0)
NRBC # BLD: 0 /100 WBCS — SIGNIFICANT CHANGE UP (ref 0–0)
OSMOLALITY UR: 285 MOS/KG — SIGNIFICANT CHANGE UP (ref 50–1200)
OSMOLALITY UR: 285 MOS/KG — SIGNIFICANT CHANGE UP (ref 50–1200)
PHOSPHATE SERPL-MCNC: 3.2 MG/DL — SIGNIFICANT CHANGE UP (ref 2.5–4.5)
PHOSPHATE SERPL-MCNC: 3.2 MG/DL — SIGNIFICANT CHANGE UP (ref 2.5–4.5)
PLATELET # BLD AUTO: 256 K/UL — SIGNIFICANT CHANGE UP (ref 150–400)
PLATELET # BLD AUTO: 256 K/UL — SIGNIFICANT CHANGE UP (ref 150–400)
POTASSIUM SERPL-MCNC: 2.9 MMOL/L — CRITICAL LOW (ref 3.5–5.3)
POTASSIUM SERPL-MCNC: 2.9 MMOL/L — CRITICAL LOW (ref 3.5–5.3)
POTASSIUM SERPL-MCNC: 3.9 MMOL/L — SIGNIFICANT CHANGE UP (ref 3.5–5.3)
POTASSIUM SERPL-MCNC: 3.9 MMOL/L — SIGNIFICANT CHANGE UP (ref 3.5–5.3)
POTASSIUM SERPL-MCNC: 4.5 MMOL/L — SIGNIFICANT CHANGE UP (ref 3.5–5.3)
POTASSIUM SERPL-MCNC: 4.5 MMOL/L — SIGNIFICANT CHANGE UP (ref 3.5–5.3)
POTASSIUM SERPL-MCNC: 4.8 MMOL/L — SIGNIFICANT CHANGE UP (ref 3.5–5.3)
POTASSIUM SERPL-MCNC: 4.9 MMOL/L — SIGNIFICANT CHANGE UP (ref 3.5–5.3)
POTASSIUM SERPL-MCNC: 4.9 MMOL/L — SIGNIFICANT CHANGE UP (ref 3.5–5.3)
POTASSIUM SERPL-SCNC: 2.9 MMOL/L — CRITICAL LOW (ref 3.5–5.3)
POTASSIUM SERPL-SCNC: 2.9 MMOL/L — CRITICAL LOW (ref 3.5–5.3)
POTASSIUM SERPL-SCNC: 3.9 MMOL/L — SIGNIFICANT CHANGE UP (ref 3.5–5.3)
POTASSIUM SERPL-SCNC: 3.9 MMOL/L — SIGNIFICANT CHANGE UP (ref 3.5–5.3)
POTASSIUM SERPL-SCNC: 4.5 MMOL/L — SIGNIFICANT CHANGE UP (ref 3.5–5.3)
POTASSIUM SERPL-SCNC: 4.5 MMOL/L — SIGNIFICANT CHANGE UP (ref 3.5–5.3)
POTASSIUM SERPL-SCNC: 4.8 MMOL/L — SIGNIFICANT CHANGE UP (ref 3.5–5.3)
POTASSIUM SERPL-SCNC: 4.9 MMOL/L — SIGNIFICANT CHANGE UP (ref 3.5–5.3)
POTASSIUM SERPL-SCNC: 4.9 MMOL/L — SIGNIFICANT CHANGE UP (ref 3.5–5.3)
PROT SERPL-MCNC: 3.6 G/DL — LOW (ref 6–8.3)
PROT SERPL-MCNC: 3.6 G/DL — LOW (ref 6–8.3)
PROT SERPL-MCNC: 5.9 G/DL — LOW (ref 6–8.3)
PROT SERPL-MCNC: 5.9 G/DL — LOW (ref 6–8.3)
PROT SERPL-MCNC: 6.2 G/DL — SIGNIFICANT CHANGE UP (ref 6–8.3)
PROT SERPL-MCNC: 6.2 G/DL — SIGNIFICANT CHANGE UP (ref 6–8.3)
RBC # BLD: 3.57 M/UL — LOW (ref 3.8–5.2)
RBC # BLD: 3.57 M/UL — LOW (ref 3.8–5.2)
RBC # FLD: 12.7 % — SIGNIFICANT CHANGE UP (ref 10.3–14.5)
RBC # FLD: 12.7 % — SIGNIFICANT CHANGE UP (ref 10.3–14.5)
SODIUM SERPL-SCNC: 120 MMOL/L — CRITICAL LOW (ref 135–145)
SODIUM SERPL-SCNC: 122 MMOL/L — LOW (ref 135–145)
SODIUM SERPL-SCNC: 122 MMOL/L — LOW (ref 135–145)
SODIUM SERPL-SCNC: 126 MMOL/L — LOW (ref 135–145)
SODIUM SERPL-SCNC: 126 MMOL/L — LOW (ref 135–145)
SODIUM SERPL-SCNC: 133 MMOL/L — LOW (ref 135–145)
SODIUM SERPL-SCNC: 133 MMOL/L — LOW (ref 135–145)
SODIUM UR-SCNC: 95 MMOL/L — SIGNIFICANT CHANGE UP
SODIUM UR-SCNC: 95 MMOL/L — SIGNIFICANT CHANGE UP
WBC # BLD: 8.61 K/UL — SIGNIFICANT CHANGE UP (ref 3.8–10.5)
WBC # BLD: 8.61 K/UL — SIGNIFICANT CHANGE UP (ref 3.8–10.5)
WBC # FLD AUTO: 8.61 K/UL — SIGNIFICANT CHANGE UP (ref 3.8–10.5)
WBC # FLD AUTO: 8.61 K/UL — SIGNIFICANT CHANGE UP (ref 3.8–10.5)

## 2023-12-11 RX ORDER — SODIUM CHLORIDE 9 MG/ML
1 INJECTION INTRAMUSCULAR; INTRAVENOUS; SUBCUTANEOUS THREE TIMES A DAY
Refills: 0 | Status: DISCONTINUED | OUTPATIENT
Start: 2023-12-11 | End: 2023-12-14

## 2023-12-11 RX ORDER — FUROSEMIDE 40 MG
20 TABLET ORAL ONCE
Refills: 0 | Status: COMPLETED | OUTPATIENT
Start: 2023-12-11 | End: 2023-12-11

## 2023-12-11 RX ORDER — MAGNESIUM SULFATE 500 MG/ML
2 VIAL (ML) INJECTION ONCE
Refills: 0 | Status: COMPLETED | OUTPATIENT
Start: 2023-12-11 | End: 2023-12-11

## 2023-12-11 RX ORDER — SODIUM CHLORIDE 9 MG/ML
1000 INJECTION INTRAMUSCULAR; INTRAVENOUS; SUBCUTANEOUS
Refills: 0 | Status: DISCONTINUED | OUTPATIENT
Start: 2023-12-11 | End: 2023-12-12

## 2023-12-11 RX ADMIN — BUDESONIDE AND FORMOTEROL FUMARATE DIHYDRATE 2 PUFF(S): 160; 4.5 AEROSOL RESPIRATORY (INHALATION) at 09:19

## 2023-12-11 RX ADMIN — RIVAROXABAN 20 MILLIGRAM(S): KIT at 17:14

## 2023-12-11 RX ADMIN — LETROZOLE 2.5 MILLIGRAM(S): 2.5 TABLET, FILM COATED ORAL at 12:28

## 2023-12-11 RX ADMIN — CARVEDILOL PHOSPHATE 3.12 MILLIGRAM(S): 80 CAPSULE, EXTENDED RELEASE ORAL at 17:14

## 2023-12-11 RX ADMIN — SODIUM CHLORIDE 100 MILLILITER(S): 9 INJECTION INTRAMUSCULAR; INTRAVENOUS; SUBCUTANEOUS at 06:11

## 2023-12-11 RX ADMIN — SODIUM CHLORIDE 1 GRAM(S): 9 INJECTION INTRAMUSCULAR; INTRAVENOUS; SUBCUTANEOUS at 16:45

## 2023-12-11 RX ADMIN — ATORVASTATIN CALCIUM 20 MILLIGRAM(S): 80 TABLET, FILM COATED ORAL at 21:38

## 2023-12-11 RX ADMIN — DORZOLAMIDE HYDROCHLORIDE 1 DROP(S): 20 SOLUTION/ DROPS OPHTHALMIC at 06:12

## 2023-12-11 RX ADMIN — SODIUM CHLORIDE 50 MILLILITER(S): 9 INJECTION INTRAMUSCULAR; INTRAVENOUS; SUBCUTANEOUS at 17:16

## 2023-12-11 RX ADMIN — MONTELUKAST 10 MILLIGRAM(S): 4 TABLET, CHEWABLE ORAL at 21:39

## 2023-12-11 RX ADMIN — Medication 500 MILLIGRAM(S): at 06:12

## 2023-12-11 RX ADMIN — POLYETHYLENE GLYCOL 3350 17 GRAM(S): 17 POWDER, FOR SOLUTION ORAL at 11:58

## 2023-12-11 RX ADMIN — BUDESONIDE AND FORMOTEROL FUMARATE DIHYDRATE 2 PUFF(S): 160; 4.5 AEROSOL RESPIRATORY (INHALATION) at 21:41

## 2023-12-11 RX ADMIN — DORZOLAMIDE HYDROCHLORIDE 1 DROP(S): 20 SOLUTION/ DROPS OPHTHALMIC at 13:45

## 2023-12-11 RX ADMIN — Medication 20 MILLIGRAM(S): at 16:45

## 2023-12-11 RX ADMIN — CARVEDILOL PHOSPHATE 3.12 MILLIGRAM(S): 80 CAPSULE, EXTENDED RELEASE ORAL at 06:12

## 2023-12-11 RX ADMIN — PANTOPRAZOLE SODIUM 40 MILLIGRAM(S): 20 TABLET, DELAYED RELEASE ORAL at 06:12

## 2023-12-11 RX ADMIN — SODIUM CHLORIDE 1 GRAM(S): 9 INJECTION INTRAMUSCULAR; INTRAVENOUS; SUBCUTANEOUS at 21:39

## 2023-12-11 RX ADMIN — LATANOPROST 1 DROP(S): 0.05 SOLUTION/ DROPS OPHTHALMIC; TOPICAL at 21:39

## 2023-12-11 RX ADMIN — Medication 25 GRAM(S): at 10:10

## 2023-12-11 RX ADMIN — DORZOLAMIDE HYDROCHLORIDE 1 DROP(S): 20 SOLUTION/ DROPS OPHTHALMIC at 21:38

## 2023-12-11 RX ADMIN — AMLODIPINE BESYLATE 5 MILLIGRAM(S): 2.5 TABLET ORAL at 06:12

## 2023-12-11 NOTE — PROGRESS NOTE ADULT - SUBJECTIVE AND OBJECTIVE BOX
Patient is a 89y old  Female who presents with a chief complaint of cough and sob (10 Dec 2023 11:06)  Awake, alert, comfortable out of bed to chair in NAD. Doing well with oxygen supp via NC    INTERVAL HPI/OVERNIGHT EVENTS:      VITAL SIGNS:  T(F): 98.4 (12-11-23 @ 08:42)  HR: 70 (12-11-23 @ 08:42)  BP: 161/60 (12-11-23 @ 08:42)  RR: 19 (12-11-23 @ 08:42)  SpO2: 98% (12-11-23 @ 08:42)  Wt(kg): --  I&O's Detail    10 Dec 2023 07:01  -  11 Dec 2023 07:00  --------------------------------------------------------  IN:    Oral Fluid: 850 mL    sodium chloride 0.9%: 740 mL  Total IN: 1590 mL    OUT:    Indwelling Catheter - Urethral (mL): 1300 mL  Total OUT: 1300 mL    Total NET: 290 mL              REVIEW OF SYSTEMS:    CONSTITUTIONAL:  No fevers, chills, sweats    HEENT:  Eyes:  No diplopia or blurred vision. ENT:  No earache, sore throat or runny nose.    CARDIOVASCULAR:  No pressure, squeezing, tightness, or heaviness about the chest; no palpitations.    RESPIRATORY:  Per HPI    GASTROINTESTINAL:  No abdominal pain, nausea, vomiting or diarrhea.    GENITOURINARY:  No dysuria, frequency or urgency.    NEUROLOGIC:  No paresthesias, fasciculations, seizures or weakness.    PSYCHIATRIC:  No disorder of thought or mood.      PHYSICAL EXAM:    Constitutional: Well developed and nourished  Eyes:Perrla  ENMT: normal  Neck:supple  Respiratory: good air entry  Cardiovascular: S1 S2 regular  Gastrointestinal: Soft, Non tender  Extremities: No edema  Vascular:normal  Neurological:Awake, alert,Ox3  Musculoskeletal:Normal      MEDICATIONS  (STANDING):  amLODIPine   Tablet 5 milliGRAM(s) Oral daily  atorvastatin 20 milliGRAM(s) Oral at bedtime  budesonide 160 MICROgram(s)/formoterol 4.5 MICROgram(s) Inhaler 2 Puff(s) Inhalation two times a day  carvedilol 3.125 milliGRAM(s) Oral every 12 hours  dorzolamide 2% Ophthalmic Solution 1 Drop(s) Right EYE three times a day  insulin lispro (ADMELOG) corrective regimen sliding scale   SubCutaneous Before meals and at bedtime  latanoprost 0.005% Ophthalmic Solution 1 Drop(s) Both EYES at bedtime  letrozole 2.5 milliGRAM(s) Oral daily  montelukast 10 milliGRAM(s) Oral at bedtime  pantoprazole    Tablet 40 milliGRAM(s) Oral before breakfast  polyethylene glycol 3350 17 Gram(s) Oral daily  rivaroxaban 20 milliGRAM(s) Oral with dinner  sodium chloride 0.9%. 1000 milliLiter(s) (100 mL/Hr) IV Continuous <Continuous>    MEDICATIONS  (PRN):  acetaminophen     Tablet .. 650 milliGRAM(s) Oral every 6 hours PRN Temp greater or equal to 38C (100.4F), Mild Pain (1 - 3), Moderate Pain (4 - 6)  albuterol    90 MICROgram(s) HFA Inhaler 2 Puff(s) Inhalation every 6 hours PRN for bronchospasm  aluminum hydroxide/magnesium hydroxide/simethicone Suspension 30 milliLiter(s) Oral every 4 hours PRN Dyspepsia  ondansetron Injectable 4 milliGRAM(s) IV Push every 8 hours PRN Nausea and/or Vomiting      Allergies    aspirin (Stomach Upset)  aspirin (Nausea (Mild))    Intolerances        LABS:                        10.9   8.61  )-----------( 256      ( 11 Dec 2023 07:50 )             32.7     12-11    120<LL>  |  86<L>  |  6<L>  ----------------------------<  97  4.5   |  29  |  0.66    Ca    8.8      11 Dec 2023 09:00  Phos  3.2     12-11  Mg     1.3     12-11    TPro  6.2  /  Alb  3.1<L>  /  TBili  0.5  /  DBili  x   /  AST  16  /  ALT  26  /  AlkPhos  75  12-11      Urinalysis Basic - ( 11 Dec 2023 09:00 )    Color: x / Appearance: x / SG: x / pH: x  Gluc: 97 mg/dL / Ketone: x  / Bili: x / Urobili: x   Blood: x / Protein: x / Nitrite: x   Leuk Esterase: x / RBC: x / WBC x   Sq Epi: x / Non Sq Epi: x / Bacteria: x            CAPILLARY BLOOD GLUCOSE      POCT Blood Glucose.: 91 mg/dL (11 Dec 2023 08:10)  POCT Blood Glucose.: 124 mg/dL (10 Dec 2023 21:04)  POCT Blood Glucose.: 100 mg/dL (10 Dec 2023 16:39)  POCT Blood Glucose.: 110 mg/dL (10 Dec 2023 11:14)        RADIOLOGY & ADDITIONAL TESTS:    CXR:  xr< from: Xray Chest 1 View-PORTABLE IMMEDIATE (12.09.23 @ 03:40) >  IMPRESSION: When allowing for technique no change. No acute parenchymal   disease    < end of copied text >    Ct scan chest:    ekg;    echo:ech

## 2023-12-11 NOTE — PROGRESS NOTE ADULT - SUBJECTIVE AND OBJECTIVE BOX
PGY-1 Progress Note discussed with attending    PAGER #: [[phone]] TILL 5:00 PM  PLEASE CONTACT ON CALL TEAM:  - On Call Team (Please refer to [facility]) FROM 5:00 PM - 8:30PM  - Nightfloat Team FROM 8:30 -7:30 AM    CHIEF COMPLAINT & BRIEF HOSPITAL COURSE: No acute overnight events. Patient reports coughing but otherwise no other complaints.     INTERVAL HPI/OVERNIGHT EVENTS:   MEDICATIONS  (STANDING):  amLODIPine   Tablet 5 milliGRAM(s) Oral daily  atorvastatin 20 milliGRAM(s) Oral at bedtime  budesonide 160 MICROgram(s)/formoterol 4.5 MICROgram(s) Inhaler 2 Puff(s) Inhalation two times a day  carvedilol 3.125 milliGRAM(s) Oral every 12 hours  dorzolamide 2% Ophthalmic Solution 1 Drop(s) Right EYE three times a day  insulin lispro (ADMELOG) corrective regimen sliding scale   SubCutaneous Before meals and at bedtime  latanoprost 0.005% Ophthalmic Solution 1 Drop(s) Both EYES at bedtime  letrozole 2.5 milliGRAM(s) Oral daily  montelukast 10 milliGRAM(s) Oral at bedtime  pantoprazole    Tablet 40 milliGRAM(s) Oral before breakfast  polyethylene glycol 3350 17 Gram(s) Oral daily  rivaroxaban 20 milliGRAM(s) Oral with dinner  sodium chloride 0.9%. 1000 milliLiter(s) (100 mL/Hr) IV Continuous <Continuous>    MEDICATIONS  (PRN):  acetaminophen     Tablet .. 650 milliGRAM(s) Oral every 6 hours PRN Temp greater or equal to 38C (100.4F), Mild Pain (1 - 3), Moderate Pain (4 - 6)  albuterol    90 MICROgram(s) HFA Inhaler 2 Puff(s) Inhalation every 6 hours PRN for bronchospasm  aluminum hydroxide/magnesium hydroxide/simethicone Suspension 30 milliLiter(s) Oral every 4 hours PRN Dyspepsia  ondansetron Injectable 4 milliGRAM(s) IV Push every 8 hours PRN Nausea and/or Vomiting      REVIEW OF SYSTEMS:  CONSTITUTIONAL: No fever, weight loss, or fatigue  RESPIRATORY: No shortness of breath  CARDIOVASCULAR: No chest pain  GASTROINTESTINAL: No abdominal pain.  GENITOURINARY: No dysuria  NEUROLOGICAL: No headaches  SKIN: No itching, burning, rashes    Vital Signs Last 24 Hrs  T(C): 36.8 (11 Dec 2023 11:17), Max: 36.9 (10 Dec 2023 16:07)  T(F): 98.2 (11 Dec 2023 11:17), Max: 98.4 (10 Dec 2023 16:07)  HR: 64 (11 Dec 2023 11:17) (58 - 72)  BP: 140/68 (11 Dec 2023 11:17) (129/60 - 161/60)  BP(mean): --  RR: 17 (11 Dec 2023 11:17) (17 - 19)  SpO2: 99% (11 Dec 2023 11:17) (97% - 100%)    Parameters below as of 11 Dec 2023 11:17  Patient On (Oxygen Delivery Method): nasal cannula  O2 Flow (L/min): 2      PHYSICAL EXAMINATION:  GENERAL: NAD, well built  HEAD:  Atraumatic, Normocephalic  EYES:  conjunctiva and sclera clear  CHEST/LUNG: Clear to auscultation. No rales, rhonchi, wheezing, or rubs  HEART: Regular rate and rhythm; No murmurs, rubs, or gallops  ABDOMEN: Soft, Nontender, Nondistended; Bowel sounds present  NERVOUS SYSTEM:  Alert & Oriented X 1-2,    EXTREMITIES:  2+ Peripheral Pulses, No clubbing, cyanosis, or edema  SKIN: warm dry                          10.9   8.61  )-----------( 256      ( 11 Dec 2023 07:50 )             32.7     12-11    120<LL>  |  86<L>  |  6<L>  ----------------------------<  97  4.5   |  29  |  0.66    Ca    8.8      11 Dec 2023 09:00  Phos  3.2     12-11  Mg     1.3     12-11    TPro  6.2  /  Alb  3.1<L>  /  TBili  0.5  /  DBili  x   /  AST  16  /  ALT  26  /  AlkPhos  75  12-11    LIVER FUNCTIONS - ( 11 Dec 2023 09:00 )  Alb: 3.1 g/dL / Pro: 6.2 g/dL / ALK PHOS: 75 U/L / ALT: 26 U/L DA / AST: 16 U/L / GGT: x                   CAPILLARY BLOOD GLUCOSE      RADIOLOGY & ADDITIONAL TESTS:                   PGY-1 Progress Note discussed with attending    PAGER #: [[phone]] TILL 5:00 PM  PLEASE CONTACT ON CALL TEAM:  - On Call Team (Please refer to [facility]) FROM 5:00 PM - 8:30PM  - Nightfloat Team FROM 8:30 -7:30 AM    CHIEF COMPLAINT & BRIEF HOSPITAL COURSE: No acute overnight events. Patient reports coughing but otherwise no other complaints.     INTERVAL HPI/OVERNIGHT EVENTS:   MEDICATIONS  (STANDING):  amLODIPine   Tablet 5 milliGRAM(s) Oral daily  atorvastatin 20 milliGRAM(s) Oral at bedtime  budesonide 160 MICROgram(s)/formoterol 4.5 MICROgram(s) Inhaler 2 Puff(s) Inhalation two times a day  carvedilol 3.125 milliGRAM(s) Oral every 12 hours  dorzolamide 2% Ophthalmic Solution 1 Drop(s) Right EYE three times a day  insulin lispro (ADMELOG) corrective regimen sliding scale   SubCutaneous Before meals and at bedtime  latanoprost 0.005% Ophthalmic Solution 1 Drop(s) Both EYES at bedtime  letrozole 2.5 milliGRAM(s) Oral daily  montelukast 10 milliGRAM(s) Oral at bedtime  pantoprazole    Tablet 40 milliGRAM(s) Oral before breakfast  polyethylene glycol 3350 17 Gram(s) Oral daily  rivaroxaban 20 milliGRAM(s) Oral with dinner  sodium chloride 0.9%. 1000 milliLiter(s) (100 mL/Hr) IV Continuous <Continuous>    MEDICATIONS  (PRN):  acetaminophen     Tablet .. 650 milliGRAM(s) Oral every 6 hours PRN Temp greater or equal to 38C (100.4F), Mild Pain (1 - 3), Moderate Pain (4 - 6)  albuterol    90 MICROgram(s) HFA Inhaler 2 Puff(s) Inhalation every 6 hours PRN for bronchospasm  aluminum hydroxide/magnesium hydroxide/simethicone Suspension 30 milliLiter(s) Oral every 4 hours PRN Dyspepsia  ondansetron Injectable 4 milliGRAM(s) IV Push every 8 hours PRN Nausea and/or Vomiting      REVIEW OF SYSTEMS:  CONSTITUTIONAL: No fever, weight loss, or fatigue  RESPIRATORY: No shortness of breath  CARDIOVASCULAR: No chest pain  GASTROINTESTINAL: No abdominal pain.  GENITOURINARY: No dysuria  NEUROLOGICAL: No headaches  SKIN: No itching, burning, rashes    Vital Signs Last 24 Hrs  T(C): 36.8 (11 Dec 2023 11:17), Max: 36.9 (10 Dec 2023 16:07)  T(F): 98.2 (11 Dec 2023 11:17), Max: 98.4 (10 Dec 2023 16:07)  HR: 64 (11 Dec 2023 11:17) (58 - 72)  BP: 140/68 (11 Dec 2023 11:17) (129/60 - 161/60)  BP(mean): --  RR: 17 (11 Dec 2023 11:17) (17 - 19)  SpO2: 99% (11 Dec 2023 11:17) (97% - 100%)    Parameters below as of 11 Dec 2023 11:17  Patient On (Oxygen Delivery Method): nasal cannula  O2 Flow (L/min): 2      PHYSICAL EXAMINATION:  GENERAL: NAD, well built  HEAD:  Atraumatic, Normocephalic  EYES:  conjunctiva and sclera clear  CHEST/LUNG: Clear to auscultation. No rales, rhonchi, wheezing, or rubs  HEART: Regular rate and rhythm; No murmurs, rubs, or gallops  ABDOMEN: Soft, Nontender, Nondistended; Bowel sounds present  NERVOUS SYSTEM:  Alert & Oriented X 1-2,    EXTREMITIES:  2+ Peripheral Pulses, No clubbing, cyanosis, or edema  SKIN: warm dry                          10.9   8.61  )-----------( 256      ( 11 Dec 2023 07:50 )             32.7     12-11    120<LL>  |  86<L>  |  6<L>  ----------------------------<  97  4.5   |  29  |  0.66    Ca    8.8      11 Dec 2023 09:00  Phos  3.2     12-11  Mg     1.3     12-11    TPro  6.2  /  Alb  3.1<L>  /  TBili  0.5  /  DBili  x   /  AST  16  /  ALT  26  /  AlkPhos  75  12-11    LIVER FUNCTIONS - ( 11 Dec 2023 09:00 )  Alb: 3.1 g/dL / Pro: 6.2 g/dL / ALK PHOS: 75 U/L / ALT: 26 U/L DA / AST: 16 U/L / GGT: x                   CAPILLARY BLOOD GLUCOSE      RADIOLOGY & ADDITIONAL TESTS:

## 2023-12-11 NOTE — PROVIDER CONTACT NOTE (CRITICAL VALUE NOTIFICATION) - TEST AND RESULT REPORTED:
POTASSIUM 2.9
Sodium 160 , potassium 6.7
sodium 120
CALCIUM less than 5
Na 120
sodium 119
Sodium 120

## 2023-12-11 NOTE — PROGRESS NOTE ADULT - SUBJECTIVE AND OBJECTIVE BOX
Patient is a 89y old  Female who presents with a chief complaint of cough and sob (10 Dec 2023 11:06)    pt seen in tele [x  ], reg med floor [   ], bed [ x ], chair at bedside [   ], awake and responsive [ x ],   lethargic [  ],  nad [ x ]        Allergies    aspirin (Stomach Upset)  aspirin (Nausea (Mild))        Vitals    T(F): 97.7 (12-11-23 @ 05:00), Max: 98.5 (12-10-23 @ 11:04)  HR: 71 (12-11-23 @ 05:00) (58 - 79)  BP: 151/70 (12-11-23 @ 05:00) (129/60 - 151/70)  RR: 18 (12-11-23 @ 05:00) (18 - 18)  SpO2: 97% (12-11-23 @ 05:00) (96% - 100%)  Wt(kg): --  CAPILLARY BLOOD GLUCOSE      POCT Blood Glucose.: 124 mg/dL (10 Dec 2023 21:04)      Labs                          11.2   7.97  )-----------( 281      ( 10 Dec 2023 07:01 )             34.1       12-11    126<L>  |  95<L>  |  6<L>  ----------------------------<  93  3.9   |  27  |  0.47<L>    Ca    6.7<L>      11 Dec 2023 02:00  Phos  3.6     12-10  Mg     1.5     12-10    TPro  3.6<L>  /  Alb  1.8<L>  /  TBili  0.2  /  DBili  x   /  AST  9<L>  /  ALT  15  /  AlkPhos  42  12-11          Troponin I, High Sensitivity Result: 14.1 ng/L (12-09-23 @ 02:57)    Clean Catch Clean Catch (Midstream)  12-09 @ 03:55   No growth  --  --      .Blood Blood-Peripheral  12-09 @ 02:57   No growth at 24 hours  --  --          Radiology Results      Meds    MEDICATIONS  (STANDING):  amLODIPine   Tablet 5 milliGRAM(s) Oral daily  atorvastatin 20 milliGRAM(s) Oral at bedtime  budesonide 160 MICROgram(s)/formoterol 4.5 MICROgram(s) Inhaler 2 Puff(s) Inhalation two times a day  carvedilol 3.125 milliGRAM(s) Oral every 12 hours  dorzolamide 2% Ophthalmic Solution 1 Drop(s) Right EYE three times a day  insulin lispro (ADMELOG) corrective regimen sliding scale   SubCutaneous Before meals and at bedtime  latanoprost 0.005% Ophthalmic Solution 1 Drop(s) Both EYES at bedtime  letrozole 2.5 milliGRAM(s) Oral daily  montelukast 10 milliGRAM(s) Oral at bedtime  pantoprazole    Tablet 40 milliGRAM(s) Oral before breakfast  polyethylene glycol 3350 17 Gram(s) Oral daily  rivaroxaban 20 milliGRAM(s) Oral with dinner  sodium chloride 0.9%. 1000 milliLiter(s) (100 mL/Hr) IV Continuous <Continuous>      MEDICATIONS  (PRN):  acetaminophen     Tablet .. 650 milliGRAM(s) Oral every 6 hours PRN Temp greater or equal to 38C (100.4F), Mild Pain (1 - 3), Moderate Pain (4 - 6)  albuterol    90 MICROgram(s) HFA Inhaler 2 Puff(s) Inhalation every 6 hours PRN for bronchospasm  aluminum hydroxide/magnesium hydroxide/simethicone Suspension 30 milliLiter(s) Oral every 4 hours PRN Dyspepsia  ondansetron Injectable 4 milliGRAM(s) IV Push every 8 hours PRN Nausea and/or Vomiting      Physical Exam    Neuro :  no focal deficits  Respiratory: CTA B/L  CV: RRR, S1S2, no murmurs,   Abdominal: Soft, NT, ND +BS,  Extremities: No edema, + peripheral pulses      ASSESSMENT    metabolic encephalopathy,   hyponatremia,   hyperkalemia,   metabolic encephalopathy, ,   h/o  DM,   HTN,   HLD,   GERD,   hypothyroid,   COPD,   DVT,   PAF/DVT/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  hgba1c,   lispro ss  f/u ucx,  cont current meds         Patient is a 89y old  Female who presents with a chief complaint of cough and sob (10 Dec 2023 11:06)    pt seen in tele [x  ], reg med floor [   ], bed [ x ], chair at bedside [   ], awake and responsive [ x ],   lethargic [  ],  nad [ x ]        Allergies    aspirin (Stomach Upset)  aspirin (Nausea (Mild))        Vitals    T(F): 97.7 (12-11-23 @ 05:00), Max: 98.5 (12-10-23 @ 11:04)  HR: 71 (12-11-23 @ 05:00) (58 - 79)  BP: 151/70 (12-11-23 @ 05:00) (129/60 - 151/70)  RR: 18 (12-11-23 @ 05:00) (18 - 18)  SpO2: 97% (12-11-23 @ 05:00) (96% - 100%)  Wt(kg): --  CAPILLARY BLOOD GLUCOSE      POCT Blood Glucose.: 124 mg/dL (10 Dec 2023 21:04)      Labs                          11.2   7.97  )-----------( 281      ( 10 Dec 2023 07:01 )             34.1       12-11    126<L>  |  95<L>  |  6<L>  ----------------------------<  93  3.9   |  27  |  0.47<L>    Ca    6.7<L>      11 Dec 2023 02:00  Phos  3.6     12-10  Mg     1.5     12-10    TPro  3.6<L>  /  Alb  1.8<L>  /  TBili  0.2  /  DBili  x   /  AST  9<L>  /  ALT  15  /  AlkPhos  42  12-11          Troponin I, High Sensitivity Result: 14.1 ng/L (12-09-23 @ 02:57)    Clean Catch Clean Catch (Midstream)  12-09 @ 03:55   No growth  --  --      .Blood Blood-Peripheral  12-09 @ 02:57   No growth at 24 hours  --  --          Radiology Results      Meds    MEDICATIONS  (STANDING):  amLODIPine   Tablet 5 milliGRAM(s) Oral daily  atorvastatin 20 milliGRAM(s) Oral at bedtime  budesonide 160 MICROgram(s)/formoterol 4.5 MICROgram(s) Inhaler 2 Puff(s) Inhalation two times a day  carvedilol 3.125 milliGRAM(s) Oral every 12 hours  dorzolamide 2% Ophthalmic Solution 1 Drop(s) Right EYE three times a day  insulin lispro (ADMELOG) corrective regimen sliding scale   SubCutaneous Before meals and at bedtime  latanoprost 0.005% Ophthalmic Solution 1 Drop(s) Both EYES at bedtime  letrozole 2.5 milliGRAM(s) Oral daily  montelukast 10 milliGRAM(s) Oral at bedtime  pantoprazole    Tablet 40 milliGRAM(s) Oral before breakfast  polyethylene glycol 3350 17 Gram(s) Oral daily  rivaroxaban 20 milliGRAM(s) Oral with dinner  sodium chloride 0.9%. 1000 milliLiter(s) (100 mL/Hr) IV Continuous <Continuous>      MEDICATIONS  (PRN):  acetaminophen     Tablet .. 650 milliGRAM(s) Oral every 6 hours PRN Temp greater or equal to 38C (100.4F), Mild Pain (1 - 3), Moderate Pain (4 - 6)  albuterol    90 MICROgram(s) HFA Inhaler 2 Puff(s) Inhalation every 6 hours PRN for bronchospasm  aluminum hydroxide/magnesium hydroxide/simethicone Suspension 30 milliLiter(s) Oral every 4 hours PRN Dyspepsia  ondansetron Injectable 4 milliGRAM(s) IV Push every 8 hours PRN Nausea and/or Vomiting      Physical Exam    Neuro :  no focal deficits  Respiratory: CTA B/L  CV: RRR, S1S2, no murmurs,   Abdominal: Soft, NT, ND +BS,  Extremities: No edema, + peripheral pulses      ASSESSMENT    metabolic encephalopathy,   hyponatremia,   hyperkalemia,   metabolic encephalopathy, ,   h/o  DM,   HTN,   HLD,   GERD,   hypothyroid,   COPD,   DVT,   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  Etiology , diuretics , ARB reduced solid food intake and continue fluid intake.  Hyperkalemia due to hypovolemia and ARB.   cont NS and monitor Sodium q 6 hours ,   next 24 hours serum sodium 128-129.  pulm f/u   cont Bronchodilators  symbicort 160/4.5 mcgs 2 puffs po BID.  hgba1c,   lispro ss  ucx neg noted above   completed course of cipro 12/10/23  cont current meds         Patient is a 89y old  Female who presents with a chief complaint of cough and sob (10 Dec 2023 11:06)    pt seen in tele [x  ], reg med floor [   ], bed [ x ], chair at bedside [   ], awake and responsive [ x ],   lethargic [  ],  nad [ x ]        Allergies    aspirin (Stomach Upset)  aspirin (Nausea (Mild))        Vitals    T(F): 97.7 (12-11-23 @ 05:00), Max: 98.5 (12-10-23 @ 11:04)  HR: 71 (12-11-23 @ 05:00) (58 - 79)  BP: 151/70 (12-11-23 @ 05:00) (129/60 - 151/70)  RR: 18 (12-11-23 @ 05:00) (18 - 18)  SpO2: 97% (12-11-23 @ 05:00) (96% - 100%)  Wt(kg): --  CAPILLARY BLOOD GLUCOSE      POCT Blood Glucose.: 124 mg/dL (10 Dec 2023 21:04)      Labs                          11.2   7.97  )-----------( 281      ( 10 Dec 2023 07:01 )             34.1       12-11    126<L>  |  95<L>  |  6<L>  ----------------------------<  93  3.9   |  27  |  0.47<L>    Ca    6.7<L>      11 Dec 2023 02:00  Phos  3.6     12-10  Mg     1.5     12-10    TPro  3.6<L>  /  Alb  1.8<L>  /  TBili  0.2  /  DBili  x   /  AST  9<L>  /  ALT  15  /  AlkPhos  42  12-11    A1C with Estimated Average Glucose (10.10.21 @ 11:06)   A1C with Estimated Average Glucose Result: 6.1:       Troponin I, High Sensitivity Result: 14.1 ng/L (12-09-23 @ 02:57)    Clean Catch Clean Catch (Midstream)  12-09 @ 03:55   No growth  --  --      .Blood Blood-Peripheral  12-09 @ 02:57   No growth at 24 hours  --  --          Radiology Results      Meds    MEDICATIONS  (STANDING):  amLODIPine   Tablet 5 milliGRAM(s) Oral daily  atorvastatin 20 milliGRAM(s) Oral at bedtime  budesonide 160 MICROgram(s)/formoterol 4.5 MICROgram(s) Inhaler 2 Puff(s) Inhalation two times a day  carvedilol 3.125 milliGRAM(s) Oral every 12 hours  dorzolamide 2% Ophthalmic Solution 1 Drop(s) Right EYE three times a day  insulin lispro (ADMELOG) corrective regimen sliding scale   SubCutaneous Before meals and at bedtime  latanoprost 0.005% Ophthalmic Solution 1 Drop(s) Both EYES at bedtime  letrozole 2.5 milliGRAM(s) Oral daily  montelukast 10 milliGRAM(s) Oral at bedtime  pantoprazole    Tablet 40 milliGRAM(s) Oral before breakfast  polyethylene glycol 3350 17 Gram(s) Oral daily  rivaroxaban 20 milliGRAM(s) Oral with dinner  sodium chloride 0.9%. 1000 milliLiter(s) (100 mL/Hr) IV Continuous <Continuous>      MEDICATIONS  (PRN):  acetaminophen     Tablet .. 650 milliGRAM(s) Oral every 6 hours PRN Temp greater or equal to 38C (100.4F), Mild Pain (1 - 3), Moderate Pain (4 - 6)  albuterol    90 MICROgram(s) HFA Inhaler 2 Puff(s) Inhalation every 6 hours PRN for bronchospasm  aluminum hydroxide/magnesium hydroxide/simethicone Suspension 30 milliLiter(s) Oral every 4 hours PRN Dyspepsia  ondansetron Injectable 4 milliGRAM(s) IV Push every 8 hours PRN Nausea and/or Vomiting      Physical Exam    Neuro :  no focal deficits  Respiratory: CTA B/L  CV: RRR, S1S2, no murmurs,   Abdominal: Soft, NT, ND +BS,  Extremities: No edema, + peripheral pulses      ASSESSMENT    metabolic encephalopathy,   hyponatremia,   hyperkalemia,   metabolic encephalopathy, ,   h/o  DM,   HTN,   HLD,   GERD,   hypothyroid,   COPD,   DVT,   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  Etiology , diuretics , ARB reduced solid food intake and continue fluid intake.  Hyperkalemia due to hypovolemia and ARB.   cont NS and monitor Sodium q 6 hours ,   next 24 hours serum sodium 128-129.  pulm f/u   cont Bronchodilators  symbicort 160/4.5 mcgs 2 puffs po BID.  hgba1c 6.1 noted above   lispro ss  ucx neg noted above   completed course of cipro 12/10/23  cont current meds

## 2023-12-11 NOTE — PROGRESS NOTE ADULT - SUBJECTIVE AND OBJECTIVE BOX
Problem List:  Hyponatremia , over corrected on admission and was given Desmopressin, at present sodium 120 , on IV NS.    PAST MEDICAL & SURGICAL HISTORY:  DM (diabetes mellitus)  HTN (hypertension)  HLD (hyperlipidemia)  Breast cancer in female  COPD (chronic obstructive pulmonary disease)  PE (pulmonary thromboembolism)      Diabetes      Breast cancer      Blindness of left eye      History of appendectomy      Spinal stenosis      Spinal stenosis      Multiple pulmonary emboli      Fall      Chronic low back pain      Lumbar spondylosis      H/O mastectomy, right          aspirin (Stomach Upset)  aspirin (Nausea (Mild))      MEDICATIONS  (STANDING):  amLODIPine   Tablet 5 milliGRAM(s) Oral daily  atorvastatin 20 milliGRAM(s) Oral at bedtime  budesonide 160 MICROgram(s)/formoterol 4.5 MICROgram(s) Inhaler 2 Puff(s) Inhalation two times a day  carvedilol 3.125 milliGRAM(s) Oral every 12 hours  dorzolamide 2% Ophthalmic Solution 1 Drop(s) Right EYE three times a day  furosemide    Tablet 20 milliGRAM(s) Oral once  insulin lispro (ADMELOG) corrective regimen sliding scale   SubCutaneous Before meals and at bedtime  latanoprost 0.005% Ophthalmic Solution 1 Drop(s) Both EYES at bedtime  letrozole 2.5 milliGRAM(s) Oral daily  montelukast 10 milliGRAM(s) Oral at bedtime  pantoprazole    Tablet 40 milliGRAM(s) Oral before breakfast  polyethylene glycol 3350 17 Gram(s) Oral daily  rivaroxaban 20 milliGRAM(s) Oral with dinner  sodium chloride 1 Gram(s) Oral three times a day  sodium chloride 0.9%. 1000 milliLiter(s) (50 mL/Hr) IV Continuous <Continuous>    MEDICATIONS  (PRN):  acetaminophen     Tablet .. 650 milliGRAM(s) Oral every 6 hours PRN Temp greater or equal to 38C (100.4F), Mild Pain (1 - 3), Moderate Pain (4 - 6)  albuterol    90 MICROgram(s) HFA Inhaler 2 Puff(s) Inhalation every 6 hours PRN for bronchospasm  aluminum hydroxide/magnesium hydroxide/simethicone Suspension 30 milliLiter(s) Oral every 4 hours PRN Dyspepsia  ondansetron Injectable 4 milliGRAM(s) IV Push every 8 hours PRN Nausea and/or Vomiting                         10.9   8.61  )-----------( 256      ( 11 Dec 2023 07:50 )             32.7     12-11    120<LL>  |  86<L>  |  6<L>  ----------------------------<  97  4.5   |  29  |  0.66    Ca    8.8      11 Dec 2023 09:00  Phos  3.2     12-11  Mg     1.3     12-11    TPro  6.2  /  Alb  3.1<L>  /  TBili  0.5  /  DBili  x   /  AST  16  /  ALT  26  /  AlkPhos  75  12-11        Potassium, Random Urine: 9 mmol/L (12-09 @ 12:15)  Sodium, Random Urine: 36 mmol/L (12-09 @ 04:35)  Creatinine, Random Urine: 35 mg/dL (12-09 @ 04:35)  Osmolality, Random Urine: 263 mos/kg (12-09 @ 04:35)      REVIEW OF SYSTEMS:  General: no fever no chills, no weight loss.  EYES/ENT: No visual changes;  No vertigo, no headache.  NECK: No pain or stiffness  RESPIRATORY: No cough, wheezing, hemoptysis; No shortness of breath  CARDIOVASCULAR: No chest pain or palpitations. No Edema  GASTROINTESTINAL: No abdominal or epigastric pain. No nausea, vomiting. No diarrhea or constipation. No melena.  GENITOURINARY: No dysuria, frequency, foamy urine, urinary urgency, incontinence or hematuria          VITALS:  T(F): 98.2 (12-11-23 @ 11:17), Max: 98.4 (12-10-23 @ 16:07)  HR: 64 (12-11-23 @ 11:17)  BP: 140/68 (12-11-23 @ 11:17)  RR: 17 (12-11-23 @ 11:17)  SpO2: 99% (12-11-23 @ 11:17)  Wt(kg): --    12-10 @ 07:01  -  12-11 @ 07:00  --------------------------------------------------------  IN: 1590 mL / OUT: 1300 mL / NET: 290 mL        PHYSICAL EXAM:  Constitutional: well developed, no diaphoresis, no distress.  Neck: No JVD, no carotid bruit, supple, no adenopathy  Respiratory: Good air entrance B/L, no wheezes, rales or rhonchi  Cardiovascular: S1, S2, RRR, no pericardial rub, no murmur  Abdomen: BS+, soft, no tenderness, no bruit  Pelvis: bladder nondistended  Extremities: LLE edema plus 1

## 2023-12-11 NOTE — PROGRESS NOTE ADULT - ASSESSMENT
Hyponatremia , post desmopressin on 12/09 due to over correction of sodium  Repeat sodium in am 120.  Reduced NS TO 50 ML PER HOUR. Add salt tablets 1 gm TID.  Furosemide po 20 mg stat.  Repeat urine sodium and osmolality.  Follow sodium q 6 hours.     LLE edema - follow doppler r/o Chronic DVT versus acute, versus fluid retention.

## 2023-12-11 NOTE — PROGRESS NOTE ADULT - PROBLEM SELECTOR PLAN 1
Sodium 116 on admission. Likely hypovolemic hyponatremia  Today it is 120  - C/w BMP q6 hours   - C/w D5 at 60cc/hr  Dr. Lomas Nephrology Consulted

## 2023-12-11 NOTE — PROVIDER CONTACT NOTE (CRITICAL VALUE NOTIFICATION) - PERSON GIVING RESULT:
DMITRI Infante
Lab, BOBBI Simmons
Lab Na[facility]
Angeles Villalobos
lab, [facility]
DMITRI FLETCHER
Ivan HERNANDEZ / [place]

## 2023-12-12 LAB
ALBUMIN SERPL ELPH-MCNC: 3.1 G/DL — LOW (ref 3.5–5)
ALBUMIN SERPL ELPH-MCNC: 3.1 G/DL — LOW (ref 3.5–5)
ALP SERPL-CCNC: 71 U/L — SIGNIFICANT CHANGE UP (ref 40–120)
ALP SERPL-CCNC: 71 U/L — SIGNIFICANT CHANGE UP (ref 40–120)
ALT FLD-CCNC: 26 U/L DA — SIGNIFICANT CHANGE UP (ref 10–60)
ALT FLD-CCNC: 26 U/L DA — SIGNIFICANT CHANGE UP (ref 10–60)
ANION GAP SERPL CALC-SCNC: 1 MMOL/L — LOW (ref 5–17)
ANION GAP SERPL CALC-SCNC: 1 MMOL/L — LOW (ref 5–17)
ANION GAP SERPL CALC-SCNC: 2 MMOL/L — LOW (ref 5–17)
AST SERPL-CCNC: 17 U/L — SIGNIFICANT CHANGE UP (ref 10–40)
AST SERPL-CCNC: 17 U/L — SIGNIFICANT CHANGE UP (ref 10–40)
BILIRUB SERPL-MCNC: 0.4 MG/DL — SIGNIFICANT CHANGE UP (ref 0.2–1.2)
BILIRUB SERPL-MCNC: 0.4 MG/DL — SIGNIFICANT CHANGE UP (ref 0.2–1.2)
BUN SERPL-MCNC: 8 MG/DL — SIGNIFICANT CHANGE UP (ref 7–18)
BUN SERPL-MCNC: 8 MG/DL — SIGNIFICANT CHANGE UP (ref 7–18)
BUN SERPL-MCNC: 9 MG/DL — SIGNIFICANT CHANGE UP (ref 7–18)
CALCIUM SERPL-MCNC: 8.4 MG/DL — SIGNIFICANT CHANGE UP (ref 8.4–10.5)
CALCIUM SERPL-MCNC: 8.4 MG/DL — SIGNIFICANT CHANGE UP (ref 8.4–10.5)
CALCIUM SERPL-MCNC: 8.6 MG/DL — SIGNIFICANT CHANGE UP (ref 8.4–10.5)
CALCIUM SERPL-MCNC: 8.6 MG/DL — SIGNIFICANT CHANGE UP (ref 8.4–10.5)
CALCIUM SERPL-MCNC: 8.7 MG/DL — SIGNIFICANT CHANGE UP (ref 8.4–10.5)
CALCIUM SERPL-MCNC: 8.7 MG/DL — SIGNIFICANT CHANGE UP (ref 8.4–10.5)
CHLORIDE SERPL-SCNC: 92 MMOL/L — LOW (ref 96–108)
CHLORIDE SERPL-SCNC: 92 MMOL/L — LOW (ref 96–108)
CHLORIDE SERPL-SCNC: 93 MMOL/L — LOW (ref 96–108)
CHLORIDE SERPL-SCNC: 93 MMOL/L — LOW (ref 96–108)
CHLORIDE SERPL-SCNC: 96 MMOL/L — SIGNIFICANT CHANGE UP (ref 96–108)
CHLORIDE SERPL-SCNC: 96 MMOL/L — SIGNIFICANT CHANGE UP (ref 96–108)
CO2 SERPL-SCNC: 30 MMOL/L — SIGNIFICANT CHANGE UP (ref 22–31)
CO2 SERPL-SCNC: 30 MMOL/L — SIGNIFICANT CHANGE UP (ref 22–31)
CO2 SERPL-SCNC: 32 MMOL/L — HIGH (ref 22–31)
CREAT SERPL-MCNC: 0.66 MG/DL — SIGNIFICANT CHANGE UP (ref 0.5–1.3)
CREAT SERPL-MCNC: 0.66 MG/DL — SIGNIFICANT CHANGE UP (ref 0.5–1.3)
CREAT SERPL-MCNC: 0.68 MG/DL — SIGNIFICANT CHANGE UP (ref 0.5–1.3)
CREAT SERPL-MCNC: 0.68 MG/DL — SIGNIFICANT CHANGE UP (ref 0.5–1.3)
CREAT SERPL-MCNC: 0.74 MG/DL — SIGNIFICANT CHANGE UP (ref 0.5–1.3)
CREAT SERPL-MCNC: 0.74 MG/DL — SIGNIFICANT CHANGE UP (ref 0.5–1.3)
EGFR: 74 ML/MIN/1.73M2 — SIGNIFICANT CHANGE UP
EGFR: 74 ML/MIN/1.73M2 — SIGNIFICANT CHANGE UP
EGFR: 79 ML/MIN/1.73M2 — SIGNIFICANT CHANGE UP
EGFR: 79 ML/MIN/1.73M2 — SIGNIFICANT CHANGE UP
EGFR: 80 ML/MIN/1.73M2 — SIGNIFICANT CHANGE UP
EGFR: 80 ML/MIN/1.73M2 — SIGNIFICANT CHANGE UP
GLUCOSE BLDC GLUCOMTR-MCNC: 108 MG/DL — HIGH (ref 70–99)
GLUCOSE BLDC GLUCOMTR-MCNC: 108 MG/DL — HIGH (ref 70–99)
GLUCOSE BLDC GLUCOMTR-MCNC: 116 MG/DL — HIGH (ref 70–99)
GLUCOSE BLDC GLUCOMTR-MCNC: 116 MG/DL — HIGH (ref 70–99)
GLUCOSE BLDC GLUCOMTR-MCNC: 139 MG/DL — HIGH (ref 70–99)
GLUCOSE BLDC GLUCOMTR-MCNC: 139 MG/DL — HIGH (ref 70–99)
GLUCOSE BLDC GLUCOMTR-MCNC: 96 MG/DL — SIGNIFICANT CHANGE UP (ref 70–99)
GLUCOSE BLDC GLUCOMTR-MCNC: 96 MG/DL — SIGNIFICANT CHANGE UP (ref 70–99)
GLUCOSE SERPL-MCNC: 104 MG/DL — HIGH (ref 70–99)
GLUCOSE SERPL-MCNC: 104 MG/DL — HIGH (ref 70–99)
GLUCOSE SERPL-MCNC: 107 MG/DL — HIGH (ref 70–99)
GLUCOSE SERPL-MCNC: 107 MG/DL — HIGH (ref 70–99)
GLUCOSE SERPL-MCNC: 132 MG/DL — HIGH (ref 70–99)
GLUCOSE SERPL-MCNC: 132 MG/DL — HIGH (ref 70–99)
HCT VFR BLD CALC: 34.9 % — SIGNIFICANT CHANGE UP (ref 34.5–45)
HCT VFR BLD CALC: 34.9 % — SIGNIFICANT CHANGE UP (ref 34.5–45)
HGB BLD-MCNC: 11.3 G/DL — LOW (ref 11.5–15.5)
HGB BLD-MCNC: 11.3 G/DL — LOW (ref 11.5–15.5)
MAGNESIUM SERPL-MCNC: 1.7 MG/DL — SIGNIFICANT CHANGE UP (ref 1.6–2.6)
MAGNESIUM SERPL-MCNC: 1.7 MG/DL — SIGNIFICANT CHANGE UP (ref 1.6–2.6)
MCHC RBC-ENTMCNC: 30.2 PG — SIGNIFICANT CHANGE UP (ref 27–34)
MCHC RBC-ENTMCNC: 30.2 PG — SIGNIFICANT CHANGE UP (ref 27–34)
MCHC RBC-ENTMCNC: 32.4 GM/DL — SIGNIFICANT CHANGE UP (ref 32–36)
MCHC RBC-ENTMCNC: 32.4 GM/DL — SIGNIFICANT CHANGE UP (ref 32–36)
MCV RBC AUTO: 93.3 FL — SIGNIFICANT CHANGE UP (ref 80–100)
MCV RBC AUTO: 93.3 FL — SIGNIFICANT CHANGE UP (ref 80–100)
NRBC # BLD: 0 /100 WBCS — SIGNIFICANT CHANGE UP (ref 0–0)
NRBC # BLD: 0 /100 WBCS — SIGNIFICANT CHANGE UP (ref 0–0)
OSMOLALITY UR: 156 MOS/KG — SIGNIFICANT CHANGE UP (ref 50–1200)
OSMOLALITY UR: 156 MOS/KG — SIGNIFICANT CHANGE UP (ref 50–1200)
PHOSPHATE SERPL-MCNC: 3.7 MG/DL — SIGNIFICANT CHANGE UP (ref 2.5–4.5)
PHOSPHATE SERPL-MCNC: 3.7 MG/DL — SIGNIFICANT CHANGE UP (ref 2.5–4.5)
PLATELET # BLD AUTO: 276 K/UL — SIGNIFICANT CHANGE UP (ref 150–400)
PLATELET # BLD AUTO: 276 K/UL — SIGNIFICANT CHANGE UP (ref 150–400)
POTASSIUM SERPL-MCNC: 4.4 MMOL/L — SIGNIFICANT CHANGE UP (ref 3.5–5.3)
POTASSIUM SERPL-MCNC: 4.4 MMOL/L — SIGNIFICANT CHANGE UP (ref 3.5–5.3)
POTASSIUM SERPL-MCNC: 4.7 MMOL/L — SIGNIFICANT CHANGE UP (ref 3.5–5.3)
POTASSIUM SERPL-MCNC: 4.7 MMOL/L — SIGNIFICANT CHANGE UP (ref 3.5–5.3)
POTASSIUM SERPL-MCNC: 5 MMOL/L — SIGNIFICANT CHANGE UP (ref 3.5–5.3)
POTASSIUM SERPL-MCNC: 5 MMOL/L — SIGNIFICANT CHANGE UP (ref 3.5–5.3)
POTASSIUM SERPL-SCNC: 4.4 MMOL/L — SIGNIFICANT CHANGE UP (ref 3.5–5.3)
POTASSIUM SERPL-SCNC: 4.4 MMOL/L — SIGNIFICANT CHANGE UP (ref 3.5–5.3)
POTASSIUM SERPL-SCNC: 4.7 MMOL/L — SIGNIFICANT CHANGE UP (ref 3.5–5.3)
POTASSIUM SERPL-SCNC: 4.7 MMOL/L — SIGNIFICANT CHANGE UP (ref 3.5–5.3)
POTASSIUM SERPL-SCNC: 5 MMOL/L — SIGNIFICANT CHANGE UP (ref 3.5–5.3)
POTASSIUM SERPL-SCNC: 5 MMOL/L — SIGNIFICANT CHANGE UP (ref 3.5–5.3)
PROT SERPL-MCNC: 5.9 G/DL — LOW (ref 6–8.3)
PROT SERPL-MCNC: 5.9 G/DL — LOW (ref 6–8.3)
RBC # BLD: 3.74 M/UL — LOW (ref 3.8–5.2)
RBC # BLD: 3.74 M/UL — LOW (ref 3.8–5.2)
RBC # FLD: 12.6 % — SIGNIFICANT CHANGE UP (ref 10.3–14.5)
RBC # FLD: 12.6 % — SIGNIFICANT CHANGE UP (ref 10.3–14.5)
SODIUM SERPL-SCNC: 124 MMOL/L — LOW (ref 135–145)
SODIUM SERPL-SCNC: 124 MMOL/L — LOW (ref 135–145)
SODIUM SERPL-SCNC: 127 MMOL/L — LOW (ref 135–145)
SODIUM SERPL-SCNC: 127 MMOL/L — LOW (ref 135–145)
SODIUM SERPL-SCNC: 129 MMOL/L — LOW (ref 135–145)
SODIUM SERPL-SCNC: 129 MMOL/L — LOW (ref 135–145)
SODIUM UR-SCNC: 54 MMOL/L — SIGNIFICANT CHANGE UP
SODIUM UR-SCNC: 54 MMOL/L — SIGNIFICANT CHANGE UP
WBC # BLD: 12.62 K/UL — HIGH (ref 3.8–10.5)
WBC # BLD: 12.62 K/UL — HIGH (ref 3.8–10.5)
WBC # FLD AUTO: 12.62 K/UL — HIGH (ref 3.8–10.5)
WBC # FLD AUTO: 12.62 K/UL — HIGH (ref 3.8–10.5)

## 2023-12-12 PROCEDURE — 93971 EXTREMITY STUDY: CPT | Mod: 26,LT

## 2023-12-12 RX ADMIN — CARVEDILOL PHOSPHATE 3.12 MILLIGRAM(S): 80 CAPSULE, EXTENDED RELEASE ORAL at 17:39

## 2023-12-12 RX ADMIN — AMLODIPINE BESYLATE 5 MILLIGRAM(S): 2.5 TABLET ORAL at 05:32

## 2023-12-12 RX ADMIN — DORZOLAMIDE HYDROCHLORIDE 1 DROP(S): 20 SOLUTION/ DROPS OPHTHALMIC at 05:31

## 2023-12-12 RX ADMIN — DORZOLAMIDE HYDROCHLORIDE 1 DROP(S): 20 SOLUTION/ DROPS OPHTHALMIC at 14:08

## 2023-12-12 RX ADMIN — LATANOPROST 1 DROP(S): 0.05 SOLUTION/ DROPS OPHTHALMIC; TOPICAL at 21:21

## 2023-12-12 RX ADMIN — SODIUM CHLORIDE 1 GRAM(S): 9 INJECTION INTRAMUSCULAR; INTRAVENOUS; SUBCUTANEOUS at 05:31

## 2023-12-12 RX ADMIN — LETROZOLE 2.5 MILLIGRAM(S): 2.5 TABLET, FILM COATED ORAL at 12:20

## 2023-12-12 RX ADMIN — SODIUM CHLORIDE 1 GRAM(S): 9 INJECTION INTRAMUSCULAR; INTRAVENOUS; SUBCUTANEOUS at 14:08

## 2023-12-12 RX ADMIN — CARVEDILOL PHOSPHATE 3.12 MILLIGRAM(S): 80 CAPSULE, EXTENDED RELEASE ORAL at 05:32

## 2023-12-12 RX ADMIN — Medication 650 MILLIGRAM(S): at 12:21

## 2023-12-12 RX ADMIN — Medication 650 MILLIGRAM(S): at 13:11

## 2023-12-12 RX ADMIN — RIVAROXABAN 20 MILLIGRAM(S): KIT at 17:39

## 2023-12-12 RX ADMIN — PANTOPRAZOLE SODIUM 40 MILLIGRAM(S): 20 TABLET, DELAYED RELEASE ORAL at 05:32

## 2023-12-12 RX ADMIN — POLYETHYLENE GLYCOL 3350 17 GRAM(S): 17 POWDER, FOR SOLUTION ORAL at 12:19

## 2023-12-12 RX ADMIN — BUDESONIDE AND FORMOTEROL FUMARATE DIHYDRATE 2 PUFF(S): 160; 4.5 AEROSOL RESPIRATORY (INHALATION) at 09:55

## 2023-12-12 RX ADMIN — MONTELUKAST 10 MILLIGRAM(S): 4 TABLET, CHEWABLE ORAL at 21:20

## 2023-12-12 RX ADMIN — SODIUM CHLORIDE 1 GRAM(S): 9 INJECTION INTRAMUSCULAR; INTRAVENOUS; SUBCUTANEOUS at 21:21

## 2023-12-12 RX ADMIN — DORZOLAMIDE HYDROCHLORIDE 1 DROP(S): 20 SOLUTION/ DROPS OPHTHALMIC at 21:21

## 2023-12-12 RX ADMIN — ATORVASTATIN CALCIUM 20 MILLIGRAM(S): 80 TABLET, FILM COATED ORAL at 21:21

## 2023-12-12 RX ADMIN — BUDESONIDE AND FORMOTEROL FUMARATE DIHYDRATE 2 PUFF(S): 160; 4.5 AEROSOL RESPIRATORY (INHALATION) at 21:22

## 2023-12-12 NOTE — PROGRESS NOTE ADULT - ASSESSMENT
Hyponatremia , improved sodium 127 AM.  IV fluids was stopped.  Continue current diet and follow lab in am.

## 2023-12-12 NOTE — PROGRESS NOTE ADULT - SUBJECTIVE AND OBJECTIVE BOX
Patient is a 89y old  Female who presents with a chief complaint of Hyponatremia (11 Dec 2023 15:27)  Awake, alert, comfortable in BED in NAD. Doing well on RA. Feels better    INTERVAL HPI/OVERNIGHT EVENTS:      VITAL SIGNS:  T(F): 98.4 (12-12-23 @ 07:27)  HR: 72 (12-12-23 @ 07:27)  BP: 129/66 (12-12-23 @ 07:27)  RR: 18 (12-12-23 @ 07:27)  SpO2: 96% (12-12-23 @ 07:27)  Wt(kg): --  I&O's Detail    11 Dec 2023 07:01  -  12 Dec 2023 07:00  --------------------------------------------------------  IN:    Oral Fluid: 200 mL    sodium chloride 0.9%: 800 mL    sodium chloride 0.9%: 200 mL  Total IN: 1200 mL    OUT:    Indwelling Catheter - Urethral (mL): 2000 mL  Total OUT: 2000 mL    Total NET: -800 mL              REVIEW OF SYSTEMS:    CONSTITUTIONAL:  No fevers, chills, sweats    HEENT:  Eyes:  No diplopia or blurred vision. ENT:  No earache, sore throat or runny nose.    CARDIOVASCULAR:  No pressure, squeezing, tightness, or heaviness about the chest; no palpitations.    RESPIRATORY:  Per HPI    GASTROINTESTINAL:  No abdominal pain, nausea, vomiting or diarrhea.    GENITOURINARY:  No dysuria, frequency or urgency.    NEUROLOGIC:  No paresthesias, fasciculations, seizures or weakness.    PSYCHIATRIC:  No disorder of thought or mood.      PHYSICAL EXAM:    Constitutional: Well developed and nourished  Eyes:Perrla  ENMT: normal  Neck:supple  Respiratory: good air entry  Cardiovascular: S1 S2 regular  Gastrointestinal: Soft, Non tender  Extremities: No edema  Vascular:normal  Neurological:Awake, alert,Ox3  Musculoskeletal:Normal      MEDICATIONS  (STANDING):  amLODIPine   Tablet 5 milliGRAM(s) Oral daily  atorvastatin 20 milliGRAM(s) Oral at bedtime  budesonide 160 MICROgram(s)/formoterol 4.5 MICROgram(s) Inhaler 2 Puff(s) Inhalation two times a day  carvedilol 3.125 milliGRAM(s) Oral every 12 hours  dorzolamide 2% Ophthalmic Solution 1 Drop(s) Right EYE three times a day  insulin lispro (ADMELOG) corrective regimen sliding scale   SubCutaneous Before meals and at bedtime  latanoprost 0.005% Ophthalmic Solution 1 Drop(s) Both EYES at bedtime  letrozole 2.5 milliGRAM(s) Oral daily  montelukast 10 milliGRAM(s) Oral at bedtime  pantoprazole    Tablet 40 milliGRAM(s) Oral before breakfast  polyethylene glycol 3350 17 Gram(s) Oral daily  rivaroxaban 20 milliGRAM(s) Oral with dinner  sodium chloride 1 Gram(s) Oral three times a day    MEDICATIONS  (PRN):  acetaminophen     Tablet .. 650 milliGRAM(s) Oral every 6 hours PRN Temp greater or equal to 38C (100.4F), Mild Pain (1 - 3), Moderate Pain (4 - 6)  albuterol    90 MICROgram(s) HFA Inhaler 2 Puff(s) Inhalation every 6 hours PRN for bronchospasm  aluminum hydroxide/magnesium hydroxide/simethicone Suspension 30 milliLiter(s) Oral every 4 hours PRN Dyspepsia  ondansetron Injectable 4 milliGRAM(s) IV Push every 8 hours PRN Nausea and/or Vomiting      Allergies    aspirin (Stomach Upset)  aspirin (Nausea (Mild))    Intolerances        LABS:                        11.3   12.62 )-----------( 276      ( 12 Dec 2023 05:28 )             34.9     12-12    124<L>  |  92<L>  |  9   ----------------------------<  107<H>  4.7   |  30  |  0.74    Ca    8.4      12 Dec 2023 05:28  Phos  3.7     12-12  Mg     1.7     12-12    TPro  5.9<L>  /  Alb  3.1<L>  /  TBili  0.4  /  DBili  x   /  AST  17  /  ALT  26  /  AlkPhos  71  12-12      Urinalysis Basic - ( 12 Dec 2023 05:28 )    Color: x / Appearance: x / SG: x / pH: x  Gluc: 107 mg/dL / Ketone: x  / Bili: x / Urobili: x   Blood: x / Protein: x / Nitrite: x   Leuk Esterase: x / RBC: x / WBC x   Sq Epi: x / Non Sq Epi: x / Bacteria: x            CAPILLARY BLOOD GLUCOSE      POCT Blood Glucose.: 96 mg/dL (12 Dec 2023 07:47)  POCT Blood Glucose.: 146 mg/dL (11 Dec 2023 21:21)  POCT Blood Glucose.: 105 mg/dL (11 Dec 2023 17:11)  POCT Blood Glucose.: 131 mg/dL (11 Dec 2023 11:40)        RADIOLOGY & ADDITIONAL TESTS:  < from: Xray Chest 1 View-PORTABLE IMMEDIATE (12.09.23 @ 03:40) >  IMPRESSION: When allowing for technique no change. No acute parenchymal   disease    < end of copied text >    CXR:    Ct scan chest:    ekg;    echo:

## 2023-12-12 NOTE — PROGRESS NOTE ADULT - ASSESSMENT
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 Admitted for hyponatremia management.

## 2023-12-12 NOTE — PROGRESS NOTE ADULT - SUBJECTIVE AND OBJECTIVE BOX
PGY-1 Progress Note discussed with attending    PAGER #: [[phone]] TILL 5:00 PM  PLEASE CONTACT ON CALL TEAM:  - On Call Team (Please refer to [facility]) FROM 5:00 PM - 8:30PM  - Nightfloat Team FROM 8:30 -7:30 AM    CHIEF COMPLAINT & BRIEF HOSPITAL COURSE: No acute overnight events. Sodium improving and coughing improving.     INTERVAL HPI/OVERNIGHT EVENTS:   MEDICATIONS  (STANDING):  amLODIPine   Tablet 5 milliGRAM(s) Oral daily  atorvastatin 20 milliGRAM(s) Oral at bedtime  budesonide 160 MICROgram(s)/formoterol 4.5 MICROgram(s) Inhaler 2 Puff(s) Inhalation two times a day  carvedilol 3.125 milliGRAM(s) Oral every 12 hours  dorzolamide 2% Ophthalmic Solution 1 Drop(s) Right EYE three times a day  insulin lispro (ADMELOG) corrective regimen sliding scale   SubCutaneous Before meals and at bedtime  latanoprost 0.005% Ophthalmic Solution 1 Drop(s) Both EYES at bedtime  letrozole 2.5 milliGRAM(s) Oral daily  montelukast 10 milliGRAM(s) Oral at bedtime  pantoprazole    Tablet 40 milliGRAM(s) Oral before breakfast  polyethylene glycol 3350 17 Gram(s) Oral daily  rivaroxaban 20 milliGRAM(s) Oral with dinner  sodium chloride 1 Gram(s) Oral three times a day    MEDICATIONS  (PRN):  acetaminophen     Tablet .. 650 milliGRAM(s) Oral every 6 hours PRN Temp greater or equal to 38C (100.4F), Mild Pain (1 - 3), Moderate Pain (4 - 6)  albuterol    90 MICROgram(s) HFA Inhaler 2 Puff(s) Inhalation every 6 hours PRN for bronchospasm  aluminum hydroxide/magnesium hydroxide/simethicone Suspension 30 milliLiter(s) Oral every 4 hours PRN Dyspepsia  ondansetron Injectable 4 milliGRAM(s) IV Push every 8 hours PRN Nausea and/or Vomiting      REVIEW OF SYSTEMS:  CONSTITUTIONAL: No fever, weight loss, or fatigue  RESPIRATORY: No shortness of breath  CARDIOVASCULAR: No chest pain  GASTROINTESTINAL: No abdominal pain.  GENITOURINARY: No dysuria  NEUROLOGICAL: No headaches  SKIN: No itching, burning, rashes    Vital Signs Last 24 Hrs  T(C): 36.8 (12 Dec 2023 11:12), Max: 37 (11 Dec 2023 19:01)  T(F): 98.2 (12 Dec 2023 11:12), Max: 98.6 (11 Dec 2023 19:01)  HR: 59 (12 Dec 2023 11:12) (59 - 72)  BP: 137/52 (12 Dec 2023 11:12) (116/62 - 156/71)  BP(mean): --  RR: 18 (12 Dec 2023 11:12) (17 - 19)  SpO2: 96% (12 Dec 2023 11:12) (96% - 100%)    Parameters below as of 12 Dec 2023 11:12  Patient On (Oxygen Delivery Method): nasal cannula  O2 Flow (L/min): 2      PHYSICAL EXAMINATION:  GENERAL: NAD, well built  HEAD:  Atraumatic, Normocephalic  EYES:  conjunctiva and sclera clear  CHEST/LUNG: Clear to auscultation. No rales, rhonchi, wheezing, or rubs  HEART: Regular rate and rhythm; No murmurs, rubs, or gallops  ABDOMEN: Soft, Nontender, Nondistended; Bowel sounds present  NERVOUS SYSTEM:  Alert & Oriented X1-2,    EXTREMITIES:  2+ Peripheral Pulses, No clubbing, cyanosis, or edema  SKIN: warm dry                          11.3   12.62 )-----------( 276      ( 12 Dec 2023 05:28 )             34.9     12-12    127<L>  |  93<L>  |  8   ----------------------------<  104<H>  4.4   |  32<H>  |  0.68    Ca    8.7      12 Dec 2023 11:41  Phos  3.7     12-12  Mg     1.7     12-12    TPro  5.9<L>  /  Alb  3.1<L>  /  TBili  0.4  /  DBili  x   /  AST  17  /  ALT  26  /  AlkPhos  71  12-12    LIVER FUNCTIONS - ( 12 Dec 2023 05:28 )  Alb: 3.1 g/dL / Pro: 5.9 g/dL / ALK PHOS: 71 U/L / ALT: 26 U/L DA / AST: 17 U/L / GGT: x                   CAPILLARY BLOOD GLUCOSE      RADIOLOGY & ADDITIONAL TESTS:                   PGY-1 Progress Note discussed with attending    PAGER #: [[phone]] TILL 5:00 PM  PLEASE CONTACT ON CALL TEAM:  - On Call Team (Please refer to Robles) FROM 5:00 PM - 8:30PM  - Nightfloat Team FROM 8:30 -7:30 AM    CHIEF COMPLAINT & BRIEF HOSPITAL COURSE: No acute overnight events. Sodium improving and coughing improving.     INTERVAL HPI/OVERNIGHT EVENTS:   MEDICATIONS  (STANDING):  amLODIPine   Tablet 5 milliGRAM(s) Oral daily  atorvastatin 20 milliGRAM(s) Oral at bedtime  budesonide 160 MICROgram(s)/formoterol 4.5 MICROgram(s) Inhaler 2 Puff(s) Inhalation two times a day  carvedilol 3.125 milliGRAM(s) Oral every 12 hours  dorzolamide 2% Ophthalmic Solution 1 Drop(s) Right EYE three times a day  insulin lispro (ADMELOG) corrective regimen sliding scale   SubCutaneous Before meals and at bedtime  latanoprost 0.005% Ophthalmic Solution 1 Drop(s) Both EYES at bedtime  letrozole 2.5 milliGRAM(s) Oral daily  montelukast 10 milliGRAM(s) Oral at bedtime  pantoprazole    Tablet 40 milliGRAM(s) Oral before breakfast  polyethylene glycol 3350 17 Gram(s) Oral daily  rivaroxaban 20 milliGRAM(s) Oral with dinner  sodium chloride 1 Gram(s) Oral three times a day    MEDICATIONS  (PRN):  acetaminophen     Tablet .. 650 milliGRAM(s) Oral every 6 hours PRN Temp greater or equal to 38C (100.4F), Mild Pain (1 - 3), Moderate Pain (4 - 6)  albuterol    90 MICROgram(s) HFA Inhaler 2 Puff(s) Inhalation every 6 hours PRN for bronchospasm  aluminum hydroxide/magnesium hydroxide/simethicone Suspension 30 milliLiter(s) Oral every 4 hours PRN Dyspepsia  ondansetron Injectable 4 milliGRAM(s) IV Push every 8 hours PRN Nausea and/or Vomiting      REVIEW OF SYSTEMS:  CONSTITUTIONAL: No fever, weight loss, or fatigue  RESPIRATORY: No shortness of breath  CARDIOVASCULAR: No chest pain  GASTROINTESTINAL: No abdominal pain.  GENITOURINARY: No dysuria  NEUROLOGICAL: No headaches  SKIN: No itching, burning, rashes    Vital Signs Last 24 Hrs  T(C): 36.8 (12 Dec 2023 11:12), Max: 37 (11 Dec 2023 19:01)  T(F): 98.2 (12 Dec 2023 11:12), Max: 98.6 (11 Dec 2023 19:01)  HR: 59 (12 Dec 2023 11:12) (59 - 72)  BP: 137/52 (12 Dec 2023 11:12) (116/62 - 156/71)  BP(mean): --  RR: 18 (12 Dec 2023 11:12) (17 - 19)  SpO2: 96% (12 Dec 2023 11:12) (96% - 100%)    Parameters below as of 12 Dec 2023 11:12  Patient On (Oxygen Delivery Method): nasal cannula  O2 Flow (L/min): 2      PHYSICAL EXAMINATION:  GENERAL: NAD, well built  HEAD:  Atraumatic, Normocephalic  EYES:  conjunctiva and sclera clear  CHEST/LUNG: Clear to auscultation. No rales, rhonchi, wheezing, or rubs  HEART: Regular rate and rhythm; No murmurs, rubs, or gallops  ABDOMEN: Soft, Nontender, Nondistended; Bowel sounds present  NERVOUS SYSTEM:  Alert & Oriented X1-2,    EXTREMITIES:  2+ Peripheral Pulses, No clubbing, cyanosis, or edema  SKIN: warm dry                          11.3   12.62 )-----------( 276      ( 12 Dec 2023 05:28 )             34.9     12-12    127<L>  |  93<L>  |  8   ----------------------------<  104<H>  4.4   |  32<H>  |  0.68    Ca    8.7      12 Dec 2023 11:41  Phos  3.7     12-12  Mg     1.7     12-12    TPro  5.9<L>  /  Alb  3.1<L>  /  TBili  0.4  /  DBili  x   /  AST  17  /  ALT  26  /  AlkPhos  71  12-12    LIVER FUNCTIONS - ( 12 Dec 2023 05:28 )  Alb: 3.1 g/dL / Pro: 5.9 g/dL / ALK PHOS: 71 U/L / ALT: 26 U/L DA / AST: 17 U/L / GGT: x                   CAPILLARY BLOOD GLUCOSE      RADIOLOGY & ADDITIONAL TESTS:

## 2023-12-12 NOTE — PROGRESS NOTE ADULT - SUBJECTIVE AND OBJECTIVE BOX
Problem List:  Hyponatremia, improved.     PAST MEDICAL & SURGICAL HISTORY:  DM (diabetes mellitus)      HTN (hypertension)      HLD (hyperlipidemia)      Breast cancer in female      COPD (chronic obstructive pulmonary disease)      PE (pulmonary thromboembolism)      Diabetes      Breast cancer      Blindness of left eye      History of appendectomy      Spinal stenosis      Spinal stenosis      Multiple pulmonary emboli      Fall      Chronic low back pain      Lumbar spondylosis      H/O mastectomy, right          aspirin (Stomach Upset)  aspirin (Nausea (Mild))      MEDICATIONS  (STANDING):  amLODIPine   Tablet 5 milliGRAM(s) Oral daily  atorvastatin 20 milliGRAM(s) Oral at bedtime  budesonide 160 MICROgram(s)/formoterol 4.5 MICROgram(s) Inhaler 2 Puff(s) Inhalation two times a day  carvedilol 3.125 milliGRAM(s) Oral every 12 hours  dorzolamide 2% Ophthalmic Solution 1 Drop(s) Right EYE three times a day  insulin lispro (ADMELOG) corrective regimen sliding scale   SubCutaneous Before meals and at bedtime  latanoprost 0.005% Ophthalmic Solution 1 Drop(s) Both EYES at bedtime  letrozole 2.5 milliGRAM(s) Oral daily  montelukast 10 milliGRAM(s) Oral at bedtime  pantoprazole    Tablet 40 milliGRAM(s) Oral before breakfast  polyethylene glycol 3350 17 Gram(s) Oral daily  rivaroxaban 20 milliGRAM(s) Oral with dinner  sodium chloride 1 Gram(s) Oral three times a day    MEDICATIONS  (PRN):  acetaminophen     Tablet .. 650 milliGRAM(s) Oral every 6 hours PRN Temp greater or equal to 38C (100.4F), Mild Pain (1 - 3), Moderate Pain (4 - 6)  albuterol    90 MICROgram(s) HFA Inhaler 2 Puff(s) Inhalation every 6 hours PRN for bronchospasm  aluminum hydroxide/magnesium hydroxide/simethicone Suspension 30 milliLiter(s) Oral every 4 hours PRN Dyspepsia  ondansetron Injectable 4 milliGRAM(s) IV Push every 8 hours PRN Nausea and/or Vomiting                            11.3   12.62 )-----------( 276      ( 12 Dec 2023 05:28 )             34.9     12-12    127<L>  |  93<L>  |  8   ----------------------------<  104<H>  4.4   |  32<H>  |  0.68    Ca    8.7      12 Dec 2023 11:41  Phos  3.7     12-12  Mg     1.7     12-12    TPro  5.9<L>  /  Alb  3.1<L>  /  TBili  0.4  /  DBili  x   /  AST  17  /  ALT  26  /  AlkPhos  71  12-12        Osmolality, Random Urine: 156 mos/kg (12-12 @ 08:25)  Sodium, Random Urine: 54 mmol/L (12-12 @ 08:25)  Creatinine, Random Urine: 16 mg/dL (12-11 @ 16:58)  Osmolality, Random Urine: 285 mos/kg (12-11 @ 16:58)  Sodium, Random Urine: 95 mmol/L (12-11 @ 16:58)  Potassium, Random Urine: 9 mmol/L (12-09 @ 12:15)  Sodium, Random Urine: 36 mmol/L (12-09 @ 04:35)  Creatinine, Random Urine: 35 mg/dL (12-09 @ 04:35)  Osmolality, Random Urine: 263 mos/kg (12-09 @ 04:35)      REVIEW OF SYSTEMS:  General: no fever no chills, no weight loss.    RESPIRATORY: No cough, wheezing, hemoptysis; No shortness of breath  CARDIOVASCULAR: No chest pain or palpitations. No Edema  GASTROINTESTINAL: No abdominal or epigastric pain. No nausea, vomiting. No diarrhea or constipation. No melena.  GENITOURINARY: No dysuria, frequency, foamy urine, urinary urgency, incontinence or hematuria  NEUROLOGICAL: No numbness or weakness, no tremor , no dizziness.         VITALS:  T(F): 98.2 (12-12-23 @ 11:12), Max: 98.6 (12-11-23 @ 19:01)  HR: 59 (12-12-23 @ 11:12)  BP: 137/52 (12-12-23 @ 11:12)  RR: 18 (12-12-23 @ 11:12)  SpO2: 96% (12-12-23 @ 11:12)  Wt(kg): --    12-11 @ 07:01  -  12-12 @ 07:00  --------------------------------------------------------  IN: 1200 mL / OUT: 2000 mL / NET: -800 mL    12-12 @ 07:01  - 12-12 @ 15:34  --------------------------------------------------------  IN: 0 mL / OUT: 800 mL / NET: -800 mL        PHYSICAL EXAM:  Constitutional: well developed, no diaphoresis, no distress.  Neck: No JVD, no carotid bruit, supple, no adenopathy  Respiratory:  air entrance B/L, no wheezes, rales or rhonchi  Cardiovascular: S1, S2, RRR, no pericardial rub, no murmur  Abdomen: BS+, soft, no tenderness, no bruit  Pelvis: bladder nondistended  Extremities: No edema

## 2023-12-12 NOTE — PROGRESS NOTE ADULT - PROBLEM SELECTOR PLAN 1
Sodium 116 on admission. Likely hypovolemic hyponatremia  Today it is 127  - C/w BMP q6 hours   -Reduced NS TO 50 ML PER HOUR.   - salt tablets 1 gm TID.  -1 dose of Furosemide po 20 mg   Dr. Lomas Nephrology Consulted

## 2023-12-12 NOTE — PROGRESS NOTE ADULT - SUBJECTIVE AND OBJECTIVE BOX
Patient is a 89y old  Female who presents with a chief complaint of Hyponatremia (11 Dec 2023 15:27)    pt seen in tele [x  ], reg med floor [   ], bed [ x ], chair at bedside [   ], awake and responsive [ x ],   lethargic [  ],  nad [ x ]        Allergies    aspirin (Stomach Upset)  aspirin (Nausea (Mild))        Vitals    T(F): 98.4 (12-12-23 @ 04:58), Max: 98.6 (12-11-23 @ 19:01)  HR: 67 (12-12-23 @ 04:58) (64 - 71)  BP: 118/67 (12-12-23 @ 04:58) (116/62 - 161/60)  RR: 17 (12-12-23 @ 04:58) (17 - 19)  SpO2: 98% (12-12-23 @ 04:58) (98% - 100%)  Wt(kg): --  CAPILLARY BLOOD GLUCOSE      POCT Blood Glucose.: 146 mg/dL (11 Dec 2023 21:21)      Labs                          11.3   12.62 )-----------( 276      ( 12 Dec 2023 05:28 )             34.9       12-11    122<L>  |  88<L>  |  9   ----------------------------<  112<H>  4.8   |  30  |  0.82    Ca    8.5      11 Dec 2023 23:10  Phos  3.2     12-11  Mg     1.3     12-11    TPro  6.2  /  Alb  3.1<L>  /  TBili  0.5  /  DBili  x   /  AST  16  /  ALT  26  /  AlkPhos  75  12-11            Clean Catch Clean Catch (Midstream)  12-09 @ 03:55   No growth  --  --      .Blood Blood-Peripheral  12-09 @ 02:57   No growth at 48 Hours  --  --          Radiology Results      Meds    MEDICATIONS  (STANDING):  amLODIPine   Tablet 5 milliGRAM(s) Oral daily  atorvastatin 20 milliGRAM(s) Oral at bedtime  budesonide 160 MICROgram(s)/formoterol 4.5 MICROgram(s) Inhaler 2 Puff(s) Inhalation two times a day  carvedilol 3.125 milliGRAM(s) Oral every 12 hours  dorzolamide 2% Ophthalmic Solution 1 Drop(s) Right EYE three times a day  insulin lispro (ADMELOG) corrective regimen sliding scale   SubCutaneous Before meals and at bedtime  latanoprost 0.005% Ophthalmic Solution 1 Drop(s) Both EYES at bedtime  letrozole 2.5 milliGRAM(s) Oral daily  montelukast 10 milliGRAM(s) Oral at bedtime  pantoprazole    Tablet 40 milliGRAM(s) Oral before breakfast  polyethylene glycol 3350 17 Gram(s) Oral daily  rivaroxaban 20 milliGRAM(s) Oral with dinner  sodium chloride 1 Gram(s) Oral three times a day  sodium chloride 0.9%. 1000 milliLiter(s) (50 mL/Hr) IV Continuous <Continuous>      MEDICATIONS  (PRN):  acetaminophen     Tablet .. 650 milliGRAM(s) Oral every 6 hours PRN Temp greater or equal to 38C (100.4F), Mild Pain (1 - 3), Moderate Pain (4 - 6)  albuterol    90 MICROgram(s) HFA Inhaler 2 Puff(s) Inhalation every 6 hours PRN for bronchospasm  aluminum hydroxide/magnesium hydroxide/simethicone Suspension 30 milliLiter(s) Oral every 4 hours PRN Dyspepsia  ondansetron Injectable 4 milliGRAM(s) IV Push every 8 hours PRN Nausea and/or Vomiting      Physical Exam    Neuro :  no focal deficits  Respiratory: CTA B/L  CV: RRR, S1S2, no murmurs,   Abdominal: Soft, NT, ND +BS,  Extremities: No edema, + peripheral pulses      ASSESSMENT    metabolic encephalopathy,   hyponatremia,   hyperkalemia,   metabolic encephalopathy, ,   h/o  DM,   HTN,   HLD,   GERD,   hypothyroid,   COPD,   DVT,   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  Etiology , diuretics , ARB reduced solid food intake and continue fluid intake.  Hyperkalemia due to hypovolemia and ARB.   cont NS and monitor Sodium q 6 hours ,   next 24 hours serum sodium 128-129.  pulm f/u   cont Bronchodilators  symbicort 160/4.5 mcgs 2 puffs po BID.  hgba1c 6.1 noted above   lispro ss  ucx neg noted above   completed course of cipro 12/10/23  cont current meds       Patient is a 89y old  Female who presents with a chief complaint of Hyponatremia (11 Dec 2023 15:27)    pt seen in tele [x  ], reg med floor [   ], bed [ x ], chair at bedside [   ], awake and responsive [ x ],   lethargic [  ],  nad [ x ]        Allergies    aspirin (Stomach Upset)  aspirin (Nausea (Mild))        Vitals    T(F): 98.4 (12-12-23 @ 04:58), Max: 98.6 (12-11-23 @ 19:01)  HR: 67 (12-12-23 @ 04:58) (64 - 71)  BP: 118/67 (12-12-23 @ 04:58) (116/62 - 161/60)  RR: 17 (12-12-23 @ 04:58) (17 - 19)  SpO2: 98% (12-12-23 @ 04:58) (98% - 100%)  Wt(kg): --  CAPILLARY BLOOD GLUCOSE      POCT Blood Glucose.: 146 mg/dL (11 Dec 2023 21:21)      Labs                          11.3   12.62 )-----------( 276      ( 12 Dec 2023 05:28 )             34.9       12-11    122<L>  |  88<L>  |  9   ----------------------------<  112<H>  4.8   |  30  |  0.82    Ca    8.5      11 Dec 2023 23:10  Phos  3.2     12-11  Mg     1.3     12-11    TPro  6.2  /  Alb  3.1<L>  /  TBili  0.5  /  DBili  x   /  AST  16  /  ALT  26  /  AlkPhos  75  12-11            Clean Catch Clean Catch (Midstream)  12-09 @ 03:55   No growth  --  --      .Blood Blood-Peripheral  12-09 @ 02:57   No growth at 48 Hours  --  --          Radiology Results      Meds    MEDICATIONS  (STANDING):  amLODIPine   Tablet 5 milliGRAM(s) Oral daily  atorvastatin 20 milliGRAM(s) Oral at bedtime  budesonide 160 MICROgram(s)/formoterol 4.5 MICROgram(s) Inhaler 2 Puff(s) Inhalation two times a day  carvedilol 3.125 milliGRAM(s) Oral every 12 hours  dorzolamide 2% Ophthalmic Solution 1 Drop(s) Right EYE three times a day  insulin lispro (ADMELOG) corrective regimen sliding scale   SubCutaneous Before meals and at bedtime  latanoprost 0.005% Ophthalmic Solution 1 Drop(s) Both EYES at bedtime  letrozole 2.5 milliGRAM(s) Oral daily  montelukast 10 milliGRAM(s) Oral at bedtime  pantoprazole    Tablet 40 milliGRAM(s) Oral before breakfast  polyethylene glycol 3350 17 Gram(s) Oral daily  rivaroxaban 20 milliGRAM(s) Oral with dinner  sodium chloride 1 Gram(s) Oral three times a day  sodium chloride 0.9%. 1000 milliLiter(s) (50 mL/Hr) IV Continuous <Continuous>      MEDICATIONS  (PRN):  acetaminophen     Tablet .. 650 milliGRAM(s) Oral every 6 hours PRN Temp greater or equal to 38C (100.4F), Mild Pain (1 - 3), Moderate Pain (4 - 6)  albuterol    90 MICROgram(s) HFA Inhaler 2 Puff(s) Inhalation every 6 hours PRN for bronchospasm  aluminum hydroxide/magnesium hydroxide/simethicone Suspension 30 milliLiter(s) Oral every 4 hours PRN Dyspepsia  ondansetron Injectable 4 milliGRAM(s) IV Push every 8 hours PRN Nausea and/or Vomiting      Physical Exam    Neuro :  no focal deficits  Respiratory: CTA B/L  CV: RRR, S1S2, no murmurs,   Abdominal: Soft, NT, ND +BS,  Extremities: left le edema, + peripheral pulses      ASSESSMENT    metabolic encephalopathy,   hyponatremia,   hyperkalemia,   metabolic encephalopathy, ,   h/o  DM,   HTN,   HLD,   GERD,   hypothyroid,   COPD,   DVT,   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   Added salt tablets 1 gm TID.  s/p Furosemide po 20 mg   Repeat urine sodium and osmolality.  Follow sodium q 6 hours.   f/u left LE doppler to r/o dvt   pulm f/u   cont Bronchodilators  symbicort 160/4.5 mcgs 2 puffs po BID.  hgba1c 6.1 noted above   lispro ss  ucx neg noted above   completed course of cipro 12/10/23  cont current meds

## 2023-12-13 ENCOUNTER — TRANSCRIPTION ENCOUNTER (OUTPATIENT)
Age: 88
End: 2023-12-13

## 2023-12-13 LAB
ALBUMIN SERPL ELPH-MCNC: 2.9 G/DL — LOW (ref 3.5–5)
ALBUMIN SERPL ELPH-MCNC: 2.9 G/DL — LOW (ref 3.5–5)
ALP SERPL-CCNC: 90 U/L — SIGNIFICANT CHANGE UP (ref 40–120)
ALP SERPL-CCNC: 90 U/L — SIGNIFICANT CHANGE UP (ref 40–120)
ALT FLD-CCNC: 73 U/L DA — HIGH (ref 10–60)
ALT FLD-CCNC: 73 U/L DA — HIGH (ref 10–60)
ANION GAP SERPL CALC-SCNC: 2 MMOL/L — LOW (ref 5–17)
AST SERPL-CCNC: 63 U/L — HIGH (ref 10–40)
AST SERPL-CCNC: 63 U/L — HIGH (ref 10–40)
BILIRUB SERPL-MCNC: 0.5 MG/DL — SIGNIFICANT CHANGE UP (ref 0.2–1.2)
BILIRUB SERPL-MCNC: 0.5 MG/DL — SIGNIFICANT CHANGE UP (ref 0.2–1.2)
BUN SERPL-MCNC: 7 MG/DL — SIGNIFICANT CHANGE UP (ref 7–18)
BUN SERPL-MCNC: 7 MG/DL — SIGNIFICANT CHANGE UP (ref 7–18)
BUN SERPL-MCNC: 8 MG/DL — SIGNIFICANT CHANGE UP (ref 7–18)
BUN SERPL-MCNC: 8 MG/DL — SIGNIFICANT CHANGE UP (ref 7–18)
CALCIUM SERPL-MCNC: 8.2 MG/DL — LOW (ref 8.4–10.5)
CALCIUM SERPL-MCNC: 8.2 MG/DL — LOW (ref 8.4–10.5)
CALCIUM SERPL-MCNC: 8.5 MG/DL — SIGNIFICANT CHANGE UP (ref 8.4–10.5)
CALCIUM SERPL-MCNC: 8.5 MG/DL — SIGNIFICANT CHANGE UP (ref 8.4–10.5)
CHLORIDE SERPL-SCNC: 95 MMOL/L — LOW (ref 96–108)
CHLORIDE SERPL-SCNC: 95 MMOL/L — LOW (ref 96–108)
CHLORIDE SERPL-SCNC: 99 MMOL/L — SIGNIFICANT CHANGE UP (ref 96–108)
CHLORIDE SERPL-SCNC: 99 MMOL/L — SIGNIFICANT CHANGE UP (ref 96–108)
CO2 SERPL-SCNC: 31 MMOL/L — SIGNIFICANT CHANGE UP (ref 22–31)
CO2 SERPL-SCNC: 31 MMOL/L — SIGNIFICANT CHANGE UP (ref 22–31)
CO2 SERPL-SCNC: 32 MMOL/L — HIGH (ref 22–31)
CO2 SERPL-SCNC: 32 MMOL/L — HIGH (ref 22–31)
CREAT SERPL-MCNC: 0.66 MG/DL — SIGNIFICANT CHANGE UP (ref 0.5–1.3)
CREAT SERPL-MCNC: 0.66 MG/DL — SIGNIFICANT CHANGE UP (ref 0.5–1.3)
CREAT SERPL-MCNC: 0.71 MG/DL — SIGNIFICANT CHANGE UP (ref 0.5–1.3)
CREAT SERPL-MCNC: 0.71 MG/DL — SIGNIFICANT CHANGE UP (ref 0.5–1.3)
EGFR: 77 ML/MIN/1.73M2 — SIGNIFICANT CHANGE UP
EGFR: 77 ML/MIN/1.73M2 — SIGNIFICANT CHANGE UP
EGFR: 80 ML/MIN/1.73M2 — SIGNIFICANT CHANGE UP
EGFR: 80 ML/MIN/1.73M2 — SIGNIFICANT CHANGE UP
GLUCOSE BLDC GLUCOMTR-MCNC: 104 MG/DL — HIGH (ref 70–99)
GLUCOSE BLDC GLUCOMTR-MCNC: 133 MG/DL — HIGH (ref 70–99)
GLUCOSE BLDC GLUCOMTR-MCNC: 133 MG/DL — HIGH (ref 70–99)
GLUCOSE BLDC GLUCOMTR-MCNC: 167 MG/DL — HIGH (ref 70–99)
GLUCOSE BLDC GLUCOMTR-MCNC: 167 MG/DL — HIGH (ref 70–99)
GLUCOSE BLDC GLUCOMTR-MCNC: 96 MG/DL — SIGNIFICANT CHANGE UP (ref 70–99)
GLUCOSE BLDC GLUCOMTR-MCNC: 96 MG/DL — SIGNIFICANT CHANGE UP (ref 70–99)
GLUCOSE SERPL-MCNC: 104 MG/DL — HIGH (ref 70–99)
GLUCOSE SERPL-MCNC: 104 MG/DL — HIGH (ref 70–99)
GLUCOSE SERPL-MCNC: 143 MG/DL — HIGH (ref 70–99)
GLUCOSE SERPL-MCNC: 143 MG/DL — HIGH (ref 70–99)
HCT VFR BLD CALC: 34 % — LOW (ref 34.5–45)
HCT VFR BLD CALC: 34 % — LOW (ref 34.5–45)
HGB BLD-MCNC: 11 G/DL — LOW (ref 11.5–15.5)
HGB BLD-MCNC: 11 G/DL — LOW (ref 11.5–15.5)
MAGNESIUM SERPL-MCNC: 1.8 MG/DL — SIGNIFICANT CHANGE UP (ref 1.6–2.6)
MAGNESIUM SERPL-MCNC: 1.8 MG/DL — SIGNIFICANT CHANGE UP (ref 1.6–2.6)
MCHC RBC-ENTMCNC: 30.1 PG — SIGNIFICANT CHANGE UP (ref 27–34)
MCHC RBC-ENTMCNC: 30.1 PG — SIGNIFICANT CHANGE UP (ref 27–34)
MCHC RBC-ENTMCNC: 32.4 GM/DL — SIGNIFICANT CHANGE UP (ref 32–36)
MCHC RBC-ENTMCNC: 32.4 GM/DL — SIGNIFICANT CHANGE UP (ref 32–36)
MCV RBC AUTO: 93.2 FL — SIGNIFICANT CHANGE UP (ref 80–100)
MCV RBC AUTO: 93.2 FL — SIGNIFICANT CHANGE UP (ref 80–100)
NRBC # BLD: 0 /100 WBCS — SIGNIFICANT CHANGE UP (ref 0–0)
NRBC # BLD: 0 /100 WBCS — SIGNIFICANT CHANGE UP (ref 0–0)
PHOSPHATE SERPL-MCNC: 4.2 MG/DL — SIGNIFICANT CHANGE UP (ref 2.5–4.5)
PHOSPHATE SERPL-MCNC: 4.2 MG/DL — SIGNIFICANT CHANGE UP (ref 2.5–4.5)
PLATELET # BLD AUTO: 256 K/UL — SIGNIFICANT CHANGE UP (ref 150–400)
PLATELET # BLD AUTO: 256 K/UL — SIGNIFICANT CHANGE UP (ref 150–400)
POTASSIUM SERPL-MCNC: 4.7 MMOL/L — SIGNIFICANT CHANGE UP (ref 3.5–5.3)
POTASSIUM SERPL-MCNC: 4.7 MMOL/L — SIGNIFICANT CHANGE UP (ref 3.5–5.3)
POTASSIUM SERPL-MCNC: 4.9 MMOL/L — SIGNIFICANT CHANGE UP (ref 3.5–5.3)
POTASSIUM SERPL-MCNC: 4.9 MMOL/L — SIGNIFICANT CHANGE UP (ref 3.5–5.3)
POTASSIUM SERPL-SCNC: 4.7 MMOL/L — SIGNIFICANT CHANGE UP (ref 3.5–5.3)
POTASSIUM SERPL-SCNC: 4.7 MMOL/L — SIGNIFICANT CHANGE UP (ref 3.5–5.3)
POTASSIUM SERPL-SCNC: 4.9 MMOL/L — SIGNIFICANT CHANGE UP (ref 3.5–5.3)
POTASSIUM SERPL-SCNC: 4.9 MMOL/L — SIGNIFICANT CHANGE UP (ref 3.5–5.3)
PROT SERPL-MCNC: 6 G/DL — SIGNIFICANT CHANGE UP (ref 6–8.3)
PROT SERPL-MCNC: 6 G/DL — SIGNIFICANT CHANGE UP (ref 6–8.3)
RBC # BLD: 3.65 M/UL — LOW (ref 3.8–5.2)
RBC # BLD: 3.65 M/UL — LOW (ref 3.8–5.2)
RBC # FLD: 13.3 % — SIGNIFICANT CHANGE UP (ref 10.3–14.5)
RBC # FLD: 13.3 % — SIGNIFICANT CHANGE UP (ref 10.3–14.5)
SODIUM SERPL-SCNC: 129 MMOL/L — LOW (ref 135–145)
SODIUM SERPL-SCNC: 129 MMOL/L — LOW (ref 135–145)
SODIUM SERPL-SCNC: 132 MMOL/L — LOW (ref 135–145)
SODIUM SERPL-SCNC: 132 MMOL/L — LOW (ref 135–145)
WBC # BLD: 8.77 K/UL — SIGNIFICANT CHANGE UP (ref 3.8–10.5)
WBC # BLD: 8.77 K/UL — SIGNIFICANT CHANGE UP (ref 3.8–10.5)
WBC # FLD AUTO: 8.77 K/UL — SIGNIFICANT CHANGE UP (ref 3.8–10.5)
WBC # FLD AUTO: 8.77 K/UL — SIGNIFICANT CHANGE UP (ref 3.8–10.5)

## 2023-12-13 RX ORDER — RIVAROXABAN 15 MG-20MG
1 KIT ORAL
Qty: 0 | Refills: 0 | DISCHARGE

## 2023-12-13 RX ADMIN — SODIUM CHLORIDE 1 GRAM(S): 9 INJECTION INTRAMUSCULAR; INTRAVENOUS; SUBCUTANEOUS at 06:06

## 2023-12-13 RX ADMIN — Medication 1: at 11:59

## 2023-12-13 RX ADMIN — LETROZOLE 2.5 MILLIGRAM(S): 2.5 TABLET, FILM COATED ORAL at 13:41

## 2023-12-13 RX ADMIN — DORZOLAMIDE HYDROCHLORIDE 1 DROP(S): 20 SOLUTION/ DROPS OPHTHALMIC at 06:07

## 2023-12-13 RX ADMIN — DORZOLAMIDE HYDROCHLORIDE 1 DROP(S): 20 SOLUTION/ DROPS OPHTHALMIC at 13:41

## 2023-12-13 RX ADMIN — MONTELUKAST 10 MILLIGRAM(S): 4 TABLET, CHEWABLE ORAL at 21:34

## 2023-12-13 RX ADMIN — CARVEDILOL PHOSPHATE 3.12 MILLIGRAM(S): 80 CAPSULE, EXTENDED RELEASE ORAL at 06:07

## 2023-12-13 RX ADMIN — CARVEDILOL PHOSPHATE 3.12 MILLIGRAM(S): 80 CAPSULE, EXTENDED RELEASE ORAL at 18:06

## 2023-12-13 RX ADMIN — LATANOPROST 1 DROP(S): 0.05 SOLUTION/ DROPS OPHTHALMIC; TOPICAL at 21:35

## 2023-12-13 RX ADMIN — BUDESONIDE AND FORMOTEROL FUMARATE DIHYDRATE 2 PUFF(S): 160; 4.5 AEROSOL RESPIRATORY (INHALATION) at 21:35

## 2023-12-13 RX ADMIN — ATORVASTATIN CALCIUM 20 MILLIGRAM(S): 80 TABLET, FILM COATED ORAL at 21:34

## 2023-12-13 RX ADMIN — SODIUM CHLORIDE 1 GRAM(S): 9 INJECTION INTRAMUSCULAR; INTRAVENOUS; SUBCUTANEOUS at 13:41

## 2023-12-13 RX ADMIN — SODIUM CHLORIDE 1 GRAM(S): 9 INJECTION INTRAMUSCULAR; INTRAVENOUS; SUBCUTANEOUS at 21:34

## 2023-12-13 RX ADMIN — POLYETHYLENE GLYCOL 3350 17 GRAM(S): 17 POWDER, FOR SOLUTION ORAL at 11:57

## 2023-12-13 RX ADMIN — DORZOLAMIDE HYDROCHLORIDE 1 DROP(S): 20 SOLUTION/ DROPS OPHTHALMIC at 21:35

## 2023-12-13 RX ADMIN — PANTOPRAZOLE SODIUM 40 MILLIGRAM(S): 20 TABLET, DELAYED RELEASE ORAL at 06:06

## 2023-12-13 RX ADMIN — BUDESONIDE AND FORMOTEROL FUMARATE DIHYDRATE 2 PUFF(S): 160; 4.5 AEROSOL RESPIRATORY (INHALATION) at 12:04

## 2023-12-13 RX ADMIN — RIVAROXABAN 20 MILLIGRAM(S): KIT at 18:07

## 2023-12-13 RX ADMIN — AMLODIPINE BESYLATE 5 MILLIGRAM(S): 2.5 TABLET ORAL at 06:07

## 2023-12-13 NOTE — PHYSICAL THERAPY INITIAL EVALUATION ADULT - NS ASR RISK AREAS PT EVAL
Tazorac Pregnancy And Lactation Text: This medication is not safe during pregnancy. It is unknown if this medication is excreted in breast milk. Minocycline Pregnancy And Lactation Text: This medication is Pregnancy Category D and not consider safe during pregnancy. It is also excreted in breast milk. Erythromycin Pregnancy And Lactation Text: This medication is Pregnancy Category B and is considered safe during pregnancy. It is also excreted in breast milk. Topical Retinoid counseling:  Patient advised to apply a pea-sized amount only at bedtime and wait 30 minutes after washing their face before applying.  If too drying, patient may add a non-comedogenic moisturizer. The patient verbalized understanding of the proper use and possible adverse effects of retinoids.  All of the patient's questions and concerns were addressed. Doxycycline Counseling:  Patient counseled regarding possible photosensitivity and increased risk for sunburn.  Patient instructed to avoid sunlight, if possible.  When exposed to sunlight, patients should wear protective clothing, sunglasses, and sunscreen.  The patient was instructed to call the office immediately if the following severe adverse effects occur:  hearing changes, easy bruising/bleeding, severe headache, or vision changes.  The patient verbalized understanding of the proper use and possible adverse effects of doxycycline.  All of the patient's questions and concerns were addressed. Detail Level: Zone Bactrim Pregnancy And Lactation Text: This medication is Pregnancy Category D and is known to cause fetal risk.  It is also excreted in breast milk. Topical Clindamycin Counseling: Patient counseled that this medication may cause skin irritation or allergic reactions.  In the event of skin irritation, the patient was advised to reduce the amount of the drug applied or use it less frequently.   The patient verbalized understanding of the proper use and possible adverse effects of clindamycin.  All of the patient's questions and concerns were addressed. Spironolactone Pregnancy And Lactation Text: This medication can cause feminization of the male fetus and should be avoided during pregnancy. The active metabolite is also found in breast milk. neglect Azelaic Acid Pregnancy And Lactation Text: This medication is considered safe during pregnancy and breast feeding. Birth Control Pills Pregnancy And Lactation Text: This medication should be avoided if pregnant and for the first 30 days post-partum. Minocycline Counseling: Patient advised regarding possible photosensitivity and discoloration of the teeth, skin, lips, tongue and gums.  Patient instructed to avoid sunlight, if possible.  When exposed to sunlight, patients should wear protective clothing, sunglasses, and sunscreen.  The patient was instructed to call the office immediately if the following severe adverse effects occur:  hearing changes, easy bruising/bleeding, severe headache, or vision changes.  The patient verbalized understanding of the proper use and possible adverse effects of minocycline.  All of the patient's questions and concerns were addressed. Topical Sulfur Applications Counseling: Topical Sulfur Counseling: Patient counseled that this medication may cause skin irritation or allergic reactions.  In the event of skin irritation, the patient was advised to reduce the amount of the drug applied or use it less frequently.   The patient verbalized understanding of the proper use and possible adverse effects of topical sulfur application.  All of the patient's questions and concerns were addressed. High Dose Vitamin A Pregnancy And Lactation Text: High dose vitamin A therapy is contraindicated during pregnancy and breast feeding. Birth Control Pills Counseling: Birth Control Pill Counseling: I discussed with the patient the potential side effects of OCPs including but not limited to increased risk of stroke, heart attack, thrombophlebitis, deep venous thrombosis, hepatic adenomas, breast changes, GI upset, headaches, and depression.  The patient verbalized understanding of the proper use and possible adverse effects of OCPs. All of the patient's questions and concerns were addressed. Tetracycline Counseling: Patient counseled regarding possible photosensitivity and increased risk for sunburn.  Patient instructed to avoid sunlight, if possible.  When exposed to sunlight, patients should wear protective clothing, sunglasses, and sunscreen.  The patient was instructed to call the office immediately if the following severe adverse effects occur:  hearing changes, easy bruising/bleeding, severe headache, or vision changes.  The patient verbalized understanding of the proper use and possible adverse effects of tetracycline.  All of the patient's questions and concerns were addressed. Patient understands to avoid pregnancy while on therapy due to potential birth defects. Erythromycin Counseling:  I discussed with the patient the risks of erythromycin including but not limited to GI upset, allergic reaction, drug rash, diarrhea, increase in liver enzymes, and yeast infections. Benzoyl Peroxide Pregnancy And Lactation Text: This medication is Pregnancy Category C. It is unknown if benzoyl peroxide is excreted in breast milk. Use Enhanced Medication Counseling?: No Azithromycin Counseling:  I discussed with the patient the risks of azithromycin including but not limited to GI upset, allergic reaction, drug rash, diarrhea, and yeast infections. Spironolactone Counseling: Patient advised regarding risks of diarrhea, abdominal pain, hyperkalemia, birth defects (for female patients), liver toxicity and renal toxicity. The patient may need blood work to monitor liver and kidney function and potassium levels while on therapy. The patient verbalized understanding of the proper use and possible adverse effects of spironolactone.  All of the patient's questions and concerns were addressed. Dapsone Counseling: I discussed with the patient the risks of dapsone including but not limited to hemolytic anemia, agranulocytosis, rashes, methemoglobinemia, kidney failure, peripheral neuropathy, headaches, GI upset, and liver toxicity.  Patients who start dapsone require monitoring including baseline LFTs and weekly CBCs for the first month, then every month thereafter.  The patient verbalized understanding of the proper use and possible adverse effects of dapsone.  All of the patient's questions and concerns were addressed. Sarecycline Counseling: Patient advised regarding possible photosensitivity and discoloration of the teeth, skin, lips, tongue and gums.  Patient instructed to avoid sunlight, if possible.  When exposed to sunlight, patients should wear protective clothing, sunglasses, and sunscreen.  The patient was instructed to call the office immediately if the following severe adverse effects occur:  hearing changes, easy bruising/bleeding, severe headache, or vision changes.  The patient verbalized understanding of the proper use and possible adverse effects of sarecycline.  All of the patient's questions and concerns were addressed. Topical Clindamycin Pregnancy And Lactation Text: This medication is Pregnancy Category B and is considered safe during pregnancy. It is unknown if it is excreted in breast milk. Azithromycin Pregnancy And Lactation Text: This medication is considered safe during pregnancy and is also secreted in breast milk. Doxycycline Pregnancy And Lactation Text: This medication is Pregnancy Category D and not consider safe during pregnancy. It is also excreted in breast milk but is considered safe for shorter treatment courses. Topical Sulfur Applications Pregnancy And Lactation Text: This medication is Pregnancy Category C and has an unknown safety profile during pregnancy. It is unknown if this topical medication is excreted in breast milk. Benzoyl Peroxide Counseling: Patient counseled that medicine may cause skin irritation and bleach clothing.  In the event of skin irritation, the patient was advised to reduce the amount of the drug applied or use it less frequently.   The patient verbalized understanding of the proper use and possible adverse effects of benzoyl peroxide.  All of the patient's questions and concerns were addressed. Patient Specific Counseling (Will Not Stick From Patient To Patient): .\\n7/12/23\\nPatient will do another month of treatment. Continue oral birth control. Will confirm and send prescription after reviewing labs. Stressed SPF importance. Patient is really happy with results. \\n\\n6/6/23\\nWill be starting month 4. One small break out on forehead. Will send medication after we get labs. She is happy with the treatment so far, continue with oral birth control pills.\\n\\n5/3/23\\nHappy so far with treatment, she will be confirmed once labs and pregnancy test results are sent to office. \\nStarting month 3. She skipped a month due to Covid illness. \\n\\n03/07/23\\nFinished month one and now starting month 2. Take once daily for couple of weeks due to having headaches also take IBU. May increase twice daily after side effects have subsided. Lab work done at  prior to coming to appointment \\n\\n2/3/23\\nShe?s eager to get restarted with accutane. \\n\\n1/3/23\\nRe-initiate Accutane today. She had blood work done at [facility] labs this morning. Unfortunately she was not re-enrolled last month and will need to wait another 30 days. OK to continue with doxycycline for another month. \\n\\n\\n11/29/22\\nWill re- initiate Accutane. Will do in house HCG.\\nShe is still on oral birth control, continue with Differin and doxy ok for one month. \\n\\n5/18/22\\nPatient completed 8 months of treatment in April. She presents to clinic today for post isotretinoin pregnancy test. \\n\\n\\n4/6/22\\nShe has completed isotretinoin about two weeks ago. She completed 8 months total of treatment. She looks great and completely pimple free. \\nShe is still dry and recommended we wait to start topical tretinoin for scarring until at least May. \\nOk to use ibuprofen and drink plenty of water. She states she has about 2 headaches a week and dry skin still. We will send in triamcinolone again and recommending she use CeraVe moisturizers.letty Tazorac Counseling:  Patient advised that medication is irritating and drying.  Patient may need to apply sparingly and wash off after an hour before eventually leaving it on overnight.  The patient verbalized understanding of the proper use and possible adverse effects of tazorac.  All of the patient's questions and concerns were addressed. Isotretinoin Pregnancy And Lactation Text: This medication is Pregnancy Category X and is considered extremely dangerous during pregnancy. It is unknown if it is excreted in breast milk. Azelaic Acid Counseling: Patient counseled that medicine may cause skin irritation and to avoid applying near the eyes.  In the event of skin irritation, the patient was advised to reduce the amount of the drug applied or use it less frequently.   The patient verbalized understanding of the proper use and possible adverse effects of azelaic acid.  All of the patient's questions and concerns were addressed. High Dose Vitamin A Counseling: Side effects reviewed, pt to contact office should one occur. Isotretinoin Counseling: Patient should get monthly blood tests, not donate blood, not drive at night if vision affected, not share medication, and not undergo elective surgery for 6 months after tx completed. Side effects reviewed, pt to contact office should one occur. Winlevi Pregnancy And Lactation Text: This medication is considered safe during pregnancy and breastfeeding. Winlevi Counseling:  I discussed with the patient the risks of topical clascoterone including but not limited to erythema, scaling, itching, and stinging. Patient voiced their understanding. Dapsone Pregnancy And Lactation Text: This medication is Pregnancy Category C and is not considered safe during pregnancy or breast feeding. Topical Retinoid Pregnancy And Lactation Text: This medication is Pregnancy Category C. It is unknown if this medication is excreted in breast milk. Bactrim Counseling:  I discussed with the patient the risks of sulfa antibiotics including but not limited to GI upset, allergic reaction, drug rash, diarrhea, dizziness, photosensitivity, and yeast infections.  Rarely, more serious reactions can occur including but not limited to aplastic anemia, agranulocytosis, methemoglobinemia, blood dyscrasias, liver or kidney failure, lung infiltrates or desquamative/blistering drug rashes. Aklief counseling:  Patient advised to apply a pea-sized amount only at bedtime and wait 30 minutes after washing their face before applying.  If too drying, patient may add a non-comedogenic moisturizer.  The most commonly reported side effects including irritation, redness, scaling, dryness, stinging, burning, itching, and increased risk of sunburn.  The patient verbalized understanding of the proper use and possible adverse effects of retinoids.  All of the patient's questions and concerns were addressed. Aklief Pregnancy And Lactation Text: It is unknown if this medication is safe to use during pregnancy.  It is unknown if this medication is excreted in breast milk.  Breastfeeding women should use the topical cream on the smallest area of the skin for the shortest time needed while breastfeeding.  Do not apply to nipple and areola.

## 2023-12-13 NOTE — PROGRESS NOTE ADULT - PROBLEM SELECTOR PLAN 9
Pt has a history of HLD, takes atorvastatin at home  - C/w statin

## 2023-12-13 NOTE — DISCHARGE NOTE PROVIDER - NSDCCPCAREPLAN_GEN_ALL_CORE_FT
PRINCIPAL DISCHARGE DIAGNOSIS  Diagnosis: Hyponatremia  Assessment and Plan of Treatment: You came into the emergnecy room due to altered mental status. We found that you have hyponatremia, which means you have low sodium. We gave fluids, salt tablets and had our nephrology team follow you. Your sodium returned to normal after a few days and your ental status improved to your baseline. We advise you follow up with your primary care provider to make sure you that your levels stay in normal range.      SECONDARY DISCHARGE DIAGNOSES  Diagnosis: Acute on chronic hypoxic respiratory failure  Assessment and Plan of Treatment: You came in with shortness of breath. We put you on oxygen. We continued you on albuterol, singuliar, symbicort and montekulast and advair. Your symtpoms resolved and you were able to go home room air. Please follow up with your pulmonologist and primary care.    Diagnosis: HTN (hypertension)  Assessment and Plan of Treatment: You have high blood pressure. Please continue to taking your medication of amlodipine, losartan , carvidiol, and torsemid as prescribed. Please measure your blood pressure at least once daily. Maintain a healthy diet which includes incorporating more vegetables and decreasing the amount of salt (low sodium) and sugar in your diet such as rice, bread, and pasta low sugar, low fat, low sodium diet. Exercise frequently if possible.  Please follow up with your primary care provider within one week of your discharge.      Diagnosis: Breast cancer  Assessment and Plan of Treatment: You have a history of breast cancer. We continued you on your home medication of Letrozole. Please follow up with your oncologist for further management.    Diagnosis: HLD (hyperlipidemia)  Assessment and Plan of Treatment: You have hyperlipidemia. Please continue to take your cholesterol medication of Atorvastatin. Maintain a healthy diet that consist of low sugar, low fat, low sodium. Decrease the amount of fried foods that you consume. Exercise frequently if possible. Please follow up with  your primary care provider within 1 week of your discharge.  You are recommended a DASH Diet that emphasizes mostly vegetables, fruits, and fat-free or low-fat dairy products. Please limit intake of sodium, sweets, beverages w/ high sugar/carbohydrate content.      Diagnosis: Pulmonary embolism  Assessment and Plan of Treatment: You have a history of pulmonary embolism. We continued you on your home medication of xeralto. Follow up with your primary care doctor.    Diagnosis: DM (diabetes mellitus)  Assessment and Plan of Treatment: Continue with your blood sugar medication of januvia.   You must maintain a healthy diet that consists of low sugar and low fat. Your diet must consist of mostly vegetables. Please limit your intake of high sugar and high carbohydrate foods such as pasta, rice, and bread. Exercise frequently if possible. Follow up with primary care physician within one week of your discharge to further manage your diabetes and monitor your Hemoglobin A1c levels after 3 months to evaluate your diabetes control.

## 2023-12-13 NOTE — PHYSICAL THERAPY INITIAL EVALUATION ADULT - IMPAIRMENTS FOUND, PT EVAL
aerobic capacity/endurance/gait, locomotion, and balance/gross motor/joint integrity and mobility/muscle strength/posture/visual motor

## 2023-12-13 NOTE — PROGRESS NOTE ADULT - SUBJECTIVE AND OBJECTIVE BOX
Patient is a 89y old  Female who presents with a chief complaint of Cough and Sob (12 Dec 2023 10:08)  Awake, alert, out of bed to chair in NAD. Doing well on RA. Good appetite    INTERVAL HPI/OVERNIGHT EVENTS:      VITAL SIGNS:  T(F): 98.8 (12-13-23 @ 11:20)  HR: 63 (12-13-23 @ 11:20)  BP: 128/46 (12-13-23 @ 11:20)  RR: 18 (12-13-23 @ 11:20)  SpO2: 91% (12-13-23 @ 11:20)  Wt(kg): --  I&O's Detail    12 Dec 2023 07:01  -  13 Dec 2023 07:00  --------------------------------------------------------  IN:    Oral Fluid: 150 mL  Total IN: 150 mL    OUT:    Indwelling Catheter - Urethral (mL): 1100 mL    Voided (mL): 600 mL  Total OUT: 1700 mL    Total NET: -1550 mL              REVIEW OF SYSTEMS:    CONSTITUTIONAL:  No fevers, chills, sweats    HEENT:  Eyes:  No diplopia or blurred vision. ENT:  No earache, sore throat or runny nose.    CARDIOVASCULAR:  No pressure, squeezing, tightness, or heaviness about the chest; no palpitations.    RESPIRATORY:  Per HPI    GASTROINTESTINAL:  No abdominal pain, nausea, vomiting or diarrhea.    GENITOURINARY:  No dysuria, frequency or urgency.    NEUROLOGIC:  No paresthesias, fasciculations, seizures or weakness.    PSYCHIATRIC:  No disorder of thought or mood.      PHYSICAL EXAM:    Constitutional: Well developed and nourished  Eyes:Perrla  ENMT: normal  Neck:supple  Respiratory: good air entry  Cardiovascular: S1 S2 regular  Gastrointestinal: Soft, Non tender  Extremities: + edema  Vascular:normal  Neurological:Awake, alert,Ox3  Musculoskeletal:Normal      MEDICATIONS  (STANDING):  amLODIPine   Tablet 5 milliGRAM(s) Oral daily  atorvastatin 20 milliGRAM(s) Oral at bedtime  budesonide 160 MICROgram(s)/formoterol 4.5 MICROgram(s) Inhaler 2 Puff(s) Inhalation two times a day  carvedilol 3.125 milliGRAM(s) Oral every 12 hours  dorzolamide 2% Ophthalmic Solution 1 Drop(s) Right EYE three times a day  insulin lispro (ADMELOG) corrective regimen sliding scale   SubCutaneous Before meals and at bedtime  latanoprost 0.005% Ophthalmic Solution 1 Drop(s) Both EYES at bedtime  letrozole 2.5 milliGRAM(s) Oral daily  montelukast 10 milliGRAM(s) Oral at bedtime  pantoprazole    Tablet 40 milliGRAM(s) Oral before breakfast  polyethylene glycol 3350 17 Gram(s) Oral daily  rivaroxaban 20 milliGRAM(s) Oral with dinner  sodium chloride 1 Gram(s) Oral three times a day    MEDICATIONS  (PRN):  acetaminophen     Tablet .. 650 milliGRAM(s) Oral every 6 hours PRN Temp greater or equal to 38C (100.4F), Mild Pain (1 - 3), Moderate Pain (4 - 6)  albuterol    90 MICROgram(s) HFA Inhaler 2 Puff(s) Inhalation every 6 hours PRN for bronchospasm  aluminum hydroxide/magnesium hydroxide/simethicone Suspension 30 milliLiter(s) Oral every 4 hours PRN Dyspepsia  ondansetron Injectable 4 milliGRAM(s) IV Push every 8 hours PRN Nausea and/or Vomiting      Allergies    aspirin (Stomach Upset)  aspirin (Nausea (Mild))    Intolerances        LABS:                        11.0   8.77  )-----------( 256      ( 13 Dec 2023 06:37 )             34.0     12-13    132<L>  |  99  |  7   ----------------------------<  104<H>  4.9   |  31  |  0.66    Ca    8.5      13 Dec 2023 06:37  Phos  4.2     12-13  Mg     1.8     12-13    TPro  6.0  /  Alb  2.9<L>  /  TBili  0.5  /  DBili  x   /  AST  63<H>  /  ALT  73<H>  /  AlkPhos  90  12-13      Urinalysis Basic - ( 13 Dec 2023 06:37 )    Color: x / Appearance: x / SG: x / pH: x  Gluc: 104 mg/dL / Ketone: x  / Bili: x / Urobili: x   Blood: x / Protein: x / Nitrite: x   Leuk Esterase: x / RBC: x / WBC x   Sq Epi: x / Non Sq Epi: x / Bacteria: x            CAPILLARY BLOOD GLUCOSE      POCT Blood Glucose.: 96 mg/dL (13 Dec 2023 07:57)  POCT Blood Glucose.: 116 mg/dL (12 Dec 2023 21:18)  POCT Blood Glucose.: 139 mg/dL (12 Dec 2023 16:58)        RADIOLOGY & ADDITIONAL TESTS:    < from: US Duplex Venous Lower Ext Ltd, Left (12.12.23 @ 11:15) >  IMPRESSION:  No evidence of left lower extremity deep venous thrombosis.      < end of copied text >  < from: Xray Chest 1 View-PORTABLE IMMEDIATE (12.09.23 @ 03:40) >  IMPRESSION: When allowing for technique no change. No acute parenchymal   disease    < end of copied text >  CXR:    Ct scan chest:    ekg;    echo:

## 2023-12-13 NOTE — PROGRESS NOTE ADULT - SUBJECTIVE AND OBJECTIVE BOX
PGY-1 Progress Note discussed with attending    PAGER #: [[phone]] TILL 5:00 PM  PLEASE CONTACT ON CALL TEAM: No acute overnight events. Pending discharge upon trial of void.   - On Call Team (Please refer to Robles) FROM 5:00 PM - 8:30PM  - Nightfloat Team FROM 8:30 -7:30 AM    CHIEF COMPLAINT & BRIEF HOSPITAL COURSE:    INTERVAL HPI/OVERNIGHT EVENTS:   MEDICATIONS  (STANDING):  amLODIPine   Tablet 5 milliGRAM(s) Oral daily  atorvastatin 20 milliGRAM(s) Oral at bedtime  budesonide 160 MICROgram(s)/formoterol 4.5 MICROgram(s) Inhaler 2 Puff(s) Inhalation two times a day  carvedilol 3.125 milliGRAM(s) Oral every 12 hours  dorzolamide 2% Ophthalmic Solution 1 Drop(s) Right EYE three times a day  insulin lispro (ADMELOG) corrective regimen sliding scale   SubCutaneous Before meals and at bedtime  latanoprost 0.005% Ophthalmic Solution 1 Drop(s) Both EYES at bedtime  letrozole 2.5 milliGRAM(s) Oral daily  montelukast 10 milliGRAM(s) Oral at bedtime  pantoprazole    Tablet 40 milliGRAM(s) Oral before breakfast  polyethylene glycol 3350 17 Gram(s) Oral daily  rivaroxaban 20 milliGRAM(s) Oral with dinner  sodium chloride 1 Gram(s) Oral three times a day    MEDICATIONS  (PRN):  acetaminophen     Tablet .. 650 milliGRAM(s) Oral every 6 hours PRN Temp greater or equal to 38C (100.4F), Mild Pain (1 - 3), Moderate Pain (4 - 6)  albuterol    90 MICROgram(s) HFA Inhaler 2 Puff(s) Inhalation every 6 hours PRN for bronchospasm  aluminum hydroxide/magnesium hydroxide/simethicone Suspension 30 milliLiter(s) Oral every 4 hours PRN Dyspepsia  ondansetron Injectable 4 milliGRAM(s) IV Push every 8 hours PRN Nausea and/or Vomiting      REVIEW OF SYSTEMS:  CONSTITUTIONAL: No fever, weight loss, or fatigue  RESPIRATORY: No shortness of breath  CARDIOVASCULAR: No chest pain  GASTROINTESTINAL: No abdominal pain.  GENITOURINARY: No dysuria  NEUROLOGICAL: No headaches  SKIN: No itching, burning, rashes    Vital Signs Last 24 Hrs  T(C): 37.1 (13 Dec 2023 11:20), Max: 37.1 (13 Dec 2023 11:20)  T(F): 98.8 (13 Dec 2023 11:20), Max: 98.8 (13 Dec 2023 11:20)  HR: 69 (13 Dec 2023 11:39) (56 - 69)  BP: 130/83 (13 Dec 2023 11:39) (118/50 - 143/52)  BP(mean): --  RR: 18 (13 Dec 2023 11:20) (18 - 18)  SpO2: 91% (13 Dec 2023 11:20) (91% - 99%)    Parameters below as of 13 Dec 2023 11:20  Patient On (Oxygen Delivery Method): room air        PHYSICAL EXAMINATION:  GENERAL: NAD, well built  HEAD:  Atraumatic, Normocephalic  EYES:  conjunctiva and sclera clear  CHEST/LUNG: Clear to auscultation. No rales, rhonchi, wheezing, or rubs  HEART: Regular rate and rhythm; No murmurs, rubs, or gallops  ABDOMEN: Soft, Nontender, Nondistended; Bowel sounds present  NERVOUS SYSTEM:  Alert & Oriented X1-2,    EXTREMITIES:  2+ Peripheral Pulses, No clubbing, cyanosis, or edema  SKIN: warm dry                          11.0   8.77  )-----------( 256      ( 13 Dec 2023 06:37 )             34.0     12-13    132<L>  |  99  |  7   ----------------------------<  104<H>  4.9   |  31  |  0.66    Ca    8.5      13 Dec 2023 06:37  Phos  4.2     12-13  Mg     1.8     12-13    TPro  6.0  /  Alb  2.9<L>  /  TBili  0.5  /  DBili  x   /  AST  63<H>  /  ALT  73<H>  /  AlkPhos  90  12-13    LIVER FUNCTIONS - ( 13 Dec 2023 06:37 )  Alb: 2.9 g/dL / Pro: 6.0 g/dL / ALK PHOS: 90 U/L / ALT: 73 U/L DA / AST: 63 U/L / GGT: x                   CAPILLARY BLOOD GLUCOSE      RADIOLOGY & ADDITIONAL TESTS:                   PGY-1 Progress Note discussed with attending    PAGER #: [[phone]] TILL 5:00 PM  PLEASE CONTACT ON CALL TEAM: No acute overnight events. Pending discharge upon trial of void.   - On Call Team (Please refer to Robles) FROM 5:00 PM - 8:30PM  - Nightfloat Team FROM 8:30 -7:30 AM    CHIEF COMPLAINT & BRIEF HOSPITAL COURSE:    INTERVAL HPI/OVERNIGHT EVENTS:   MEDICATIONS  (STANDING):  amLODIPine   Tablet 5 milliGRAM(s) Oral daily  atorvastatin 20 milliGRAM(s) Oral at bedtime  budesonide 160 MICROgram(s)/formoterol 4.5 MICROgram(s) Inhaler 2 Puff(s) Inhalation two times a day  carvedilol 3.125 milliGRAM(s) Oral every 12 hours  dorzolamide 2% Ophthalmic Solution 1 Drop(s) Right EYE three times a day  insulin lispro (ADMELOG) corrective regimen sliding scale   SubCutaneous Before meals and at bedtime  latanoprost 0.005% Ophthalmic Solution 1 Drop(s) Both EYES at bedtime  letrozole 2.5 milliGRAM(s) Oral daily  montelukast 10 milliGRAM(s) Oral at bedtime  pantoprazole    Tablet 40 milliGRAM(s) Oral before breakfast  polyethylene glycol 3350 17 Gram(s) Oral daily  rivaroxaban 20 milliGRAM(s) Oral with dinner  sodium chloride 1 Gram(s) Oral three times a day    MEDICATIONS  (PRN):  acetaminophen     Tablet .. 650 milliGRAM(s) Oral every 6 hours PRN Temp greater or equal to 38C (100.4F), Mild Pain (1 - 3), Moderate Pain (4 - 6)  albuterol    90 MICROgram(s) HFA Inhaler 2 Puff(s) Inhalation every 6 hours PRN for bronchospasm  aluminum hydroxide/magnesium hydroxide/simethicone Suspension 30 milliLiter(s) Oral every 4 hours PRN Dyspepsia  ondansetron Injectable 4 milliGRAM(s) IV Push every 8 hours PRN Nausea and/or Vomiting      REVIEW OF SYSTEMS:  CONSTITUTIONAL: No fever, weight loss, or fatigue  RESPIRATORY: No shortness of breath  CARDIOVASCULAR: No chest pain  GASTROINTESTINAL: No abdominal pain.  GENITOURINARY: No dysuria  NEUROLOGICAL: No headaches  SKIN: No itching, burning, rashes    Vital Signs Last 24 Hrs  T(C): 37.1 (13 Dec 2023 11:20), Max: 37.1 (13 Dec 2023 11:20)  T(F): 98.8 (13 Dec 2023 11:20), Max: 98.8 (13 Dec 2023 11:20)  HR: 69 (13 Dec 2023 11:39) (56 - 69)  BP: 130/83 (13 Dec 2023 11:39) (118/50 - 143/52)  BP(mean): --  RR: 18 (13 Dec 2023 11:20) (18 - 18)  SpO2: 91% (13 Dec 2023 11:20) (91% - 99%)    Parameters below as of 13 Dec 2023 11:20  Patient On (Oxygen Delivery Method): room air        PHYSICAL EXAMINATION:  GENERAL: NAD, well built  HEAD:  Atraumatic, Normocephalic  EYES:  conjunctiva and sclera clear  CHEST/LUNG: Clear to auscultation. No rales, rhonchi, wheezing, or rubs  HEART: Regular rate and rhythm; No murmurs, rubs, or gallops  ABDOMEN: Soft, Nontender, Nondistended; Bowel sounds present  NERVOUS SYSTEM:  Alert & Oriented X1-2,    EXTREMITIES:  2+ Peripheral Pulses, No clubbing, cyanosis, or edema  SKIN: warm dry                          11.0   8.77  )-----------( 256      ( 13 Dec 2023 06:37 )             34.0     12-13    132<L>  |  99  |  7   ----------------------------<  104<H>  4.9   |  31  |  0.66    Ca    8.5      13 Dec 2023 06:37  Phos  4.2     12-13  Mg     1.8     12-13    TPro  6.0  /  Alb  2.9<L>  /  TBili  0.5  /  DBili  x   /  AST  63<H>  /  ALT  73<H>  /  AlkPhos  90  12-13    LIVER FUNCTIONS - ( 13 Dec 2023 06:37 )  Alb: 2.9 g/dL / Pro: 6.0 g/dL / ALK PHOS: 90 U/L / ALT: 73 U/L DA / AST: 63 U/L / GGT: x                   CAPILLARY BLOOD GLUCOSE      RADIOLOGY & ADDITIONAL TESTS:

## 2023-12-13 NOTE — PHYSICAL THERAPY INITIAL EVALUATION ADULT - GENERAL OBSERVATIONS, REHAB EVAL
elderly female on tele, in care of family as cdpap, pt provided assistance to transfers with rw and PT at bedside, she was oob chair early am with pca assistance.

## 2023-12-13 NOTE — PHYSICAL THERAPY INITIAL EVALUATION ADULT - SIT-TO-STAND BALANCE
[FreeTextEntry1] : Diabetes, not controlled, with morning hyperglycemia.  goal A1c < 7.0%, HTN, hyper cholesterolemia\par Reviewed potential complications of diabetes and high cholesterol  including blindness, dialysis, neuropathy, heart attack, stroke.   He still refuses to take meds, told me not to send the rx's to his pharmacy because he still has the pills at home.\par will do labs today\par If he does not plan to start the new medications, I advised pt to find another doctor and follow with his PCP who can treat his diabetes.  We have had this same conversation year after year and he won't accept that he has medical conditions that are treatable and preventable complications.\par RTO 6 months  fair minus

## 2023-12-13 NOTE — DISCHARGE NOTE PROVIDER - NSDCMRMEDTOKEN_GEN_ALL_CORE_FT
Advair Diskus 100 mcg-50 mcg inhalation powder: 1 puff(s) inhaled 2 times a day  Albuterol (Eqv-ProAir HFA) 90 mcg/inh inhalation aerosol: 2 puff(s) inhaled every 6 hours as needed for  bronchospasm  amLODIPine 5 mg oral tablet: 1 tab(s) orally once a day  atorvastatin 20 mg oral tablet: 1 tab(s) orally once a day  Azopt 1% ophthalmic suspension: 1 drop(s) to right eye 3 times a day  carvedilol 3.125 mg oral tablet: 1 tab(s) orally every 12 hours  Januvia 50 mg oral tablet: 1 tab(s) orally once a day  latanoprost 0.005% ophthalmic solution: 1 drop(s) to each affected eye once a day (in the evening)  letrozole 2.5 mg oral tablet: 1 tab(s) orally once a day  Linzess 72 mcg oral capsule: 1 cap(s) orally once a day  losartan 100 mg oral tablet: 1 tab(s) orally once a day  omeprazole 40 mg oral delayed release capsule: 1 cap(s) orally once a day  Restasis 0.05% ophthalmic emulsion: 1 drop(s) to each affected eye every 12 hours  Singulair 10 mg oral tablet: 1 tab(s) orally once a day  tiZANidine 2 mg oral tablet: 2 tab(s) orally every 8 hours  torsemide 10 mg oral tablet: 1 tab(s) orally once a day  Xarelto 20 mg oral tablet: 1 tab(s) orally once a day

## 2023-12-13 NOTE — DISCHARGE NOTE PROVIDER - CARE PROVIDER_API CALL
Basil Tejeda L  Pulmonary Disease  [address] [place] [address]  Phone: [phone]  Fax: [phone]  Follow Up Time: 2 weeks   Basil Tejeda L  Pulmonary Disease  [address] [place] [address]  Phone: [phone]  Fax: [phone]  Follow Up Time: 2 weeks

## 2023-12-13 NOTE — PROGRESS NOTE ADULT - PROBLEM SELECTOR PLAN 7
Pt has a history of PE and DVT, takes Xarelto at home  - C/w Xarelto

## 2023-12-13 NOTE — PROGRESS NOTE ADULT - PROBLEM SELECTOR PLAN 1
Sodium 116 on admission. Likely hypovolemic hyponatremia  Today it is 132  - C/w BMP q6 hours   -Reduced NS TO 50 ML PER HOUR.   - salt tablets 1 gm TID.  -1 dose of Furosemide po 20 mg   Dr. Lomas Nephrology Consulted  Discharge upon passing trial of avoid

## 2023-12-13 NOTE — PROGRESS NOTE ADULT - SUBJECTIVE AND OBJECTIVE BOX
Patient is a 89y old  Female who presents with a chief complaint of Cough and Sob (12 Dec 2023 10:08)    pt seen in tele [x  ], reg med floor [   ], bed [ x ], chair at bedside [   ], awake and responsive [ x ],   lethargic [  ],  nad [ x ]      Allergies    aspirin (Stomach Upset)  aspirin (Nausea (Mild))        Vitals    T(F): 98.2 (12-13-23 @ 04:45), Max: 98.4 (12-12-23 @ 07:27)  HR: 60 (12-13-23 @ 04:45) (59 - 72)  BP: 131/39 (12-13-23 @ 04:45) (129/66 - 143/52)  RR: 18 (12-13-23 @ 04:45) (18 - 18)  SpO2: 96% (12-13-23 @ 04:45) (96% - 99%)  Wt(kg): --  CAPILLARY BLOOD GLUCOSE      POCT Blood Glucose.: 116 mg/dL (12 Dec 2023 21:18)      Labs                          11.3   12.62 )-----------( 276      ( 12 Dec 2023 05:28 )             34.9       12-13    129<L>  |  95<L>  |  8   ----------------------------<  143<H>  4.7   |  32<H>  |  0.71    Ca    8.2<L>      13 Dec 2023 00:26  Phos  3.7     12-12  Mg     1.7     12-12    TPro  5.9<L>  /  Alb  3.1<L>  /  TBili  0.4  /  DBili  x   /  AST  17  /  ALT  26  /  AlkPhos  71  12-12            Clean Catch Clean Catch (Midstream)  12-09 @ 03:55   No growth  --  --      .Blood Blood-Peripheral  12-09 @ 02:57   No growth at 72 Hours  --  --          Radiology Results      Meds    MEDICATIONS  (STANDING):  amLODIPine   Tablet 5 milliGRAM(s) Oral daily  atorvastatin 20 milliGRAM(s) Oral at bedtime  budesonide 160 MICROgram(s)/formoterol 4.5 MICROgram(s) Inhaler 2 Puff(s) Inhalation two times a day  carvedilol 3.125 milliGRAM(s) Oral every 12 hours  dorzolamide 2% Ophthalmic Solution 1 Drop(s) Right EYE three times a day  insulin lispro (ADMELOG) corrective regimen sliding scale   SubCutaneous Before meals and at bedtime  latanoprost 0.005% Ophthalmic Solution 1 Drop(s) Both EYES at bedtime  letrozole 2.5 milliGRAM(s) Oral daily  montelukast 10 milliGRAM(s) Oral at bedtime  pantoprazole    Tablet 40 milliGRAM(s) Oral before breakfast  polyethylene glycol 3350 17 Gram(s) Oral daily  rivaroxaban 20 milliGRAM(s) Oral with dinner  sodium chloride 1 Gram(s) Oral three times a day      MEDICATIONS  (PRN):  acetaminophen     Tablet .. 650 milliGRAM(s) Oral every 6 hours PRN Temp greater or equal to 38C (100.4F), Mild Pain (1 - 3), Moderate Pain (4 - 6)  albuterol    90 MICROgram(s) HFA Inhaler 2 Puff(s) Inhalation every 6 hours PRN for bronchospasm  aluminum hydroxide/magnesium hydroxide/simethicone Suspension 30 milliLiter(s) Oral every 4 hours PRN Dyspepsia  ondansetron Injectable 4 milliGRAM(s) IV Push every 8 hours PRN Nausea and/or Vomiting      Physical Exam    Neuro :  no focal deficits  Respiratory: CTA B/L  CV: RRR, S1S2, no murmurs,   Abdominal: Soft, NT, ND +BS,  Extremities: left le edema, + peripheral pulses      ASSESSMENT    metabolic encephalopathy,   hyponatremia,   hyperkalemia,   metabolic encephalopathy, ,   h/o  DM,   HTN,   HLD,   GERD,   hypothyroid,   COPD,   DVT,   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   Added salt tablets 1 gm TID.  s/p Furosemide po 20 mg   Repeat urine sodium and osmolality.  Follow sodium q 6 hours.   f/u left LE doppler to r/o dvt   pulm f/u   cont Bronchodilators  symbicort 160/4.5 mcgs 2 puffs po BID.  hgba1c 6.1 noted above   lispro ss  ucx neg noted above   completed course of cipro 12/10/23  cont current meds         Patient is a 89y old  Female who presents with a chief complaint of Cough and Sob (12 Dec 2023 10:08)    pt seen in tele [x  ], reg med floor [   ], bed [ x ], chair at bedside [   ], awake and responsive [ x ],   lethargic [  ],  nad [ x ]      Allergies    aspirin (Stomach Upset)  aspirin (Nausea (Mild))        Vitals    T(F): 98.2 (12-13-23 @ 04:45), Max: 98.4 (12-12-23 @ 07:27)  HR: 60 (12-13-23 @ 04:45) (59 - 72)  BP: 131/39 (12-13-23 @ 04:45) (129/66 - 143/52)  RR: 18 (12-13-23 @ 04:45) (18 - 18)  SpO2: 96% (12-13-23 @ 04:45) (96% - 99%)  Wt(kg): --  CAPILLARY BLOOD GLUCOSE      POCT Blood Glucose.: 116 mg/dL (12 Dec 2023 21:18)      Labs                          11.3   12.62 )-----------( 276      ( 12 Dec 2023 05:28 )             34.9       12-13    129<L>  |  95<L>  |  8   ----------------------------<  143<H>  4.7   |  32<H>  |  0.71    Ca    8.2<L>      13 Dec 2023 00:26  Phos  3.7     12-12  Mg     1.7     12-12    TPro  5.9<L>  /  Alb  3.1<L>  /  TBili  0.4  /  DBili  x   /  AST  17  /  ALT  26  /  AlkPhos  71  12-12            Clean Catch Clean Catch (Midstream)  12-09 @ 03:55   No growth  --  --      .Blood Blood-Peripheral  12-09 @ 02:57   No growth at 72 Hours  --  --          Radiology Results    < from: US Duplex Venous Lower Ext Ltd, Left (12.12.23 @ 11:15) >  FINDINGS:    There is normal compressibility of the left common femoral, femoral and   popliteal veins.  The contralateral common femoral vein is patent.  Doppler examination shows normal spontaneous and phasic flow.    No calf vein thrombosis is detected.    IMPRESSION:  No evidence of left lower extremity deep venous thrombosis.    < end of copied text >        Meds    MEDICATIONS  (STANDING):  amLODIPine   Tablet 5 milliGRAM(s) Oral daily  atorvastatin 20 milliGRAM(s) Oral at bedtime  budesonide 160 MICROgram(s)/formoterol 4.5 MICROgram(s) Inhaler 2 Puff(s) Inhalation two times a day  carvedilol 3.125 milliGRAM(s) Oral every 12 hours  dorzolamide 2% Ophthalmic Solution 1 Drop(s) Right EYE three times a day  insulin lispro (ADMELOG) corrective regimen sliding scale   SubCutaneous Before meals and at bedtime  latanoprost 0.005% Ophthalmic Solution 1 Drop(s) Both EYES at bedtime  letrozole 2.5 milliGRAM(s) Oral daily  montelukast 10 milliGRAM(s) Oral at bedtime  pantoprazole    Tablet 40 milliGRAM(s) Oral before breakfast  polyethylene glycol 3350 17 Gram(s) Oral daily  rivaroxaban 20 milliGRAM(s) Oral with dinner  sodium chloride 1 Gram(s) Oral three times a day      MEDICATIONS  (PRN):  acetaminophen     Tablet .. 650 milliGRAM(s) Oral every 6 hours PRN Temp greater or equal to 38C (100.4F), Mild Pain (1 - 3), Moderate Pain (4 - 6)  albuterol    90 MICROgram(s) HFA Inhaler 2 Puff(s) Inhalation every 6 hours PRN for bronchospasm  aluminum hydroxide/magnesium hydroxide/simethicone Suspension 30 milliLiter(s) Oral every 4 hours PRN Dyspepsia  ondansetron Injectable 4 milliGRAM(s) IV Push every 8 hours PRN Nausea and/or Vomiting      Physical Exam    Neuro :  no focal deficits  Respiratory: CTA B/L  CV: RRR, S1S2, no murmurs,   Abdominal: Soft, NT, ND +BS,  Extremities: mild left le edema, + peripheral pulses      ASSESSMENT    metabolic encephalopathy,   hyponatremia,   hyperkalemia,   metabolic encephalopathy, ,   h/o  DM,   HTN,   HLD,   GERD,   hypothyroid,   COPD,   DVT,   PAF/DVT/PE on Xarelto,   glaucoma,   left eye blindness,   cataracts,   Breast cancer s/p right mastectomy    Spinal stenosis   chronic back pain       PLAN    d/c tele   d/c ivf ns,   serum potassium wnl   renal f/u     post desmopressin on 12/09 due to over correction of sodium  s/p Furosemide po 20 mg   Continue current diet   follow this am labs.   left LE doppler with No evidence of left lower extremity deep venous thrombosis noted above.    pulm f/u   cont Bronchodilators  symbicort 160/4.5 mcgs 2 puffs po BID.  hgba1c 6.1 noted    lispro ss  ucx neg noted    completed course of cipro 12/10/23   tov  cont current meds   d/c plan if serum sodium continues to improve and pt voiding post tov

## 2023-12-13 NOTE — PROGRESS NOTE ADULT - ASSESSMENT
Hyponatremia improved.  Hyponatremia due to diuretics and increased fluid intake, post Desmopressin meds due to sodium over correction.  Sodium continue to improve now 132.  Can DC SALT tABLETES.   Diuretics and ARB were DC on admission.

## 2023-12-13 NOTE — DISCHARGE NOTE PROVIDER - HOSPITAL COURSE
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 Admitted for hyponatremia management. Sodium 116 on admission. Likely hypovolemic hyponatremia. Responsive to fluids and salt tablets and lasix. Nephrology consulted. Sodium returned to normal.     For her chronic hypoxic respiratory failure patient was on oxygen needed but was able to transition to room air. Was continued on singulair and Albuterol PRN.    For the Pt's HTN, she was continued on Amlodipine and coreg but toresmide and losartan Were held due to hyponatremia.     For the patints DM was on sliding scale as home oral medication was held.      For the patients asthma she was continued on symbicort, Montelukast, Albuterol PRN and Advair diskus. Pulmonolgy was following.     for the patients history of Pulmonary embolism was continued on Xarelto.    For the patients of breast cancer, she was continued on letrozole 2.5mg daily    For the patients HLD she was continued on atorvastatin.     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 Admitted for hyponatremia management. Sodium 116 on admission. Likely hypovolemic hyponatremia. Responsive to fluids and salt tablets and lasix. Nephrology consulted. Sodium returned to normal.     For her chronic hypoxic respiratory failure patient was on oxygen needed but was able to transition to room air. Was continued on singulair and Albuterol PRN.    For the Pt's HTN, she was continued on Amlodipine and coreg but toresmide and losartan Were held due to hyponatremia.     For the patints DM was on sliding scale as home oral medication was held.      For the patients asthma she was continued on symbicort, Montelukast, Albuterol PRN and Advair diskus. Pulmonolgy was following.     for the patients history of Pulmonary embolism was continued on Xarelto.    For the patients of breast cancer, she was continued on letrozole 2.5mg daily    For the patients HLD she was continued on atorvastatin.

## 2023-12-13 NOTE — PROGRESS NOTE ADULT - SUBJECTIVE AND OBJECTIVE BOX
Problem List:  Hyponatremia    PAST MEDICAL & SURGICAL HISTORY:  DM (diabetes mellitus)      HTN (hypertension)      HLD (hyperlipidemia)      Breast cancer in female      COPD (chronic obstructive pulmonary disease)      PE (pulmonary thromboembolism)      Diabetes      Breast cancer      Blindness of left eye      History of appendectomy      Spinal stenosis      Spinal stenosis      Multiple pulmonary emboli      Fall      Chronic low back pain      Lumbar spondylosis      H/O mastectomy, right          aspirin (Stomach Upset)  aspirin (Nausea (Mild))      MEDICATIONS  (STANDING):  amLODIPine   Tablet 5 milliGRAM(s) Oral daily  atorvastatin 20 milliGRAM(s) Oral at bedtime  budesonide 160 MICROgram(s)/formoterol 4.5 MICROgram(s) Inhaler 2 Puff(s) Inhalation two times a day  carvedilol 3.125 milliGRAM(s) Oral every 12 hours  dorzolamide 2% Ophthalmic Solution 1 Drop(s) Right EYE three times a day  insulin lispro (ADMELOG) corrective regimen sliding scale   SubCutaneous Before meals and at bedtime  latanoprost 0.005% Ophthalmic Solution 1 Drop(s) Both EYES at bedtime  letrozole 2.5 milliGRAM(s) Oral daily  montelukast 10 milliGRAM(s) Oral at bedtime  pantoprazole    Tablet 40 milliGRAM(s) Oral before breakfast  polyethylene glycol 3350 17 Gram(s) Oral daily  rivaroxaban 20 milliGRAM(s) Oral with dinner  sodium chloride 1 Gram(s) Oral three times a day    MEDICATIONS  (PRN):  acetaminophen     Tablet .. 650 milliGRAM(s) Oral every 6 hours PRN Temp greater or equal to 38C (100.4F), Mild Pain (1 - 3), Moderate Pain (4 - 6)  albuterol    90 MICROgram(s) HFA Inhaler 2 Puff(s) Inhalation every 6 hours PRN for bronchospasm  aluminum hydroxide/magnesium hydroxide/simethicone Suspension 30 milliLiter(s) Oral every 4 hours PRN Dyspepsia  ondansetron Injectable 4 milliGRAM(s) IV Push every 8 hours PRN Nausea and/or Vomiting                            11.0   8.77  )-----------( 256      ( 13 Dec 2023 06:37 )             34.0     12-13    132<L>  |  99  |  7   ----------------------------<  104<H>  4.9   |  31  |  0.66    Ca    8.5      13 Dec 2023 06:37  Phos  4.2     12-13  Mg     1.8     12-13    TPro  6.0  /  Alb  2.9<L>  /  TBili  0.5  /  DBili  x   /  AST  63<H>  /  ALT  73<H>  /  AlkPhos  90  12-13        Osmolality, Random Urine: 156 mos/kg (12-12 @ 08:25)  Sodium, Random Urine: 54 mmol/L (12-12 @ 08:25)  Creatinine, Random Urine: 16 mg/dL (12-11 @ 16:58)  Osmolality, Random Urine: 285 mos/kg (12-11 @ 16:58)  Sodium, Random Urine: 95 mmol/L (12-11 @ 16:58)  Potassium, Random Urine: 9 mmol/L (12-09 @ 12:15)  Sodium, Random Urine: 36 mmol/L (12-09 @ 04:35)  Creatinine, Random Urine: 35 mg/dL (12-09 @ 04:35)  Osmolality, Random Urine: 263 mos/kg (12-09 @ 04:35)      REVIEW OF SYSTEMS:  General: no fever no chills, no weight loss.  EYES/ENT: No visual changes;  No vertigo, no headache.    RESPIRATORY: No cough, wheezing, hemoptysis; No shortness of breath  CARDIOVASCULAR: No chest pain or palpitations. No Edema  GASTROINTESTINAL: No abdominal or epigastric pain. No nausea, vomiting. No diarrhea or constipation. No melena.        VITALS:  T(F): 98.8 (12-13-23 @ 11:20), Max: 98.8 (12-13-23 @ 11:20)  HR: 69 (12-13-23 @ 11:39)  BP: 130/83 (12-13-23 @ 11:39)  RR: 18 (12-13-23 @ 11:20)  SpO2: 91% (12-13-23 @ 11:20)  Wt(kg): --    12-12 @ 07:01  -  12-13 @ 07:00  --------------------------------------------------------  IN: 150 mL / OUT: 1700 mL / NET: -1550 mL        PHYSICAL EXAM:  Constitutional:  no diaphoresis, no distress.  Neck: No JVD, no carotid bruit, supple, no adenopathy  Respiratory:  air entrance B/L, no wheezes, rales or rhonchi  Cardiovascular: S1, S2, RRR, no pericardial rub, no murmur  Abdomen: BS+, soft, no tenderness, no bruit  Pelvis: bladder nondistended  Extremities: No cyanosis or clubbing. No peripheral edema.

## 2023-12-13 NOTE — PROGRESS NOTE ADULT - PROBLEM SELECTOR PLAN 8
Pt has a history of breast cancer, takes letrozole 2.5mg daily  - C/w Letrozole

## 2023-12-14 ENCOUNTER — TRANSCRIPTION ENCOUNTER (OUTPATIENT)
Age: 88
End: 2023-12-14

## 2023-12-14 VITALS — HEART RATE: 68 BPM

## 2023-12-14 LAB
ALBUMIN SERPL ELPH-MCNC: 3.1 G/DL — LOW (ref 3.5–5)
ALBUMIN SERPL ELPH-MCNC: 3.1 G/DL — LOW (ref 3.5–5)
ALP SERPL-CCNC: 114 U/L — SIGNIFICANT CHANGE UP (ref 40–120)
ALP SERPL-CCNC: 114 U/L — SIGNIFICANT CHANGE UP (ref 40–120)
ALT FLD-CCNC: 129 U/L DA — HIGH (ref 10–60)
ALT FLD-CCNC: 129 U/L DA — HIGH (ref 10–60)
ANION GAP SERPL CALC-SCNC: 6 MMOL/L — SIGNIFICANT CHANGE UP (ref 5–17)
ANION GAP SERPL CALC-SCNC: 6 MMOL/L — SIGNIFICANT CHANGE UP (ref 5–17)
AST SERPL-CCNC: 84 U/L — HIGH (ref 10–40)
AST SERPL-CCNC: 84 U/L — HIGH (ref 10–40)
BILIRUB SERPL-MCNC: 0.5 MG/DL — SIGNIFICANT CHANGE UP (ref 0.2–1.2)
BILIRUB SERPL-MCNC: 0.5 MG/DL — SIGNIFICANT CHANGE UP (ref 0.2–1.2)
BUN SERPL-MCNC: 10 MG/DL — SIGNIFICANT CHANGE UP (ref 7–18)
BUN SERPL-MCNC: 10 MG/DL — SIGNIFICANT CHANGE UP (ref 7–18)
CALCIUM SERPL-MCNC: 9 MG/DL — SIGNIFICANT CHANGE UP (ref 8.4–10.5)
CALCIUM SERPL-MCNC: 9 MG/DL — SIGNIFICANT CHANGE UP (ref 8.4–10.5)
CHLORIDE SERPL-SCNC: 98 MMOL/L — SIGNIFICANT CHANGE UP (ref 96–108)
CHLORIDE SERPL-SCNC: 98 MMOL/L — SIGNIFICANT CHANGE UP (ref 96–108)
CO2 SERPL-SCNC: 29 MMOL/L — SIGNIFICANT CHANGE UP (ref 22–31)
CO2 SERPL-SCNC: 29 MMOL/L — SIGNIFICANT CHANGE UP (ref 22–31)
CREAT SERPL-MCNC: 0.74 MG/DL — SIGNIFICANT CHANGE UP (ref 0.5–1.3)
CREAT SERPL-MCNC: 0.74 MG/DL — SIGNIFICANT CHANGE UP (ref 0.5–1.3)
CULTURE RESULTS: SIGNIFICANT CHANGE UP
EGFR: 74 ML/MIN/1.73M2 — SIGNIFICANT CHANGE UP
EGFR: 74 ML/MIN/1.73M2 — SIGNIFICANT CHANGE UP
GLUCOSE BLDC GLUCOMTR-MCNC: 127 MG/DL — HIGH (ref 70–99)
GLUCOSE BLDC GLUCOMTR-MCNC: 127 MG/DL — HIGH (ref 70–99)
GLUCOSE BLDC GLUCOMTR-MCNC: 94 MG/DL — SIGNIFICANT CHANGE UP (ref 70–99)
GLUCOSE BLDC GLUCOMTR-MCNC: 94 MG/DL — SIGNIFICANT CHANGE UP (ref 70–99)
GLUCOSE BLDC GLUCOMTR-MCNC: 96 MG/DL — SIGNIFICANT CHANGE UP (ref 70–99)
GLUCOSE BLDC GLUCOMTR-MCNC: 96 MG/DL — SIGNIFICANT CHANGE UP (ref 70–99)
GLUCOSE SERPL-MCNC: 99 MG/DL — SIGNIFICANT CHANGE UP (ref 70–99)
GLUCOSE SERPL-MCNC: 99 MG/DL — SIGNIFICANT CHANGE UP (ref 70–99)
HCT VFR BLD CALC: 31.3 % — LOW (ref 34.5–45)
HCT VFR BLD CALC: 31.3 % — LOW (ref 34.5–45)
HGB BLD-MCNC: 10 G/DL — LOW (ref 11.5–15.5)
HGB BLD-MCNC: 10 G/DL — LOW (ref 11.5–15.5)
MAGNESIUM SERPL-MCNC: 1.8 MG/DL — SIGNIFICANT CHANGE UP (ref 1.6–2.6)
MAGNESIUM SERPL-MCNC: 1.8 MG/DL — SIGNIFICANT CHANGE UP (ref 1.6–2.6)
MCHC RBC-ENTMCNC: 29.9 PG — SIGNIFICANT CHANGE UP (ref 27–34)
MCHC RBC-ENTMCNC: 29.9 PG — SIGNIFICANT CHANGE UP (ref 27–34)
MCHC RBC-ENTMCNC: 31.9 GM/DL — LOW (ref 32–36)
MCHC RBC-ENTMCNC: 31.9 GM/DL — LOW (ref 32–36)
MCV RBC AUTO: 93.7 FL — SIGNIFICANT CHANGE UP (ref 80–100)
MCV RBC AUTO: 93.7 FL — SIGNIFICANT CHANGE UP (ref 80–100)
NRBC # BLD: 0 /100 WBCS — SIGNIFICANT CHANGE UP (ref 0–0)
NRBC # BLD: 0 /100 WBCS — SIGNIFICANT CHANGE UP (ref 0–0)
PHOSPHATE SERPL-MCNC: 4 MG/DL — SIGNIFICANT CHANGE UP (ref 2.5–4.5)
PHOSPHATE SERPL-MCNC: 4 MG/DL — SIGNIFICANT CHANGE UP (ref 2.5–4.5)
PLATELET # BLD AUTO: 263 K/UL — SIGNIFICANT CHANGE UP (ref 150–400)
PLATELET # BLD AUTO: 263 K/UL — SIGNIFICANT CHANGE UP (ref 150–400)
POTASSIUM SERPL-MCNC: 4.4 MMOL/L — SIGNIFICANT CHANGE UP (ref 3.5–5.3)
POTASSIUM SERPL-MCNC: 4.4 MMOL/L — SIGNIFICANT CHANGE UP (ref 3.5–5.3)
POTASSIUM SERPL-SCNC: 4.4 MMOL/L — SIGNIFICANT CHANGE UP (ref 3.5–5.3)
POTASSIUM SERPL-SCNC: 4.4 MMOL/L — SIGNIFICANT CHANGE UP (ref 3.5–5.3)
PROT SERPL-MCNC: 5.9 G/DL — LOW (ref 6–8.3)
PROT SERPL-MCNC: 5.9 G/DL — LOW (ref 6–8.3)
RBC # BLD: 3.34 M/UL — LOW (ref 3.8–5.2)
RBC # BLD: 3.34 M/UL — LOW (ref 3.8–5.2)
RBC # FLD: 13.5 % — SIGNIFICANT CHANGE UP (ref 10.3–14.5)
RBC # FLD: 13.5 % — SIGNIFICANT CHANGE UP (ref 10.3–14.5)
SODIUM SERPL-SCNC: 133 MMOL/L — LOW (ref 135–145)
SODIUM SERPL-SCNC: 133 MMOL/L — LOW (ref 135–145)
SPECIMEN SOURCE: SIGNIFICANT CHANGE UP
WBC # BLD: 10.71 K/UL — HIGH (ref 3.8–10.5)
WBC # BLD: 10.71 K/UL — HIGH (ref 3.8–10.5)
WBC # FLD AUTO: 10.71 K/UL — HIGH (ref 3.8–10.5)
WBC # FLD AUTO: 10.71 K/UL — HIGH (ref 3.8–10.5)

## 2023-12-14 PROCEDURE — 70450 CT HEAD/BRAIN W/O DYE: CPT | Mod: MA

## 2023-12-14 PROCEDURE — 85027 COMPLETE CBC AUTOMATED: CPT

## 2023-12-14 PROCEDURE — 87637 SARSCOV2&INF A&B&RSV AMP PRB: CPT

## 2023-12-14 PROCEDURE — 82962 GLUCOSE BLOOD TEST: CPT

## 2023-12-14 PROCEDURE — 83605 ASSAY OF LACTIC ACID: CPT

## 2023-12-14 PROCEDURE — 80053 COMPREHEN METABOLIC PANEL: CPT

## 2023-12-14 PROCEDURE — 93005 ELECTROCARDIOGRAM TRACING: CPT

## 2023-12-14 PROCEDURE — 96361 HYDRATE IV INFUSION ADD-ON: CPT

## 2023-12-14 PROCEDURE — 80048 BASIC METABOLIC PNL TOTAL CA: CPT

## 2023-12-14 PROCEDURE — 94640 AIRWAY INHALATION TREATMENT: CPT

## 2023-12-14 PROCEDURE — 96374 THER/PROPH/DIAG INJ IV PUSH: CPT

## 2023-12-14 PROCEDURE — 84484 ASSAY OF TROPONIN QUANT: CPT

## 2023-12-14 PROCEDURE — 84100 ASSAY OF PHOSPHORUS: CPT

## 2023-12-14 PROCEDURE — 96375 TX/PRO/DX INJ NEW DRUG ADDON: CPT

## 2023-12-14 PROCEDURE — 83735 ASSAY OF MAGNESIUM: CPT

## 2023-12-14 PROCEDURE — 81003 URINALYSIS AUTO W/O SCOPE: CPT

## 2023-12-14 PROCEDURE — 87040 BLOOD CULTURE FOR BACTERIA: CPT

## 2023-12-14 PROCEDURE — 71045 X-RAY EXAM CHEST 1 VIEW: CPT

## 2023-12-14 PROCEDURE — 84133 ASSAY OF URINE POTASSIUM: CPT

## 2023-12-14 PROCEDURE — 97161 PT EVAL LOW COMPLEX 20 MIN: CPT

## 2023-12-14 PROCEDURE — 36415 COLL VENOUS BLD VENIPUNCTURE: CPT

## 2023-12-14 PROCEDURE — 83880 ASSAY OF NATRIURETIC PEPTIDE: CPT

## 2023-12-14 PROCEDURE — 93971 EXTREMITY STUDY: CPT

## 2023-12-14 PROCEDURE — 85025 COMPLETE CBC W/AUTO DIFF WBC: CPT

## 2023-12-14 PROCEDURE — 82570 ASSAY OF URINE CREATININE: CPT

## 2023-12-14 PROCEDURE — 99291 CRITICAL CARE FIRST HOUR: CPT

## 2023-12-14 PROCEDURE — 87086 URINE CULTURE/COLONY COUNT: CPT

## 2023-12-14 PROCEDURE — 83930 ASSAY OF BLOOD OSMOLALITY: CPT

## 2023-12-14 PROCEDURE — 84300 ASSAY OF URINE SODIUM: CPT

## 2023-12-14 PROCEDURE — 83935 ASSAY OF URINE OSMOLALITY: CPT

## 2023-12-14 RX ADMIN — SODIUM CHLORIDE 1 GRAM(S): 9 INJECTION INTRAMUSCULAR; INTRAVENOUS; SUBCUTANEOUS at 13:47

## 2023-12-14 RX ADMIN — PANTOPRAZOLE SODIUM 40 MILLIGRAM(S): 20 TABLET, DELAYED RELEASE ORAL at 06:38

## 2023-12-14 RX ADMIN — SODIUM CHLORIDE 1 GRAM(S): 9 INJECTION INTRAMUSCULAR; INTRAVENOUS; SUBCUTANEOUS at 06:38

## 2023-12-14 RX ADMIN — RIVAROXABAN 20 MILLIGRAM(S): KIT at 17:35

## 2023-12-14 RX ADMIN — POLYETHYLENE GLYCOL 3350 17 GRAM(S): 17 POWDER, FOR SOLUTION ORAL at 11:49

## 2023-12-14 RX ADMIN — DORZOLAMIDE HYDROCHLORIDE 1 DROP(S): 20 SOLUTION/ DROPS OPHTHALMIC at 06:38

## 2023-12-14 RX ADMIN — BUDESONIDE AND FORMOTEROL FUMARATE DIHYDRATE 2 PUFF(S): 160; 4.5 AEROSOL RESPIRATORY (INHALATION) at 11:49

## 2023-12-14 RX ADMIN — DORZOLAMIDE HYDROCHLORIDE 1 DROP(S): 20 SOLUTION/ DROPS OPHTHALMIC at 13:47

## 2023-12-14 RX ADMIN — AMLODIPINE BESYLATE 5 MILLIGRAM(S): 2.5 TABLET ORAL at 06:38

## 2023-12-14 RX ADMIN — LETROZOLE 2.5 MILLIGRAM(S): 2.5 TABLET, FILM COATED ORAL at 11:50

## 2023-12-14 RX ADMIN — CARVEDILOL PHOSPHATE 3.12 MILLIGRAM(S): 80 CAPSULE, EXTENDED RELEASE ORAL at 06:38

## 2023-12-14 RX ADMIN — CARVEDILOL PHOSPHATE 3.12 MILLIGRAM(S): 80 CAPSULE, EXTENDED RELEASE ORAL at 17:34

## 2023-12-14 NOTE — PROGRESS NOTE ADULT - PROBLEM SELECTOR PROBLEM 1
AMS (altered mental status)
Hyponatremia
AMS (altered mental status)
Hyponatremia
AMS (altered mental status)
Hyponatremia
Hyponatremia

## 2023-12-14 NOTE — DISCHARGE NOTE NURSING/CASE MANAGEMENT/SOCIAL WORK - NSDCPEFALRISK_GEN_ALL_CORE
For information on Fall & Injury Prevention, visit: [email or web address] OR  [email or web address] OR  https://www.cdc.gov/steadi/patient.html For information on Fall & Injury Prevention, visit: [email or web address]/[email or web address] OR  [email or web address]/[email or web address] OR  https://www.cdc.gov/steadi/patient.html

## 2023-12-14 NOTE — PROGRESS NOTE ADULT - PROVIDER SPECIALTY LIST ADULT
Internal Medicine
Nephrology
Internal Medicine
Nephrology
Internal Medicine
Internal Medicine
Nephrology
Nephrology
Pulmonology
Internal Medicine
Pulmonology
Pulmonology
Internal Medicine

## 2023-12-14 NOTE — PROGRESS NOTE ADULT - PROBLEM SELECTOR PLAN 3
Correct electrolytes  Teresa follow up
Pt requriring 2LNC , saturating 100%, however desats to 80s when on RA. Pt uses oxygen at home as needed  - C/w Albuterol PRN  - C/w Singulair  - Wean Oxygen as tolerated
Correct electrolytes  Renal follow up
Pt requriring 2LNC , saturating 100%, however desats to 80s when on RA. Pt uses oxygen at home as needed  - no longer on oxygen   - C/w Albuterol PRN  - C/w Singulair
Pt requriring 2LNC , saturating 100%, however desats to 80s when on RA. Pt uses oxygen at home as needed  - no longer on oxygen   - C/w Albuterol PRN  - C/w Singulair
Correct electrolytes  Teresa follow up
Pt requriring 2LNC , saturating 100%, however desats to 80s when on RA. Pt uses oxygen at home as needed  - no longer on oxygen   - C/w Albuterol PRN  - C/w Singulair
Correct electrolytes  Teresa follow up
Correct electrolytes  Renal follow up

## 2023-12-14 NOTE — PROGRESS NOTE ADULT - SUBJECTIVE AND OBJECTIVE BOX
Patient is a 89y old  Female who presents with a chief complaint of Cough and Sob (13 Dec 2023 11:29)  Awake, alert, comfortable in bed in NAD. Doing well on oxygen supp via NC  INTERVAL HPI/OVERNIGHT EVENTS:      VITAL SIGNS:  T(F): 97.9 (12-14-23 @ 11:14)  HR: 64 (12-14-23 @ 11:14)  BP: 142/41 (12-14-23 @ 11:14)  RR: 20 (12-14-23 @ 11:14)  SpO2: 100% (12-14-23 @ 11:14)  Wt(kg): --  I&O's Detail          REVIEW OF SYSTEMS:    CONSTITUTIONAL:  No fevers, chills, sweats    HEENT:  Eyes:  No diplopia or blurred vision. ENT:  No earache, sore throat or runny nose.    CARDIOVASCULAR:  No pressure, squeezing, tightness, or heaviness about the chest; no palpitations.    RESPIRATORY:  Per HPI    GASTROINTESTINAL:  No abdominal pain, nausea, vomiting or diarrhea.    GENITOURINARY:  No dysuria, frequency or urgency.    NEUROLOGIC:  No paresthesias, fasciculations, seizures or weakness.    PSYCHIATRIC:  No disorder of thought or mood.      PHYSICAL EXAM:    Constitutional: Well developed and nourished  Eyes:Perrla  ENMT: normal  Neck:supple  Respiratory: good air entry  Cardiovascular: S1 S2 regular  Gastrointestinal: Soft, Non tender  Extremities: No edema  Vascular:normal  Neurological:Awake, alert,Ox3  Musculoskeletal:Normal      MEDICATIONS  (STANDING):  amLODIPine   Tablet 5 milliGRAM(s) Oral daily  atorvastatin 20 milliGRAM(s) Oral at bedtime  budesonide 160 MICROgram(s)/formoterol 4.5 MICROgram(s) Inhaler 2 Puff(s) Inhalation two times a day  carvedilol 3.125 milliGRAM(s) Oral every 12 hours  dorzolamide 2% Ophthalmic Solution 1 Drop(s) Right EYE three times a day  insulin lispro (ADMELOG) corrective regimen sliding scale   SubCutaneous Before meals and at bedtime  latanoprost 0.005% Ophthalmic Solution 1 Drop(s) Both EYES at bedtime  letrozole 2.5 milliGRAM(s) Oral daily  montelukast 10 milliGRAM(s) Oral at bedtime  pantoprazole    Tablet 40 milliGRAM(s) Oral before breakfast  polyethylene glycol 3350 17 Gram(s) Oral daily  rivaroxaban 20 milliGRAM(s) Oral with dinner  sodium chloride 1 Gram(s) Oral three times a day    MEDICATIONS  (PRN):  acetaminophen     Tablet .. 650 milliGRAM(s) Oral every 6 hours PRN Temp greater or equal to 38C (100.4F), Mild Pain (1 - 3), Moderate Pain (4 - 6)  albuterol    90 MICROgram(s) HFA Inhaler 2 Puff(s) Inhalation every 6 hours PRN for bronchospasm  aluminum hydroxide/magnesium hydroxide/simethicone Suspension 30 milliLiter(s) Oral every 4 hours PRN Dyspepsia  ondansetron Injectable 4 milliGRAM(s) IV Push every 8 hours PRN Nausea and/or Vomiting      Allergies    aspirin (Stomach Upset)  aspirin (Nausea (Mild))    Intolerances        LABS:                        10.0   10.71 )-----------( 263      ( 14 Dec 2023 07:01 )             31.3     12-14    133<L>  |  98  |  10  ----------------------------<  99  4.4   |  29  |  0.74    Ca    9.0      14 Dec 2023 07:01  Phos  4.0     12-14  Mg     1.8     12-14    TPro  5.9<L>  /  Alb  3.1<L>  /  TBili  0.5  /  DBili  x   /  AST  84<H>  /  ALT  129<H>  /  AlkPhos  114  12-14      Urinalysis Basic - ( 14 Dec 2023 07:01 )    Color: x / Appearance: x / SG: x / pH: x  Gluc: 99 mg/dL / Ketone: x  / Bili: x / Urobili: x   Blood: x / Protein: x / Nitrite: x   Leuk Esterase: x / RBC: x / WBC x   Sq Epi: x / Non Sq Epi: x / Bacteria: x            CAPILLARY BLOOD GLUCOSE      POCT Blood Glucose.: 96 mg/dL (14 Dec 2023 11:34)  POCT Blood Glucose.: 94 mg/dL (14 Dec 2023 07:26)  POCT Blood Glucose.: 133 mg/dL (13 Dec 2023 21:09)  POCT Blood Glucose.: 104 mg/dL (13 Dec 2023 16:54)        RADIOLOGY & ADDITIONAL TESTS:  < from: Xray Chest 1 View-PORTABLE IMMEDIATE (12.09.23 @ 03:40) >  IMPRESSION: When allowing for technique no change. No acute parenchymal   disease      < end of copied text >    CXR:    Ct scan chest:    ekg;    echo:

## 2023-12-14 NOTE — DISCHARGE NOTE NURSING/CASE MANAGEMENT/SOCIAL WORK - PATIENT PORTAL LINK FT
You can access the FollowMyHealth Patient Portal offered by [facility] by registering at the following website: [email or web address]. By joining [employer]’s FollowMyHealth portal, you will also be able to view your health information using other applications (apps) compatible with our system. You can access the FollowMyHealth Patient Portal offered by [facility] by registering at the following website: [email or web address]. By joining [employer]’s FollowMyHealth portal, you will also be able to view your health information using other applications (apps) compatible with our system.

## 2023-12-14 NOTE — CHART NOTE - NSCHARTNOTEFT_GEN_A_CORE
As patient family request they want to take her home today. They do not want to wait for the aid to be reinstated and can be reinstated after she leaves. She only has the aid 8 hours a day. Family will stay with the patient until the aid is reinstated and comes the following day. Informed  to call an ambulance.
Patient can benefit from bedside commode.
Spoke with patient's daughter Ms Ventura at bedside. Patient's grandson Juan A was on the phone at the same time.    With regards to GOC, Juan A said the family had a discussion when ICU was consulted. Juan A explained in details the resuscitation and intubation to Ms Ventura and the family. He explained the increased risk factors given patient's age, to the family.      However, the family still opted for FULL CODE.

## 2023-12-14 NOTE — PROGRESS NOTE ADULT - PROBLEM SELECTOR PROBLEM 2
COPD (chronic obstructive pulmonary disease)
COPD (chronic obstructive pulmonary disease)
Hyperkalemia
COPD (chronic obstructive pulmonary disease)
Hyperkalemia

## 2023-12-14 NOTE — PROGRESS NOTE ADULT - PROBLEM SELECTOR PROBLEM 3
Disorder of electrolytes
Acute on chronic hypoxic respiratory failure
Disorder of electrolytes
Disorder of electrolytes
Acute on chronic hypoxic respiratory failure

## 2023-12-14 NOTE — PROGRESS NOTE ADULT - PROBLEM SELECTOR PLAN 6
Hem/onc follow up as OP  Cont med
Pt has a history of Asthma, takes montelukast 10mg daily, albuterol PRN and Advair diskus  - c/w symbicort 160/4.5 mcgs 2 puffs po BID.  - C/w Montelukast  - C/w Albuterol PRN  - C/w Advair diskus  pulmonology following
hem/onc follow up as OP
Pt has a history of Asthma, takes montelukast 10mg daily, albuterol PRN and Advair diskus  - C/w Montelukast  - C/w Albuterol PRN  - C/w Advair diskus
Pt has a history of Asthma, takes montelukast 10mg daily, albuterol PRN and Advair diskus  - c/w symbicort 160/4.5 mcgs 2 puffs po BID.  - C/w Montelukast  - C/w Albuterol PRN  - C/w Advair diskus  pulmonology following
Pt has a history of Asthma, takes montelukast 10mg daily, albuterol PRN and Advair diskus  - c/w symbicort 160/4.5 mcgs 2 puffs po BID.  - C/w Montelukast  - C/w Albuterol PRN  - C/w Advair diskus  pulmonology following

## 2023-12-14 NOTE — PROGRESS NOTE ADULT - PROBLEM SELECTOR PLAN 5
Accuchecks with reg insulin coverage  HBA1C
Pt has a history of DM, takes Januvia 50mg daily at home  - C/w ISS  FS ACHS
Pt has a history of DM, takes Januvia 50mg daily at home  - C/w ISS  FS ACHS
Accuchecks with reg insulin coverage  HBA1C
Pt has a history of DM, takes Januvia 50mg daily at home  - C/w ISS  FS ACHS
Pt has a history of DM, takes Januvia 50mg daily at home  - C/w ISS  FS ACHS

## 2023-12-14 NOTE — PROGRESS NOTE ADULT - SUBJECTIVE AND OBJECTIVE BOX
Patient is a 89y old  Female who presents with a chief complaint of Cough and Sob (13 Dec 2023 11:29)    pt seen in tele [x  ], reg med floor [   ], bed [ x ], chair at bedside [   ], awake and responsive [ x ],   lethargic [  ],  nad [ x ]        Allergies    aspirin (Stomach Upset)  aspirin (Nausea (Mild))        Vitals    T(F): 98.8 (12-14-23 @ 04:39), Max: 98.8 (12-13-23 @ 11:20)  HR: 73 (12-14-23 @ 04:39) (56 - 76)  BP: 140/54 (12-14-23 @ 04:39) (118/50 - 156/59)  RR: 19 (12-14-23 @ 04:39) (18 - 19)  SpO2: 94% (12-14-23 @ 04:39) (91% - 97%)  Wt(kg): --  CAPILLARY BLOOD GLUCOSE      POCT Blood Glucose.: 133 mg/dL (13 Dec 2023 21:09)      Labs                          11.0   8.77  )-----------( 256      ( 13 Dec 2023 06:37 )             34.0       12-13    132<L>  |  99  |  7   ----------------------------<  104<H>  4.9   |  31  |  0.66    Ca    8.5      13 Dec 2023 06:37  Phos  4.2     12-13  Mg     1.8     12-13    TPro  6.0  /  Alb  2.9<L>  /  TBili  0.5  /  DBili  x   /  AST  63<H>  /  ALT  73<H>  /  AlkPhos  90  12-13            Clean Catch Clean Catch (Midstream)  12-09 @ 03:55   No growth  --  --      .Blood Blood-Peripheral  12-09 @ 02:57   No growth at 4 days  --  --          Radiology Results      Meds    MEDICATIONS  (STANDING):  amLODIPine   Tablet 5 milliGRAM(s) Oral daily  atorvastatin 20 milliGRAM(s) Oral at bedtime  budesonide 160 MICROgram(s)/formoterol 4.5 MICROgram(s) Inhaler 2 Puff(s) Inhalation two times a day  carvedilol 3.125 milliGRAM(s) Oral every 12 hours  dorzolamide 2% Ophthalmic Solution 1 Drop(s) Right EYE three times a day  insulin lispro (ADMELOG) corrective regimen sliding scale   SubCutaneous Before meals and at bedtime  latanoprost 0.005% Ophthalmic Solution 1 Drop(s) Both EYES at bedtime  letrozole 2.5 milliGRAM(s) Oral daily  montelukast 10 milliGRAM(s) Oral at bedtime  pantoprazole    Tablet 40 milliGRAM(s) Oral before breakfast  polyethylene glycol 3350 17 Gram(s) Oral daily  rivaroxaban 20 milliGRAM(s) Oral with dinner  sodium chloride 1 Gram(s) Oral three times a day      MEDICATIONS  (PRN):  acetaminophen     Tablet .. 650 milliGRAM(s) Oral every 6 hours PRN Temp greater or equal to 38C (100.4F), Mild Pain (1 - 3), Moderate Pain (4 - 6)  albuterol    90 MICROgram(s) HFA Inhaler 2 Puff(s) Inhalation every 6 hours PRN for bronchospasm  aluminum hydroxide/magnesium hydroxide/simethicone Suspension 30 milliLiter(s) Oral every 4 hours PRN Dyspepsia  ondansetron Injectable 4 milliGRAM(s) IV Push every 8 hours PRN Nausea and/or Vomiting      Physical Exam    Neuro :  no focal deficits  Respiratory: CTA B/L  CV: RRR, S1S2, no murmurs,   Abdominal: Soft, NT, ND +BS,  Extremities: mild left le edema, + peripheral pulses      ASSESSMENT    metabolic encephalopathy,   hyponatremia,   hyperkalemia,   metabolic encephalopathy, ,   h/o  DM,   HTN,   HLD,   GERD,   hypothyroid,   COPD,   DVT,   PAF/DVT/PE on Xarelto,   glaucoma,   left eye blindness,   cataracts,   Breast cancer s/p right mastectomy    Spinal stenosis   chronic back pain       PLAN    d/c tele   d/c ivf ns,   serum potassium wnl   renal f/u     post desmopressin on 12/09 due to over correction of sodium  s/p Furosemide po 20 mg   Continue current diet   follow this am labs.   left LE doppler with No evidence of left lower extremity deep venous thrombosis noted above.    pulm f/u   cont Bronchodilators  symbicort 160/4.5 mcgs 2 puffs po BID.  hgba1c 6.1 noted    lispro ss  ucx neg noted    completed course of cipro 12/10/23   tov  cont current meds   d/c plan if serum sodium continues to improve and pt voiding post tov         Patient is a 89y old  Female who presents with a chief complaint of Cough and Sob (13 Dec 2023 11:29)    pt seen in tele [x  ], reg med floor [   ], bed [ x ], chair at bedside [   ], awake and responsive [ x ],   lethargic [  ],  nad [ x ]        Allergies    aspirin (Stomach Upset)  aspirin (Nausea (Mild))        Vitals    T(F): 98.8 (12-14-23 @ 04:39), Max: 98.8 (12-13-23 @ 11:20)  HR: 73 (12-14-23 @ 04:39) (56 - 76)  BP: 140/54 (12-14-23 @ 04:39) (118/50 - 156/59)  RR: 19 (12-14-23 @ 04:39) (18 - 19)  SpO2: 94% (12-14-23 @ 04:39) (91% - 97%)  Wt(kg): --  CAPILLARY BLOOD GLUCOSE      POCT Blood Glucose.: 133 mg/dL (13 Dec 2023 21:09)      Labs                          11.0   8.77  )-----------( 256      ( 13 Dec 2023 06:37 )             34.0       12-13    132<L>  |  99  |  7   ----------------------------<  104<H>  4.9   |  31  |  0.66    Ca    8.5      13 Dec 2023 06:37  Phos  4.2     12-13  Mg     1.8     12-13    TPro  6.0  /  Alb  2.9<L>  /  TBili  0.5  /  DBili  x   /  AST  63<H>  /  ALT  73<H>  /  AlkPhos  90  12-13            Clean Catch Clean Catch (Midstream)  12-09 @ 03:55   No growth  --  --      .Blood Blood-Peripheral  12-09 @ 02:57   No growth at 4 days  --  --          Radiology Results      Meds    MEDICATIONS  (STANDING):  amLODIPine   Tablet 5 milliGRAM(s) Oral daily  atorvastatin 20 milliGRAM(s) Oral at bedtime  budesonide 160 MICROgram(s)/formoterol 4.5 MICROgram(s) Inhaler 2 Puff(s) Inhalation two times a day  carvedilol 3.125 milliGRAM(s) Oral every 12 hours  dorzolamide 2% Ophthalmic Solution 1 Drop(s) Right EYE three times a day  insulin lispro (ADMELOG) corrective regimen sliding scale   SubCutaneous Before meals and at bedtime  latanoprost 0.005% Ophthalmic Solution 1 Drop(s) Both EYES at bedtime  letrozole 2.5 milliGRAM(s) Oral daily  montelukast 10 milliGRAM(s) Oral at bedtime  pantoprazole    Tablet 40 milliGRAM(s) Oral before breakfast  polyethylene glycol 3350 17 Gram(s) Oral daily  rivaroxaban 20 milliGRAM(s) Oral with dinner  sodium chloride 1 Gram(s) Oral three times a day      MEDICATIONS  (PRN):  acetaminophen     Tablet .. 650 milliGRAM(s) Oral every 6 hours PRN Temp greater or equal to 38C (100.4F), Mild Pain (1 - 3), Moderate Pain (4 - 6)  albuterol    90 MICROgram(s) HFA Inhaler 2 Puff(s) Inhalation every 6 hours PRN for bronchospasm  aluminum hydroxide/magnesium hydroxide/simethicone Suspension 30 milliLiter(s) Oral every 4 hours PRN Dyspepsia  ondansetron Injectable 4 milliGRAM(s) IV Push every 8 hours PRN Nausea and/or Vomiting      Physical Exam    Neuro :  no focal deficits  Respiratory: CTA B/L  CV: RRR, S1S2, no murmurs,   Abdominal: Soft, NT, ND +BS,  Extremities: mild left le edema, + peripheral pulses      ASSESSMENT    metabolic encephalopathy,   hyponatremia,   hyperkalemia,   metabolic encephalopathy, ,   h/o  DM,   HTN,   HLD,   GERD,   hypothyroid,   COPD,   DVT,   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   left LE doppler with No evidence of left lower extremity deep venous thrombosis noted above.    pulm f/u   cont Bronchodilators  symbicort 160/4.5 mcgs 2 puffs po BID.  hgba1c 6.1 noted    lispro ss  ucx neg noted    completed course of cipro 12/10/23   s/p tov, pt voiding   pt eval noted and rec phys tx 1-2x/week x 6-9 weeks Home PT;   benefit resumption of cdpap services  cont current meds   pt stable for d/c

## 2023-12-14 NOTE — PROGRESS NOTE ADULT - PROBLEM SELECTOR PROBLEM 5
DM (diabetes mellitus)

## 2023-12-15 ENCOUNTER — APPOINTMENT (OUTPATIENT)
Dept: HOME HEALTH SERVICES | Facility: HOME HEALTH | Age: 88
End: 2023-12-15

## 2023-12-26 ENCOUNTER — APPOINTMENT (OUTPATIENT)
Dept: HOME HEALTH SERVICES | Facility: HOME HEALTH | Age: 88
End: 2023-12-26
Payer: MEDICARE

## 2023-12-26 VITALS
OXYGEN SATURATION: 94 % | RESPIRATION RATE: 16 BRPM | TEMPERATURE: 98.06 F | SYSTOLIC BLOOD PRESSURE: 148 MMHG | DIASTOLIC BLOOD PRESSURE: 70 MMHG | HEART RATE: 70 BPM

## 2023-12-26 DIAGNOSIS — C50.911 MALIGNANT NEOPLASM OF UNSPECIFIED SITE OF RIGHT FEMALE BREAST: ICD-10-CM

## 2023-12-26 DIAGNOSIS — E78.5 HYPERLIPIDEMIA, UNSPECIFIED: ICD-10-CM

## 2023-12-26 DIAGNOSIS — J45.909 UNSPECIFIED ASTHMA, UNCOMPLICATED: ICD-10-CM

## 2023-12-26 DIAGNOSIS — I82.409 ACUTE EMBOLISM AND THROMBOSIS OF UNSPECIFIED DEEP VEINS OF UNSPECIFIED LOWER EXTREMITY: ICD-10-CM

## 2023-12-26 DIAGNOSIS — I25.10 ATHEROSCLEROTIC HEART DISEASE OF NATIVE CORONARY ARTERY W/OUT ANGINA PECTORIS: ICD-10-CM

## 2023-12-26 DIAGNOSIS — I10 ESSENTIAL (PRIMARY) HYPERTENSION: ICD-10-CM

## 2023-12-26 PROCEDURE — 99495 TRANSJ CARE MGMT MOD F2F 14D: CPT

## 2023-12-30 PROBLEM — I10 HYPERTENSION, UNSPECIFIED TYPE: Status: ACTIVE | Noted: 2022-12-19

## 2023-12-30 PROBLEM — E78.5 HYPERLIPIDEMIA: Status: ACTIVE | Noted: 2023-08-29

## 2023-12-30 PROBLEM — C50.911 BREAST CANCER, RIGHT: Status: ACTIVE | Noted: 2022-12-16

## 2023-12-30 PROBLEM — I25.10 CAD IN NATIVE ARTERY: Status: ACTIVE | Noted: 2022-12-19

## 2023-12-30 PROBLEM — J45.909 ASTHMA, UNSPECIFIED ASTHMA SEVERITY, UNSPECIFIED WHETHER COMPLICATED, UNSPECIFIED WHETHER PERSISTENT: Status: ACTIVE | Noted: 2022-12-19

## 2023-12-30 PROBLEM — I82.409 DVT (DEEP VENOUS THROMBOSIS): Status: ACTIVE | Noted: 2022-12-19

## 2023-12-30 RX ORDER — LATANOPROST 50 UG/ML
0.01 SOLUTION OPHTHALMIC
Refills: 0 | Status: COMPLETED | COMMUNITY
Start: 2022-12-16 | End: 2023-12-30

## 2023-12-30 RX ORDER — NEOMYCIN SULFATE, POLYMYXIN B SULFATE, HYDROCORTISONE 3.5; 10000; 1 MG/ML; [USP'U]/ML; MG/ML
1 SOLUTION/ DROPS AURICULAR (OTIC) 4 TIMES DAILY
Qty: 17 | Refills: 0 | Status: COMPLETED | COMMUNITY
Start: 2023-08-29 | End: 2023-12-30

## 2023-12-30 NOTE — HEALTH RISK ASSESSMENT
[Yes] : The patient does have visual impairment [TimeGetUpGo] : 60 [Some assistance needed] : feeding [Full assistance needed] : preparing meals [No falls in past year] : Patient reported no falls in the past year [HRA Reviewed] : Health Risk Assessment reviewed [[facility]

## 2023-12-30 NOTE — PHYSICAL EXAM
[No Acute Distress] : no acute distress [Normal Sclera/Conjunctiva] : normal sclera/conjunctiva [Normal Outer Ear/Nose] : the ears and nose were normal in appearance [No JVD] : no jugular venous distention [No Respiratory Distress] : no respiratory distress [Clear to Auscultation] : lungs were clear to auscultation bilaterally [Normal Rate] : heart rate was normal  [Regular Rhythm] : with a regular rhythm [No Joint Swelling] : no joint swelling seen [No Rash] : no rash [de-identified] : oxygen use as needed [No Skin Lesions] : no skin lesions [de-identified] : right breast mastectomy [de-identified] : complain of back pain [de-identified] : use walker with assistance, left leg +1 edema [de-identified] : a+o x2

## 2023-12-30 NOTE — REASON FOR VISIT
[Post-hospitalization from ___ Hospital] : Post-hospitalization from [unfilled] Hospital [Admitted on: ___] : The patient was admitted on [unfilled] [Discharged on ___] : discharged on [unfilled] [Discharge Summary] : discharge summary [Discharge Med List] : discharge medication list [Med Reconciliation] : medication reconciliation has been completed [Patient Contacted By: ____] : and contacted by [unfilled] [Pre-Visit Preparation] : Pre-visit preparation was done [Intercurrent Specialty/Sub-specialty Visits] : The patient has intercurrent specialty/sub-specialty visits [Family Member] : family member [Formal Caregiver] : formal caregiver [FreeTextEntry3] : Post D/C visit [FreeTextEntry2] : Hospital Course:  Discharge Date 14-Dec-2023  Admission Date 09-Dec-2023 06:41  Hospital Course   89 y/o F from home with PMHx of Asthma, DM, HTN, HLD, GERD, breast CA s/p Right sided mastectomy, DVT/PE (home O2) and PAF on Xarelto and 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 Admitted for hyponatremia management. Sodium 116 on admission. Likely hypovolemic hyponatremia. Responsive to fluids and salt tablets and lasix. Nephrology consulted. Sodium returned to normal For her chronic hypoxic respiratory failure patient was on oxygen needed but was able to transition to room air. Was continued on singulair and Albuterol PRN.  For the Pt's HTN, she was continued on Amlodipine and coreg but toresmide and losartan Were held due to hyponatremia. For the patints DM was on sliding scale as home oral medication was held. For the patients asthma she was continued on symbicort, Montelukast, Albuterol PRN and Advair diskus. Pulmonolgy was following. for the patients history of Pulmonary embolism was continued on Xarelto.  For the patients of breast cancer, she was continued on letrozole 2.5mg daily For the patients HLD she was continued on atorvastatin. Med Reconciliation: Medication Reconciliation Status Admission Reconciliation is Completed Discharge Reconciliation is Completed Discharge Medications Advair Diskus 100 mcg-50 mcg inhalation powder: 1 puff(s inhaled 2 times a day Albuterol (Eqv-ProAir HFA) 90 mcg/inh inhalation aerosol: 2 puff(s) inhaled every 6 hours as needed for  bronchospasm amLODIPine 5 mg oral tablet: 1 tab(s) orally once a day atorvastatin 20 mg oral tablet: 1 tab(s) orally once a day Azopt 1% ophthalmic suspension: 1 drop(s) to right eye 3 times a day carvedilol 3.125 mg oral tablet: 1 tab(s) orally every 12 hours Januvia 50 mg oral tablet: 1 tab(s) orally once a day latanoprost 0.005% ophthalmic solution: 1 drop(s) to each affected eye once a day (in the evening) letrozole 2.5 mg oral tablet: 1 tab(s) orally once a day Linzess 72 mcg oral capsule: 1 cap(s) orally once a day losartan 100 mg oral tablet: 1 tab(s) orally once a day omeprazole 40 mg oral delayed release capsule: 1 cap(s) orally once a day Restasis 0.05% ophthalmic emulsion: 1 drop(s) to each affected eye every 12 hours Singulair 10 mg oral tablet: 1 tab(s) orally once a day tiZANidine 2 mg oral tablet: 2 tab(s) orally every 8 hours torsemide 10 mg oral tablet: 1 tab(s) orally once a day Xarelto 20 mg oral tablet: 1 tab(s) orally once a day  Care Plan/Procedures:  Discharge Diagnoses, Assessment and Plan of Treatment PRINCIPAL DISCHARGE DIAGNOSIS Diagnosis: Hyponatremia Assessment and Plan of Treatment: You came into the emergnecy room due to altered mental status. We found that you have hyponatremia, which means you have low sodium. We gave fluids, salt tablets and had our nephrology team follow you. Your sodium returned to normal after a few days and your mental status improved to your baseline. We advise you follow up with your primary care provider to make sure you that your levels stay in normal range. SECONDARY DISCHARGE DIAGNOSES Diagnosis: Acute on chronic hypoxic respiratory failure Assessment and Plan of Treatment: You came in with shortness of breath. We put you on oxygen. We continued you on albuterol, singuliar, symbicort and montekulast and advair. Your symtpoms resolved and you were able to go home room air. Please follow up with your pulmonologist and primary care. Diagnosis: HTN (hypertension) Assessment and Plan of Treatment: You have high blood pressure. Please continue to taking your medication of amlodipine, losartan , carvidiol, and torsemid as prescribed. Please measure your blood pressure at least once daily. Maintain a healthy diet which includes incorporating more vegetables and decreasing e amount of salt (low sodium) and sugar in your diet such as rice, bread, and pasta low sugar, low fat, low sodium diet. Exercise frequently if possible. Please follow up with your primary care provider within one week of your discharge.  Diagnosis: Breast cancer  Assessment and Plan of Treatment: You have a history of breast cancer. We continued you on your home medication of Letrozole. Please follow up with your oncologist for further management. Diagnosis: HLD (hyperlipidemia) Assessment and Plan of Treatment: You have hyperlipidemia. Please continue to take your cholesterol medication of Atorvastatin. Maintain a healthy diet that consist of low sugar, low fat, low sodium. Decrease the amount of fried foods that you consume. Exercise frequently if possible. Please follow up with  your primary care provider within 1 week of your discharge. You are recommended a DASH Diet that emphasizes mostly vegetables, fruits, and fat-free or low-fat dairy products. Please limit intake of sodium, sweets, beverages w/ high sugar/carbohydrate content.  Diagnosis: Pulmonary embolism Assessment and Plan of Treatment: You have a history of pulmonary embolism. We continued you on your home medication of xeralto. Follow up with your primary care doctor. Diagnosis: DM (diabetes mellitus) Assessment and Plan of Treatment: Continue with your blood sugar medication of januvia. You must maintain a healthy diet that consists of low sugar and low fat. Your diet must consist of mostly vegetables. Please limit your intake of high sugar and high carbohydrate foods such as pasta, rice, and bread. Exercise frequently  if possible. Follow up with primary care physician within one week of your discharge to further manage your diabetes and monitor your Hemoglobin A1  levels after 3 months to evaluate your diabetes control. [FreeTextEntry4] : Chart Reviewed [FreeTextEntry5] : Dr Nikhil Gracia

## 2023-12-30 NOTE — HISTORY OF PRESENT ILLNESS
[House Calls Co-Management Patient] : [unfilled] is a House Calls co-management patient [A] : A [Patient is stable] : patient is stable [Education provided] : education provided [Patient is stable - had PCP appoinment] : patient is stable - had PCP appointment [Patient] : patient [Family Member] : family member [LastPCPVisitDate] : 12/21/2023 Telehealth [FreeTextEntry4] : [facility] [LastSpecialistVisitDate] : 1 year ago [FreeTextEntry1] : unsteady gait  [FreeTextEntry2] : PMH of right breast cancer (s/p surgery and chemotherapy), DM, right eye blindness (s/p corneal transplant), HTN, CAD, GERD, severe spinal canal stenosis at L4-L5 and possible superiorly migrated L4-L5 disc herniation at mid L4 level, reducible umbilical hernia, diverticulitis, asthma, DVT/PE  (2012), PAF (on Xarelto) and Glaucoma being seen for initial visit. History provided by daughter Sola and grandson Juan A. Patient was on [facility] [place] [facility] got discharge as off last week. Patients have Oxygen supplementation in the house (~2L).   Interval Events:  -Left leg + 1 edema, Pt to elevate legs when sitting Have supplemental oxygen, Nebulizer machine. Patient have a cough taking Mucinex now with good relief.  Subjective: -Appetite/weight: appetite fair.  weight stable  -Gait/falls: unsteady gait. Uses rolling walker with assistance. No falls in the past year.  -Pain: back pain due to spinal stenosis. Always. Uses Tylenol prn with some relief and muscle relaxer (Tizanidine) -Sleep: sleeps Good at times.  -BMs: regular every other day, BM, continent.  -Urine: continent. no concerns. Uses diapers for leaks.  -Skin: intact  -DME: walker, commode, shower chair, wheelchair, adjustable bed, Oxygen concentrator 2LNC. -Mood/memory: mood is ok. memory fair. -Communication: few words only, not conversational.  -Health Maintenance: no recommended screening. Vaccine: Flu (11/2022), Pneumonia and shingles not given.  -Hospitalizations in the past year: 12/9/2023-12/14/2023 admit to [facility] for Hyponatremia.   Medical Issues: a) Hospice: No More hospice b) Asthma: no exacerbation. on albuterol prn and loratadine.  c) DVT/PE: on Xarelto d) CAD: No angina symptom. Continue current management, on nitro as needed. e) GERD: Chronic. on omeprazole. f) DMII: No BG to review. on Januvia  g) Umbilical hernia: Reducible. Counselled warning signs for incarcerated hernia.  h) Spinal stenosis and disc herniation: Chronic. Continue Tizanidine, Tylenol i) HTN: chronic. 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  k) Right breast cancer: s/p chemotherapy and surgery. on Letrozole FU with oncology.    Specialists: PCP: Dr Basil Hopper  Oncology: Dr Carpenter last visit 1 year ago. Cardiology: Dr Olson [phone] - last visit 1 year ago   Social hx: lives with family. From  [place] speaking. Has 6 children.   Caregivers: CD Pas 8.5 hr x 6 days. The daughters split the CD Pass.  MOLST: Full Code. No limitation, Long term intubation HCP: Sola New and Leanne by mother but mother had no capacity on evaluation.

## 2023-12-30 NOTE — CURRENT MEDS
[Medication and Allergies Reconciled] : medication and allergies reconciled [High Risk Medications Reviewed and Reconciled (Beers Criteria)] : high risk medications reviewed, reconciled [Reviewed patient reported medication adherence from Comprehensive Assessment] : reviewed patient reported medication adherence from comprehensive assessment [Adherentt to medications as prescribed] : the patient is adherent to medications as prescribed

## 2024-01-01 ENCOUNTER — INPATIENT (INPATIENT)
Facility: HOSPITAL | Age: 89
LOS: 6 days | DRG: 293 | End: 2024-09-26
Attending: INTERNAL MEDICINE | Admitting: INTERNAL MEDICINE
Payer: COMMERCIAL

## 2024-01-01 VITALS
DIASTOLIC BLOOD PRESSURE: 75 MMHG | TEMPERATURE: 99 F | SYSTOLIC BLOOD PRESSURE: 101 MMHG | RESPIRATION RATE: 18 BRPM | HEART RATE: 97 BPM | OXYGEN SATURATION: 94 %

## 2024-01-01 VITALS
TEMPERATURE: 98 F | DIASTOLIC BLOOD PRESSURE: 64 MMHG | HEART RATE: 77 BPM | WEIGHT: 149.91 LBS | RESPIRATION RATE: 18 BRPM | SYSTOLIC BLOOD PRESSURE: 91 MMHG | OXYGEN SATURATION: 100 %

## 2024-01-01 DIAGNOSIS — J44.9 CHRONIC OBSTRUCTIVE PULMONARY DISEASE, UNSPECIFIED: ICD-10-CM

## 2024-01-01 DIAGNOSIS — G93.41 METABOLIC ENCEPHALOPATHY: ICD-10-CM

## 2024-01-01 DIAGNOSIS — Z75.8 OTHER PROBLEMS RELATED TO MEDICAL FACILITIES AND OTHER HEALTH CARE: ICD-10-CM

## 2024-01-01 DIAGNOSIS — I48.0 PAROXYSMAL ATRIAL FIBRILLATION: ICD-10-CM

## 2024-01-01 DIAGNOSIS — I10 ESSENTIAL (PRIMARY) HYPERTENSION: ICD-10-CM

## 2024-01-01 DIAGNOSIS — H54.40 BLINDNESS, ONE EYE, UNSPECIFIED EYE: ICD-10-CM

## 2024-01-01 DIAGNOSIS — J96.01 ACUTE RESPIRATORY FAILURE WITH HYPOXIA: ICD-10-CM

## 2024-01-01 DIAGNOSIS — J45.909 UNSPECIFIED ASTHMA, UNCOMPLICATED: ICD-10-CM

## 2024-01-01 DIAGNOSIS — I50.9 HEART FAILURE, UNSPECIFIED: ICD-10-CM

## 2024-01-01 DIAGNOSIS — J69.0 PNEUMONITIS DUE TO INHALATION OF FOOD AND VOMIT: ICD-10-CM

## 2024-01-01 DIAGNOSIS — R09.89 OTHER SPECIFIED SYMPTOMS AND SIGNS INVOLVING THE CIRCULATORY AND RESPIRATORY SYSTEMS: ICD-10-CM

## 2024-01-01 DIAGNOSIS — Z29.9 ENCOUNTER FOR PROPHYLACTIC MEASURES, UNSPECIFIED: ICD-10-CM

## 2024-01-01 DIAGNOSIS — I82.409 ACUTE EMBOLISM AND THROMBOSIS OF UNSPECIFIED DEEP VEINS OF UNSPECIFIED LOWER EXTREMITY: ICD-10-CM

## 2024-01-01 DIAGNOSIS — K21.9 GASTRO-ESOPHAGEAL REFLUX DISEASE WITHOUT ESOPHAGITIS: ICD-10-CM

## 2024-01-01 DIAGNOSIS — J98.11 ATELECTASIS: ICD-10-CM

## 2024-01-01 DIAGNOSIS — Z90.11 ACQUIRED ABSENCE OF RIGHT BREAST AND NIPPLE: Chronic | ICD-10-CM

## 2024-01-01 LAB
24R-OH-CALCIDIOL SERPL-MCNC: 151 NG/ML — HIGH (ref 30–80)
ABO RH CONFIRMATION: SIGNIFICANT CHANGE UP
ALBUMIN SERPL ELPH-MCNC: 2.5 G/DL — LOW (ref 3.5–5)
ALBUMIN SERPL ELPH-MCNC: 2.6 G/DL — LOW (ref 3.5–5)
ALBUMIN SERPL ELPH-MCNC: 2.9 G/DL — LOW (ref 3.5–5)
ALBUMIN SERPL ELPH-MCNC: 2.9 G/DL — LOW (ref 3.5–5)
ALDOST SERPL-MCNC: 10.3 NG/DL — SIGNIFICANT CHANGE UP
ALP SERPL-CCNC: 100 U/L — SIGNIFICANT CHANGE UP (ref 40–120)
ALP SERPL-CCNC: 113 U/L — SIGNIFICANT CHANGE UP (ref 40–120)
ALP SERPL-CCNC: 114 U/L — SIGNIFICANT CHANGE UP (ref 40–120)
ALP SERPL-CCNC: 97 U/L — SIGNIFICANT CHANGE UP (ref 40–120)
ALT FLD-CCNC: 12 U/L DA — SIGNIFICANT CHANGE UP (ref 10–60)
ALT FLD-CCNC: 20 U/L DA — SIGNIFICANT CHANGE UP (ref 10–60)
ANION GAP SERPL CALC-SCNC: 2 MMOL/L — LOW (ref 5–17)
ANION GAP SERPL CALC-SCNC: 4 MMOL/L — LOW (ref 5–17)
ANION GAP SERPL CALC-SCNC: 5 MMOL/L — SIGNIFICANT CHANGE UP (ref 5–17)
ANION GAP SERPL CALC-SCNC: 5 MMOL/L — SIGNIFICANT CHANGE UP (ref 5–17)
ANION GAP SERPL CALC-SCNC: 6 MMOL/L — SIGNIFICANT CHANGE UP (ref 5–17)
ANION GAP SERPL CALC-SCNC: 8 MMOL/L — SIGNIFICANT CHANGE UP (ref 5–17)
ANISOCYTOSIS BLD QL: SLIGHT — SIGNIFICANT CHANGE UP
APPEARANCE UR: CLEAR — SIGNIFICANT CHANGE UP
APTT BLD: 26.6 SEC — SIGNIFICANT CHANGE UP (ref 24.5–35.6)
AST SERPL-CCNC: 13 U/L — SIGNIFICANT CHANGE UP (ref 10–40)
AST SERPL-CCNC: 13 U/L — SIGNIFICANT CHANGE UP (ref 10–40)
AST SERPL-CCNC: 35 U/L — SIGNIFICANT CHANGE UP (ref 10–40)
AST SERPL-CCNC: 8 U/L — LOW (ref 10–40)
BASE EXCESS BLDA CALC-SCNC: 11 MMOL/L — HIGH (ref -2–3)
BASE EXCESS BLDA CALC-SCNC: 12.9 MMOL/L — HIGH (ref -2–3)
BASE EXCESS BLDV CALC-SCNC: 14.8 MMOL/L — SIGNIFICANT CHANGE UP
BASOPHILS # BLD AUTO: 0.05 K/UL — SIGNIFICANT CHANGE UP (ref 0–0.2)
BASOPHILS # BLD AUTO: 0.06 K/UL — SIGNIFICANT CHANGE UP (ref 0–0.2)
BASOPHILS NFR BLD AUTO: 0.4 % — SIGNIFICANT CHANGE UP (ref 0–2)
BASOPHILS NFR BLD AUTO: 0.6 % — SIGNIFICANT CHANGE UP (ref 0–2)
BILIRUB SERPL-MCNC: 0.3 MG/DL — SIGNIFICANT CHANGE UP (ref 0.2–1.2)
BILIRUB SERPL-MCNC: 0.4 MG/DL — SIGNIFICANT CHANGE UP (ref 0.2–1.2)
BILIRUB UR-MCNC: NEGATIVE — SIGNIFICANT CHANGE UP
BLD GP AB SCN SERPL QL: SIGNIFICANT CHANGE UP
BLOOD GAS COMMENTS ARTERIAL: SIGNIFICANT CHANGE UP
BLOOD GAS COMMENTS ARTERIAL: SIGNIFICANT CHANGE UP
BUN SERPL-MCNC: 14 MG/DL — SIGNIFICANT CHANGE UP (ref 7–18)
BUN SERPL-MCNC: 14 MG/DL — SIGNIFICANT CHANGE UP (ref 7–18)
BUN SERPL-MCNC: 15 MG/DL — SIGNIFICANT CHANGE UP (ref 7–18)
BUN SERPL-MCNC: 15 MG/DL — SIGNIFICANT CHANGE UP (ref 7–18)
BUN SERPL-MCNC: 16 MG/DL — SIGNIFICANT CHANGE UP (ref 7–18)
BUN SERPL-MCNC: 19 MG/DL — HIGH (ref 7–18)
BUN SERPL-MCNC: 20 MG/DL — HIGH (ref 7–18)
BUN SERPL-MCNC: 25 MG/DL — HIGH (ref 7–18)
BUN SERPL-MCNC: 34 MG/DL — HIGH (ref 7–18)
CALCIUM SERPL-MCNC: 8.5 MG/DL — SIGNIFICANT CHANGE UP (ref 8.4–10.5)
CALCIUM SERPL-MCNC: 8.7 MG/DL — SIGNIFICANT CHANGE UP (ref 8.4–10.5)
CALCIUM SERPL-MCNC: 8.7 MG/DL — SIGNIFICANT CHANGE UP (ref 8.4–10.5)
CALCIUM SERPL-MCNC: 8.8 MG/DL — SIGNIFICANT CHANGE UP (ref 8.4–10.5)
CALCIUM SERPL-MCNC: 8.8 MG/DL — SIGNIFICANT CHANGE UP (ref 8.4–10.5)
CALCIUM SERPL-MCNC: 9 MG/DL — SIGNIFICANT CHANGE UP (ref 8.4–10.5)
CALCIUM SERPL-MCNC: 9 MG/DL — SIGNIFICANT CHANGE UP (ref 8.4–10.5)
CALCIUM SERPL-MCNC: 9.1 MG/DL — SIGNIFICANT CHANGE UP (ref 8.4–10.5)
CALCIUM SERPL-MCNC: 9.1 MG/DL — SIGNIFICANT CHANGE UP (ref 8.4–10.5)
CHLORIDE SERPL-SCNC: 102 MMOL/L — SIGNIFICANT CHANGE UP (ref 96–108)
CHLORIDE SERPL-SCNC: 103 MMOL/L — SIGNIFICANT CHANGE UP (ref 96–108)
CHLORIDE SERPL-SCNC: 107 MMOL/L — SIGNIFICANT CHANGE UP (ref 96–108)
CHLORIDE SERPL-SCNC: 110 MMOL/L — HIGH (ref 96–108)
CHLORIDE SERPL-SCNC: 94 MMOL/L — LOW (ref 96–108)
CHLORIDE SERPL-SCNC: 95 MMOL/L — LOW (ref 96–108)
CHLORIDE SERPL-SCNC: 96 MMOL/L — SIGNIFICANT CHANGE UP (ref 96–108)
CHLORIDE SERPL-SCNC: 99 MMOL/L — SIGNIFICANT CHANGE UP (ref 96–108)
CHLORIDE SERPL-SCNC: 99 MMOL/L — SIGNIFICANT CHANGE UP (ref 96–108)
CO2 SERPL-SCNC: 28 MMOL/L — SIGNIFICANT CHANGE UP (ref 22–31)
CO2 SERPL-SCNC: 30 MMOL/L — SIGNIFICANT CHANGE UP (ref 22–31)
CO2 SERPL-SCNC: 32 MMOL/L — HIGH (ref 22–31)
CO2 SERPL-SCNC: 33 MMOL/L — HIGH (ref 22–31)
CO2 SERPL-SCNC: 33 MMOL/L — HIGH (ref 22–31)
CO2 SERPL-SCNC: 34 MMOL/L — HIGH (ref 22–31)
CO2 SERPL-SCNC: 35 MMOL/L — HIGH (ref 22–31)
CO2 SERPL-SCNC: 35 MMOL/L — HIGH (ref 22–31)
CO2 SERPL-SCNC: 37 MMOL/L — HIGH (ref 22–31)
COLOR SPEC: YELLOW — SIGNIFICANT CHANGE UP
CREAT ?TM UR-MCNC: <13 MG/DL — SIGNIFICANT CHANGE UP
CREAT SERPL-MCNC: 0.85 MG/DL — SIGNIFICANT CHANGE UP (ref 0.5–1.3)
CREAT SERPL-MCNC: 0.91 MG/DL — SIGNIFICANT CHANGE UP (ref 0.5–1.3)
CREAT SERPL-MCNC: 0.91 MG/DL — SIGNIFICANT CHANGE UP (ref 0.5–1.3)
CREAT SERPL-MCNC: 0.98 MG/DL — SIGNIFICANT CHANGE UP (ref 0.5–1.3)
CREAT SERPL-MCNC: 1.08 MG/DL — SIGNIFICANT CHANGE UP (ref 0.5–1.3)
CREAT SERPL-MCNC: 1.13 MG/DL — SIGNIFICANT CHANGE UP (ref 0.5–1.3)
CREAT SERPL-MCNC: 1.15 MG/DL — SIGNIFICANT CHANGE UP (ref 0.5–1.3)
CREAT SERPL-MCNC: 1.19 MG/DL — SIGNIFICANT CHANGE UP (ref 0.5–1.3)
CREAT SERPL-MCNC: 1.32 MG/DL — HIGH (ref 0.5–1.3)
CULTURE RESULTS: SIGNIFICANT CHANGE UP
CULTURE RESULTS: SIGNIFICANT CHANGE UP
DIFF PNL FLD: NEGATIVE — SIGNIFICANT CHANGE UP
EGFR: 37 ML/MIN/1.73M2 — LOW
EGFR: 42 ML/MIN/1.73M2 — LOW
EGFR: 43 ML/MIN/1.73M2 — LOW
EGFR: 44 ML/MIN/1.73M2 — LOW
EGFR: 47 ML/MIN/1.73M2 — LOW
EGFR: 52 ML/MIN/1.73M2 — LOW
EGFR: 57 ML/MIN/1.73M2 — LOW
EGFR: 57 ML/MIN/1.73M2 — LOW
EGFR: 62 ML/MIN/1.73M2 — SIGNIFICANT CHANGE UP
EOSINOPHIL # BLD AUTO: 0.21 K/UL — SIGNIFICANT CHANGE UP (ref 0–0.5)
EOSINOPHIL # BLD AUTO: 0.22 K/UL — SIGNIFICANT CHANGE UP (ref 0–0.5)
EOSINOPHIL NFR BLD AUTO: 1.5 % — SIGNIFICANT CHANGE UP (ref 0–6)
EOSINOPHIL NFR BLD AUTO: 2.5 % — SIGNIFICANT CHANGE UP (ref 0–6)
FERRITIN SERPL-MCNC: 19 NG/ML — SIGNIFICANT CHANGE UP (ref 13–330)
FLUAV AG NPH QL: SIGNIFICANT CHANGE UP
FLUBV AG NPH QL: SIGNIFICANT CHANGE UP
GLUCOSE BLDC GLUCOMTR-MCNC: 102 MG/DL — HIGH (ref 70–99)
GLUCOSE BLDC GLUCOMTR-MCNC: 116 MG/DL — HIGH (ref 70–99)
GLUCOSE BLDC GLUCOMTR-MCNC: 132 MG/DL — HIGH (ref 70–99)
GLUCOSE BLDC GLUCOMTR-MCNC: 148 MG/DL — HIGH (ref 70–99)
GLUCOSE BLDC GLUCOMTR-MCNC: 204 MG/DL — HIGH (ref 70–99)
GLUCOSE SERPL-MCNC: 100 MG/DL — HIGH (ref 70–99)
GLUCOSE SERPL-MCNC: 103 MG/DL — HIGH (ref 70–99)
GLUCOSE SERPL-MCNC: 105 MG/DL — HIGH (ref 70–99)
GLUCOSE SERPL-MCNC: 105 MG/DL — HIGH (ref 70–99)
GLUCOSE SERPL-MCNC: 119 MG/DL — HIGH (ref 70–99)
GLUCOSE SERPL-MCNC: 136 MG/DL — HIGH (ref 70–99)
GLUCOSE SERPL-MCNC: 78 MG/DL — SIGNIFICANT CHANGE UP (ref 70–99)
GLUCOSE SERPL-MCNC: 93 MG/DL — SIGNIFICANT CHANGE UP (ref 70–99)
GLUCOSE SERPL-MCNC: 97 MG/DL — SIGNIFICANT CHANGE UP (ref 70–99)
GLUCOSE UR QL: NEGATIVE MG/DL — SIGNIFICANT CHANGE UP
HCO3 BLDA-SCNC: 37 MMOL/L — HIGH (ref 21–28)
HCO3 BLDA-SCNC: 42 MMOL/L — HIGH (ref 21–28)
HCO3 BLDV-SCNC: 45 MMOL/L — CRITICAL HIGH (ref 22–29)
HCT VFR BLD CALC: 27.5 % — LOW (ref 34.5–45)
HCT VFR BLD CALC: 27.7 % — LOW (ref 34.5–45)
HCT VFR BLD CALC: 28.7 % — LOW (ref 34.5–45)
HCT VFR BLD CALC: 29.8 % — LOW (ref 34.5–45)
HCT VFR BLD CALC: 30 % — LOW (ref 34.5–45)
HCT VFR BLD CALC: 30.6 % — LOW (ref 34.5–45)
HCT VFR BLD CALC: 30.9 % — LOW (ref 34.5–45)
HCT VFR BLD CALC: 34.4 % — LOW (ref 34.5–45)
HGB BLD-MCNC: 10.3 G/DL — LOW (ref 11.5–15.5)
HGB BLD-MCNC: 8.2 G/DL — LOW (ref 11.5–15.5)
HGB BLD-MCNC: 8.5 G/DL — LOW (ref 11.5–15.5)
HGB BLD-MCNC: 8.8 G/DL — LOW (ref 11.5–15.5)
HGB BLD-MCNC: 9 G/DL — LOW (ref 11.5–15.5)
HGB BLD-MCNC: 9 G/DL — LOW (ref 11.5–15.5)
HGB BLD-MCNC: 9.1 G/DL — LOW (ref 11.5–15.5)
HGB BLD-MCNC: 9.4 G/DL — LOW (ref 11.5–15.5)
HIV 1 & 2 AB SERPL IA.RAPID: SIGNIFICANT CHANGE UP
HOROWITZ INDEX BLDA+IHG-RTO: 32 — SIGNIFICANT CHANGE UP
HOROWITZ INDEX BLDA+IHG-RTO: 35 — SIGNIFICANT CHANGE UP
HYPOCHROMIA BLD QL: SIGNIFICANT CHANGE UP
IMM GRANULOCYTES NFR BLD AUTO: 0.4 % — SIGNIFICANT CHANGE UP (ref 0–0.9)
IMM GRANULOCYTES NFR BLD AUTO: 0.5 % — SIGNIFICANT CHANGE UP (ref 0–0.9)
INR BLD: 1.25 RATIO — HIGH (ref 0.85–1.18)
IRON SATN MFR SERPL: 15 UG/DL — LOW (ref 40–150)
KETONES UR-MCNC: NEGATIVE MG/DL — SIGNIFICANT CHANGE UP
LACTATE SERPL-SCNC: 0.7 MMOL/L — SIGNIFICANT CHANGE UP (ref 0.7–2)
LEGIONELLA AG UR QL: NEGATIVE — SIGNIFICANT CHANGE UP
LEUKOCYTE ESTERASE UR-ACNC: NEGATIVE — SIGNIFICANT CHANGE UP
LYMPHOCYTES # BLD AUTO: 1.08 K/UL — SIGNIFICANT CHANGE UP (ref 1–3.3)
LYMPHOCYTES # BLD AUTO: 1.7 K/UL — SIGNIFICANT CHANGE UP (ref 1–3.3)
LYMPHOCYTES # BLD AUTO: 20 % — SIGNIFICANT CHANGE UP (ref 13–44)
LYMPHOCYTES # BLD AUTO: 7.3 % — LOW (ref 13–44)
MAGNESIUM SERPL-MCNC: 1.9 MG/DL — SIGNIFICANT CHANGE UP (ref 1.6–2.6)
MAGNESIUM SERPL-MCNC: 1.9 MG/DL — SIGNIFICANT CHANGE UP (ref 1.6–2.6)
MAGNESIUM SERPL-MCNC: 2 MG/DL — SIGNIFICANT CHANGE UP (ref 1.6–2.6)
MAGNESIUM SERPL-MCNC: 2 MG/DL — SIGNIFICANT CHANGE UP (ref 1.6–2.6)
MAGNESIUM SERPL-MCNC: 2.1 MG/DL — SIGNIFICANT CHANGE UP (ref 1.6–2.6)
MANUAL SMEAR VERIFICATION: SIGNIFICANT CHANGE UP
MCHC RBC-ENTMCNC: 27.5 PG — SIGNIFICANT CHANGE UP (ref 27–34)
MCHC RBC-ENTMCNC: 27.6 PG — SIGNIFICANT CHANGE UP (ref 27–34)
MCHC RBC-ENTMCNC: 27.9 PG — SIGNIFICANT CHANGE UP (ref 27–34)
MCHC RBC-ENTMCNC: 27.9 PG — SIGNIFICANT CHANGE UP (ref 27–34)
MCHC RBC-ENTMCNC: 28 PG — SIGNIFICANT CHANGE UP (ref 27–34)
MCHC RBC-ENTMCNC: 28 PG — SIGNIFICANT CHANGE UP (ref 27–34)
MCHC RBC-ENTMCNC: 28.1 PG — SIGNIFICANT CHANGE UP (ref 27–34)
MCHC RBC-ENTMCNC: 28.4 PG — SIGNIFICANT CHANGE UP (ref 27–34)
MCHC RBC-ENTMCNC: 29.6 GM/DL — LOW (ref 32–36)
MCHC RBC-ENTMCNC: 29.7 GM/DL — LOW (ref 32–36)
MCHC RBC-ENTMCNC: 29.9 GM/DL — LOW (ref 32–36)
MCHC RBC-ENTMCNC: 30 GM/DL — LOW (ref 32–36)
MCHC RBC-ENTMCNC: 30.2 GM/DL — LOW (ref 32–36)
MCHC RBC-ENTMCNC: 30.4 GM/DL — LOW (ref 32–36)
MCHC RBC-ENTMCNC: 30.7 GM/DL — LOW (ref 32–36)
MCHC RBC-ENTMCNC: 30.9 GM/DL — LOW (ref 32–36)
MCV RBC AUTO: 91.1 FL — SIGNIFICANT CHANGE UP (ref 80–100)
MCV RBC AUTO: 91.7 FL — SIGNIFICANT CHANGE UP (ref 80–100)
MCV RBC AUTO: 92.6 FL — SIGNIFICANT CHANGE UP (ref 80–100)
MCV RBC AUTO: 92.7 FL — SIGNIFICANT CHANGE UP (ref 80–100)
MCV RBC AUTO: 92.8 FL — SIGNIFICANT CHANGE UP (ref 80–100)
MCV RBC AUTO: 92.9 FL — SIGNIFICANT CHANGE UP (ref 80–100)
MCV RBC AUTO: 93 FL — SIGNIFICANT CHANGE UP (ref 80–100)
MCV RBC AUTO: 93.5 FL — SIGNIFICANT CHANGE UP (ref 80–100)
MONOCYTES # BLD AUTO: 1.62 K/UL — HIGH (ref 0–0.9)
MONOCYTES # BLD AUTO: 2.16 K/UL — HIGH (ref 0–0.9)
MONOCYTES NFR BLD AUTO: 14.6 % — HIGH (ref 2–14)
MONOCYTES NFR BLD AUTO: 19 % — HIGH (ref 2–14)
MRSA PCR RESULT.: SIGNIFICANT CHANGE UP
NEUTROPHILS # BLD AUTO: 11.21 K/UL — HIGH (ref 1.8–7.4)
NEUTROPHILS # BLD AUTO: 4.9 K/UL — SIGNIFICANT CHANGE UP (ref 1.8–7.4)
NEUTROPHILS NFR BLD AUTO: 57.5 % — SIGNIFICANT CHANGE UP (ref 43–77)
NEUTROPHILS NFR BLD AUTO: 75.7 % — SIGNIFICANT CHANGE UP (ref 43–77)
NITRITE UR-MCNC: NEGATIVE — SIGNIFICANT CHANGE UP
NRBC # BLD: 0 /100 WBCS — SIGNIFICANT CHANGE UP (ref 0–0)
NT-PROBNP SERPL-SCNC: 6813 PG/ML — HIGH (ref 0–450)
OSMOLALITY UR: 213 MOSM/KG — SIGNIFICANT CHANGE UP (ref 50–1200)
OVALOCYTES BLD QL SMEAR: SLIGHT — SIGNIFICANT CHANGE UP
PCO2 BLDA: 57 MMHG — HIGH (ref 32–35)
PCO2 BLDA: 74 MMHG — CRITICAL HIGH (ref 32–35)
PCO2 BLDV: 88 MMHG — CRITICAL HIGH (ref 39–42)
PH BLDA: 7.36 — SIGNIFICANT CHANGE UP (ref 7.35–7.45)
PH BLDA: 7.42 — SIGNIFICANT CHANGE UP (ref 7.35–7.45)
PH BLDV: 7.32 — SIGNIFICANT CHANGE UP (ref 7.32–7.43)
PH UR: 7.5 — SIGNIFICANT CHANGE UP (ref 5–8)
PHOSPHATE SERPL-MCNC: 2.6 MG/DL — SIGNIFICANT CHANGE UP (ref 2.5–4.5)
PHOSPHATE SERPL-MCNC: 3 MG/DL — SIGNIFICANT CHANGE UP (ref 2.5–4.5)
PHOSPHATE SERPL-MCNC: 3.7 MG/DL — SIGNIFICANT CHANGE UP (ref 2.5–4.5)
PHOSPHATE SERPL-MCNC: 4 MG/DL — SIGNIFICANT CHANGE UP (ref 2.5–4.5)
PLAT MORPH BLD: NORMAL — SIGNIFICANT CHANGE UP
PLATELET # BLD AUTO: 260 K/UL — SIGNIFICANT CHANGE UP (ref 150–400)
PLATELET # BLD AUTO: 267 K/UL — SIGNIFICANT CHANGE UP (ref 150–400)
PLATELET # BLD AUTO: 270 K/UL — SIGNIFICANT CHANGE UP (ref 150–400)
PLATELET # BLD AUTO: 282 K/UL — SIGNIFICANT CHANGE UP (ref 150–400)
PLATELET # BLD AUTO: 322 K/UL — SIGNIFICANT CHANGE UP (ref 150–400)
PLATELET # BLD AUTO: 323 K/UL — SIGNIFICANT CHANGE UP (ref 150–400)
PLATELET # BLD AUTO: 350 K/UL — SIGNIFICANT CHANGE UP (ref 150–400)
PLATELET # BLD AUTO: 398 K/UL — SIGNIFICANT CHANGE UP (ref 150–400)
PLATELET COUNT - ESTIMATE: NORMAL — SIGNIFICANT CHANGE UP
PO2 BLDA: 93 MMHG — SIGNIFICANT CHANGE UP (ref 83–108)
PO2 BLDA: 97 MMHG — SIGNIFICANT CHANGE UP (ref 83–108)
PO2 BLDV: 47 MMHG — SIGNIFICANT CHANGE UP
POTASSIUM SERPL-MCNC: 3.4 MMOL/L — LOW (ref 3.5–5.3)
POTASSIUM SERPL-MCNC: 3.7 MMOL/L — SIGNIFICANT CHANGE UP (ref 3.5–5.3)
POTASSIUM SERPL-MCNC: 3.8 MMOL/L — SIGNIFICANT CHANGE UP (ref 3.5–5.3)
POTASSIUM SERPL-MCNC: 3.9 MMOL/L — SIGNIFICANT CHANGE UP (ref 3.5–5.3)
POTASSIUM SERPL-MCNC: 4.2 MMOL/L — SIGNIFICANT CHANGE UP (ref 3.5–5.3)
POTASSIUM SERPL-MCNC: 4.4 MMOL/L — SIGNIFICANT CHANGE UP (ref 3.5–5.3)
POTASSIUM SERPL-MCNC: 4.4 MMOL/L — SIGNIFICANT CHANGE UP (ref 3.5–5.3)
POTASSIUM SERPL-MCNC: 4.8 MMOL/L — SIGNIFICANT CHANGE UP (ref 3.5–5.3)
POTASSIUM SERPL-MCNC: 6 MMOL/L — HIGH (ref 3.5–5.3)
POTASSIUM SERPL-SCNC: 3.4 MMOL/L — LOW (ref 3.5–5.3)
POTASSIUM SERPL-SCNC: 3.7 MMOL/L — SIGNIFICANT CHANGE UP (ref 3.5–5.3)
POTASSIUM SERPL-SCNC: 3.8 MMOL/L — SIGNIFICANT CHANGE UP (ref 3.5–5.3)
POTASSIUM SERPL-SCNC: 3.9 MMOL/L — SIGNIFICANT CHANGE UP (ref 3.5–5.3)
POTASSIUM SERPL-SCNC: 4.2 MMOL/L — SIGNIFICANT CHANGE UP (ref 3.5–5.3)
POTASSIUM SERPL-SCNC: 4.4 MMOL/L — SIGNIFICANT CHANGE UP (ref 3.5–5.3)
POTASSIUM SERPL-SCNC: 4.4 MMOL/L — SIGNIFICANT CHANGE UP (ref 3.5–5.3)
POTASSIUM SERPL-SCNC: 4.8 MMOL/L — SIGNIFICANT CHANGE UP (ref 3.5–5.3)
POTASSIUM SERPL-SCNC: 6 MMOL/L — HIGH (ref 3.5–5.3)
POTASSIUM UR-SCNC: 14 MMOL/L — SIGNIFICANT CHANGE UP
PROT ?TM UR-MCNC: 8 MG/DL — SIGNIFICANT CHANGE UP (ref 0–12)
PROT SERPL-MCNC: 5.8 G/DL — LOW (ref 6–8.3)
PROT SERPL-MCNC: 5.9 G/DL — LOW (ref 6–8.3)
PROT SERPL-MCNC: 6 G/DL — SIGNIFICANT CHANGE UP (ref 6–8.3)
PROT SERPL-MCNC: 6 G/DL — SIGNIFICANT CHANGE UP (ref 6–8.3)
PROT UR-MCNC: NEGATIVE MG/DL — SIGNIFICANT CHANGE UP
PROTHROM AB SERPL-ACNC: 14.2 SEC — HIGH (ref 9.5–13)
RBC # BLD: 2.98 M/UL — LOW (ref 3.8–5.2)
RBC # BLD: 3.02 M/UL — LOW (ref 3.8–5.2)
RBC # BLD: 3.1 M/UL — LOW (ref 3.8–5.2)
RBC # BLD: 3.21 M/UL — LOW (ref 3.8–5.2)
RBC # BLD: 3.23 M/UL — LOW (ref 3.8–5.2)
RBC # BLD: 3.3 M/UL — LOW (ref 3.8–5.2)
RBC # BLD: 3.37 M/UL — LOW (ref 3.8–5.2)
RBC # BLD: 3.68 M/UL — LOW (ref 3.8–5.2)
RBC # FLD: 14.8 % — HIGH (ref 10.3–14.5)
RBC # FLD: 14.9 % — HIGH (ref 10.3–14.5)
RBC # FLD: 15.2 % — HIGH (ref 10.3–14.5)
RBC # FLD: 15.2 % — HIGH (ref 10.3–14.5)
RBC # FLD: 15.4 % — HIGH (ref 10.3–14.5)
RBC # FLD: 15.7 % — HIGH (ref 10.3–14.5)
RBC # FLD: 16 % — HIGH (ref 10.3–14.5)
RBC # FLD: 17.1 % — HIGH (ref 10.3–14.5)
RBC BLD AUTO: ABNORMAL
RENIN DIRECT, PLASMA: 3 PG/ML — SIGNIFICANT CHANGE UP
S AUREUS DNA NOSE QL NAA+PROBE: SIGNIFICANT CHANGE UP
SAO2 % BLDA: 100 % — SIGNIFICANT CHANGE UP
SAO2 % BLDA: 100 % — SIGNIFICANT CHANGE UP
SAO2 % BLDV: 82 % — SIGNIFICANT CHANGE UP
SARS-COV-2 RNA SPEC QL NAA+PROBE: SIGNIFICANT CHANGE UP
SODIUM SERPL-SCNC: 128 MMOL/L — LOW (ref 135–145)
SODIUM SERPL-SCNC: 137 MMOL/L — SIGNIFICANT CHANGE UP (ref 135–145)
SODIUM SERPL-SCNC: 137 MMOL/L — SIGNIFICANT CHANGE UP (ref 135–145)
SODIUM SERPL-SCNC: 138 MMOL/L — SIGNIFICANT CHANGE UP (ref 135–145)
SODIUM SERPL-SCNC: 139 MMOL/L — SIGNIFICANT CHANGE UP (ref 135–145)
SODIUM SERPL-SCNC: 140 MMOL/L — SIGNIFICANT CHANGE UP (ref 135–145)
SODIUM SERPL-SCNC: 142 MMOL/L — SIGNIFICANT CHANGE UP (ref 135–145)
SODIUM SERPL-SCNC: 143 MMOL/L — SIGNIFICANT CHANGE UP (ref 135–145)
SODIUM SERPL-SCNC: 146 MMOL/L — HIGH (ref 135–145)
SODIUM UR-SCNC: 86 MMOL/L — SIGNIFICANT CHANGE UP
SP GR SPEC: 1 — SIGNIFICANT CHANGE UP (ref 1–1.03)
SPECIMEN SOURCE: SIGNIFICANT CHANGE UP
SPECIMEN SOURCE: SIGNIFICANT CHANGE UP
STOMATOCYTES BLD QL SMEAR: SLIGHT — SIGNIFICANT CHANGE UP
TROPONIN I, HIGH SENSITIVITY RESULT: 16.8 NG/L — SIGNIFICANT CHANGE UP
TSH SERPL-MCNC: 1.56 UU/ML — SIGNIFICANT CHANGE UP (ref 0.34–4.82)
UROBILINOGEN FLD QL: 0.2 MG/DL — SIGNIFICANT CHANGE UP (ref 0.2–1)
VIT B12 SERPL-MCNC: >2000 PG/ML — HIGH (ref 232–1245)
WBC # BLD: 10.27 K/UL — SIGNIFICANT CHANGE UP (ref 3.8–10.5)
WBC # BLD: 11.88 K/UL — HIGH (ref 3.8–10.5)
WBC # BLD: 12.39 K/UL — HIGH (ref 3.8–10.5)
WBC # BLD: 13.06 K/UL — HIGH (ref 3.8–10.5)
WBC # BLD: 13.64 K/UL — HIGH (ref 3.8–10.5)
WBC # BLD: 14.8 K/UL — HIGH (ref 3.8–10.5)
WBC # BLD: 15.49 K/UL — HIGH (ref 3.8–10.5)
WBC # BLD: 8.51 K/UL — SIGNIFICANT CHANGE UP (ref 3.8–10.5)
WBC # FLD AUTO: 10.27 K/UL — SIGNIFICANT CHANGE UP (ref 3.8–10.5)
WBC # FLD AUTO: 11.88 K/UL — HIGH (ref 3.8–10.5)
WBC # FLD AUTO: 12.39 K/UL — HIGH (ref 3.8–10.5)
WBC # FLD AUTO: 13.06 K/UL — HIGH (ref 3.8–10.5)
WBC # FLD AUTO: 13.64 K/UL — HIGH (ref 3.8–10.5)
WBC # FLD AUTO: 14.8 K/UL — HIGH (ref 3.8–10.5)
WBC # FLD AUTO: 15.49 K/UL — HIGH (ref 3.8–10.5)
WBC # FLD AUTO: 8.51 K/UL — SIGNIFICANT CHANGE UP (ref 3.8–10.5)

## 2024-01-01 PROCEDURE — 93010 ELECTROCARDIOGRAM REPORT: CPT

## 2024-01-01 PROCEDURE — 93971 EXTREMITY STUDY: CPT | Mod: 26,LT

## 2024-01-01 PROCEDURE — 71250 CT THORAX DX C-: CPT | Mod: 26

## 2024-01-01 PROCEDURE — 99291 CRITICAL CARE FIRST HOUR: CPT | Mod: 25

## 2024-01-01 PROCEDURE — 71045 X-RAY EXAM CHEST 1 VIEW: CPT | Mod: 26

## 2024-01-01 PROCEDURE — 71046 X-RAY EXAM CHEST 2 VIEWS: CPT | Mod: 26

## 2024-01-01 PROCEDURE — 74019 RADEX ABDOMEN 2 VIEWS: CPT | Mod: 26

## 2024-01-01 RX ORDER — ALCOHOL ANTISEPTIC PADS
50 PADS, MEDICATED (EA) TOPICAL ONCE
Refills: 0 | Status: COMPLETED | OUTPATIENT
Start: 2024-01-01 | End: 2024-01-01

## 2024-01-01 RX ORDER — AZELASTINE HYDROCHLORIDE OPHTHALMIC SOLUTION 0.05% 0.5 MG/ML
1 SOLUTION/ DROPS OPHTHALMIC
Refills: 0 | DISCHARGE

## 2024-01-01 RX ORDER — CEFTRIAXONE SODIUM 1 G
1000 VIAL (EA) INJECTION ONCE
Refills: 0 | Status: COMPLETED | OUTPATIENT
Start: 2024-01-01 | End: 2024-01-01

## 2024-01-01 RX ORDER — FLUTICASONE PROPION/SALMETEROL 100-50 MCG
1 BLISTER, WITH INHALATION DEVICE INHALATION
Refills: 0 | Status: DISCONTINUED | OUTPATIENT
Start: 2024-01-01 | End: 2024-01-01

## 2024-01-01 RX ORDER — ALBUTEROL 90 MCG
2 AEROSOL (GRAM) INHALATION
Refills: 0 | DISCHARGE

## 2024-01-01 RX ORDER — IPRATROPIUM BROMIDE AND ALBUTEROL SULFATE .5; 3 MG/3ML; MG/3ML
3 SOLUTION RESPIRATORY (INHALATION) EVERY 6 HOURS
Refills: 0 | Status: DISCONTINUED | OUTPATIENT
Start: 2024-01-01 | End: 2024-01-01

## 2024-01-01 RX ORDER — CEFEPIME 2 G/1
1000 INJECTION, POWDER, FOR SOLUTION INTRAVENOUS ONCE
Refills: 0 | Status: DISCONTINUED | OUTPATIENT
Start: 2024-01-01 | End: 2024-01-01

## 2024-01-01 RX ORDER — PANTOPRAZOLE SODIUM 40 MG/1
40 TABLET, DELAYED RELEASE ORAL
Refills: 0 | Status: DISCONTINUED | OUTPATIENT
Start: 2024-01-01 | End: 2024-01-01

## 2024-01-01 RX ORDER — TORSEMIDE 10 MG/1
1 TABLET ORAL
Refills: 0 | DISCHARGE

## 2024-01-01 RX ORDER — ATORVASTATIN CALCIUM 10 MG/1
20 TABLET, FILM COATED ORAL AT BEDTIME
Refills: 0 | Status: DISCONTINUED | OUTPATIENT
Start: 2024-01-01 | End: 2024-01-01

## 2024-01-01 RX ORDER — ALBUTEROL 90 MCG
2.5 AEROSOL (GRAM) INHALATION ONCE
Refills: 0 | Status: DISCONTINUED | OUTPATIENT
Start: 2024-01-01 | End: 2024-01-01

## 2024-01-01 RX ORDER — CARVEDILOL 3.125 MG
1 TABLET ORAL
Refills: 0 | DISCHARGE

## 2024-01-01 RX ORDER — ALBUTEROL 90 MCG
2.5 AEROSOL (GRAM) INHALATION ONCE
Refills: 0 | Status: COMPLETED | OUTPATIENT
Start: 2024-01-01 | End: 2024-01-01

## 2024-01-01 RX ORDER — BISACODYL 5 MG/1
5 TABLET, COATED ORAL EVERY 12 HOURS
Refills: 0 | Status: DISCONTINUED | OUTPATIENT
Start: 2024-01-01 | End: 2024-01-01

## 2024-01-01 RX ORDER — SALINE LAXATIVE 7; 19 G/118ML; G/118ML
1 ENEMA RECTAL ONCE
Refills: 0 | Status: COMPLETED | OUTPATIENT
Start: 2024-01-01 | End: 2024-01-01

## 2024-01-01 RX ORDER — LOSARTAN POTASSIUM 100 MG/1
25 TABLET, FILM COATED ORAL DAILY
Refills: 0 | Status: DISCONTINUED | OUTPATIENT
Start: 2024-01-01 | End: 2024-01-01

## 2024-01-01 RX ORDER — MONTELUKAST SODIUM 10 MG/1
1 TABLET, FILM COATED ORAL
Refills: 0 | DISCHARGE

## 2024-01-01 RX ORDER — FUROSEMIDE 10 MG/ML
40 INJECTION INTRAVENOUS ONCE
Refills: 0 | Status: COMPLETED | OUTPATIENT
Start: 2024-01-01 | End: 2024-01-01

## 2024-01-01 RX ORDER — FLUTICASONE PROPION/SALMETEROL 100-50 MCG
1 BLISTER, WITH INHALATION DEVICE INHALATION
Refills: 0 | DISCHARGE

## 2024-01-01 RX ORDER — LATANOPROSTENE BUNOD 0.24 MG/ML
1 SOLUTION/ DROPS OPHTHALMIC
Refills: 0 | DISCHARGE

## 2024-01-01 RX ORDER — APIXABAN 5 MG/1
5 TABLET, FILM COATED ORAL
Refills: 0 | Status: DISCONTINUED | OUTPATIENT
Start: 2024-01-01 | End: 2024-01-01

## 2024-01-01 RX ORDER — SENNOSIDES 8.6 MG
2 TABLET ORAL AT BEDTIME
Refills: 0 | Status: DISCONTINUED | OUTPATIENT
Start: 2024-01-01 | End: 2024-01-01

## 2024-01-01 RX ORDER — CARVEDILOL 3.125 MG
12.5 TABLET ORAL EVERY 12 HOURS
Refills: 0 | Status: DISCONTINUED | OUTPATIENT
Start: 2024-01-01 | End: 2024-01-01

## 2024-01-01 RX ORDER — CEFEPIME 2 G/1
INJECTION, POWDER, FOR SOLUTION INTRAVENOUS
Refills: 0 | Status: DISCONTINUED | OUTPATIENT
Start: 2024-01-01 | End: 2024-01-01

## 2024-01-01 RX ORDER — SODIUM CHLORIDE IRRIG SOLUTION 0.9 %
1000 SOLUTION, IRRIGATION IRRIGATION
Refills: 0 | Status: DISCONTINUED | OUTPATIENT
Start: 2024-01-01 | End: 2024-01-01

## 2024-01-01 RX ORDER — LATANOPROST 50 UG/ML
1 SOLUTION OPHTHALMIC AT BEDTIME
Refills: 0 | Status: DISCONTINUED | OUTPATIENT
Start: 2024-01-01 | End: 2024-01-01

## 2024-01-01 RX ORDER — FUROSEMIDE 10 MG/ML
40 INJECTION INTRAVENOUS
Refills: 0 | Status: DISCONTINUED | OUTPATIENT
Start: 2024-01-01 | End: 2024-01-01

## 2024-01-01 RX ORDER — CEFTRIAXONE SODIUM 1 G
1000 VIAL (EA) INJECTION EVERY 24 HOURS
Refills: 0 | Status: DISCONTINUED | OUTPATIENT
Start: 2024-01-01 | End: 2024-01-01

## 2024-01-01 RX ORDER — CARVEDILOL 3.125 MG
6.25 TABLET ORAL EVERY 12 HOURS
Refills: 0 | Status: DISCONTINUED | OUTPATIENT
Start: 2024-01-01 | End: 2024-01-01

## 2024-01-01 RX ORDER — FUROSEMIDE 10 MG/ML
20 INJECTION INTRAVENOUS ONCE
Refills: 0 | Status: COMPLETED | OUTPATIENT
Start: 2024-01-01 | End: 2024-01-01

## 2024-01-01 RX ORDER — CEFTRIAXONE SODIUM 1 G
VIAL (EA) INJECTION
Refills: 0 | Status: DISCONTINUED | OUTPATIENT
Start: 2024-01-01 | End: 2024-01-01

## 2024-01-01 RX ORDER — CEFEPIME 2 G/1
1000 INJECTION, POWDER, FOR SOLUTION INTRAVENOUS EVERY 8 HOURS
Refills: 0 | Status: DISCONTINUED | OUTPATIENT
Start: 2024-01-01 | End: 2024-01-01

## 2024-01-01 RX ORDER — CEFEPIME 2 G/1
1000 INJECTION, POWDER, FOR SOLUTION INTRAVENOUS ONCE
Refills: 0 | Status: COMPLETED | OUTPATIENT
Start: 2024-01-01 | End: 2024-01-01

## 2024-01-01 RX ORDER — BRINZOLAMIDE/BRIMONIDINE TARTRATE 10; 2 MG/ML; MG/ML
1 SUSPENSION/ DROPS OPHTHALMIC
Refills: 0 | DISCHARGE

## 2024-01-01 RX ORDER — KETOTIFEN FUMARATE 0.25 MG/ML
1 SOLUTION OPHTHALMIC
Refills: 0 | Status: DISCONTINUED | OUTPATIENT
Start: 2024-01-01 | End: 2024-01-01

## 2024-01-01 RX ORDER — INSULIN REGULAR, HUMAN 100/ML
5 VIAL (ML) INJECTION ONCE
Refills: 0 | Status: COMPLETED | OUTPATIENT
Start: 2024-01-01 | End: 2024-01-01

## 2024-01-01 RX ORDER — FAMOTIDINE 40 MG
20 TABLET ORAL DAILY
Refills: 0 | Status: DISCONTINUED | OUTPATIENT
Start: 2024-01-01 | End: 2024-01-01

## 2024-01-01 RX ORDER — MONTELUKAST SODIUM 10 MG/1
10 TABLET, FILM COATED ORAL DAILY
Refills: 0 | Status: DISCONTINUED | OUTPATIENT
Start: 2024-01-01 | End: 2024-01-01

## 2024-01-01 RX ORDER — ACETYLCYSTEINE
4 POWDER (GRAM) MISCELLANEOUS EVERY 6 HOURS
Refills: 0 | Status: DISCONTINUED | OUTPATIENT
Start: 2024-01-01 | End: 2024-01-01

## 2024-01-01 RX ORDER — ALBUTEROL 90 MCG
2 AEROSOL (GRAM) INHALATION THREE TIMES A DAY
Refills: 0 | Status: DISCONTINUED | OUTPATIENT
Start: 2024-01-01 | End: 2024-01-01

## 2024-01-01 RX ORDER — ATORVASTATIN CALCIUM 10 MG/1
1 TABLET, FILM COATED ORAL
Refills: 0 | DISCHARGE

## 2024-01-01 RX ORDER — LATANOPROST 50 UG/ML
1 SOLUTION OPHTHALMIC
Refills: 0 | DISCHARGE

## 2024-01-01 RX ORDER — SODIUM ZIRCONIUM CYCLOSILICATE 10 G/10G
10 POWDER, FOR SUSPENSION ORAL ONCE
Refills: 0 | Status: COMPLETED | OUTPATIENT
Start: 2024-01-01 | End: 2024-01-01

## 2024-01-01 RX ORDER — APIXABAN 5 MG/1
1 TABLET, FILM COATED ORAL
Refills: 0 | DISCHARGE

## 2024-01-01 RX ORDER — ACETAMINOPHEN 325 MG
650 TABLET ORAL EVERY 6 HOURS
Refills: 0 | Status: DISCONTINUED | OUTPATIENT
Start: 2024-01-01 | End: 2024-01-01

## 2024-01-01 RX ORDER — LOSARTAN POTASSIUM 100 MG/1
1 TABLET, FILM COATED ORAL
Refills: 0 | DISCHARGE

## 2024-01-01 RX ORDER — IRON SUCROSE 20 MG/ML
200 INJECTION, SOLUTION INTRAVENOUS EVERY 24 HOURS
Refills: 0 | Status: COMPLETED | OUTPATIENT
Start: 2024-01-01 | End: 2024-01-01

## 2024-01-01 RX ORDER — SODIUM BICARBONATE 650 MG
100 TABLET ORAL ONCE
Refills: 0 | Status: COMPLETED | OUTPATIENT
Start: 2024-01-01 | End: 2024-01-01

## 2024-01-01 RX ORDER — INFLUENZA VIRUS VACCINE 15; 15; 15; 15 UG/.5ML; UG/.5ML; UG/.5ML; UG/.5ML
0.5 SUSPENSION INTRAMUSCULAR ONCE
Refills: 0 | Status: DISCONTINUED | OUTPATIENT
Start: 2024-01-01 | End: 2024-01-01

## 2024-01-01 RX ORDER — CARVEDILOL 3.125 MG
6.25 TABLET ORAL ONCE
Refills: 0 | Status: COMPLETED | OUTPATIENT
Start: 2024-01-01 | End: 2024-01-01

## 2024-01-01 RX ADMIN — IRON SUCROSE 110 MILLIGRAM(S): 20 INJECTION, SOLUTION INTRAVENOUS at 12:45

## 2024-01-01 RX ADMIN — Medication 100 MILLIGRAM(S): at 15:11

## 2024-01-01 RX ADMIN — SALINE LAXATIVE 1 ENEMA: 7; 19 ENEMA RECTAL at 22:15

## 2024-01-01 RX ADMIN — Medication 20 MILLIGRAM(S): at 12:19

## 2024-01-01 RX ADMIN — Medication 2 TABLET(S): at 21:28

## 2024-01-01 RX ADMIN — Medication 17 GRAM(S): at 12:01

## 2024-01-01 RX ADMIN — KETOTIFEN FUMARATE 1 DROP(S): 0.25 SOLUTION OPHTHALMIC at 06:16

## 2024-01-01 RX ADMIN — Medication 12.5 MILLIGRAM(S): at 05:14

## 2024-01-01 RX ADMIN — BISACODYL 5 MILLIGRAM(S): 5 TABLET, COATED ORAL at 09:43

## 2024-01-01 RX ADMIN — IPRATROPIUM BROMIDE AND ALBUTEROL SULFATE 3 MILLILITER(S): .5; 3 SOLUTION RESPIRATORY (INHALATION) at 08:03

## 2024-01-01 RX ADMIN — APIXABAN 5 MILLIGRAM(S): 5 TABLET, FILM COATED ORAL at 05:27

## 2024-01-01 RX ADMIN — IPRATROPIUM BROMIDE AND ALBUTEROL SULFATE 3 MILLILITER(S): .5; 3 SOLUTION RESPIRATORY (INHALATION) at 03:16

## 2024-01-01 RX ADMIN — Medication 17 GRAM(S): at 12:04

## 2024-01-01 RX ADMIN — Medication 4 MILLILITER(S): at 08:07

## 2024-01-01 RX ADMIN — Medication 1 DOSE(S): at 09:42

## 2024-01-01 RX ADMIN — APIXABAN 5 MILLIGRAM(S): 5 TABLET, FILM COATED ORAL at 05:33

## 2024-01-01 RX ADMIN — CEFEPIME 100 MILLIGRAM(S): 2 INJECTION, POWDER, FOR SOLUTION INTRAVENOUS at 05:52

## 2024-01-01 RX ADMIN — Medication 1 DOSE(S): at 21:29

## 2024-01-01 RX ADMIN — FUROSEMIDE 40 MILLIGRAM(S): 10 INJECTION INTRAVENOUS at 11:45

## 2024-01-01 RX ADMIN — Medication 1 DROP(S): at 13:02

## 2024-01-01 RX ADMIN — Medication 20 MILLIGRAM(S): at 12:00

## 2024-01-01 RX ADMIN — Medication 4 MILLILITER(S): at 08:54

## 2024-01-01 RX ADMIN — IPRATROPIUM BROMIDE AND ALBUTEROL SULFATE 3 MILLILITER(S): .5; 3 SOLUTION RESPIRATORY (INHALATION) at 16:06

## 2024-01-01 RX ADMIN — KETOTIFEN FUMARATE 1 DROP(S): 0.25 SOLUTION OPHTHALMIC at 17:08

## 2024-01-01 RX ADMIN — IPRATROPIUM BROMIDE AND ALBUTEROL SULFATE 3 MILLILITER(S): .5; 3 SOLUTION RESPIRATORY (INHALATION) at 20:11

## 2024-01-01 RX ADMIN — LATANOPROST 1 DROP(S): 50 SOLUTION OPHTHALMIC at 21:09

## 2024-01-01 RX ADMIN — CEFEPIME 100 MILLIGRAM(S): 2 INJECTION, POWDER, FOR SOLUTION INTRAVENOUS at 21:39

## 2024-01-01 RX ADMIN — Medication 4 MILLILITER(S): at 02:17

## 2024-01-01 RX ADMIN — Medication 2.5 MILLIGRAM(S): at 06:12

## 2024-01-01 RX ADMIN — IPRATROPIUM BROMIDE AND ALBUTEROL SULFATE 3 MILLILITER(S): .5; 3 SOLUTION RESPIRATORY (INHALATION) at 09:24

## 2024-01-01 RX ADMIN — Medication 4 MILLILITER(S): at 16:13

## 2024-01-01 RX ADMIN — IPRATROPIUM BROMIDE AND ALBUTEROL SULFATE 3 MILLILITER(S): .5; 3 SOLUTION RESPIRATORY (INHALATION) at 02:55

## 2024-01-01 RX ADMIN — MONTELUKAST SODIUM 10 MILLIGRAM(S): 10 TABLET, FILM COATED ORAL at 12:00

## 2024-01-01 RX ADMIN — IPRATROPIUM BROMIDE AND ALBUTEROL SULFATE 3 MILLILITER(S): .5; 3 SOLUTION RESPIRATORY (INHALATION) at 02:13

## 2024-01-01 RX ADMIN — IPRATROPIUM BROMIDE AND ALBUTEROL SULFATE 3 MILLILITER(S): .5; 3 SOLUTION RESPIRATORY (INHALATION) at 14:56

## 2024-01-01 RX ADMIN — IPRATROPIUM BROMIDE AND ALBUTEROL SULFATE 3 MILLILITER(S): .5; 3 SOLUTION RESPIRATORY (INHALATION) at 10:01

## 2024-01-01 RX ADMIN — IRON SUCROSE 110 MILLIGRAM(S): 20 INJECTION, SOLUTION INTRAVENOUS at 12:00

## 2024-01-01 RX ADMIN — LATANOPROST 1 DROP(S): 50 SOLUTION OPHTHALMIC at 22:14

## 2024-01-01 RX ADMIN — Medication 1 DROP(S): at 21:09

## 2024-01-01 RX ADMIN — Medication 100 MILLIGRAM(S): at 21:29

## 2024-01-01 RX ADMIN — KETOTIFEN FUMARATE 1 DROP(S): 0.25 SOLUTION OPHTHALMIC at 05:17

## 2024-01-01 RX ADMIN — Medication 12.5 MILLIGRAM(S): at 07:58

## 2024-01-01 RX ADMIN — Medication 6.25 MILLIGRAM(S): at 06:16

## 2024-01-01 RX ADMIN — Medication 100 MILLIGRAM(S): at 10:03

## 2024-01-01 RX ADMIN — KETOTIFEN FUMARATE 1 DROP(S): 0.25 SOLUTION OPHTHALMIC at 17:16

## 2024-01-01 RX ADMIN — Medication 100 MILLIGRAM(S): at 07:24

## 2024-01-01 RX ADMIN — Medication 4 MILLILITER(S): at 21:58

## 2024-01-01 RX ADMIN — Medication 1 DOSE(S): at 21:30

## 2024-01-01 RX ADMIN — Medication 1 DOSE(S): at 12:19

## 2024-01-01 RX ADMIN — IPRATROPIUM BROMIDE AND ALBUTEROL SULFATE 3 MILLILITER(S): .5; 3 SOLUTION RESPIRATORY (INHALATION) at 20:03

## 2024-01-01 RX ADMIN — Medication 5 UNIT(S): at 06:28

## 2024-01-01 RX ADMIN — Medication 1 DROP(S): at 13:22

## 2024-01-01 RX ADMIN — Medication 1 DROP(S): at 06:17

## 2024-01-01 RX ADMIN — Medication 17 GRAM(S): at 12:35

## 2024-01-01 RX ADMIN — Medication 40 MILLIEQUIVALENT(S): at 09:46

## 2024-01-01 RX ADMIN — Medication 2 TABLET(S): at 22:13

## 2024-01-01 RX ADMIN — Medication 1 DOSE(S): at 12:46

## 2024-01-01 RX ADMIN — APIXABAN 5 MILLIGRAM(S): 5 TABLET, FILM COATED ORAL at 17:16

## 2024-01-01 RX ADMIN — KETOTIFEN FUMARATE 1 DROP(S): 0.25 SOLUTION OPHTHALMIC at 17:14

## 2024-01-01 RX ADMIN — FUROSEMIDE 40 MILLIGRAM(S): 10 INJECTION INTRAVENOUS at 04:42

## 2024-01-01 RX ADMIN — Medication 4 MILLILITER(S): at 20:11

## 2024-01-01 RX ADMIN — IPRATROPIUM BROMIDE AND ALBUTEROL SULFATE 3 MILLILITER(S): .5; 3 SOLUTION RESPIRATORY (INHALATION) at 08:22

## 2024-01-01 RX ADMIN — IRON SUCROSE 110 MILLIGRAM(S): 20 INJECTION, SOLUTION INTRAVENOUS at 12:36

## 2024-01-01 RX ADMIN — Medication 1 DOSE(S): at 22:16

## 2024-01-01 RX ADMIN — IPRATROPIUM BROMIDE AND ALBUTEROL SULFATE 3 MILLILITER(S): .5; 3 SOLUTION RESPIRATORY (INHALATION) at 16:03

## 2024-01-01 RX ADMIN — Medication 2 TABLET(S): at 21:33

## 2024-01-01 RX ADMIN — APIXABAN 5 MILLIGRAM(S): 5 TABLET, FILM COATED ORAL at 05:13

## 2024-01-01 RX ADMIN — Medication 6.25 MILLIGRAM(S): at 10:55

## 2024-01-01 RX ADMIN — Medication 12.5 MILLIGRAM(S): at 18:54

## 2024-01-01 RX ADMIN — Medication 12.5 MILLIGRAM(S): at 05:33

## 2024-01-01 RX ADMIN — Medication 1 DROP(S): at 05:17

## 2024-01-01 RX ADMIN — KETOTIFEN FUMARATE 1 DROP(S): 0.25 SOLUTION OPHTHALMIC at 05:34

## 2024-01-01 RX ADMIN — Medication 1 DROP(S): at 13:09

## 2024-01-01 RX ADMIN — Medication 12.5 MILLIGRAM(S): at 17:16

## 2024-01-01 RX ADMIN — Medication 4 MILLILITER(S): at 03:05

## 2024-01-01 RX ADMIN — Medication 100 MILLIGRAM(S): at 21:28

## 2024-01-01 RX ADMIN — APIXABAN 5 MILLIGRAM(S): 5 TABLET, FILM COATED ORAL at 07:58

## 2024-01-01 RX ADMIN — IPRATROPIUM BROMIDE AND ALBUTEROL SULFATE 3 MILLILITER(S): .5; 3 SOLUTION RESPIRATORY (INHALATION) at 02:48

## 2024-01-01 RX ADMIN — KETOTIFEN FUMARATE 1 DROP(S): 0.25 SOLUTION OPHTHALMIC at 18:56

## 2024-01-01 RX ADMIN — KETOTIFEN FUMARATE 1 DROP(S): 0.25 SOLUTION OPHTHALMIC at 19:44

## 2024-01-01 RX ADMIN — BISACODYL 5 MILLIGRAM(S): 5 TABLET, COATED ORAL at 06:31

## 2024-01-01 RX ADMIN — ATORVASTATIN CALCIUM 20 MILLIGRAM(S): 10 TABLET, FILM COATED ORAL at 21:29

## 2024-01-01 RX ADMIN — Medication 17 GRAM(S): at 15:08

## 2024-01-01 RX ADMIN — CEFEPIME 100 MILLIGRAM(S): 2 INJECTION, POWDER, FOR SOLUTION INTRAVENOUS at 21:29

## 2024-01-01 RX ADMIN — Medication 1 DROP(S): at 21:28

## 2024-01-01 RX ADMIN — KETOTIFEN FUMARATE 1 DROP(S): 0.25 SOLUTION OPHTHALMIC at 05:16

## 2024-01-01 RX ADMIN — ATORVASTATIN CALCIUM 20 MILLIGRAM(S): 10 TABLET, FILM COATED ORAL at 21:30

## 2024-01-01 RX ADMIN — CEFEPIME 100 MILLIGRAM(S): 2 INJECTION, POWDER, FOR SOLUTION INTRAVENOUS at 21:33

## 2024-01-01 RX ADMIN — Medication 17 GRAM(S): at 12:20

## 2024-01-01 RX ADMIN — FUROSEMIDE 20 MILLIGRAM(S): 10 INJECTION INTRAVENOUS at 19:44

## 2024-01-01 RX ADMIN — APIXABAN 5 MILLIGRAM(S): 5 TABLET, FILM COATED ORAL at 05:52

## 2024-01-01 RX ADMIN — Medication 2 TABLET(S): at 21:29

## 2024-01-01 RX ADMIN — KETOTIFEN FUMARATE 1 DROP(S): 0.25 SOLUTION OPHTHALMIC at 05:26

## 2024-01-01 RX ADMIN — Medication 100 MILLIGRAM(S): at 05:29

## 2024-01-01 RX ADMIN — Medication 2.5 MILLIGRAM(S): at 18:17

## 2024-01-01 RX ADMIN — Medication 20 MILLIGRAM(S): at 12:34

## 2024-01-01 RX ADMIN — Medication 17 GRAM(S): at 13:30

## 2024-01-01 RX ADMIN — KETOTIFEN FUMARATE 1 DROP(S): 0.25 SOLUTION OPHTHALMIC at 19:10

## 2024-01-01 RX ADMIN — MONTELUKAST SODIUM 10 MILLIGRAM(S): 10 TABLET, FILM COATED ORAL at 12:39

## 2024-01-01 RX ADMIN — CEFEPIME 100 MILLIGRAM(S): 2 INJECTION, POWDER, FOR SOLUTION INTRAVENOUS at 05:16

## 2024-01-01 RX ADMIN — KETOTIFEN FUMARATE 1 DROP(S): 0.25 SOLUTION OPHTHALMIC at 18:54

## 2024-01-01 RX ADMIN — APIXABAN 5 MILLIGRAM(S): 5 TABLET, FILM COATED ORAL at 18:56

## 2024-01-01 RX ADMIN — Medication 1 DROP(S): at 14:46

## 2024-01-01 RX ADMIN — Medication 1 DROP(S): at 05:34

## 2024-01-01 RX ADMIN — Medication 100 MILLIGRAM(S): at 13:12

## 2024-01-01 RX ADMIN — Medication 100 MILLIGRAM(S): at 11:03

## 2024-01-01 RX ADMIN — Medication 1 DROP(S): at 05:26

## 2024-01-01 RX ADMIN — Medication 12.5 MILLIGRAM(S): at 05:52

## 2024-01-01 RX ADMIN — Medication 100 MILLIGRAM(S): at 22:42

## 2024-01-01 RX ADMIN — LATANOPROST 1 DROP(S): 50 SOLUTION OPHTHALMIC at 21:30

## 2024-01-01 RX ADMIN — MONTELUKAST SODIUM 10 MILLIGRAM(S): 10 TABLET, FILM COATED ORAL at 11:17

## 2024-01-01 RX ADMIN — KETOTIFEN FUMARATE 1 DROP(S): 0.25 SOLUTION OPHTHALMIC at 05:52

## 2024-01-01 RX ADMIN — MONTELUKAST SODIUM 10 MILLIGRAM(S): 10 TABLET, FILM COATED ORAL at 12:04

## 2024-01-01 RX ADMIN — IPRATROPIUM BROMIDE AND ALBUTEROL SULFATE 3 MILLILITER(S): .5; 3 SOLUTION RESPIRATORY (INHALATION) at 16:11

## 2024-01-01 RX ADMIN — Medication 100 MILLIEQUIVALENT(S): at 06:12

## 2024-01-01 RX ADMIN — Medication 1 DROP(S): at 07:58

## 2024-01-01 RX ADMIN — CEFEPIME 100 MILLIGRAM(S): 2 INJECTION, POWDER, FOR SOLUTION INTRAVENOUS at 06:39

## 2024-01-01 RX ADMIN — APIXABAN 5 MILLIGRAM(S): 5 TABLET, FILM COATED ORAL at 18:08

## 2024-01-01 RX ADMIN — IPRATROPIUM BROMIDE AND ALBUTEROL SULFATE 3 MILLILITER(S): .5; 3 SOLUTION RESPIRATORY (INHALATION) at 20:43

## 2024-01-01 RX ADMIN — Medication 1 DROP(S): at 05:15

## 2024-01-01 RX ADMIN — Medication 1000 MILLIGRAM(S): at 11:02

## 2024-01-01 RX ADMIN — Medication 1 DOSE(S): at 21:31

## 2024-01-01 RX ADMIN — Medication 100 MILLIGRAM(S): at 14:00

## 2024-01-01 RX ADMIN — Medication 6.25 MILLIGRAM(S): at 18:10

## 2024-01-01 RX ADMIN — Medication 1 DROP(S): at 05:52

## 2024-01-01 RX ADMIN — LATANOPROST 1 DROP(S): 50 SOLUTION OPHTHALMIC at 21:32

## 2024-01-01 RX ADMIN — APIXABAN 5 MILLIGRAM(S): 5 TABLET, FILM COATED ORAL at 06:16

## 2024-01-01 RX ADMIN — Medication 4 MILLILITER(S): at 02:56

## 2024-01-01 RX ADMIN — Medication 4 MILLILITER(S): at 15:26

## 2024-01-01 RX ADMIN — Medication 1 DOSE(S): at 11:17

## 2024-01-01 RX ADMIN — IPRATROPIUM BROMIDE AND ALBUTEROL SULFATE 3 MILLILITER(S): .5; 3 SOLUTION RESPIRATORY (INHALATION) at 03:05

## 2024-01-01 RX ADMIN — Medication 20 MILLIGRAM(S): at 12:04

## 2024-01-01 RX ADMIN — CEFEPIME 100 MILLIGRAM(S): 2 INJECTION, POWDER, FOR SOLUTION INTRAVENOUS at 13:03

## 2024-01-01 RX ADMIN — Medication 4 MILLILITER(S): at 14:58

## 2024-01-01 RX ADMIN — Medication 17 GRAM(S): at 11:17

## 2024-01-01 RX ADMIN — Medication 1 DOSE(S): at 21:34

## 2024-01-01 RX ADMIN — IPRATROPIUM BROMIDE AND ALBUTEROL SULFATE 3 MILLILITER(S): .5; 3 SOLUTION RESPIRATORY (INHALATION) at 20:09

## 2024-01-01 RX ADMIN — IPRATROPIUM BROMIDE AND ALBUTEROL SULFATE 3 MILLILITER(S): .5; 3 SOLUTION RESPIRATORY (INHALATION) at 20:16

## 2024-01-01 RX ADMIN — Medication 4 MILLILITER(S): at 02:49

## 2024-01-01 RX ADMIN — Medication 1 DROP(S): at 22:14

## 2024-01-01 RX ADMIN — Medication 4 MILLILITER(S): at 20:16

## 2024-01-01 RX ADMIN — Medication 20 MILLIGRAM(S): at 12:46

## 2024-01-01 RX ADMIN — IPRATROPIUM BROMIDE AND ALBUTEROL SULFATE 3 MILLILITER(S): .5; 3 SOLUTION RESPIRATORY (INHALATION) at 14:54

## 2024-01-01 RX ADMIN — IPRATROPIUM BROMIDE AND ALBUTEROL SULFATE 3 MILLILITER(S): .5; 3 SOLUTION RESPIRATORY (INHALATION) at 15:22

## 2024-01-01 RX ADMIN — APIXABAN 5 MILLIGRAM(S): 5 TABLET, FILM COATED ORAL at 19:44

## 2024-01-01 RX ADMIN — Medication 4 MILLILITER(S): at 20:10

## 2024-01-01 RX ADMIN — Medication 1 DOSE(S): at 21:08

## 2024-01-01 RX ADMIN — Medication 6.25 MILLIGRAM(S): at 05:26

## 2024-01-01 RX ADMIN — SODIUM ZIRCONIUM CYCLOSILICATE 10 GRAM(S): 10 POWDER, FOR SUSPENSION ORAL at 06:14

## 2024-01-01 RX ADMIN — Medication 1 DROP(S): at 21:30

## 2024-01-01 RX ADMIN — Medication 20 MILLIGRAM(S): at 11:17

## 2024-01-01 RX ADMIN — Medication 100 MILLIGRAM(S): at 05:13

## 2024-01-01 RX ADMIN — Medication 12.5 MILLIGRAM(S): at 19:44

## 2024-01-01 RX ADMIN — APIXABAN 5 MILLIGRAM(S): 5 TABLET, FILM COATED ORAL at 05:16

## 2024-01-01 RX ADMIN — LATANOPROST 1 DROP(S): 50 SOLUTION OPHTHALMIC at 21:33

## 2024-01-01 RX ADMIN — Medication 50 MILLILITER(S): at 06:12

## 2024-01-01 RX ADMIN — Medication 4 MILLILITER(S): at 02:54

## 2024-01-01 RX ADMIN — Medication 2 TABLET(S): at 21:08

## 2024-01-01 RX ADMIN — CEFEPIME 100 MILLIGRAM(S): 2 INJECTION, POWDER, FOR SOLUTION INTRAVENOUS at 14:00

## 2024-01-01 RX ADMIN — Medication 1 DOSE(S): at 10:02

## 2024-01-01 RX ADMIN — KETOTIFEN FUMARATE 1 DROP(S): 0.25 SOLUTION OPHTHALMIC at 07:59

## 2024-01-01 RX ADMIN — IPRATROPIUM BROMIDE AND ALBUTEROL SULFATE 3 MILLILITER(S): .5; 3 SOLUTION RESPIRATORY (INHALATION) at 21:39

## 2024-01-01 RX ADMIN — APIXABAN 5 MILLIGRAM(S): 5 TABLET, FILM COATED ORAL at 17:14

## 2024-01-01 RX ADMIN — Medication 12.5 MILLIGRAM(S): at 17:08

## 2024-01-01 RX ADMIN — MONTELUKAST SODIUM 10 MILLIGRAM(S): 10 TABLET, FILM COATED ORAL at 12:46

## 2024-01-01 RX ADMIN — APIXABAN 5 MILLIGRAM(S): 5 TABLET, FILM COATED ORAL at 18:54

## 2024-01-01 RX ADMIN — Medication 100 MILLIGRAM(S): at 22:14

## 2024-01-01 RX ADMIN — MONTELUKAST SODIUM 10 MILLIGRAM(S): 10 TABLET, FILM COATED ORAL at 12:19

## 2024-01-01 RX ADMIN — Medication 1 DOSE(S): at 13:30

## 2024-01-01 RX ADMIN — MONTELUKAST SODIUM 10 MILLIGRAM(S): 10 TABLET, FILM COATED ORAL at 12:34

## 2024-01-01 RX ADMIN — ATORVASTATIN CALCIUM 20 MILLIGRAM(S): 10 TABLET, FILM COATED ORAL at 21:33

## 2024-01-01 RX ADMIN — ATORVASTATIN CALCIUM 20 MILLIGRAM(S): 10 TABLET, FILM COATED ORAL at 22:13

## 2024-01-01 RX ADMIN — Medication 2 TABLET(S): at 21:30

## 2024-01-01 RX ADMIN — CEFEPIME 100 MILLIGRAM(S): 2 INJECTION, POWDER, FOR SOLUTION INTRAVENOUS at 15:48

## 2024-01-01 RX ADMIN — IPRATROPIUM BROMIDE AND ALBUTEROL SULFATE 3 MILLILITER(S): .5; 3 SOLUTION RESPIRATORY (INHALATION) at 02:54

## 2024-01-01 RX ADMIN — Medication 100 MILLIGRAM(S): at 13:01

## 2024-01-01 RX ADMIN — LATANOPROST 1 DROP(S): 50 SOLUTION OPHTHALMIC at 21:28

## 2024-01-01 RX ADMIN — Medication 4 MILLILITER(S): at 20:43

## 2024-01-01 RX ADMIN — Medication 100 MILLIGRAM(S): at 05:52

## 2024-01-01 RX ADMIN — Medication 1 DOSE(S): at 21:40

## 2024-01-01 RX ADMIN — ATORVASTATIN CALCIUM 20 MILLIGRAM(S): 10 TABLET, FILM COATED ORAL at 21:08

## 2024-01-01 RX ADMIN — Medication 1 DROP(S): at 14:00

## 2024-01-01 RX ADMIN — Medication 1 DROP(S): at 21:31

## 2024-01-01 RX ADMIN — ATORVASTATIN CALCIUM 20 MILLIGRAM(S): 10 TABLET, FILM COATED ORAL at 21:28

## 2024-01-01 RX ADMIN — Medication 1 DOSE(S): at 10:55

## 2024-01-01 RX ADMIN — Medication 20 MILLIGRAM(S): at 12:39

## 2024-01-01 RX ADMIN — LATANOPROST 1 DROP(S): 50 SOLUTION OPHTHALMIC at 21:31

## 2024-01-01 RX ADMIN — Medication 1 DROP(S): at 15:08

## 2024-01-01 RX ADMIN — IPRATROPIUM BROMIDE AND ALBUTEROL SULFATE 3 MILLILITER(S): .5; 3 SOLUTION RESPIRATORY (INHALATION) at 08:54

## 2024-01-01 RX ADMIN — Medication 4 MILLILITER(S): at 14:57

## 2024-01-01 RX ADMIN — APIXABAN 5 MILLIGRAM(S): 5 TABLET, FILM COATED ORAL at 17:07

## 2024-01-01 RX ADMIN — Medication 1 DROP(S): at 21:34

## 2024-01-01 RX ADMIN — Medication 4 MILLILITER(S): at 09:24

## 2024-01-01 RX ADMIN — Medication 6.25 MILLIGRAM(S): at 17:14

## 2024-04-02 ENCOUNTER — APPOINTMENT (OUTPATIENT)
Dept: HOME HEALTH SERVICES | Facility: HOME HEALTH | Age: 89
End: 2024-04-02

## 2024-04-02 VITALS
SYSTOLIC BLOOD PRESSURE: 137 MMHG | RESPIRATION RATE: 18 BRPM | HEART RATE: 78 BPM | DIASTOLIC BLOOD PRESSURE: 80 MMHG | OXYGEN SATURATION: 94 %

## 2024-04-02 RX ORDER — LATANOPROST/PF 0.005 %
0.01 DROPS OPHTHALMIC (EYE) DAILY
Refills: 0 | Status: ACTIVE | COMMUNITY
Start: 2023-03-20

## 2024-04-02 RX ORDER — AMLODIPINE BESYLATE 5 MG/1
5 TABLET ORAL
Qty: 30 | Refills: 3 | Status: ACTIVE | COMMUNITY
Start: 2018-03-08

## 2024-04-02 RX ORDER — OMEPRAZOLE 40 MG/1
40 CAPSULE, DELAYED RELEASE ORAL
Qty: 90 | Refills: 3 | Status: ACTIVE | COMMUNITY
Start: 2022-12-16

## 2024-04-02 RX ORDER — NITROGLYCERIN 0.4 MG/1
0.4 TABLET SUBLINGUAL
Qty: 1 | Refills: 3 | Status: ACTIVE | COMMUNITY
Start: 2022-12-16

## 2024-04-02 RX ORDER — DICLOFENAC SODIUM 1% 10 MG/G
1 GEL TOPICAL DAILY
Qty: 1 | Refills: 6 | Status: ACTIVE | COMMUNITY
Start: 2023-10-09

## 2024-04-02 RX ORDER — BLOOD-GLUCOSE METER
W/DEVICE EACH MISCELLANEOUS
Qty: 1 | Refills: 0 | Status: ACTIVE | COMMUNITY
Start: 2023-11-09

## 2024-04-02 RX ORDER — LORATADINE 10 MG/1
10 TABLET ORAL
Qty: 30 | Refills: 0 | Status: ACTIVE | COMMUNITY
Start: 2022-12-16

## 2024-04-02 RX ORDER — TORSEMIDE 10 MG/1
10 TABLET ORAL DAILY
Qty: 90 | Refills: 3 | Status: ACTIVE | COMMUNITY

## 2024-04-02 RX ORDER — BRIMONIDINE TARTRATE, TIMOLOL MALEATE 2; 5 MG/ML; MG/ML
0.2-0.5 SOLUTION/ DROPS TOPICAL
Qty: 1 | Refills: 5 | Status: ACTIVE | COMMUNITY
Start: 2023-03-20

## 2024-04-02 RX ORDER — ATORVASTATIN CALCIUM 20 MG/1
20 TABLET, FILM COATED ORAL
Qty: 90 | Refills: 3 | Status: ACTIVE | COMMUNITY
Start: 2023-08-29

## 2024-04-02 RX ORDER — RIVAROXABAN 10 MG/1
10 TABLET, FILM COATED ORAL
Qty: 90 | Refills: 3 | Status: ACTIVE | COMMUNITY
Start: 2022-12-16

## 2024-04-02 RX ORDER — LETROZOLE TABLETS 2.5 MG/1
2.5 TABLET, FILM COATED ORAL DAILY
Refills: 0 | Status: ACTIVE | COMMUNITY
Start: 2023-08-29

## 2024-04-02 RX ORDER — MONTELUKAST 10 MG/1
10 TABLET, FILM COATED ORAL DAILY
Refills: 0 | Status: ACTIVE | COMMUNITY

## 2024-04-02 RX ORDER — CARVEDILOL 3.12 MG/1
3.12 TABLET, FILM COATED ORAL TWICE DAILY
Refills: 0 | Status: ACTIVE | COMMUNITY

## 2024-04-02 RX ORDER — ACETAMINOPHEN 325 MG/1
325 TABLET ORAL
Refills: 0 | Status: ACTIVE | COMMUNITY
Start: 2023-03-20

## 2024-04-02 RX ORDER — BLOOD SUGAR DIAGNOSTIC
STRIP MISCELLANEOUS
Qty: 50 | Refills: 0 | Status: ACTIVE | COMMUNITY
Start: 2023-11-09

## 2024-04-02 RX ORDER — SITAGLIPTIN 50 MG/1
50 TABLET, FILM COATED ORAL DAILY
Refills: 0 | Status: ACTIVE | COMMUNITY
Start: 2023-03-20

## 2024-04-02 RX ORDER — IPRATROPIUM BROMIDE AND ALBUTEROL SULFATE 2.5; .5 MG/3ML; MG/3ML
0.5-2.5 (3) SOLUTION RESPIRATORY (INHALATION)
Refills: 0 | Status: ACTIVE | COMMUNITY
Start: 2023-03-20

## 2024-04-02 RX ORDER — TIZANIDINE 2 MG/1
2 TABLET ORAL EVERY 8 HOURS
Refills: 0 | Status: ACTIVE | COMMUNITY
Start: 2023-08-29

## 2024-04-02 RX ORDER — LOSARTAN POTASSIUM 100 MG/1
100 TABLET, FILM COATED ORAL
Qty: 90 | Refills: 3 | Status: ACTIVE | COMMUNITY
Start: 2022-12-16

## 2024-04-02 NOTE — ED ADULT NURSE NOTE - HARM RISK FACTORS
Refill request for Atorvastatin 40 mg from [facility] [facility] [employer] [facility].  I cannot find this on the patient's list of medications (promise I am not blind).   Is patient taking this and just not on list   no

## 2024-04-25 NOTE — PROGRESS NOTE ADULT - ATTENDING COMMENTS
Bulging [] Right [] Left []  Billateral    [] Oropharynx [] Clear [] Red [] Exudate [] Swollen    [x] No adenopathy [] Adenopathy __________    [x] Lungs clear with good movement and effort  [x] Breathing appears normal     [] Speaks in complete sentences  [] Appears tachypneic   [] Wheezing           [] Rhonchi   [] Decreased    [] CV RRR  [] No Murmur  [] Murmur  [] Irregular  [] Tachycardic    [] OTHER:  1}      TESTS ORDERED:    [] POCT FLU  [] POCT STREP  [] COVID-19 Test sent  [] Appointment made at testing clinic for patient to get a COVID test.       TEST RESULTS:    POCT FLU test:  [] Positive  [] Negative  POCT STREP test:  [] Positive  [] Negative    ASSESSMENT:  [] Allergic Rhinitis  [] Asthma Exacerbation conjunctivitis   [] Bronchitis  [] COPD Exacerbation  [] Gastroenteritis  [] Influenza  [] Sinusitis  [] Strep Throat [] Sore Throat  [] Viral URI   [] Possible COVID-19   [] Exposure to COVID -19  [] Positive for COVID  [] Screening for Viral Disease (COVID test no sx)        Arturo was seen today for congestion.    Diagnoses and all orders for this visit:    Non-recurrent acute suppurative otitis media of left ear without spontaneous rupture of tympanic membrane  -     amoxicillin (AMOXIL) 250 MG/5ML suspension; Take 5 mLs by mouth 3 times daily for 10 days    Acute conjunctivitis of both eyes, unspecified acute conjunctivitis type              [x] Low risk for complications from COVID 19  [] Moderate risk for complications from COVID 19  [] High risk for complications from COVID 19    PLAN:    [] Discharge home with written instructions for:  [] Flu management  [] Strep throat management  [] Viral respiratory illness management  [] Sinusitis management  [] Bronchitis Management  [] Possible COVID-19 infection with self-quarantine and management of symptoms  [] Follow-up with primary care physician or emergency department if worsens  [] Note given for work    [] Referred to emergency department for  Patient is alert, ambulatory with assistance, feels better.   Plan:  Discharge home.  Hold anticoagulation, as PE was many years ago and patient has a high risk of fall.

## 2024-08-01 ENCOUNTER — TRANSCRIPTION ENCOUNTER (OUTPATIENT)
Age: 89
End: 2024-08-01

## 2024-08-01 ENCOUNTER — APPOINTMENT (OUTPATIENT)
Dept: AFTER HOURS CARE | Facility: EMERGENCY ROOM | Age: 89
End: 2024-08-01

## 2024-08-02 ENCOUNTER — TRANSCRIPTION ENCOUNTER (OUTPATIENT)
Age: 89
End: 2024-08-02

## 2024-08-02 DIAGNOSIS — R07.9 CHEST PAIN, UNSPECIFIED: ICD-10-CM

## 2024-08-02 PROCEDURE — 99215 OFFICE O/P EST HI 40 MIN: CPT | Mod: 95

## 2024-08-02 NOTE — PLAN
[FreeTextEntry1] : --Pt and family counseled at length regarding s/s that would necessitate ED eval --Labs per primary team -- recommend CBC, CMP, dimer, UA --Imaging per primary team -- recommend AXR, CXR, LLE venous duplex

## 2024-08-02 NOTE — HISTORY OF PRESENT ILLNESS
[Home] : at home, [unfilled] , at the time of the visit. [Other Location: e.g. Home (Enter Location, City,State)___] : at [unfilled] [Verbal consent obtained from patient] : the patient, [unfilled] [FreeTextEntry8] : Pt is [place]ian speaking; CP translating 89F Asthma, DM, HTN, HLD, GERD, breast CA s/p Right sided mastectomy, DVT/PE (home O2) and PAF on Xarelto c substernal neck pain starting in epigastrium to neck.  Started this morning.  Pt herself has no complaints currently.  Pain worsening with cough. Has no SOB but family gave 2L O2 for which she was Rx'd after PE and pt stated she feels somewhat better with that.  Compliant with AC; no LE swelling.  Currenlty pt has no complaints. No flu/TONI symptoms.  No n/v/d.   FS 140s  118/71, 61, 100% 2L, 98% RA, 16, 98.2  Sitting calmly Lungs CTA Abd soft NT ND DWYER Extremities WWP Of note, pt noted to have mildly swollen LLE > RLE at lower leg but family states this is baseline for her and has not worsened acutely Skin turgor is good; MMM AAOx4, NAD, nontoxic appearing  EKG NSR 70s, nl axis, RBBB, no veronica/st dep

## 2024-08-02 NOTE — ASSESSMENT
[Assessment Only] : [facility] Outcome: Assessment Only [Labs ordered] : Labs ordered [Need for ER transport confirmed] : Need for ER transport confirmed [Yes] : If the [facility] evaluation had not been available would you have advised the patient to go to the ER?  Yes [FreeTextEntry1] : Chest pain, atypical.  Broad DDx including GERD/dyspepsia/reflux, ACS, PE, PTA, PNA.   Recommended ED given potential for life threatening condition but family and pt would prefer to stay home and observe symptoms for now.  R/B/A discussed at length and they understand that if this is a life threatening condition then ideally she should be in the ED for workup/treatment.  Risks understood and pt comfortable with plan to monitor symptoms at home.

## 2024-09-18 ENCOUNTER — TRANSCRIPTION ENCOUNTER (OUTPATIENT)
Age: 89
End: 2024-09-18

## 2024-09-19 ENCOUNTER — RESULT REVIEW (OUTPATIENT)
Age: 89
End: 2024-09-19

## 2024-09-19 ENCOUNTER — NON-APPOINTMENT (OUTPATIENT)
Age: 89
End: 2024-09-19

## 2024-09-19 NOTE — ED PROVIDER NOTE - PHYSICAL EXAMINATION
Gen:  Awake, alert, NAD, elderly/frail, chronically ill appearing, NCAT  Eyes:  L eye blind, PERRL, EOMI, no icterus, normal lids/lashes, normal conjunctivae.  ENT:  External inspection normal, pink/moist membranes.   CV:  S1S2, regular rate and rhythm, no murmur/gallops/rubs, no JVD, 2+ pulses b/l, L>R LE edema, no cords/homans, good capillary refill, warm/well-perfused.  Resp:  Normal respiratory rate/effort, no respiratory distress, lungs w rales to auscultation b/l bases, no wheezing/rales/rhonchi, no retractions, no stridor.  Abd:  Soft abdomen, no tender/distended/guarding/rebound, no pulsatile mass, no CVA tender.   Musculoskeletal:  N/V intact, FROM all 4 extremities, normal motor tone  Neck:  FROM neck, supple, trachea midline, no meningismus.   Skin:  Color normal for race, warm and dry, no rash.  Neuro:  Oriented x3, CN 2-12 intact (grossly), normal motor (grossly), normal sensory (grossly), GCS 15  Psych:  Attention normal. Affect normal. Behavior normal. Judgment normal.

## 2024-09-19 NOTE — ED PROVIDER NOTE - NSICDXPASTMEDICALHX_GEN_ALL_CORE_FT
PAST MEDICAL HISTORY:  Blindness of left eye     Breast cancer     Breast cancer in female     Chronic low back pain     COPD (chronic obstructive pulmonary disease)     Diabetes     DM (diabetes mellitus)     Fall     History of appendectomy     HLD (hyperlipidemia)     HTN (hypertension)     Lumbar spondylosis     Multiple pulmonary emboli     PE (pulmonary thromboembolism)     Spinal stenosis     Spinal stenosis

## 2024-09-19 NOTE — ED PROVIDER NOTE - CARE PLAN
1 Principal Discharge DX:	CHF exacerbation   Principal Discharge DX:	CHF exacerbation  Secondary Diagnosis:	Hyperkalemia

## 2024-09-19 NOTE — ED ADULT NURSE NOTE - OBJECTIVE STATEMENT
Patient presents to ED with family c/o SOB and feeling dizziness since yesterday worsening during day, patient reports O2 6L at home. no B/P /labs in right arm

## 2024-09-19 NOTE — H&P ADULT - HISTORY OF PRESENT ILLNESS
Patient is a 89F, from home HHA (M-F/9h S-S/6h) ambulates with walker/wheelchair, with PMHx of asthma (O2 2L PRN), HTN, PAF and hx of DVT/PE (on eliquis), HLD, GERD, remote hx of r. breast cancer s/p mastectomy and left eye blindness who presents with 3 day history of worsening shortness of breath and orthopnea.  Patient is a 89F, from home HHA (M-F/9h S-S/6h) ambulates with walker/wheelchair, with PMHx of asthma (O2 2L PRN), HTN, PAF and hx of DVT/PE (on eliquis), HLD, GERD, remote hx of r. breast cancer s/p mastectomy and left eye blindness who presents with 3 day history of worsening shortness of breath and orthopnea. Patient was hospitalized at [facility] from 8/7-8/16 for asthma exacerbation due to influenza virus. Since the discharge, patient has been wearing 2L NC at home. However, patient has been worsening shortness of breath and orthopnea to a point that patient is not able to lay down flat at all. She also reports intermittent dizziness, worse on standing and laying flat. She reports that she gets blurry/black vision from dizziness. She denies chest pain or palpitations. She reports leg swelling and the patient reports that her left leg has been swollen since 3 weeks ago. Patient and family denies fever, chills, N/V, headache, cough, chest pain, palpitations, diarrhea. Patient's last BM was today before presentation but reports it was small and reports some abdominal pain.     Patient reports drinking less than 2 cups of water a day. Patient was on xarelto prior but was discharged from [facility] on eliquis 2.5mg BID. She stopped taking eliquis 5 days ago because she is supposed to get cortisone shots today.

## 2024-09-19 NOTE — ED ADULT NURSE NOTE - NSFALLHARMRISKINTERV_ED_ALL_ED

## 2024-09-19 NOTE — H&P ADULT - PROBLEM SELECTOR PLAN 1
p/w 3d history of worsening SOB and orthopnea  PEx: B/L wet rales (R>L), B/L leg edema  pro-BNP 6k  CXR: Pulmonary congestion and possible RLL effusion   s/p lasix 40mg IVP in ED  f/u TSH, TTE   c/w lasix 40mg IVP qd   Cardio Dr. Anaya consulted

## 2024-09-19 NOTE — H&P ADULT - NSHPREVIEWOFSYSTEMS_GEN_ALL_CORE
CONSTITUTIONAL: No fever, weight loss, or fatigue  EYES: No eye pain, visual disturbances, or discharge  ENT:  No difficulty hearing, tinnitus, vertigo; No sinus or throat pain  NECK: No pain or stiffness  RESPIRATORY: No cough, wheezing, chills or hemoptysis; No Shortness of Breath  CARDIOVASCULAR: No chest pain, palpitations, passing out, dizziness, or leg swelling  GASTROINTESTINAL: No abdominal or epigastric pain. No nausea, vomiting, or hematemesis; No diarrhea or constipation. No melena or hematochezia.  GENITOURINARY: No dysuria, frequency, hematuria, or incontinence  NEUROLOGICAL: No headaches, memory loss, loss of strength, numbness, or tremors  SKIN: No itching, burning, rashes, or lesions   LYMPH Nodes: No enlarged glands  ENDOCRINE: No heat or cold intolerance; No hair loss  MUSCULOSKELETAL: No joint pain or swelling; No muscle, back, or extremity pain  PSYCHIATRIC: No depression, anxiety, mood swings, or difficulty sleeping  HEME/LYMPH: No easy bruising, or bleeding gums  ALLERGY AND IMMUNOLOGIC: No hives or eczema CONSTITUTIONAL: No fever, weight loss, or fatigue  EYES: No eye pain, visual disturbances, or discharge  ENT:  No difficulty hearing, tinnitus, vertigo; No sinus or throat pain  NECK: No pain or stiffness  RESPIRATORY: +SOB; No cough, wheezing, chills or hemoptysis  CARDIOVASCULAR: +leg swelling, dizziness; No chest pain, palpitations, passing out  GASTROINTESTINAL: No abdominal or epigastric pain. No nausea, vomiting, or hematemesis; No diarrhea or constipation. No melena or hematochezia.  GENITOURINARY: No dysuria, frequency, hematuria, or incontinence  NEUROLOGICAL: No headaches, memory loss, loss of strength, numbness, or tremors  SKIN: No itching, burning, rashes, or lesions   LYMPH Nodes: No enlarged glands  ENDOCRINE: No heat or cold intolerance; No hair loss  MUSCULOSKELETAL: No joint pain or swelling; No muscle, back, or extremity pain  HEME/LYMPH: No easy bruising, or bleeding gums  ALLERGY AND IMMUNOLOGIC: No hives or eczema

## 2024-09-19 NOTE — DISCHARGE NOTE PROVIDER - NSDCCPCAREPLAN_GEN_ALL_CORE_FT
PRINCIPAL DISCHARGE DIAGNOSIS  Diagnosis: CHF exacerbation  Assessment and Plan of Treatment: You arrived to the hospital with a 3 day history of worsening shortness of breath and orthopnea. You were resently hospitalized at [facility] from 8/7-8/16 for asthma exacerbation due to influenza virus. Since the discharge, you have been wearing 2L NC at home. Low sodium diet.  Read all food labels for sodium content.  Weigh youself every day on the same scale at the same time.  Record and track your weight.  Tell your doctor of any significant weight gain.      SECONDARY DISCHARGE DIAGNOSES  Diagnosis: PAF (paroxysmal atrial fibrillation)  Assessment and Plan of Treatment: Atrial fibrillation is the most common heart rhythm problem & has the risk of stroke & heart attack  It helps if you control your blood pressure, not drink more than 1-2 alcohol drinks per day, cut down on caffeine, getting treatment for over active thyroid gland, & getting exercise  Call your doctor if you feel your heart racing or beating unusually, chest tightness or pain, lightheaded, faint, shortness of breath especially with exercise  It is important to take your heart medication as prescribed  You may be on anticoagulation which is very important to take as directed - you may need blood work to monitor drug levels    Diagnosis: Congestive heart failure (CHF)  Assessment and Plan of Treatment: Low sodium diet.  Read all food labels for sodium content.  Weigh youself every day on the same scale at the same time.  Record and track your weight.  Tell your doctor of any significant weight gain.    Diagnosis: DVT, lower extremity  Assessment and Plan of Treatment: Take your "blood thinners" as prescribed.  Walking is encouraged, increase activity as tolerated.  If you develop new leg pain, swelling, and/or redness contact your healthcare provider.  If you develop new chest pain with difficulty breathing, a rapid heart rate and/or a feeling of passing out call emergency medical services 911.    Diagnosis: HTN (hypertension)  Assessment and Plan of Treatment: You have a history of high blood pressure. High blood pressure is a condition that puts you at risk for heart attack, stroke and kidney disease. Please continue to take your medications as prescribed. You can also help control your blood pressure by maintaining a healthy weight, eating a diet low in fat and rich in fruits and vegetables, reduce the amount of salt in your diet. Also, reduce alcohol and try to include some form of physical activity daily for at least 30 mins. Follow up with your medical doctor to establish long term blood pressure treatment goals.  Notify your doctor if you have any of the following symptoms:   Dizziness, Lightheadedness, Blurry vision, Headache, Chest pain, Shortness of breath    Diagnosis: Blindness of left eye  Assessment and Plan of Treatment:     Diagnosis: Hyperkalemia  Assessment and Plan of Treatment:

## 2024-09-19 NOTE — H&P ADULT - ATTENDING COMMENTS
89F, from home HHA (M-F/9h S-S/6h) ambulates with walker/wheelchair, with PMHx of asthma (O2 2L PRN), HTN, PAF and hx of DVT/PE (on eliquis), HLD, GERD, remote hx of r. breast cancer s/p mastectomy and left eye blindness who presents with 3 day history of worsening shortness of breath and orthopnea. Patient was hospitalized at [facility] from 8/7-8/16 for asthma exacerbation due to influenza virus. Since the discharge, patient has been wearing 2L NC at home. However, patient has been worsening shortness of breath and orthopnea to a point that patient is not able to lay down flat at all. She also reports intermittent dizziness, worse on standing and laying flat. She reports that she gets blurry/black vision from dizziness. She denies chest pain or palpitations. She reports leg swelling and the patient reports that her left leg has been swollen since 3 weeks ago. Patient and family denies fever, chills, N/V, headache, cough, chest pain, palpitations, diarrhea. Patient's last BM was today before presentation but reports it was small and reports some abdominal pain.     Patient reports drinking less than 2 cups of water a day. Patient was on xarelto prior but was discharged from [facility] on eliquis 2.5mg BID. She stopped taking eliquis 5 days ago because she is supposed to get cortisone shots today.       assessment   --- acute on hypoxic resp failure pos 2nd to acute chf and asthma/copd exacerb, r/o recurrent pe/dvt., hyponatremia, hyperkalemia, h/o asthma/copd (O2 2L PRN), HTN, PAF and hx of DVT/PE (on eliquis), HLD, GERD, remote hx of r. breast cancer s/p mastectomy and left eye blindness, spinal stenosis    plan  --  adm to tele, acs protocol, coreg, aspirin, lasix iv, statin, supplemental O2, duonebs, lokelma, cont preadmit home meds, gi and dvt prophylaxis  cbc, bmp, mg, phos, lipid, tsh, d-dimer, ce q8 x3, urine lytes    doppler LE    echo    cardio cons  pulm cons   renal cons

## 2024-09-19 NOTE — DISCHARGE NOTE PROVIDER - NSDCMRMEDTOKEN_GEN_ALL_CORE_FT
Advair Diskus 100 mcg-50 mcg inhalation powder: 1 puff(s) inhaled once a day  Albuterol (Eqv-ProAir HFA) 90 mcg/inh inhalation aerosol: 2 puff(s) inhaled every 6 hours as needed for  shortness of breath and/or wheezing  atorvastatin 20 mg oral tablet: 1 tab(s) orally once a day  azelastine 0.05% ophthalmic solution: 1 drop(s) in each affected eye once a day  carvedilol 6.25 mg oral tablet: 1 tab(s) orally 2 times a day  dorzolamide 2% ophthalmic solution: 1 drop(s) in each eye once a day  Eliquis 2.5 mg oral tablet: 1 tab(s) orally 2 times a day  latanoprost 0.005% ophthalmic emulsion: 1 drop(s) in each affected eye once a day  losartan 25 mg oral tablet: 1 tab(s) orally once a day  montelukast 10 mg oral tablet: 1 tab(s) orally once a day  omeprazole 40 mg oral delayed release capsule: 1 cap(s) orally once a day  Simbrinza 1%- 0.2% ophthalmic suspension: 1 drop(s) in each affected eye once a day  torsemide 10 mg oral tablet: 1 tab(s) orally once a day  Vyzulta 0.024% ophthalmic solution: 1 drop(s) in each eye once a day   bipap: Use at night

## 2024-09-19 NOTE — CHART NOTE - NSCHARTNOTEFT_GEN_A_CORE
EVENT: PtAAOx3 Danish speaking, arrived from home w/ SOB admitted for CHF exacerbation. Pt with PRN home O2 at 2L. Pro BNP 6,813 started on Lasix, was on XARELTO and switched for ELIQUIS 2.5 BID due to PMH of PAF, DVT/PE. Pt was instructed to stop Eliquis and has been off for 5 days in preparation for a steroid shot for chronic back pain, symptoms has started since then along with LL extremity edema. Cantor placed in ED for urinary retention. GOC discussed with Pt and daughter, Pt full code for now- wants to discuss further with other family members.      SUBJECTIVE: Patient sitting up in chair eating, denies any resp symptoms at this time, reports feeling better. c/o constipation, Np BM in 3 days. Daughter bedside.     OBJECTIVE:  Vital Signs Last 24 Hrs  T(C): 36.7 (19 Sep 2024 11:01), Max: 36.7 (19 Sep 2024 02:25)  T(F): 98 (19 Sep 2024 11:01), Max: 98 (19 Sep 2024 02:25)  HR: 75 (19 Sep 2024 11:01) (75 - 86)  BP: 125/62 (19 Sep 2024 11:01) (91/64 - 131/59)  BP(mean): --  RR: 20 (19 Sep 2024 11:01) (18 - 20)  SpO2: 98% (19 Sep 2024 11:01) (97% - 100%)    Parameters below as of 19 Sep 2024 11:01  Patient On (Oxygen Delivery Method): nasal cannula  O2 Flow (L/min): 3      LABS:                        9.4    8.51  )-----------( 398      ( 19 Sep 2024 03:52 )             30.9     09-19    137  |  95[L]  |  15  ----------------------------<  78  4.2   |  37[H]  |  1.13    Ca    8.8      19 Sep 2024 11:40  Phos  4.0     09-19  Mg     2.0     09-19    TPro  6.0  /  Alb  2.9[L]  /  TBili  0.3  /  DBili  x   /  AST  35  /  ALT  20  /  AlkPhos  114  09-19      EKG: On tele NSR   IMGAGING:    ASSESSMENT:  HPI:  Patient is a 89F, from home HHA (M-F/9h S-S/6h) ambulates with walker/wheelchair, with PMHx of asthma (O2 2L PRN), HTN, PAF and hx of DVT/PE (on eliquis), HLD, GERD, remote hx of r. breast cancer s/p mastectomy and left eye blindness who presents with 3 day history of worsening shortness of breath and orthopnea. Patient was hospitalized at [facility] from 8/7-8/16 for asthma exacerbation due to influenza virus. Since the discharge, patient has been wearing 2L NC at home. However, patient has been worsening shortness of breath and orthopnea to a point that patient is not able to lay down flat at all. She also reports intermittent dizziness, worse on standing and laying flat. She reports that she gets blurry/black vision from dizziness. She denies chest pain or palpitations. She reports leg swelling and the patient reports that her left leg has been swollen since 3 weeks ago. Patient and family denies fever, chills, N/V, headache, cough, chest pain, palpitations, diarrhea. Patient's last BM was today before presentation but reports it was small and reports some abdominal pain.     Patient reports drinking less than 2 cups of water a day. Patient was on xarelto prior but was discharged from [facility] on eliquis 2.5mg BID. She stopped taking eliquis 5 days ago because she is supposed to get cortisone shots today.  (19 Sep 2024 06:27)      PLAN:  Cardio Dr Coy following   f/u ECHO  f/u Bcx  f/u D Dimer   f/u US LL extremity doppler   f/u anemia profile   f/u CT chest   f/u BM EVENT: PtAAOx3 Lithuanian speaking, arrived from home w/ SOB admitted for CHF exacerbation. Pt with PRN home O2 at 2L. Pro BNP 6,813 started on Lasix, was on XARELTO and switched for ELIQUIS 2.5 BID due to PMH of PAF, DVT/PE. Pt was instructed to stop Eliquis and has been off for 5 days in preparation for a steroid shot for chronic back pain, symptoms has started since then along with LL extremity edema. Cantor placed in ED for urinary retention. GOC discussed with Pt and daughter, Pt full code for now- wants to discuss further with other family members.      SUBJECTIVE: Patient sitting up in chair eating, denies any resp symptoms at this time, reports feeling better. c/o constipation, Np BM in 3 days. Daughter bedside.     OBJECTIVE:  Vital Signs Last 24 Hrs  T(C): 36.7 (19 Sep 2024 11:01), Max: 36.7 (19 Sep 2024 02:25)  T(F): 98 (19 Sep 2024 11:01), Max: 98 (19 Sep 2024 02:25)  HR: 75 (19 Sep 2024 11:01) (75 - 86)  BP: 125/62 (19 Sep 2024 11:01) (91/64 - 131/59)  BP(mean): --  RR: 20 (19 Sep 2024 11:01) (18 - 20)  SpO2: 98% (19 Sep 2024 11:01) (97% - 100%)    Parameters below as of 19 Sep 2024 11:01  Patient On (Oxygen Delivery Method): nasal cannula  O2 Flow (L/min): 3      LABS:                        9.4    8.51  )-----------( 398      ( 19 Sep 2024 03:52 )             30.9     09-19    137  |  95[L]  |  15  ----------------------------<  78  4.2   |  37[H]  |  1.13    Ca    8.8      19 Sep 2024 11:40  Phos  4.0     09-19  Mg     2.0     09-19    TPro  6.0  /  Alb  2.9[L]  /  TBili  0.3  /  DBili  x   /  AST  35  /  ALT  20  /  AlkPhos  114  09-19      EKG: On tele NSR   IMGAGING:    ASSESSMENT:  HPI:  Patient is a 89F, from home HHA (M-F/9h S-S/6h) ambulates with walker/wheelchair, with PMHx of asthma (O2 2L PRN), HTN, PAF and hx of DVT/PE (on eliquis), HLD, GERD, remote hx of r. breast cancer s/p mastectomy and left eye blindness who presents with 3 day history of worsening shortness of breath and orthopnea. Patient was hospitalized at [facility] from 8/7-8/16 for asthma exacerbation due to influenza virus. Since the discharge, patient has been wearing 2L NC at home. However, patient has been worsening shortness of breath and orthopnea to a point that patient is not able to lay down flat at all. She also reports intermittent dizziness, worse on standing and laying flat. She reports that she gets blurry/black vision from dizziness. She denies chest pain or palpitations. She reports leg swelling and the patient reports that her left leg has been swollen since 3 weeks ago. Patient and family denies fever, chills, N/V, headache, cough, chest pain, palpitations, diarrhea. Patient's last BM was today before presentation but reports it was small and reports some abdominal pain.     Patient reports drinking less than 2 cups of water a day. Patient was on xarelto prior but was discharged from [facility] on eliquis 2.5mg BID. She stopped taking eliquis 5 days ago because she is supposed to get cortisone shots today.  (19 Sep 2024 06:27)      PLAN:  Cardio Dr Coy following   Pulm Dr Byrd following   f/u ECHO  f/u Bcx  f/u D Dimer   f/u US LL extremity doppler   f/u anemia profile   f/u CT chest   f/u BM EVENT: PtAAOx3 Arabic speaking, arrived from home w/ SOB admitted for CHF exacerbation. Pt with PRN home O2 at 2L. Pro BNP 6,813 started on Lasix, was on XARELTO and switched for ELIQUIS 2.5 BID due to PMH of PAF, DVT/PE. Pt was instructed to stop Eliquis and has been off for 5 days in preparation for a steroid shot for chronic back pain, symptoms has started since then along with LL extremity edema. Cantor placed in ED for urinary retention. GOC discussed with Pt and daughter, Pt full code for now- wants to discuss further with other family members.      SUBJECTIVE: Patient sitting up in chair eating, denies any resp symptoms at this time, reports feeling better. c/o constipation, No BM in 3 days. Daughter bedside.     OBJECTIVE:  Vital Signs Last 24 Hrs  T(C): 36.7 (19 Sep 2024 11:01), Max: 36.7 (19 Sep 2024 02:25)  T(F): 98 (19 Sep 2024 11:01), Max: 98 (19 Sep 2024 02:25)  HR: 75 (19 Sep 2024 11:01) (75 - 86)  BP: 125/62 (19 Sep 2024 11:01) (91/64 - 131/59)  BP(mean): --  RR: 20 (19 Sep 2024 11:01) (18 - 20)  SpO2: 98% (19 Sep 2024 11:01) (97% - 100%)    Parameters below as of 19 Sep 2024 11:01  Patient On (Oxygen Delivery Method): nasal cannula  O2 Flow (L/min): 3      LABS:                        9.4    8.51  )-----------( 398      ( 19 Sep 2024 03:52 )             30.9     09-19    137  |  95[L]  |  15  ----------------------------<  78  4.2   |  37[H]  |  1.13    Ca    8.8      19 Sep 2024 11:40  Phos  4.0     09-19  Mg     2.0     09-19    TPro  6.0  /  Alb  2.9[L]  /  TBili  0.3  /  DBili  x   /  AST  35  /  ALT  20  /  AlkPhos  114  09-19      EKG: On tele NSR   IMGAGING:    ASSESSMENT:  HPI:  Patient is a 89F, from home HHA (M-F/9h S-S/6h) ambulates with walker/wheelchair, with PMHx of asthma (O2 2L PRN), HTN, PAF and hx of DVT/PE (on eliquis), HLD, GERD, remote hx of r. breast cancer s/p mastectomy and left eye blindness who presents with 3 day history of worsening shortness of breath and orthopnea. Patient was hospitalized at [facility] from 8/7-8/16 for asthma exacerbation due to influenza virus. Since the discharge, patient has been wearing 2L NC at home. However, patient has been worsening shortness of breath and orthopnea to a point that patient is not able to lay down flat at all. She also reports intermittent dizziness, worse on standing and laying flat. She reports that she gets blurry/black vision from dizziness. She denies chest pain or palpitations. She reports leg swelling and the patient reports that her left leg has been swollen since 3 weeks ago. Patient and family denies fever, chills, N/V, headache, cough, chest pain, palpitations, diarrhea. Patient's last BM was today before presentation but reports it was small and reports some abdominal pain.     Patient reports drinking less than 2 cups of water a day. Patient was on xarelto prior but was discharged from [facility] on eliquis 2.5mg BID. She stopped taking eliquis 5 days ago because she is supposed to get cortisone shots today.  (19 Sep 2024 06:27)      PLAN:  Cardio Dr Coy following   Pulm Dr Byrd following   f/u ECHO  f/u Bcx  f/u D Dimer   f/u US LL extremity doppler   f/u anemia profile   f/u CT chest   f/u BM

## 2024-09-19 NOTE — ED ADULT NURSE NOTE - NSSEPSISSUSPECTED_ED_A_ED
Anesthesia Evaluation     Patient summary reviewed and Nursing notes reviewed   NPO Solid Status: > 8 hours  NPO Liquid Status: > 2 hours           Airway   Mallampati: III  Possible difficult intubation  Dental - normal exam     Pulmonary     breath sounds clear to auscultation  (+) sleep apnea on CPAP,   Cardiovascular     ECG reviewed  Rhythm: regular  Rate: normal    (+) hypertension, CAD, cardiac stents Drug eluting stent more than 12 months ago hyperlipidemia,     ROS comment: · All left ventricular wall segments contract normally.  · Left ventricular function is normal. Estimated EF = 60%.  · Left ventricular diastolic dysfunction (grade I) consistent with impaired relaxation.  · Mild dilation of the aortic root is present.       Neuro/Psych  (+) TIA, numbness,     GI/Hepatic/Renal/Endo    (+) obesity,  GERD, GI bleeding, diabetes mellitus type 1, hypothyroidism,     Musculoskeletal     Abdominal    Substance History      OB/GYN          Other                        Anesthesia Plan    ASA 3     general     intravenous and inhalational induction   Anesthetic plan and risks discussed with patient.       No

## 2024-09-19 NOTE — ED CLERICAL - NS ED CLERK NOTE PRE-ARRIVAL INFORMATION; ADDITIONAL PRE-ARRIVAL INFORMATION

## 2024-09-19 NOTE — CONSULT NOTE ADULT - SUBJECTIVE AND OBJECTIVE BOX
Date of Service  09-19-24 @ 10:29    CHIEF COMPLAINT:Patient is a 89y old  Female who presents with a chief complaint of CHF exacerbation.      HPI:  Patient is a 89F, from home HHA (M-F/9h S-S/6h) ambulates with walker/wheelchair, with PMHx of asthma (O2 2L PRN), HTN, PAF and hx of DVT/PE (on eliquis), HLD, GERD, remote hx of r. breast cancer s/p mastectomy and left eye blindness who presents with 3 day history of worsening shortness of breath and orthopnea. Patient was hospitalized at [facility] from 8/7-8/16 for asthma exacerbation due to influenza virus. Since the discharge, patient has been wearing 2L NC at home. However, patient has been worsening shortness of breath and orthopnea to a point that patient is not able to lay down flat at all. She also reports intermittent dizziness, worse on standing and laying flat. She reports that she gets blurry/black vision from dizziness. She denies chest pain or palpitations. She reports leg swelling and the patient reports that her left leg has been swollen since 3 weeks ago. Patient and family denies fever, chills, N/V, headache, cough, chest pain, palpitations, diarrhea. Patient's last BM was today before presentation but reports it was small and reports some abdominal pain.     Patient reports drinking less than 2 cups of water a day. Patient was on xarelto prior but was discharged from [facility] on eliquis 2.5mg BID. She stopped taking eliquis 5 days ago because she is supposed to get cortisone shots today.  (19 Sep 2024 06:27)      PAST MEDICAL & SURGICAL HISTORY:  DM (diabetes mellitus)      HTN (hypertension)      HLD (hyperlipidemia)      Breast cancer in female      COPD (chronic obstructive pulmonary disease)      PE (pulmonary thromboembolism)      Diabetes      Breast cancer      Blindness of left eye      History of appendectomy      Spinal stenosis      Spinal stenosis      Multiple pulmonary emboli      Fall      Chronic low back pain      Lumbar spondylosis      H/O mastectomy, right          MEDICATIONS  (STANDING):  albuterol    0.083%. 2.5 milliGRAM(s) Nebulizer once  albuterol    0.083%. 2.5 milliGRAM(s) Nebulizer once  apixaban 5 milliGRAM(s) Oral two times a day  atorvastatin 20 milliGRAM(s) Oral at bedtime  carvedilol 6.25 milliGRAM(s) Oral every 12 hours  dorzolamide 2% Ophthalmic Solution 1 Drop(s) Both EYES three times a day  famotidine    Tablet 20 milliGRAM(s) Oral daily  fluticasone propionate/ salmeterol 100-50 MICROgram(s) Diskus 1 Dose(s) Inhalation two times a day  ketotifen 0.025% Ophthalmic Solution 1 Drop(s) Both EYES two times a day  latanoprost 0.005% Ophthalmic Solution 1 Drop(s) Both EYES at bedtime  losartan 25 milliGRAM(s) Oral daily  montelukast 10 milliGRAM(s) Oral daily    MEDICATIONS  (PRN):  acetaminophen     Tablet .. 650 milliGRAM(s) Oral every 6 hours PRN Temp greater or equal to 38C (100.4F), Mild Pain (1 - 3)      FAMILY HISTORY:No hx of CAD      SOCIAL HISTORY:    [ x] Non-smoker    [x ] Alcohol-denies    Allergies    aspirin (Nausea (Mild))  aspirin (Stomach Upset)    Intolerances    	    REVIEW OF SYSTEMS:  CONSTITUTIONAL: No fever, weight loss, or fatigue  EYES: No eye pain, visual disturbances, or discharge  ENT:  No difficulty hearing, tinnitus, vertigo; No sinus or throat pain  NECK: No pain or stiffness  RESPIRATORY: No cough, wheezing, chills or hemoptysis; + Shortness of Breath  CARDIOVASCULAR: No chest pain, palpitations, passing out, dizziness, or leg swelling  GASTROINTESTINAL: No abdominal or epigastric pain. No nausea, vomiting, or hematemesis; No diarrhea or constipation. No melena or hematochezia.  GENITOURINARY: No dysuria, frequency, hematuria, or incontinence  NEUROLOGICAL: No headaches, memory loss, loss of strength, numbness, or tremors  SKIN: No itching, burning, rashes, or lesions   LYMPH Nodes: No enlarged glands  ENDOCRINE: No heat or cold intolerance; No hair loss  MUSCULOSKELETAL: No joint pain or swelling; No muscle, back, or extremity pain  PSYCHIATRIC: No depression, anxiety, mood swings, or difficulty sleeping  HEME/LYMPH: No easy bruising, or bleeding gums  ALLERGY AND IMMUNOLOGIC: No hives or eczema	        PHYSICAL EXAM:  T(C): 36.4 (09-19-24 @ 09:25), Max: 36.7 (09-19-24 @ 02:25)  HR: 86 (09-19-24 @ 09:25) (77 - 86)  BP: 100/66 (09-19-24 @ 09:25) (91/64 - 131/59)  RR: 20 (09-19-24 @ 09:25) (18 - 20)  SpO2: 97% (09-19-24 @ 09:25) (97% - 100%)  Wt(kg): --  I&O's Summary      Appearance: Normal	  HEENT:   Normal oral mucosa, PERRL, EOMI	  Lymphatic: No lymphadenopathy  Cardiovascular: Normal S1 S2, No JVD, No murmurs, No edema  Respiratory: Dec BS at bases	  Psychiatry: A & O x 3, Mood & affect appropriate  Gastrointestinal:  Soft, Non-tender, + BS	  Skin: No rashes, No ecchymoses, No cyanosis	  Neurologic: Non-focal  Extremities: Normal range of motion, No clubbing, cyanosis or edema  Vascular: Peripheral pulses palpable 2+ bilaterally        ECG:  Afib with RBBB	  	  	  LABS:	 	    Troponin I, High Sensitivity Result: 16.8 ng/L (09-19-24 @ 03:52)                          9.4    8.51  )-----------( 398      ( 19 Sep 2024 03:52 )             30.9     09-19    128[L]  |  94[L]  |  16  ----------------------------<  93  6.0[H]   |  32[H]  |  1.19    Ca    8.8      19 Sep 2024 03:52  Phos  4.0     09-19  Mg     2.0     09-19    TPro  6.0  /  Alb  2.9[L]  /  TBili  0.3  /  DBili  x   /  AST  35  /  ALT  20  /  AlkPhos  114  09-19    < from: Transthoracic Echocardiogram (04.26.19 @ 09:35) >  OBSERVATIONS:  Mitral Valve: Normal mitral valve. No mitral regurgitation  is noted.  Aortic Root: Normal aortic root.  Aortic Valve: Aortic valve not well visualized. Peak  transaortic valve gradient equals 19.9 mm Hg, mean  transaortic valve gradient equals 11 mm Hg, consistent with  mild aortic stenosis. No aortic valve regurgitation seen.  Left Atrium: Normal left atrium.  LA volume index = 22  cc/m2.  Left Ventricle: Normal Left Ventricular Systolic Function,  (EF = 55 to 60% by visual estimation) Not all LV wall  segments were seen. Normal left ventricular internal  dimensions and wall thicknesses. Grade I diastolic  dysfunction (Impaired relaxation).  Right Heart: Normal right atrium. Normal right ventricular  size and systolic function (TAPSE 2.0 cm). Tricuspid valve  not well visualized, probably normal. Trace tricuspid  regurgitation. Pulmonic valve not well seen. Mild pulmonic  insufficiency is noted.  Pericardium/PleuraNo pericardial effusion. A prominent  epicardial fat pad is noted.  Hemodynamic: RA Pressure is 8 mm Hg. RV systolic pressure  is borderline normal at  33 mm Hg.  ------------------------------------------------------------------------  CONCLUSIONS:  1. Normal mitralvalve. No mitral regurgitation is noted.  2. Aortic valve not well visualized. Mild aortic stenosis.  No aortic valve regurgitation seen.  3. Normal aortic root.  4. Normal left atrium.  5. Normal left ventricular internal dimensions and wall  thicknesses.  6. Normal Left Ventricular Systolic Function,  (EF = 55 to  60% by visual estimation)  7. Grade I diastolic dysfunction (Impaired relaxation).  8. Normal right atrium.  9. Normal right ventricular size and systolic function  (TAPSE 2.0 cm).  10. RV systolic pressure is borderline normal at  33 mm Hg.  11. Tricuspid valve not well visualized, probably normal.  Trace tricuspid regurgitation.  12. Pulmonic valve not well seen. Mild pulmonic  insufficiency is noted.  13. No pericardial effusion.    *** Compared with echocardiogram report of 11/6/2017, no  significant changes noted.  ------------------------------------------------------------------------  Confirmed on  4/26/2019 - 17:33:55 by Ancelmo Mathis MD  ------------------------------------------------------------------------    < end of copied text >  < from: US Duplex Venous Lower Ext Ltd, Left (09.19.24 @ 09:07) >    ACC: [id number] EXAM:  US DPLX LWR EXT VEINS LTD LT   ORDERED BY: MECCA CASTRO     PROCEDURE DATE:  09/19/2024          INTERPRETATION:  CLINICAL INFORMATION: Left leg pain and swelling    COMPARISON: 12/12/2023    TECHNIQUE: Duplex sonography of the LEFT LOWER extremity veins with color   and spectral Doppler, with and without compression.    FINDINGS:    There is normal compressibility of the left common femoral, femoral and   popliteal veins.  The contralateral common femoral vein is patent.  Doppler examination shows normal spontaneous and phasic flow.    No calf vein thrombosis is detected.    IMPRESSION:  No evidence of left lower extremity deep venous thrombosis.

## 2024-09-19 NOTE — CONSULT NOTE ADULT - ASSESSMENT
89yr old Female, from home HHA (M-F/9h S-S/6h) ambulates with walker/wheelchair, with PMHx of asthma (O2 2L PRN), HTN, PAF and hx of DVT/PE (on eliquis), HLD, GERD, remote hx of r. breast cancer s/p mastectomy and left eye blindness who presents with 3 day history of worsening shortness of breath and orthopnea,hyponatremia,hyperkalemia,acute diastolic HF  1.Tele monitoring.  2.Hyperkalemia-lokelma,d/c cozaar.Repeat BMP.  3.Hyponatremia-Renal eval.  4.Afib-coreg,eliquis.  5.Acute diastolic HF-IV l;asix 40mg bid.  6.Asthma-Pulm eval.  7.Hx of DVT/PE-eliquis.  8.CT chest w/o contrast.  9.Check anemia profile.  10.PPI.

## 2024-09-19 NOTE — H&P ADULT - PROBLEM SELECTOR PLAN 5
Hx of HTN on losartan 25mg bid, coreg 6.25mg bid   c/w home meds Hx of HTN on losartan 25mg bid, coreg 6.25mg bid, torsemide 10mg qd   holding torsemide 10mg qd > giving lasix 40mg IVP qd   c/w home meds,

## 2024-09-19 NOTE — ED PROVIDER NOTE - NS ED ROS FT
Constitutional: (-) fever (-) chills  Respiratory: (-) cough (+) shortness of breath (-) wheezing (-) stridor  Cardiovascular: (-) chest pain (-) palpitations (+) leg swelling L>R  Gastrointestinal: (-) abdominal pain (-) blood in stool (no melena/hematochezia) (-) diarrhea (-) vomiting  Genitourinary: (-) dysuria (-) hematuria  Musculoskeletal: (-) myalgias  Skin: (-) rash  Neurological: (-) weakness (-) numbness (-) headaches  Psychiatric/Behavioral: (-) confusion

## 2024-09-19 NOTE — PATIENT PROFILE ADULT - FALL HARM RISK - HARM RISK INTERVENTIONS
Assistance with ambulation/Assistance OOB with selected safe patient handling equipment/Communicate Risk of Fall with Harm to all staff/Discuss with provider need for PT consult/Monitor gait and stability/Provide patient with walking aids - walker, cane, crutches/Reinforce activity limits and safety measures with patient and family/Sit up slowly, dangle for a short time, stand at bedside before walking/Tailored Fall Risk Interventions/Visual Cue: Yellow wristband and red socks/Bed in lowest position, wheels locked, appropriate side rails in place/Call bell, personal items and telephone in reach/Instruct patient to call for assistance before getting out of bed or chair/Non-slip footwear when patient is out of bed/[place] to call system/Physically safe environment - no spills, clutter or unnecessary equipment/Purposeful Proactive Rounding/Room/bathroom lighting operational, light cord in reach

## 2024-09-19 NOTE — DISCHARGE NOTE PROVIDER - HOSPITAL COURSE
Patient is a 89F, from home HHA (M-F/9h S-S/6h) ambulates with walker/wheelchair, with PMHx of asthma (O2 2L PRN), HTN, PAF and hx of DVT/PE (on eliquis), HLD, GERD, remote hx of r. breast cancer s/p mastectomy and left eye blindness who presents with 3 day history of worsening shortness of breath and orthopnea. Patient was hospitalized at [facility] from 8/7-8/16 for asthma exacerbation due to influenza virus. Since the discharge, patient has been wearing 2L NC at home. However, patient has been worsening shortness of breath and orthopnea to a point that patient is not able to lay down flat at all. She also reports intermittent dizziness, worse on standing and laying flat. She reports that she gets blurry/black vision from dizziness. She denies chest pain or palpitations. She reports leg swelling and the patient reports that her left leg has been swollen since 3 weeks ago.     Patient reports drinking less than 2 cups of water a day. Patient was on xarelto prior but was discharged from [facility] on eliquis 2.5mg BID. She stopped taking eliquis 5 days ago because she is supposed to get cortisone shots today.     INCOMPLETE 9/19    Please note that this a brief summary of hospital course please refer to daily progress notes and consult notes for full course and events. Patient seen and examined at bedside, discussed with medical attending. Patient medically optimized for discharge to home.    Patient is a 89F, from home HHA (M-F/9h S-S/6h) ambulates with walker/wheelchair, with PMHx of asthma (O2 2L PRN), HTN, PAF and hx of DVT/PE (on eliquis), HLD, GERD, remote hx of r. breast cancer s/p mastectomy and left eye blindness who presents with 3 day history of worsening shortness of breath and orthopnea. Patient was hospitalized at [facility] from 8/7-8/16 for asthma exacerbation due to influenza virus. Since the discharge, patient has been wearing 2L NC at home. However, patient has been worsening shortness of breath and orthopnea to a point that patient is not able to lay down flat at all. She also reports intermittent dizziness, worse on standing and laying flat. She reports that she gets blurry/black vision from dizziness. She denies chest pain or palpitations. She reports leg swelling and the patient reports that her left leg has been swollen since 3 weeks ago.     Patient reports drinking less than 2 cups of water a day. Patient was on xarelto prior but was discharged from [facility] on Eliquis 2.5mg BID. She stopped taking Eliquis 5 days ago because she is supposed to get cortisone shots today. Eliquis restarted at 5mg BID inpatient.     Pt with edema to Left leg, Venous doppler shows ++++++++++++++++++    INCOMPLETE 9/19    Please note that this a brief summary of hospital course please refer to daily progress notes and consult notes for full course and events. Patient seen and examined at bedside, discussed with medical attending. Patient medically optimized for discharge to home.

## 2024-09-19 NOTE — CONSULT NOTE ADULT - ASSESSMENT
89 years old with a history of few admissions in the last 3 years for hyponatremia and Hyperkalemia. Patient was admitted for dyspnea. CHF on xr chest.  Good Response to diuretics, increased sodium from 128- to 137 with improved Hyperkalemia.  Increased sodium bicarbonate on admission with further increased in am.     Hyponatremia , resolved and associated with CHF and increased po fluid  intake.  Hyperkalemia due to CHF reduced urine output , Losartan, reduced renin angiotension and aldosterone .  Increased sodium by 9 meq.    Hold second dose of diuretics in pm.  Follow repeat xr chest in am and follow CT scan.  Repeat BMP at 6 pm.  Venous ABG.     DC Losartan  Follow 2 gm K diet.  DC pantoprazole , change to Pepcid.  Follow TFT, RENIN / Aldosterone.

## 2024-09-19 NOTE — H&P ADULT - PROBLEM SELECTOR PLAN 3
Hx of PAF on carvedilol 6.25mg bid and eliquis 2.5mg bid   EKG: Atrial fibrillation   c/w home meds Hx of PAF on carvedilol 6.25mg bid and eliquis 2.5mg bid   EKG: Atrial fibrillation   c/w home meds, eliquis increased to 5mg BID

## 2024-09-19 NOTE — CONSULT NOTE ADULT - SUBJECTIVE AND OBJECTIVE BOX
Chief complain/HPI  Renal consult was called for Hyponatremia   89 years old female with a history of Asthma, HTN, PAF, HLD, GERD, breast cancer post mastectomy came to the ER for increased SOB.  08/24 she was at [facility] for Influenza and c/o of SOB since admission.  History of Hyperkalemia and Hyponatremia without renal insuffiencey for several years,  possible etiologies  increased water intake , use of ARB and Pantoprazole.  She was damitted today for CHF, Hyponatremia and Hyperkalemia.  xr chest showed CHF and bilateral  pleural effusion.     < from: Xray Chest 1 View- PORTABLE-Urgent (Xray Chest 1 View- PORTABLE-Urgent .) (09.19.24 @ 02:49) >  IMPRESSION: Increased perihilar interstitial markings on this limited   inspiration. Right greater than left effusion with compressive   atelectatic change. One should consider congestive heart failure   progressive since previous exam. Regional osseous structures appropriate   for age. Borderline cardiomegaly on this limited inspiration. Follow-up   suggested.    --- End of Report ---    Sodium, Random Urine: 86: Reference Ranges have NOT been established for random urine analytes due Potassium, Random Urine: 14: Reference Ranges have NOT been established for random urine analytes due Creatinine, Random Urine: <13: Reference Ranges have NOT been established for random urine analytes due Urinalysis with Rflx Culture (09.19.24 @ 08:36) osmolality pending.   UA was taken after use of diuretics.   Urine Appearance: Clear  Color: Yellow  Specific Gravity: 1.005  pH Urine: 7.5  Protein, Urine: Negative mg/dL  Glucose Qualitative, Urine: Negative mg/dL  Ketone - Urine: Negative mg/dL  Blood, Urine: Negative  Bilirubin: Negative  Urobilinogen: 0.2 mg/dL  Leukocyte Esterase Concentration: Negative  Nitrite: Negative        PAST MEDICAL & SURGICAL HISTORY:  DM (diabetes mellitus)      HTN (hypertension)      HLD (hyperlipidemia)      Breast cancer in female      COPD (chronic obstructive pulmonary disease)      PE (pulmonary thromboembolism)      Diabetes      Breast cancer      Blindness of left eye      History of appendectomy      Spinal stenosis      Spinal stenosis      Multiple pulmonary emboli      Fall      Chronic low back pain      Lumbar spondylosis      H/O mastectomy, right          Home Medications Reviewed  · 	Vyzulta 0.024% ophthalmic solution: 1 drop(s) in each eye once a day  · 	Simbrinza 1%- 0.2% ophthalmic suspension: 1 drop(s) in each affected eye once a day  · 	Eliquis 2.5 mg oral tablet: 1 tab(s) orally 2 times a day  · 	Advair Diskus 100 mcg-50 mcg inhalation powder: Last Dose Taken:  , 1 puff(s) inhaled once a day  · 	azelastine 0.05% ophthalmic solution: Last Dose Taken:  , 1 drop(s) in each affected eye once a day  · 	latanoprost 0.005% ophthalmic emulsion: 1 drop(s) in each affected eye once a day  · 	dorzolamide 2% ophthalmic solution: 1 drop(s) in each eye once a day  · 	torsemide 10 mg oral tablet: 1 tab(s) orally once a day  · 	Albuterol (Eqv-ProAir HFA) 90 mcg/inh inhalation aerosol: Last Dose Taken:  , 2 puff(s) inhaled every 6 hours as needed for  shortness of breath and/or wheezing  · 	omeprazole 40 mg oral delayed release capsule: 1 cap(s) orally once a day  · 	montelukast 10 mg oral tablet: 1 tab(s) orally once a day 	atorvastatin 20 mg oral tablet: Last Dose Taken:  , 1 tab(s) orally once a day  · 	carvedilol 6.25 mg oral tablet: Last Dose Taken:  , 1 tab(s) orally 2 times a day  · 	losartan 25 mg oral tablet: 1 tab(s) orally once a day    Hospital Medications:   MEDICATIONS  (STANDING):  apixaban 5 milliGRAM(s) Oral two times a day  atorvastatin 20 milliGRAM(s) Oral at bedtime  carvedilol 6.25 milliGRAM(s) Oral every 12 hours  dorzolamide 2% Ophthalmic Solution 1 Drop(s) Both EYES three times a day  famotidine    Tablet 20 milliGRAM(s) Oral daily  fluticasone propionate/ salmeterol 100-50 MICROgram(s) Diskus 1 Dose(s) Inhalation two times a day  furosemide   Injectable 40 milliGRAM(s) IV Push two times a day  influenza  Vaccine (HIGH DOSE) 0.5 milliLiter(s) IntraMuscular once  ketotifen 0.025% Ophthalmic Solution 1 Drop(s) Both EYES two times a day  latanoprost 0.005% Ophthalmic Solution 1 Drop(s) Both EYES at bedtime  montelukast 10 milliGRAM(s) Oral daily  polyethylene glycol 3350 17 Gram(s) Oral daily  senna 2 Tablet(s) Oral at bedtime    MEDICATIONS  (PRN):  acetaminophen     Tablet .. 650 milliGRAM(s) Oral every 6 hours PRN Temp greater or equal to 38C (100.4F), Mild Pain (1 - 3)  albuterol    0.083%. 2.5 milliGRAM(s) Nebulizer once PRN Shortness of Breath and/or Wheezing  bisacodyl 5 milliGRAM(s) Oral every 12 hours PRN Constipation      Allergies    aspirin (Nausea (Mild))  aspirin (Stomach Upset)    Intolerances      Comprehensive Metabolic Panel (09.19.24 @ 03:52)   Sodium: 128 mmol/L  Potassium: 6.0 mmol/L  Chloride: 94 mmol/L  Carbon Dioxide: 32 mmol/L  Anion Gap: 2 mmol/L  Blood Urea Nitrogen: 16 mg/dL  Creatinine: 1.19 mg/dL  Glucose: 93 mg/dL  Calcium: 8.8 mg/dL  Protein Total: 6.0 g/dL  Albumin: 2.9 g/dL  Bilirubin Total: 0.3 mg/dL  Alkaline Phosphatase: 114 U/L  Aspartate Aminotransferase (AST/SGOT): 35 U/L  Alanine Aminotransferase (ALT/SGPT): 20 U/L DA  eGFR: 42: The estimated glomerular filtration rate (eGFR) calculation is based on                         9.4    8.51  )-----------( 398      ( 19 Sep 2024 03:52 )             30.9       09-19    137  |  95[L]  |  15  ----------------------------<  78  4.2   |  37[H]  |  1.13    Ca    8.8      19 Sep 2024 11:40  Phos  4.0     09-19  Mg     2.0     09-19    TPro  6.0  /  Alb  2.9[L]  /  TBili  0.3  /  DBili  x   /  AST  35  /  ALT  20  /  AlkPhos  114  09-19    PT/INR - ( 19 Sep 2024 03:52 )   PT: 14.2 sec;   INR: 1.25 ratio         PTT - ( 19 Sep 2024 03:52 )  PTT:26.6 sec  Urinalysis Basic - ( 19 Sep 2024 11:40 )    Urinalysis with Rflx Culture (09.19.24 @ 08:36)   Urine Appearance: Clear  Color: Yellow  Specific Gravity: 1.005  pH Urine: 7.5  Protein, Urine: Negative mg/dL  Glucose Qualitative, Urine: Negative mg/dL  Ketone - Urine: Negative mg/dL  Blood, Urine: Negative  Bilirubin: Negative  Urobilinogen: 0.2 mg/dL  Leukocyte Esterase Concentration: Negative  Nitrite    Creatinine, Random Urine: <13 mg/dL (09-19 @ 13:12)  Potassium, Random Urine: 14 mmol/L (09-19 @ 13:12)  Sodium, Random Urine: 86 mmol/L (09-19 @ 13:12)        RADIOLOGY & ADDITIONAL STUDIES:    SOCIAL HISTORY: Denies ETOh,Smoking,     FAMILY HISTORY:      REVIEW OF SYSTEMS:  CONSTITUTIONAL: No malaise, No fatigue, No fevers or chills, well developed, no diaphoresis    RESPIRATORY: as above, sob improving.     GASTROINTESTINAL: No abdominal or epigastric pain. No nausea, vomiting, or hematemesis; No diarrhea or constipation. No melena or hematochezia.  GENITOURINARY: No dysuria, frequency, foamy urine, urinary urgency, incontinence or hematuria  NEUROLOGICAL: No numbness or weakness, No tremor  SKIN: No itching, burning, rashes, or lesions   VASCULAR: No claudication  Musculoskeletal: no arthralgia, no myalgia  All other review of systems is negative unless indicated above.    VITALS:  Vital Signs Last 24 Hrs  T(C): 36.7 (19 Sep 2024 11:01), Max: 36.7 (19 Sep 2024 02:25)  T(F): 98 (19 Sep 2024 11:01), Max: 98 (19 Sep 2024 02:25)  HR: 75 (19 Sep 2024 11:01) (75 - 86)  BP: 125/62 (19 Sep 2024 11:01) (91/64 - 131/59)  BP(mean): --  RR: 20 (19 Sep 2024 11:01) (18 - 20)  SpO2: 98% (19 Sep 2024 11:01) (97% - 100%)    Parameters below as of 19 Sep 2024 11:01  Patient On (Oxygen Delivery Method): nasal cannula  O2 Flow (L/min): 3      09-19 @ 07:01  -  09-19 @ 14:49  --------------------------------------------------------  IN: 230 mL / OUT: 0 mL / NET: 230 mL        Weight (kg): 68 (09-19 @ 02:25)    PHYSICAL EXAM:  Constitutional: NAD  HEENT: anicteric sclera, oropharynx clear, MMM    Respiratory: air entrance reduced to left lung lower field , rales at lower right lung field.   Cardiovascular: S1, S2, RRR, no pericardial rub, no murmur  Gastrointestinal: BS+, soft, no tenderness, no distension, no bruit  Pelvis: bladder non-distended, no CVA tenderness  Extremities: No cyanosis or clubbing. No peripheral edema  Neurological: A/O x 3, no focal deficits  Psychiatric: Normal mood, normal affect

## 2024-09-19 NOTE — H&P ADULT - PROBLEM SELECTOR PLAN 7
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

## 2024-09-19 NOTE — H&P ADULT - NSHPPHYSICALEXAM_GEN_ALL_CORE
Vital Signs Last 24 Hrs  T(C): 36.6 (19 Sep 2024 07:40), Max: 36.7 (19 Sep 2024 02:25)  T(F): 97.8 (19 Sep 2024 07:40), Max: 98 (19 Sep 2024 02:25)  HR: 85 (19 Sep 2024 07:40) (77 - 85)  BP: 131/59 (19 Sep 2024 07:40) (91/64 - 131/59)  BP(mean): --  RR: 20 (19 Sep 2024 07:40) (18 - 20)  SpO2: 97% (19 Sep 2024 07:40) (97% - 100%)    Parameters below as of 19 Sep 2024 07:40  Patient On (Oxygen Delivery Method): nasal cannula  O2 Flow (L/min): 3    GENERAL: NAD, well-groomed, well-developed  HEAD:  Atraumatic, Normocephalic  EYES: EOMI, PERRLA, conjunctiva and sclera clear  ENMT: No tonsillar erythema, exudates, or enlargement; Moist mucous membranes, No lesions  NECK: Supple, normal appearance, No JVD; Normal thyroid; Trachea midline  NERVOUS SYSTEM:  Alert & Oriented X3,  Motor Strength 5/5 B/L upper and lower extremities, sensation intact, CN II-XII intact.   CHEST/LUNG: Lungs clear to auscultation bilaterally, No rales, rhonchi, wheezing   HEART: Regular rate and rhythm; No murmurs, rubs, or gallops  ABDOMEN: Soft, Nontender, Nondistended; Bowel sounds present  : No suprapubic tenderness, CVAT; Cantor (+/-)  EXTREMITIES:  2+ Peripheral Pulses, No clubbing, cyanosis, or edema  LYMPH: No lymphadenopathy noted  SKIN: No rashes or lesions;  Good capillary refill

## 2024-09-19 NOTE — H&P ADULT - PROBLEM SELECTOR PLAN 2
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

## 2024-09-19 NOTE — CONSULT NOTE ADULT - SUBJECTIVE AND OBJECTIVE BOX
PULMONARY CONSULT NOTE      SIMONA CARLTON  MRN-[id number]      History of Present Illness:  Reason for Admission: CHF exacerbation  History of Present Illness:   Patient is a 89F, from home HHA (M-F/9h S-S/6h) ambulates with walker/wheelchair, with PMHx of asthma (O2 2L PRN), HTN, PAF and hx of DVT/PE (on eliquis), HLD, GERD, remote hx of r. breast cancer s/p mastectomy and left eye blindness who presents with 3 day history of worsening shortness of breath and orthopnea. Patient was hospitalized at [facility] from - for asthma exacerbation due to influenza virus. Since the discharge, patient has been wearing 2L NC at home. However, patient has been worsening shortness of breath and orthopnea to a point that patient is not able to lay down flat at all. She also reports intermittent dizziness, worse on standing and laying flat. She reports that she gets blurry/black vision from dizziness. She denies chest pain or palpitations. She reports leg swelling and the patient reports that her left leg has been swollen since 3 weeks ago. Patient and family denies fever, chills, N/V, headache, cough, chest pain, palpitations, diarrhea. Patient's last BM was today before presentation but reports it was small and reports some abdominal pain.     Patient reports drinking less than 2 cups of water a day. Patient was on xarelto prior but was discharged from [facility] on eliquis 2.5mg BID. She stopped taking eliquis 5 days ago because she is supposed to get cortisone shots today.       HISTORY OF PRESENT ILLNESS: As Above. Awake, alert, laying in bed in NAD. Currently on oxygen supp via NC    MEDICATIONS  (STANDING):  albuterol    0.083%. 2.5 milliGRAM(s) Nebulizer once  albuterol    0.083%. 2.5 milliGRAM(s) Nebulizer once  apixaban 5 milliGRAM(s) Oral two times a day  atorvastatin 20 milliGRAM(s) Oral at bedtime  carvedilol 6.25 milliGRAM(s) Oral every 12 hours  dorzolamide 2% Ophthalmic Solution 1 Drop(s) Both EYES three times a day  fluticasone propionate/ salmeterol 100-50 MICROgram(s) Diskus 1 Dose(s) Inhalation two times a day  ketotifen 0.025% Ophthalmic Solution 1 Drop(s) Both EYES two times a day  latanoprost 0.005% Ophthalmic Solution 1 Drop(s) Both EYES at bedtime  losartan 25 milliGRAM(s) Oral daily  montelukast 10 milliGRAM(s) Oral daily  pantoprazole    Tablet 40 milliGRAM(s) Oral before breakfast      MEDICATIONS  (PRN):  acetaminophen     Tablet .. 650 milliGRAM(s) Oral every 6 hours PRN Temp greater or equal to 38C (100.4F), Mild Pain (1 - 3)      Allergies    aspirin (Nausea (Mild))  aspirin (Stomach Upset)    Intolerances        PAST MEDICAL & SURGICAL HISTORY:  DM (diabetes mellitus)      HTN (hypertension)      HLD (hyperlipidemia)      Breast cancer in female      COPD (chronic obstructive pulmonary disease)      PE (pulmonary thromboembolism)      Diabetes      Breast cancer      Blindness of left eye      History of appendectomy      Spinal stenosis      Spinal stenosis      Multiple pulmonary emboli      Fall      Chronic low back pain      Lumbar spondylosis      H/O mastectomy, right          FAMILY HISTORY:      SOCIAL HISTORY  Smoking History:     REVIEW OF SYSTEMS:    CONSTITUTIONAL:  No fevers, chills, sweats    HEENT:  Eyes:  No diplopia or blurred vision. ENT:  No earache, sore throat or runny nose.    CARDIOVASCULAR:  No pressure, squeezing, tightness, or heaviness about the chest; no palpitations.    RESPIRATORY:  Per HPI    GASTROINTESTINAL:  No abdominal pain, nausea, vomiting or diarrhea.    GENITOURINARY:  No dysuria, frequency or urgency.    NEUROLOGIC:  No paresthesias, fasciculations, seizures or weakness.    PSYCHIATRIC:  No disorder of thought or mood.    Vital Signs Last 24 Hrs  T(C): 36.4 (19 Sep 2024 09:25), Max: 36.7 (19 Sep 2024 02:25)  T(F): 97.5 (19 Sep 2024 09:25), Max: 98 (19 Sep 2024 02:25)  HR: 86 (19 Sep 2024 09:25) (77 - 86)  BP: 100/66 (19 Sep 2024 09:25) (91/64 - 131/59)  BP(mean): --  RR: 20 (19 Sep 2024 09:25) (18 - 20)  SpO2: 97% (19 Sep 2024 09:25) (97% - 100%)    Parameters below as of 19 Sep 2024 09:25  Patient On (Oxygen Delivery Method): nasal cannula  O2 Flow (L/min): 3    I&O's Detail      PHYSICAL EXAMINATION:    GENERAL: The patient is a well-developed, well-nourished _____in no apparent distress.     HEENT: Head is normocephalic and atraumatic. Extraocular muscles are intact. Mucous membranes are moist.     NECK: Supple.     LUNGS: Rhonchi B/L    HEART: Regular rate and rhythm without murmur.    ABDOMEN: Soft, nontender, and nondistended.  No hepatosplenomegaly is noted.    EXTREMITIES: Without any cyanosis, clubbing, rash, lesions + edema.    NEUROLOGIC: Grossly intact.      LABS:                        9.4    8.51  )-----------( 398      ( 19 Sep 2024 03:52 )             30.9         128[L]  |  94[L]  |  16  ----------------------------<  93  6.0[H]   |  32[H]  |  1.19    Ca    8.8      19 Sep 2024 03:52  Phos  4.0       Mg     2.0         TPro  6.0  /  Alb  2.9[L]  /  TBili  0.3  /  DBili  x   /  AST  35  /  ALT  20  /  AlkPhos  114      PT/INR - ( 19 Sep 2024 03:52 )   PT: 14.2 sec;   INR: 1.25 ratio         PTT - ( 19 Sep 2024 03:52 )  PTT:26.6 sec  Urinalysis Basic - ( 19 Sep 2024 08:36 )    Color: Yellow / Appearance: Clear / S.005 / pH: x  Gluc: x / Ketone: Negative mg/dL  / Bili: Negative / Urobili: 0.2 mg/dL   Blood: x / Protein: Negative mg/dL / Nitrite: Negative   Leuk Esterase: Negative / RBC: x / WBC x   Sq Epi: x / Non Sq Epi: x / Bacteria: x                Lactate, Blood: 0.7 mmol/L (24 @ 03:52)        MICROBIOLOGY:    RADIOLOGY & ADDITIONAL STUDIES:  < from: US Duplex Venous Lower Ext Ltd, Left (24 @ 09:07) >  IMPRESSION:  No evidence of left lower extremity deep venous thrombosis.      < end of copied text >    CXR:    Ct scan chest;    ekg;    echo:

## 2024-09-19 NOTE — ED PROVIDER NOTE - CLINICAL SUMMARY MEDICAL DECISION MAKING FREE TEXT BOX
89 female with hx of asthma/COPD, DM, HTN, HLD, GERD, prior right mastectomy for breast cancer, prior DVT/PE on AC, left eye blindness, & anemia.   Patient presenting to the ED reporting 3 days of progressive worsening dyspnea & L >R lower extremity edema.    Vitals with soft BP.  Hypoxia on room air improved with 2L NC.  Elderly/frail, chronically ill-appearing, N/V intact.  Airway intact, no respiratory distress.  + Rales on exam.  + Peripheral edema.  No abdominal or CVA tenderness.      Plan to obtain:  -Labs, EKG, CXR, respiratory support as needed, diuretics as needed, bronchodilators as needed, admit    ED Course:  Lab values demonstrate no acute/emergent pathology.  My independent interpretation of the EKG: Limited by motion artifact. Afib @ 84, normal axis, RBBB, no STEMI  My independent interpretation of XR: Bibasilar effusions with pulmonary edema, no pneumothorax    [***]    [Patient advised regarding need for close outpatient follow up.  Patient stable for further care in outpatient setting. No indication for inpatient admission at this time. Patient advised regarding symptomatic & supportive care and symptoms to prompt ED return. Strict return precautions provided.]    [Patient requires inpatient admission for further care & stabilization. Care signed out to inpatient team.] 89 female with hx of asthma/COPD, DM, HTN, HLD, GERD, prior right mastectomy for breast cancer, prior DVT/PE on AC, left eye blindness, & anemia.   Patient presenting to the ED reporting 3 days of progressive worsening dyspnea & L >R lower extremity edema.    Vitals with soft BP.  Hypoxia on room air improved with 2L NC.  Elderly/frail, chronically ill-appearing, N/V intact.  Airway intact, no respiratory distress.  + Rales on exam.  + Peripheral edema.  No abdominal or CVA tenderness.      Plan to obtain:  -Labs, EKG, CXR, respiratory support as needed, diuretics as needed, bronchodilators as needed, admit    ED Course:  Lab values w hyperK, elevated BNP, hypoNa  My independent interpretation of the EKG: Limited by motion artifact. Afib @ 84, normal axis, RBBB, no STEMI  My independent interpretation of XR: Bibasilar effusions with pulmonary edema, no pneumothorax  HyperK medications and diuretics given  Patient requires inpatient admission for further care & stabilization. Care signed out to inpatient team.

## 2024-09-19 NOTE — ED PROVIDER NOTE - OBJECTIVE STATEMENT
89 female with hx of asthma/COPD, DM, HTN, HLD, GERD, prior right mastectomy for breast cancer, prior DVT/PE on AC, left eye blindness, & anemia.   Patient presenting to the ED reporting 3 days of progressive worsening dyspnea & L >R lower extremity edema.

## 2024-09-19 NOTE — H&P ADULT - ASSESSMENT
Patient is a 89F, from home HHA (M-F/9h S-S/6h) ambulates with walker/wheelchair, with PMHx of asthma (O2 2L PRN), HTN, PAF and hx of DVT/PE (on eliquis), HLD, GERD, remote hx of r. breast cancer s/p mastectomy and left eye blindness who presents with 3 day history of worsening shortness of breath and orthopnea. T38. HR 77, BP 91/64, O2 100% on 3L NC, CXR showed pulmonary congestion on wet read. BNP 6k. Admitted for new CHF.

## 2024-09-20 NOTE — PROGRESS NOTE ADULT - SUBJECTIVE AND OBJECTIVE BOX
Problem List:  Consult was called for Hyponatremia .  89 years old female with a history of Asthma, HTN, PAF, HLD, GERD, breast cancer post mastectomy came to the ER for increased SOB.  08/24 she was at [facility] for Influenza and c/o of SOB since admission.  History of Hyperkalemia and Hyponatremia without renal insuffiencey for several years,  possible etiologies  increased water intake , use of ARB and Pantoprazole.  Admitted for sob  and  CHF, Hyponatremia and Hyperkalemia.  xr chest showed CHF and bilateral  pleural effusion.      < from: CT Chest No Cont (09.19.24 @ 16:32) >  COMPARISON: Chest CT of 4/24/2019    FINDINGS:    Lungs/Airways/Pleura: Small right pleural effusion with right middle and   lower lobe consolidation/collapse with associated bronchial secretions.   Mild left basilar atelectasis. No pneumothorax.    Mediastinum/Lymph nodes: No thoracic adenopathy.    Heart and Vessels: Torturous right common carotid artery. Biatrial   enlargement qualitatively. Coronary arterial, aortic valvular and mitral   annular calcification. No pericardial effusion.    Upper Abdomen: Bilateral renal cysts one with calcified septation in the   left. Trace ascites.    Osseous structures and Soft Tissues: No aggressive bone lesions.    IMPRESSION:  Right middle and lower bilobar consolidation/collapse with bronchial   secretions and small right pleural effusion.          PAST MEDICAL & SURGICAL HISTORY:  DM (diabetes mellitus)      HTN (hypertension)      HLD (hyperlipidemia)      Breast cancer in female      COPD (chronic obstructive pulmonary disease)      PE (pulmonary thromboembolism)      Diabetes      Breast cancer      Blindness of left eye      History of appendectomy      Spinal stenosis      Spinal stenosis      Multiple pulmonary emboli      Fall      Chronic low back pain      Lumbar spondylosis      H/O mastectomy, right          aspirin (Nausea (Mild))  aspirin (Stomach Upset)      MEDICATIONS  (STANDING):  apixaban 5 milliGRAM(s) Oral two times a day  atorvastatin 20 milliGRAM(s) Oral at bedtime  carvedilol 6.25 milliGRAM(s) Oral every 12 hours  dorzolamide 2% Ophthalmic Solution 1 Drop(s) Both EYES three times a day  famotidine    Tablet 20 milliGRAM(s) Oral daily  fluticasone propionate/ salmeterol 100-50 MICROgram(s) Diskus 1 Dose(s) Inhalation two times a day  influenza  Vaccine (HIGH DOSE) 0.5 milliLiter(s) IntraMuscular once  ketotifen 0.025% Ophthalmic Solution 1 Drop(s) Both EYES two times a day  latanoprost 0.005% Ophthalmic Solution 1 Drop(s) Both EYES at bedtime  montelukast 10 milliGRAM(s) Oral daily  polyethylene glycol 3350 17 Gram(s) Oral daily  senna 2 Tablet(s) Oral at bedtime    MEDICATIONS  (PRN):  acetaminophen     Tablet .. 650 milliGRAM(s) Oral every 6 hours PRN Temp greater or equal to 38C (100.4F), Mild Pain (1 - 3)  bisacodyl 5 milliGRAM(s) Oral every 12 hours PRN Constipation                            9.1    10.27 )-----------( 350      ( 20 Sep 2024 07:24 )             30.6     09-20    137  |  96  |  14  ----------------------------<  119[H]  4.4   |  35[H]  |  1.08    Ca    8.5      20 Sep 2024 07:24  Phos  4.0     09-19  Mg     1.9     09-20    TPro  6.0  /  Alb  2.9[L]  /  TBili  0.3  /  DBili  x   /  AST  13  /  ALT  12  /  AlkPhos  113  09-20    PT/INR - ( 19 Sep 2024 03:52 )   PT: 14.2 sec;   INR: 1.25 ratio         PTT - ( 19 Sep 2024 03:52 )  PTT:26.6 sec    Creatinine, Random Urine: <13 mg/dL (09-19 @ 13:12)  Potassium, Random Urine: 14 mmol/L (09-19 @ 13:12)  Osmolality, Random Urine: 213 mosm/Kg (09-19 @ 13:12)  Sodium, Random Urine: 86 mmol/L (09-19 @ 13:12)      REVIEW OF SYSTEMS:  General: no fever no chills, no weight loss.    RESPIRATORY: No cough, wheezing, hemoptysis; No shortness of breath  CARDIOVASCULAR: No chest pain or palpitations. No Edema  GASTROINTESTINAL: No abdominal or epigastric pain. No nausea, vomiting. No diarrhea or constipation. No melena.  GENITOURINARY: No dysuria, frequency, foamy urine, urinary urgency, incontinence or hematuria  NEUROLOGICAL: No numbness or weakness, no tremor , no dizziness.   Muscle skeletal : no joint pain and no swelling of joints and limbs.  SKIN: No itching, burning, rashes.        VITALS:  T(F): 98.2 (09-20-24 @ 07:36), Max: 99.5 (09-20-24 @ 04:37)  HR: 104 (09-20-24 @ 07:36)  BP: 143/54 (09-20-24 @ 07:36)  RR: 18 (09-20-24 @ 07:36)  SpO2: 98% (09-20-24 @ 07:36)  Wt(kg): --    09-19 @ 07:01  -  09-20 @ 07:00  --------------------------------------------------------  IN: 230 mL / OUT: 1300 mL / NET: -1070 mL        PHYSICAL EXAM:  Constitutional: well developed, no diaphoresis, no distress.  Neck: No JVD, no carotid bruit, supple, no adenopathy  Respiratory: rales in left lower lung field. Right bronchial breathing.   Cardiovascular: S1, S2, RRR, no pericardial rub, no murmur  Abdomen: BS+, soft, no tenderness, no bruit  Pelvis: bladder nondistended  Extremities: No cyanosis or clubbing. No peripheral edema.

## 2024-09-20 NOTE — CONSULT NOTE ADULT - ASSESSMENT
A 89 year old female w/ HHA (M-F/9h S-S/6h), ambulates with walker/wheelchair, w/ PMHx of COPD? (2L NC PRN), HTN, PAF, DVT/PE (on Eliquis), HLD, GERD, remote hx of right breast cancer s/p mastectomy and left eye blindness presented with a 3 day history of worsening dyspnea and orthopnea. Admitted to telemetry for new CHF management. ICU consulted for BIPAP due to hypercapnia.     Recommendations:  #Acute on Chronic Hypoxic and Hypercapnic Respiratory Failure  - ABG noted  - Most likely chronic hypercapnia  - Patient not on respiratory distress and expressed mild dyspnea which has improved since admission  - STOP-BANG 3 points. She may need nocturnal BIPAP and outpatient follow up for sleep study  - Wean off oxygen as tolerated  - SPO2 target 88 to 92% for COPD  - Incentive spirometer  - Ambulate as tolerated  - s/p Lasix in the ED   - Patient with signs of volume overload today. Would continue IV diuresis for now.   - Monitor electrolytes  - TTE   - Remote Tele.   - Daily Weights  - Strict I & Os  - Vitals q4hr   - DASH/TLC diet.1500 mL Fluid restriction    - Avoid NSAIDs or thiazolidinediones  - Dietitian consult    Jacky Key  PGY-3  Available on Teams  Pending attending attestation A 89 year old female w/ HHA (M-F/9h S-S/6h), ambulates with walker/wheelchair, w/ PMHx of COPD? (2L NC PRN), HTN, PAF, DVT/PE (on Eliquis), HLD, GERD, remote hx of right breast cancer s/p mastectomy and left eye blindness presented with a 3 day history of worsening dyspnea and orthopnea. Admitted to telemetry for new CHF management. ICU consulted for BIPAP due to hypercapnia.     Recommendations:  #Acute on Chronic Hypoxic and Hypercapnic Respiratory Failure  - ABG noted  - Most likely chronic hypercapnia  - Patient not on respiratory distress and expressed mild dyspnea which has improved since admission  - STOP-BANG 3 points. She may need outpatient follow up for sleep study  - Can do nocturnal BIPAP   - Wean off oxygen as tolerated  - SPO2 target 88 to 92% for COPD  - Incentive spirometer  - Ambulate as tolerated  - s/p Lasix in the ED   - Patient with signs of volume overload today. Would continue IV diuresis for now.   - Monitor electrolytes  - TTE   - Remote Tele.   - Daily Weights  - Strict I & Os  - Vitals q4hr   - DASH/TLC diet.1500 mL Fluid restriction    - Avoid NSAIDs or thiazolidinediones  - Dietitian consult    Jacky Key  PGY-3  Available on Teams  Pending attending attestation A 89 year old female w/ HHA (M-F/9h S-S/6h), ambulates with walker/wheelchair, w/ PMHx of COPD? (2L NC PRN), HTN, PAF, DVT/PE (on Eliquis), HLD, GERD, remote hx of right breast cancer s/p mastectomy and left eye blindness presented with a 3 day history of worsening dyspnea and orthopnea. Admitted to telemetry for new CHF management. ICU consulted for BIPAP due to hypercapnia.     Recommendations:  #Acute diastolic heart failure  #Chronic afib   #Chronic Hypoxic and Hypercapnic Respiratory Failure  #Asthma   - ABG noted  - Most likely chronic hypercapnia  - Patient not on respiratory distress and expressed mild dyspnea which has improved since admission  - STOP-BANG 3 points. She may need outpatient follow up for sleep study  - Can do nocturnal BIPAP   - Wean off oxygen as tolerated  - SPO2 target 88 to 92% for COPD  - Incentive spirometer  - Ambulate as tolerated  - s/p Lasix in the ED   - Patient with signs of volume overload today. Would continue IV diuresis for now.   - Monitor electrolytes  - TTE   - Remote Tele.   - Daily Weights  - Strict I & Os  - Vitals q4hr   - DASH/TLC diet.1500 mL Fluid restriction    - Avoid NSAIDs or thiazolidinediones  - Dietitian consult    Jacky Key  PGY-3  Available on Teams  Pending attending attestation

## 2024-09-20 NOTE — PROGRESS NOTE ADULT - ASSESSMENT
89yr old Female, from home HHA (M-F/9h S-S/6h) ambulates with walker/wheelchair, with PMHx of asthma (O2 2L PRN), HTN, PAF and hx of DVT/PE (on eliquis), HLD, GERD, remote hx of r. breast cancer s/p mastectomy and left eye blindness who presents with 3 day history of worsening shortness of breath and orthopnea,hyponatremia,hyperkalemia,acute diastolic HF,respiratory failure.  1.Tele monitoring.  2.ICU eval for bipap  3.Hyponatremia-Renal f/u,improved.  4.Afib-coreg,eliquis.  5.Acute diastolic HF-s/p IV lasix.  6.Asthma-Pulm eval f/u.  7.Hx of DVT/PE-eliquis.  8.CT chest w/o contrast as above.  9.PPI.

## 2024-09-20 NOTE — PROGRESS NOTE ADULT - PROBLEM SELECTOR PLAN 4
p/w 3d history of worsening SOB and orthopnea  PEx: B/L wet rales (R>L), B/L leg edema  pro-BNP 6k  CXR: Pulmonary congestion and possible RLL effusion   s/p lasix 40mg IVP in ED  f/u TTE   hold lasix worsening BUN/Creatinine  Cardio Dr. Anaya

## 2024-09-20 NOTE — PROGRESS NOTE ADULT - SUBJECTIVE AND OBJECTIVE BOX
Date of Service 09-20-24 @ 09:48    CHIEF COMPLAINT:Patient is a 89y old  Female who presents with a chief complaint of CHF exacerbation .Pt confused this am.    	  REVIEW OF SYSTEMS:  	  [x ] Unable to obtain    PHYSICAL EXAM:  T(C): 36.8 (09-20-24 @ 07:36), Max: 37.5 (09-20-24 @ 04:37)  HR: 104 (09-20-24 @ 07:36) (75 - 109)  BP: 143/54 (09-20-24 @ 07:36) (104/66 - 143/54)  RR: 18 (09-20-24 @ 07:36) (18 - 20)  SpO2: 98% (09-20-24 @ 07:36) (93% - 100%)  Wt(kg): --  I&O's Summary    19 Sep 2024 07:01  -  20 Sep 2024 07:00  --------------------------------------------------------  IN: 230 mL / OUT: 1300 mL / NET: -1070 mL        Appearance: Normal	  HEENT:   Normal oral mucosa, PERRL, EOMI	  Lymphatic: No lymphadenopathy  Cardiovascular: Normal S1 S2, No JVD, No murmurs, No edema  Respiratory: Dec BS at bases	  Gastrointestinal:  Soft, Non-tender, + BS	  Skin: No rashes, No ecchymoses, No cyanosis	  Extremities: Normal range of motion, No clubbing, cyanosis or edema  Vascular: Peripheral pulses palpable 2+ bilaterally    MEDICATIONS  (STANDING):  albuterol    90 MICROgram(s) HFA Inhaler 2 Puff(s) Inhalation three times a day  albuterol/ipratropium for Nebulization 3 milliLiter(s) Nebulizer every 6 hours  apixaban 5 milliGRAM(s) Oral two times a day  atorvastatin 20 milliGRAM(s) Oral at bedtime  carvedilol 6.25 milliGRAM(s) Oral every 12 hours  dorzolamide 2% Ophthalmic Solution 1 Drop(s) Both EYES three times a day  famotidine    Tablet 20 milliGRAM(s) Oral daily  fluticasone propionate/ salmeterol 100-50 MICROgram(s) Diskus 1 Dose(s) Inhalation two times a day  influenza  Vaccine (HIGH DOSE) 0.5 milliLiter(s) IntraMuscular once  ketotifen 0.025% Ophthalmic Solution 1 Drop(s) Both EYES two times a day  latanoprost 0.005% Ophthalmic Solution 1 Drop(s) Both EYES at bedtime  montelukast 10 milliGRAM(s) Oral daily  polyethylene glycol 3350 17 Gram(s) Oral daily  senna 2 Tablet(s) Oral at bedtime      TELEMETRY: 	      LABS:	 	      Troponin I, High Sensitivity Result: 16.8 ng/L (09-19 @ 03:52)                            9.1    10.27 )-----------( 350      ( 20 Sep 2024 07:24 )             30.6     09-20    137  |  96  |  14  ----------------------------<  119[H]  4.4   |  35[H]  |  1.08    Ca    8.5      20 Sep 2024 07:24  Phos  4.0     09-19  Mg     1.9     09-20    TPro  6.0  /  Alb  2.9[L]  /  TBili  0.3  /  DBili  x   /  AST  13  /  ALT  12  /  AlkPhos  113  09-20    TSH: Thyroid Stimulating Hormone, Serum: 1.56 uU/mL (09-20 @ 07:24)      	  < from: CT Chest No Cont (09.19.24 @ 16:32) >  ACC: [id number] EXAM:  CT CHEST   ORDERED BY: KG RUBIO     PROCEDURE DATE:  09/19/2024          INTERPRETATION:  INDICATION: Heart failure    TECHNIQUE: A volumetric CT acquisition of the chest was obtained from the   thoracic inlet to the upper abdomen without the use of intravenous   contrast. Coronal and sagittal reconstructed images are provided.    COMPARISON: Chest CT of 4/24/2019    FINDINGS:    Lungs/Airways/Pleura: Small right pleural effusion with right middle and   lower lobe consolidation/collapse with associated bronchial secretions.   Mild left basilar atelectasis. No pneumothorax.    Mediastinum/Lymph nodes: No thoracic adenopathy.    Heart and Vessels: Torturous right common carotid artery. Biatrial   enlargement qualitatively. Coronary arterial, aortic valvular and mitral   annular calcification. No pericardial effusion.    Upper Abdomen: Bilateral renal cysts one with calcified septation in the   left. Trace ascites.    Osseous structures and Soft Tissues: No aggressive bone lesions.    IMPRESSION:  Right middle and lower bilobar consolidation/collapse with bronchial   secretions and small right pleural effusion.    Cardiomegaly.    --- End of Report ---            ELY LR M.D., Attending Radiologist  This document has been electronically signed. Sep 19 2024  5:20PM    < end of copied text >      gaBlood Gas Profile - Arterial (09.20.24 @ 09:43)   pH, Arterial: 7.36  pCO2, Arterial: 74: TYPE:(C=Critical, N=Notification, A=Abnormal) _C   TESTS: _PCO2 74   DATE/TIME CALLED: _09/20/2024 09:49:35 EDT   CALLED TO: Kayla Johnson NP   READ BACK (2 Patient Identifiers)(Y/N): _y   READ BACK VALUES (Y/N): _y   CALLED BY: Elroy Ascencio, RRT mmHg  pO2, Arterial: 97 mmHg  HCO3, Arterial: 42 mmol/L  Base Excess, Arterial: 12.9 mmol/L  Oxygen Saturation, Arterial: 100 %  FIO2, Arterial: 32.0  Blood Gas Comments Arterial: 3L N/C   Left RadialBlood Gas Profile - Arterial (09.20.24 @ 09:43)   pH, Arterial: 7.36  pCO2, Arterial: 74: TYPE:(C=Critical, N=Notification, A=Abnormal) _C   TESTS: _PCO2 74   DATE/TIME CALLED: _09/20/2024 09:49:35 EDT   CALLED TO: Kayla Johnson NP   READ BACK (2 Patient Identifiers)(Y/N): _y   READ BACK VALUES (Y/N): _y   CALLED BY: Elroy Ascencio, RRT mmHg  pO2, Arterial: 97 mmHg  HCO3, Arterial: 42 mmol/L  Base Excess, Arterial: 12.9 mmol/L  Oxygen Saturation, Arterial: 100 %  FIO2, Arterial: 32.0  Blood Gas Comments Arterial: 3L N/C   Left Radial    Iron Total: 15 ug/dL (09.20.24 @ 07:24)

## 2024-09-20 NOTE — PROGRESS NOTE ADULT - SUBJECTIVE AND OBJECTIVE BOX
Patient is a 89y old  Female who presents with a chief complaint of CHF exacerbation (19 Sep 2024 14:48)    pt seen in icu [  ], reg med floor [   ], bed [  ], chair at bedside [   ], a+o x3 [  ], lethargic [  ],  nad [  ]    valle [  ], ngt [  ], peg [  ], et tube [  ], cent line [  ], picc line [  ]        Allergies    aspirin (Nausea (Mild))  aspirin (Stomach Upset)        Vitals    T(F): 97.5 (09-20-24 @ 05:34), Max: 99.5 (09-20-24 @ 04:37)  HR: 101 (09-20-24 @ 04:37) (75 - 109)  BP: 141/60 (09-20-24 @ 04:37) (100/66 - 142/120)  RR: 20 (09-20-24 @ 04:37) (18 - 20)  SpO2: 98% (09-20-24 @ 04:37) (93% - 100%)  Wt(kg): --  CAPILLARY BLOOD GLUCOSE      POCT Blood Glucose.: 102 mg/dL (19 Sep 2024 06:23)      Labs                          9.4    8.51  )-----------( 398      ( 19 Sep 2024 03:52 )             30.9       09-19    137  |  95[L]  |  15  ----------------------------<  78  4.2   |  37[H]  |  1.13    Ca    8.8      19 Sep 2024 11:40  Phos  4.0     09-19  Mg     2.0     09-19    TPro  6.0  /  Alb  2.9[L]  /  TBili  0.3  /  DBili  x   /  AST  35  /  ALT  20  /  AlkPhos  114  09-19          Troponin I, High Sensitivity Result: 16.8 ng/L (09-19-24 @ 03:52)        Radiology Results      Meds    MEDICATIONS  (STANDING):  apixaban 5 milliGRAM(s) Oral two times a day  atorvastatin 20 milliGRAM(s) Oral at bedtime  carvedilol 6.25 milliGRAM(s) Oral every 12 hours  dorzolamide 2% Ophthalmic Solution 1 Drop(s) Both EYES three times a day  famotidine    Tablet 20 milliGRAM(s) Oral daily  fluticasone propionate/ salmeterol 100-50 MICROgram(s) Diskus 1 Dose(s) Inhalation two times a day  influenza  Vaccine (HIGH DOSE) 0.5 milliLiter(s) IntraMuscular once  ketotifen 0.025% Ophthalmic Solution 1 Drop(s) Both EYES two times a day  latanoprost 0.005% Ophthalmic Solution 1 Drop(s) Both EYES at bedtime  montelukast 10 milliGRAM(s) Oral daily  polyethylene glycol 3350 17 Gram(s) Oral daily  senna 2 Tablet(s) Oral at bedtime      MEDICATIONS  (PRN):  acetaminophen     Tablet .. 650 milliGRAM(s) Oral every 6 hours PRN Temp greater or equal to 38C (100.4F), Mild Pain (1 - 3)  bisacodyl 5 milliGRAM(s) Oral every 12 hours PRN Constipation      Physical Exam    Neuro :  no focal deficits  Respiratory: CTA B/L  CV: RRR, S1S2, no murmurs,   Abdominal: Soft, NT, ND +BS,  Extremities: No edema, + peripheral pulses    ASSESSMENT    acute on hypoxic resp failure pos 2nd to acute chf and asthma/copd exacerb,   r/o recurrent pe/dvt.,   hyponatremia,   hyperkalemia,   h/o asthma/copd (O2 2L PRN),   HTN,   PAF and hx of DVT/PE (on eliquis),   HLD,   GERD,   remote hx of r. breast cancer s/p mastectomy and left eye blindness,   spinal stenosis    Heart failure    DM (diabetes mellitus)    HTN (hypertension)    HLD (hyperlipidemia)    Breast cancer in female    COPD (chronic obstructive pulmonary disease)    PE (pulmonary thromboembolism)    Diabetes    Breast cancer    Blindness of left eye    History of appendectomy    Spinal stenosis    Spinal stenosis    Multiple pulmonary emboli    Fall    Chronic low back pain    Lumbar spondylosis    H/O mastectomy, right        PLAN    adm to tele,   acs protocol,   coreg,   aspirin,   lasix iv,   statin,   supplemental O2,   duonebs,   lokelma,   cont preadmit home meds,   gi and dvt prophylaxis  cbc,   bmp,   mg,   phos,   lipid,   tsh,   d-dimer,   ce q8 x3,   urine lytes    doppler LE    echo    cardio cons  pulm cons   renal cons .     Patient is a 89y old  Female who presents with a chief complaint of CHF exacerbation (19 Sep 2024 14:48)    pt seen in tele [x  ], reg med floor [   ], bed [x  ], chair at bedside [   ], a+o x3 [ x ], lethargic [  ],  nad [ x ]    valle [x  ],        Allergies    aspirin (Nausea (Mild))  aspirin (Stomach Upset)        Vitals    T(F): 97.5 (09-20-24 @ 05:34), Max: 99.5 (09-20-24 @ 04:37)  HR: 101 (09-20-24 @ 04:37) (75 - 109)  BP: 141/60 (09-20-24 @ 04:37) (100/66 - 142/120)  RR: 20 (09-20-24 @ 04:37) (18 - 20)  SpO2: 98% (09-20-24 @ 04:37) (93% - 100%)  Wt(kg): --  CAPILLARY BLOOD GLUCOSE      POCT Blood Glucose.: 102 mg/dL (19 Sep 2024 06:23)      Labs                          9.4    8.51  )-----------( 398      ( 19 Sep 2024 03:52 )             30.9       09-19    137  |  95[L]  |  15  ----------------------------<  78  4.2   |  37[H]  |  1.13    Ca    8.8      19 Sep 2024 11:40  Phos  4.0     09-19  Mg     2.0     09-19    TPro  6.0  /  Alb  2.9[L]  /  TBili  0.3  /  DBili  x   /  AST  35  /  ALT  20  /  AlkPhos  114  09-19          Troponin I, High Sensitivity Result: 16.8 ng/L (09-19-24 @ 03:52)        Radiology Results    < from: CT Chest No Cont (09.19.24 @ 16:32) >  IMPRESSION:  Right middle and lower bilobar consolidation/collapse with bronchial   secretions and small right pleural effusion.    < end of copied text >      < from: US Duplex Venous Lower Ext Ltd, Left (09.19.24 @ 09:07) >  IMPRESSION:  No evidence of left lower extremity deep venous thrombosis.    < end of copied text >        Meds    MEDICATIONS  (STANDING):  apixaban 5 milliGRAM(s) Oral two times a day  atorvastatin 20 milliGRAM(s) Oral at bedtime  carvedilol 6.25 milliGRAM(s) Oral every 12 hours  dorzolamide 2% Ophthalmic Solution 1 Drop(s) Both EYES three times a day  famotidine    Tablet 20 milliGRAM(s) Oral daily  fluticasone propionate/ salmeterol 100-50 MICROgram(s) Diskus 1 Dose(s) Inhalation two times a day  influenza  Vaccine (HIGH DOSE) 0.5 milliLiter(s) IntraMuscular once  ketotifen 0.025% Ophthalmic Solution 1 Drop(s) Both EYES two times a day  latanoprost 0.005% Ophthalmic Solution 1 Drop(s) Both EYES at bedtime  montelukast 10 milliGRAM(s) Oral daily  polyethylene glycol 3350 17 Gram(s) Oral daily  senna 2 Tablet(s) Oral at bedtime      MEDICATIONS  (PRN):  acetaminophen     Tablet .. 650 milliGRAM(s) Oral every 6 hours PRN Temp greater or equal to 38C (100.4F), Mild Pain (1 - 3)  bisacodyl 5 milliGRAM(s) Oral every 12 hours PRN Constipation      Physical Exam    Neuro :  no focal deficits  Respiratory: CTA B/L  CV: RRR, S1S2, no murmurs,   Abdominal: Soft, NT, ND +BS,  Extremities: No edema, + peripheral pulses      ASSESSMENT    acute on hypoxic resp failure pos 2nd to acute chf and asthma/copd exacerb,   r/o recurrent pe/dvt.,   hyponatremia,   hyperkalemia,   h/o asthma/copd (O2 2L PRN),   HTN,   PAF and hx of DVT/PE (on eliquis),   HLD,   GERD,   remote hx of r. breast cancer s/p mastectomy and left eye blindness,   spinal stenosis  DM (diabetes mellitus)  Blindness of left eye    PLAN    cont tele,   cont coreg, lasix iv, statin,   cont eliquis   cardio f/u   d/c cozaar. 2nd to hypokalemia      ct chest with Right middle and lower bilobar consolidation/collapse with bronchial   secretions and small right pleural effusion noted above.    doppler le with No evidence of left lower extremity deep venous thrombosis noted above.    pul f/u   supplemental O2,   symbicort HFA 80/4.5 mcgs 2 puffs po BID with spacer  PFTs as OP.   s/p lokelma  serum potassium wnl   serum sodium wnl  renal  f/u   Venous ABG.   Follow 2 gm K diet.  DC pantoprazole , change to Pepcid.  Follow TFT, RENIN / Aldosterone.   cont current meds     Patient is a 89y old  Female who presents with a chief complaint of CHF exacerbation (19 Sep 2024 14:48)    pt seen in tele [x  ], reg med floor [   ], bed [x  ], chair at bedside [   ], awake and responsive, moderately altered [ x ], lethargic [  ],  nad [ x ]    valle [x  ],        Allergies    aspirin (Nausea (Mild))  aspirin (Stomach Upset)        Vitals    T(F): 97.5 (09-20-24 @ 05:34), Max: 99.5 (09-20-24 @ 04:37)  HR: 101 (09-20-24 @ 04:37) (75 - 109)  BP: 141/60 (09-20-24 @ 04:37) (100/66 - 142/120)  RR: 20 (09-20-24 @ 04:37) (18 - 20)  SpO2: 98% (09-20-24 @ 04:37) (93% - 100%)  Wt(kg): --  CAPILLARY BLOOD GLUCOSE      POCT Blood Glucose.: 102 mg/dL (19 Sep 2024 06:23)      Labs                          9.4    8.51  )-----------( 398      ( 19 Sep 2024 03:52 )             30.9       09-19    137  |  95[L]  |  15  ----------------------------<  78  4.2   |  37[H]  |  1.13    Ca    8.8      19 Sep 2024 11:40  Phos  4.0     09-19  Mg     2.0     09-19    TPro  6.0  /  Alb  2.9[L]  /  TBili  0.3  /  DBili  x   /  AST  35  /  ALT  20  /  AlkPhos  114  09-19          Troponin I, High Sensitivity Result: 16.8 ng/L (09-19-24 @ 03:52)        Radiology Results    < from: CT Chest No Cont (09.19.24 @ 16:32) >  IMPRESSION:  Right middle and lower bilobar consolidation/collapse with bronchial   secretions and small right pleural effusion.    < end of copied text >      < from: US Duplex Venous Lower Ext Ltd, Left (09.19.24 @ 09:07) >  IMPRESSION:  No evidence of left lower extremity deep venous thrombosis.    < end of copied text >        Meds    MEDICATIONS  (STANDING):  apixaban 5 milliGRAM(s) Oral two times a day  atorvastatin 20 milliGRAM(s) Oral at bedtime  carvedilol 6.25 milliGRAM(s) Oral every 12 hours  dorzolamide 2% Ophthalmic Solution 1 Drop(s) Both EYES three times a day  famotidine    Tablet 20 milliGRAM(s) Oral daily  fluticasone propionate/ salmeterol 100-50 MICROgram(s) Diskus 1 Dose(s) Inhalation two times a day  influenza  Vaccine (HIGH DOSE) 0.5 milliLiter(s) IntraMuscular once  ketotifen 0.025% Ophthalmic Solution 1 Drop(s) Both EYES two times a day  latanoprost 0.005% Ophthalmic Solution 1 Drop(s) Both EYES at bedtime  montelukast 10 milliGRAM(s) Oral daily  polyethylene glycol 3350 17 Gram(s) Oral daily  senna 2 Tablet(s) Oral at bedtime      MEDICATIONS  (PRN):  acetaminophen     Tablet .. 650 milliGRAM(s) Oral every 6 hours PRN Temp greater or equal to 38C (100.4F), Mild Pain (1 - 3)  bisacodyl 5 milliGRAM(s) Oral every 12 hours PRN Constipation      Physical Exam    Neuro :  no focal deficits  Respiratory: CTA B/L  CV: RRR, S1S2, no murmurs,   Abdominal: Soft, NT, ND +BS,  Extremities: No edema, + peripheral pulses      ASSESSMENT    acute on hypoxic resp failure pos 2nd to acute chf and asthma/copd exacerb,   r/o recurrent pe/dvt.,   hyponatremia,   hyperkalemia,   h/o asthma/copd (O2 2L PRN),   HTN,   PAF and hx of DVT/PE (on eliquis),   HLD,   GERD,   remote hx of r. breast cancer s/p mastectomy and left eye blindness,   spinal stenosis  DM (diabetes mellitus)  Blindness of left eye    PLAN    cont tele,   cont coreg, lasix iv, statin,   cont eliquis   cardio f/u   d/c cozaar. 2nd to hypokalemia   ct chest with Right middle and lower bilobar consolidation/collapse with bronchial   secretions and small right pleural effusion noted above.    doppler le with No evidence of left lower extremity deep venous thrombosis noted above.    pul f/u   supplemental O2,   symbicort HFA 80/4.5 mcgs 2 puffs po BID with spacer  PFTs as OP.   s/p lokelma  serum potassium wnl   serum sodium wnl  renal  f/u   Venous ABG.   Follow 2 gm K diet.  DC pantoprazole , change to Pepcid.  Follow TFT, RENIN / Aldosterone.   serum bicarb elevated  check cbv, abg  icu eval for bipap  cont current meds

## 2024-09-20 NOTE — PROGRESS NOTE ADULT - ASSESSMENT
Hyponatremia due to fluid retention/CHF, sodium improved with diuretics, last sodium 137  CHF improved, continue with Furosemide 20 mg IV daily and fluid restriction 1 liter per day.  CT chest with bilobar right consolidation with bronchial secretion. Follow with pulmonary and chest PT.     Follow VBG .

## 2024-09-20 NOTE — PROGRESS NOTE ADULT - SUBJECTIVE AND OBJECTIVE BOX
Patient is a 89y old  Female who presents with a chief complaint of CHF exacerbation (20 Sep 2024 09:07)  Awake, alert, comfortable in bed in NAD. Agitated overnight. Remains on oxygen supp via NC    INTERVAL HPI/OVERNIGHT EVENTS:      VITAL SIGNS:  T(F): 98.2 (09-20-24 @ 07:36)  HR: 104 (09-20-24 @ 07:36)  BP: 143/54 (09-20-24 @ 07:36)  RR: 18 (09-20-24 @ 07:36)  SpO2: 98% (09-20-24 @ 07:36)  Wt(kg): --  I&O's Detail    19 Sep 2024 07:01  -  20 Sep 2024 07:00  --------------------------------------------------------  IN:    Oral Fluid: 230 mL  Total IN: 230 mL    OUT:    Voided (mL): 1300 mL  Total OUT: 1300 mL    Total NET: -1070 mL              REVIEW OF SYSTEMS:    CONSTITUTIONAL:  No fevers, chills, sweats    HEENT:  Eyes:  No diplopia or blurred vision. ENT:  No earache, sore throat or runny nose.    CARDIOVASCULAR:  No pressure, squeezing, tightness, or heaviness about the chest; no palpitations.    RESPIRATORY:  Per HPI    GASTROINTESTINAL:  No abdominal pain, nausea, vomiting or diarrhea.    GENITOURINARY:  No dysuria, frequency or urgency.    NEUROLOGIC:  No paresthesias, fasciculations, seizures or weakness.    PSYCHIATRIC:  No disorder of thought or mood.      PHYSICAL EXAM:    Constitutional: Well developed and nourished  Eyes:Perrla  ENMT: normal  Neck:supple  Respiratory: Rhonchi B/L  Cardiovascular: S1 S2 regular  Gastrointestinal: Soft, Non tender  Extremities: No edema  Vascular:normal  Neurological:Awake, alert  Musculoskeletal:Normal      MEDICATIONS  (STANDING):  apixaban 5 milliGRAM(s) Oral two times a day  atorvastatin 20 milliGRAM(s) Oral at bedtime  carvedilol 6.25 milliGRAM(s) Oral every 12 hours  dorzolamide 2% Ophthalmic Solution 1 Drop(s) Both EYES three times a day  famotidine    Tablet 20 milliGRAM(s) Oral daily  fluticasone propionate/ salmeterol 100-50 MICROgram(s) Diskus 1 Dose(s) Inhalation two times a day  influenza  Vaccine (HIGH DOSE) 0.5 milliLiter(s) IntraMuscular once  ketotifen 0.025% Ophthalmic Solution 1 Drop(s) Both EYES two times a day  latanoprost 0.005% Ophthalmic Solution 1 Drop(s) Both EYES at bedtime  montelukast 10 milliGRAM(s) Oral daily  polyethylene glycol 3350 17 Gram(s) Oral daily  senna 2 Tablet(s) Oral at bedtime    MEDICATIONS  (PRN):  acetaminophen     Tablet .. 650 milliGRAM(s) Oral every 6 hours PRN Temp greater or equal to 38C (100.4F), Mild Pain (1 - 3)  bisacodyl 5 milliGRAM(s) Oral every 12 hours PRN Constipation      Allergies    aspirin (Nausea (Mild))  aspirin (Stomach Upset)    Intolerances        LABS:                        9.1    10.27 )-----------( 350      ( 20 Sep 2024 07:24 )             30.6     09-20    137  |  96  |  14  ----------------------------<  119[H]  4.4   |  35[H]  |  1.08    Ca    8.5      20 Sep 2024 07:24  Phos  4.0     09-19  Mg     1.9     09-20    TPro  6.0  /  Alb  2.9[L]  /  TBili  0.3  /  DBili  x   /  AST  13  /  ALT  12  /  AlkPhos  113  09-20    PT/INR - ( 19 Sep 2024 03:52 )   PT: 14.2 sec;   INR: 1.25 ratio         PTT - ( 19 Sep 2024 03:52 )  PTT:26.6 sec  Urinalysis Basic - ( 20 Sep 2024 07:24 )    Color: x / Appearance: x / SG: x / pH: x  Gluc: 119 mg/dL / Ketone: x  / Bili: x / Urobili: x   Blood: x / Protein: x / Nitrite: x   Leuk Esterase: x / RBC: x / WBC x   Sq Epi: x / Non Sq Epi: x / Bacteria: x            CAPILLARY BLOOD GLUCOSE      POCT Blood Glucose.: 132 mg/dL (20 Sep 2024 07:49)        RADIOLOGY & ADDITIONAL TESTS:    CXR:  < from: CT Chest No Cont (09.19.24 @ 16:32) >  IMPRESSION:  Right middle and lower bilobar consolidation/collapse with bronchial   secretions and small right pleural effusion.    Cardiomegaly.    --- End of Report ---    < end of copied text >    Ct scan chest:    ekg;    echo:

## 2024-09-20 NOTE — CONSULT NOTE ADULT - ATTENDING COMMENTS
Patient seen with resident. Comfortable on NC oxygen. Imaging reviewed, CT scan with chronic right lower lobe atelectasis. Sitting in a chair but not in distress. ABG noted with compensated hypercapnia. Likely has underlying OHS vs due to chronic atelectasis. Recommend to start nocturnal bipap support and monitor respiratory status. Rest of management per primary team. Consider advance directives.

## 2024-09-20 NOTE — CONSULT NOTE ADULT - SUBJECTIVE AND OBJECTIVE BOX
HPI:  Patient is a 89F, from home HHA (M-F/9h S-S/6h) ambulates with walker/wheelchair, with PMHx of asthma (O2 2L PRN), HTN, PAF and hx of DVT/PE (on eliquis), HLD, GERD, remote hx of r. breast cancer s/p mastectomy and left eye blindness who presents with 3 day history of worsening shortness of breath and orthopnea. Patient was hospitalized at [facility] from - for asthma exacerbation due to influenza virus. Since the discharge, patient has been wearing 2L NC at home. However, patient has been worsening shortness of breath and orthopnea to a point that patient is not able to lay down flat at all. She also reports intermittent dizziness, worse on standing and laying flat. She reports that she gets blurry/black vision from dizziness. She denies chest pain or palpitations. She reports leg swelling and the patient reports that her left leg has been swollen since 3 weeks ago. Patient and family denies fever, chills, N/V, headache, cough, chest pain, palpitations, diarrhea. Patient's last BM was today before presentation but reports it was small and reports some abdominal pain. Patient reports drinking less than 2 cups of water a day. Patient was on xarelto prior but was discharged from [facility] on eliquis 2.5mg BID. She stopped taking eliquis 5 days ago because she is supposed to get cortisone shots today.  (19 Sep 2024 06:27)    Allergies  aspirin (Nausea (Mild))  aspirin (Stomach Upset)    MEDICATIONS  (STANDING):  albuterol    90 MICROgram(s) HFA Inhaler 2 Puff(s) Inhalation three times a day  albuterol/ipratropium for Nebulization 3 milliLiter(s) Nebulizer every 6 hours  apixaban 5 milliGRAM(s) Oral two times a day  atorvastatin 20 milliGRAM(s) Oral at bedtime  carvedilol 6.25 milliGRAM(s) Oral every 12 hours  dorzolamide 2% Ophthalmic Solution 1 Drop(s) Both EYES three times a day  famotidine    Tablet 20 milliGRAM(s) Oral daily  fluticasone propionate/ salmeterol 100-50 MICROgram(s) Diskus 1 Dose(s) Inhalation two times a day  influenza  Vaccine (HIGH DOSE) 0.5 milliLiter(s) IntraMuscular once  ketotifen 0.025% Ophthalmic Solution 1 Drop(s) Both EYES two times a day  latanoprost 0.005% Ophthalmic Solution 1 Drop(s) Both EYES at bedtime  montelukast 10 milliGRAM(s) Oral daily  polyethylene glycol 3350 17 Gram(s) Oral daily  senna 2 Tablet(s) Oral at bedtime    MEDICATIONS  (PRN):  acetaminophen     Tablet .. 650 milliGRAM(s) Oral every 6 hours PRN Temp greater or equal to 38C (100.4F), Mild Pain (1 - 3)  bisacodyl 5 milliGRAM(s) Oral every 12 hours PRN Constipation      Daily     Daily Weight in k.4 (20 Sep 2024 04:37)    Drug Dosing Weight  Height (cm): 154.9 (09 Dec 2023 05:46)  Weight (kg): 68 (19 Sep 2024 02:25)  BMI (kg/m2): 28.3 (19 Sep 2024 02:25)  BSA (m2): 1.67 (19 Sep 2024 02:25)    PAST MEDICAL & SURGICAL HISTORY:  DM (diabetes mellitus)  HTN (hypertension)  HLD (hyperlipidemia)  Breast cancer in female  COPD (chronic obstructive pulmonary disease)  PE (pulmonary thromboembolism)  Diabetes  Breast cancer  Blindness of left eye  History of appendectomy  Spinal stenosis  Multiple pulmonary emboli  Fall  Chronic low back pain  Lumbar spondylosis    H/O mastectomy, right    FAMILY HISTORY: No pertinent      SOCIAL HISTORY: No smoking, alcohol intake, or illicit drug use    ROS  Constitutional: (-)fever, (-)night sweats, (-)chills, (-)cold intolerance, (-)fatigue  Eyes: (-)change in vision, (-)loss of vision, (+)blurred vision  Ears: (+)difficulty hearing, (-)hearing loss  Nose: (-)nasal discharge  Mouth/Throat/Voice: (-)lip sores, (-)dysphagia, (-)odynophagia  Neck: (-)neck pain, (-)neck stiffness, (-)neck lumps, (-)neck swelling  Respiratory: (+)dyspnea, (-)cough, (-)cough productive of sputum, (-)hemoptysis, (-)wheezing  Cardiovascular: (-)chest pain, (-)palpitations, (+)dyspnea with activity, (+)orthopnea  Gastrointestinal: (-)abdominal pain, (-)rectal pain, (-)nausea, (-)vomiting  Urinary: (-)dysuria, (-)hematuria, (-)urinary hesitancy, (-)difficulty initiating urine stream  Dermatologic/Integumentary: (-)change in hair texture, (-)change in skin texture, (-)change in nail appearance, (-)dry hair  Musculoskeletal: (-)muscle pain, (-)back pain, (-)tender points, (-)muscle cramps  Neurological: (-)headaches, (-)vertigo, (-)lightheadedness, (-)fainting  Psychiatric: (-)change in mood, (-)depression, (-)sadness interfering with function, (-)anxiety, (-)nervousness  Hematologic/Lymphatic: (-)easy bruising, (-)difficulty stopping blood flow     ICU Vital Signs Last 24 Hrs  T(C): 36.8 (20 Sep 2024 07:36), Max: 37.5 (20 Sep 2024 04:37)  T(F): 98.2 (20 Sep 2024 07:36), Max: 99.5 (20 Sep 2024 04:37)  HR: 104 (20 Sep 2024 07:36) (75 - 109)  BP: 143/54 (20 Sep 2024 07:36) (104/66 - 143/54)  BP(mean): --  ABP: --  ABP(mean): --  RR: 18 (20 Sep 2024 07:36) (18 - 20)  SpO2: 98% (20 Sep 2024 07:36) (93% - 100%)    O2 Parameters below as of 20 Sep 2024 07:36  Patient On (Oxygen Delivery Method): nasal cannula  O2 Flow (L/min): 3    ABG - ( 20 Sep 2024 09:43 )  pH, Arterial: 7.36  pH, Blood: x     /  pCO2: 74    /  pO2: 97    / HCO3: 42    / Base Excess: 12.9  /  SaO2: 100       I&O's Detail    19 Sep 2024 07:01  -  20 Sep 2024 07:00  --------------------------------------------------------  IN:    Oral Fluid: 230 mL  Total IN: 230 mL    OUT:    Voided (mL): 1300 mL  Total OUT: 1300 mL    Total NET: -1070 mL    PHYSICAL EXAMINATION:  GENERAL: NAD, sitting on the chair, elderly, appears tired  HEAD:  Atraumatic, Normocephalic  EYES:  Conjunctiva and sclera clear, pupils are equal, round, and reactive to light and accommodation.  NECK: Supple, noted to have JVD, trachea is midline, no evidence of thyroid enlargement, no lymphadenopathy or tenderness.  CHEST/LUNG: Crackles noted on the lower zone bilaterally, no wheezing, or rubs  HEART: Irregular rate and rhythm; grade I/VI systolic murmur noted on the right upper sternal border, no rubs, or gallops  ABDOMEN: Soft, Nontender, Nondistended; Bowel sounds present  NERVOUS SYSTEM:  Alert & Oriented X1 to person; No obvious focal sensory or motor deficits are noted; Recent and remote memory is fair; Appropriate mood and affect.  EXTREMITIES:  2+ Peripheral Pulses, No clubbing, no cyanosis, pitting edema +2 on the left lower ext and +1 on the right lower ext  SKIN: Warm, dry, and well perfused; Good turgor; No lesions, no nodules, stasis dermatitis like rash on the left lower ext that is not tender to touch or warm.    LABS:  CBC Full  -  ( 20 Sep 2024 07:24 )  WBC Count : 10.27 K/uL  RBC Count : 3.30 M/uL  Hemoglobin : 9.1 g/dL  Hematocrit : 30.6 %  Platelet Count - Automated : 350 K/uL  Mean Cell Volume : 92.7 fl  Mean Cell Hemoglobin : 27.6 pg  Mean Cell Hemoglobin Concentration : 29.7 gm/dL  Auto Neutrophil # : x  Auto Lymphocyte # : x  Auto Monocyte # : x  Auto Eosinophil # : x  Auto Basophil # : x  Auto Neutrophil % : x  Auto Lymphocyte % : x  Auto Monocyte % : x  Auto Eosinophil % : x  Auto Basophil % : x        137  |  96  |  14  ----------------------------<  119[H]  4.4   |  35[H]  |  1.08    Ca    8.5      20 Sep 2024 07:24  Phos  4.0     09-19  Mg     1.9         TPro  6.0  /  Alb  2.9[L]  /  TBili  0.3  /  DBili  x   /  AST  13  /  ALT  12  /  AlkPhos  113      CAPILLARY BLOOD GLUCOSE  POCT Blood Glucose.: 132 mg/dL (20 Sep 2024 07:49)    PT/INR - ( 19 Sep 2024 03:52 )   PT: 14.2 sec;   INR: 1.25 ratio    PTT - ( 19 Sep 2024 03:52 )  PTT:26.6 sec    EKG: AFib, RBBB    RADIOLOGY:  < from: CT Chest No Cont (24 @ 16:32) >  FINDINGS:    Lungs/Airways/Pleura: Small right pleural effusion with right middle and   lower lobe consolidation/collapse with associated bronchial secretions.   Mild left basilar atelectasis. No pneumothorax.    Mediastinum/Lymph nodes: No thoracic adenopathy.    Heart and Vessels: Torturous right common carotid artery. Biatrial   enlargement qualitatively. Coronary arterial, aortic valvular and mitral   annular calcification. No pericardial effusion.    Upper Abdomen: Bilateral renal cysts one with calcified septation in the   left. Trace ascites.    Osseous structures and Soft Tissues: No aggressive bone lesions.    IMPRESSION:  Right middle and lower bilobar consolidation/collapse with bronchial   secretions and small right pleural effusion.    Cardiomegaly.    < end of copied text >  < from: US Duplex Venous Lower Ext Ltd, Left (24 @ 09:07) >  IMPRESSION:  No evidence of left lower extremity deep venous thrombosis.    < end of copied text >

## 2024-09-20 NOTE — PROGRESS NOTE ADULT - PROBLEM SELECTOR PLAN 1
likely end stage COPD  ICU consulted- not candidate  starting nocturnal BiPAP 12/6/35%  f/u am ABG  f/u ECHO  Patient may need BIPAP on discharge   continue Duoneb, advair  continue Singulair   Pulmonologist Dr. Byrd  Cardiologist Dr. Coy

## 2024-09-20 NOTE — PROGRESS NOTE ADULT - ASSESSMENT
Patient is a 89F, from home HHA (M-F/9h S-S/6h) ambulates with walker/wheelchair, with PMHx of COPD (O2 2L PRN), HTN, PAF and hx of DVT/PE (on eliquis), HLD, GERD, remote hx of r. breast cancer s/p mastectomy and left eye blindness. Patient presented to ED with worsening shortness of breath and orthopnea.  Patient admitted to telemetry for Acute Hypoxic Hypercapnic respiratory failure secondary to COPD and CHF. Patient was recently admitted to [facility] in August for ABG resulting CO2 53 baseline, as per daughters patient was placed on NIV, improved and discharged home with oxygen.   ICU consulted for possible new BiPAP ABG 7.3/74/97/42. However patient not candidate for ICU recommending nocturnal BiPAP for COPD and likely CARLOS with atelectasis. Patient will benefit from BIPAP at night for home. Patient will need am ABG to reflect improvement in CO2 with nocturnal BiPAP.

## 2024-09-20 NOTE — PROGRESS NOTE ADULT - SUBJECTIVE AND OBJECTIVE BOX
NP Note discussed with  Primary Attending    Patient is a 89y old  Female who presents with a chief complaint of CHF exacerbation (20 Sep 2024 10:32)      INTERVAL HPI/OVERNIGHT EVENTS: no new complaints    MEDICATIONS  (STANDING):  albuterol    90 MICROgram(s) HFA Inhaler 2 Puff(s) Inhalation three times a day  albuterol/ipratropium for Nebulization 3 milliLiter(s) Nebulizer every 6 hours  apixaban 5 milliGRAM(s) Oral two times a day  atorvastatin 20 milliGRAM(s) Oral at bedtime  carvedilol 6.25 milliGRAM(s) Oral every 12 hours  dorzolamide 2% Ophthalmic Solution 1 Drop(s) Both EYES three times a day  famotidine    Tablet 20 milliGRAM(s) Oral daily  fluticasone propionate/ salmeterol 100-50 MICROgram(s) Diskus 1 Dose(s) Inhalation two times a day  influenza  Vaccine (HIGH DOSE) 0.5 milliLiter(s) IntraMuscular once  ketotifen 0.025% Ophthalmic Solution 1 Drop(s) Both EYES two times a day  latanoprost 0.005% Ophthalmic Solution 1 Drop(s) Both EYES at bedtime  montelukast 10 milliGRAM(s) Oral daily  polyethylene glycol 3350 17 Gram(s) Oral daily  senna 2 Tablet(s) Oral at bedtime    MEDICATIONS  (PRN):  acetaminophen     Tablet .. 650 milliGRAM(s) Oral every 6 hours PRN Temp greater or equal to 38C (100.4F), Mild Pain (1 - 3)  bisacodyl 5 milliGRAM(s) Oral every 12 hours PRN Constipation      __________________________________________________  REVIEW OF SYSTEMS:    CONSTITUTIONAL: No fever,   EYES: no acute visual disturbances  NECK: No pain or stiffness  RESPIRATORY: No cough; No shortness of breath  CARDIOVASCULAR: No chest pain, no palpitations  GASTROINTESTINAL: No pain. No nausea or vomiting; No diarrhea   NEUROLOGICAL: No headache or numbness, no tremors  MUSCULOSKELETAL: No joint pain, no muscle pain  GENITOURINARY: no dysuria, no frequency, no hesitancy  PSYCHIATRY: no depression , no anxiety  ALL OTHER  ROS negative        Vital Signs Last 24 Hrs  T(C): 37.1 (20 Sep 2024 11:03), Max: 37.5 (20 Sep 2024 04:37)  T(F): 98.7 (20 Sep 2024 11:03), Max: 99.5 (20 Sep 2024 04:37)  HR: 88 (20 Sep 2024 11:03) (86 - 109)  BP: 110/53 (20 Sep 2024 11:03) (104/66 - 143/54)  BP(mean): --  RR: 18 (20 Sep 2024 11:03) (18 - 20)  SpO2: 98% (20 Sep 2024 11:03) (93% - 100%)    Parameters below as of 20 Sep 2024 11:03  Patient On (Oxygen Delivery Method): nasal cannula  O2 Flow (L/min): 3      ________________________________________________  PHYSICAL EXAM:  GENERAL: NAD  HEENT: Normocephalic;  conjunctivae and sclerae clear; moist mucous membranes;   NECK : supple  CHEST/LUNG: Clear to auscultation bilaterally with good air entry   HEART: S1 S2  regular; no murmurs, gallops or rubs  ABDOMEN: Soft, Nontender, Nondistended; Bowel sounds present  EXTREMITIES: no cyanosis; no edema; no calf tenderness  SKIN: warm and dry; no rash  NERVOUS SYSTEM:  Awake and alert; Oriented  to place, person and time ; no new deficits    _________________________________________________  LABS:                        9.1    10.27 )-----------( 350      ( 20 Sep 2024 07:24 )             30.6     09-20    137  |  96  |  14  ----------------------------<  119[H]  4.4   |  35[H]  |  1.08    Ca    8.5      20 Sep 2024 07:24  Phos  4.0     09-19  Mg     1.9     09-20    TPro  6.0  /  Alb  2.9[L]  /  TBili  0.3  /  DBili  x   /  AST  13  /  ALT  12  /  AlkPhos  113  09-20    PT/INR - ( 19 Sep 2024 03:52 )   PT: 14.2 sec;   INR: 1.25 ratio         PTT - ( 19 Sep 2024 03:52 )  PTT:26.6 sec  Urinalysis Basic - ( 20 Sep 2024 07:24 )    Color: x / Appearance: x / SG: x / pH: x  Gluc: 119 mg/dL / Ketone: x  / Bili: x / Urobili: x   Blood: x / Protein: x / Nitrite: x   Leuk Esterase: x / RBC: x / WBC x   Sq Epi: x / Non Sq Epi: x / Bacteria: x      CAPILLARY BLOOD GLUCOSE      POCT Blood Glucose.: 148 mg/dL (20 Sep 2024 11:25)  POCT Blood Glucose.: 132 mg/dL (20 Sep 2024 07:49)        RADIOLOGY & ADDITIONAL TESTS:    Imaging  Reviewed:  YES/NO    Consultant(s) Notes Reviewed:   YES/ No      Plan of care was discussed with patient and /or primary care giver; all questions and concerns were addressed  NP Note discussed with  Primary Attending    Patient is a 89y old  Female who presents with a chief complaint of CHF exacerbation (20 Sep 2024 10:32)      INTERVAL HPI/OVERNIGHT EVENTS: no new complaints    MEDICATIONS  (STANDING):  albuterol    90 MICROgram(s) HFA Inhaler 2 Puff(s) Inhalation three times a day  albuterol/ipratropium for Nebulization 3 milliLiter(s) Nebulizer every 6 hours  apixaban 5 milliGRAM(s) Oral two times a day  atorvastatin 20 milliGRAM(s) Oral at bedtime  carvedilol 6.25 milliGRAM(s) Oral every 12 hours  dorzolamide 2% Ophthalmic Solution 1 Drop(s) Both EYES three times a day  famotidine    Tablet 20 milliGRAM(s) Oral daily  fluticasone propionate/ salmeterol 100-50 MICROgram(s) Diskus 1 Dose(s) Inhalation two times a day  influenza  Vaccine (HIGH DOSE) 0.5 milliLiter(s) IntraMuscular once  ketotifen 0.025% Ophthalmic Solution 1 Drop(s) Both EYES two times a day  latanoprost 0.005% Ophthalmic Solution 1 Drop(s) Both EYES at bedtime  montelukast 10 milliGRAM(s) Oral daily  polyethylene glycol 3350 17 Gram(s) Oral daily  senna 2 Tablet(s) Oral at bedtime    MEDICATIONS  (PRN):  acetaminophen     Tablet .. 650 milliGRAM(s) Oral every 6 hours PRN Temp greater or equal to 38C (100.4F), Mild Pain (1 - 3)  bisacodyl 5 milliGRAM(s) Oral every 12 hours PRN Constipation      __________________________________________________  REVIEW OF SYSTEMS:    unable to assess poor historian      Vital Signs Last 24 Hrs  T(C): 37.1 (20 Sep 2024 11:03), Max: 37.5 (20 Sep 2024 04:37)  T(F): 98.7 (20 Sep 2024 11:03), Max: 99.5 (20 Sep 2024 04:37)  HR: 88 (20 Sep 2024 11:03) (86 - 109)  BP: 110/53 (20 Sep 2024 11:03) (104/66 - 143/54)  BP(mean): --  RR: 18 (20 Sep 2024 11:03) (18 - 20)  SpO2: 98% (20 Sep 2024 11:03) (93% - 100%)    Parameters below as of 20 Sep 2024 11:03  Patient On (Oxygen Delivery Method): nasal cannula  O2 Flow (L/min): 3      ________________________________________________  PHYSICAL EXAM:  GENERAL: NAD  HEENT: Normocephalic;  conjunctivae and sclerae clear; moist mucous membranes;   NECK : supple  CHEST/LUNG: bibasilar crackles with good air entry   HEART: S1 S2  regular; no murmurs, gallops or rubs  ABDOMEN: obese Soft, Nontender, Nondistended; Bowel sounds present  EXTREMITIES: left lower leg redness and swelling, no cyanosis; no edema; no calf tenderness  SKIN: warm and dry; no rash  NERVOUS SYSTEM:  Awake and alert; Oriented  to person    _________________________________________________  LABS:                        9.1    10.27 )-----------( 350      ( 20 Sep 2024 07:24 )             30.6     09-20    137  |  96  |  14  ----------------------------<  119[H]  4.4   |  35[H]  |  1.08    Ca    8.5      20 Sep 2024 07:24  Phos  4.0     09-19  Mg     1.9     09-20    TPro  6.0  /  Alb  2.9[L]  /  TBili  0.3  /  DBili  x   /  AST  13  /  ALT  12  /  AlkPhos  113  09-20    PT/INR - ( 19 Sep 2024 03:52 )   PT: 14.2 sec;   INR: 1.25 ratio         PTT - ( 19 Sep 2024 03:52 )  PTT:26.6 sec  Urinalysis Basic - ( 20 Sep 2024 07:24 )    Color: x / Appearance: x / SG: x / pH: x  Gluc: 119 mg/dL / Ketone: x  / Bili: x / Urobili: x   Blood: x / Protein: x / Nitrite: x   Leuk Esterase: x / RBC: x / WBC x   Sq Epi: x / Non Sq Epi: x / Bacteria: x      CAPILLARY BLOOD GLUCOSE      POCT Blood Glucose.: 148 mg/dL (20 Sep 2024 11:25)  POCT Blood Glucose.: 132 mg/dL (20 Sep 2024 07:49)        RADIOLOGY & ADDITIONAL TESTS:  < from: CT Chest No Cont (09.19.24 @ 16:32) >    ACC: [id number] EXAM:  CT CHEST   ORDERED BY: KG RUBIO     PROCEDURE DATE:  09/19/2024          INTERPRETATION:  INDICATION: Heart failure    TECHNIQUE: A volumetric CT acquisition of the chest was obtained from the   thoracic inlet to the upper abdomen without the use of intravenous   contrast. Coronal and sagittal reconstructed images are provided.    COMPARISON: Chest CT of 4/24/2019    FINDINGS:    Lungs/Airways/Pleura: Small right pleural effusion with right middle and   lower lobe consolidation/collapse with associated bronchial secretions.   Mild left basilar atelectasis. No pneumothorax.    Mediastinum/Lymph nodes: No thoracic adenopathy.    Heart and Vessels: Torturous right common carotid artery. Biatrial   enlargement qualitatively. Coronary arterial, aortic valvular and mitral   annular calcification. No pericardial effusion.    Upper Abdomen: Bilateral renal cysts one with calcified septation in the   left. Trace ascites.    Osseous structures and Soft Tissues: No aggressive bone lesions.    IMPRESSION:  Right middle and lower bilobar consolidation/collapse with bronchial   secretions and small right pleural effusion.    Cardiomegaly.    --- End of Report ---    < end of copied text >  < from: US Duplex Venous Lower Ext Ltd, Left (09.19.24 @ 09:07) >  FINDINGS:    There is normal compressibility of the left common femoral, femoral and   popliteal veins.  The contralateral common femoral vein is patent.  Doppler examination shows normal spontaneous and phasic flow.    No calf vein thrombosis is detected.    IMPRESSION:  No evidence of left lower extremity deep venous thrombosis.    --- End of Report ---        < end of copied text >    Imaging  Reviewed:  YES    Consultant(s) Notes Reviewed:   YES      Plan of care was discussed with patient and /or primary care giver; all questions and concerns were addressed

## 2024-09-20 NOTE — GOALS OF CARE CONVERSATION - ADVANCED CARE PLANNING - NS PRO AD PATIENT TYPE
Written by Juany Brown, as dictated by Dr. Tiffanie Begum MD.    Veronica Alicia is a 28 y.o. female. HPI  Ms. Lisandro Naidu is a 28y.o. year old female, she is seen today for Transition of Care services following a hospital discharge for ankle fracture on 8/15/20. She fractured both ankles when she fell off of an electronic skateboard after it malfunctioned. She was in the hospital from the 7/28/20 to 8/15/20. She started at [address] [place] [address] then went to rehab at [facility]. She is still taking Lovenox. She will be starting home PT at some point this week. She has flexeril, and she also has Tramadol PRN for pain. She uses her Advair inhaler every day and albuterol PRN. She has not started Celexa because as soon as she got the prescription for it, she broke her ankles. She had 2 anxiety attacks in the hospital, and they gave her valium. She takes Pepcid and is doing well with it. She had been constipated in the hospital at first, but now she is back to normal with her BM. Patient Active Problem List   Diagnosis Code    Moderate persistent asthma without complication N94.67    Anxiety F41.9        Current Outpatient Medications on File Prior to Visit   Medication Sig Dispense Refill    enoxaparin (LOVENOX) 40 mg/0.4 mL 40 mg by SubCUTAneous route two (2) times a day.  cyclobenzaprine (FLEXERIL) 10 mg tablet Take 10 mg by mouth three (3) times daily as needed.  traZODone (DESYREL) 50 mg tablet Take 1 Tab by mouth nightly for 90 days. 90 Tab 0    citalopram (CELEXA) 20 mg tablet Take 1 Tab by mouth daily for 90 days.  90 Tab 0    montelukast (SINGULAIR) 10 mg tablet TAKE 1 TABLET BY MOUTH EVERY DAY 90 Tab 0    albuterol (PROVENTIL HFA, VENTOLIN HFA, PROAIR HFA) 90 mcg/actuation inhaler INHALE 1 PUFF BY MOUTH EVERY 6 HOURS AS NEEDED FOR WHEEZING 18 Inhaler 0    fluticasone propion-salmeteroL (ADVAIR/WIXELA) 250-50 mcg/dose diskus inhaler INHALE 1 PUFF BY MOUTH EVERY 12 HOURS 3 Inhaler 1    ascorbic acid, vitamin C, (VITAMIN C) 1,000 mg tablet Take 1,000 mg by mouth.  ergocalciferol (VITAMIN D2) 50,000 unit capsule Take 50,000 Units by mouth.  Biotin 2,500 mcg cap Take  by mouth.  vitamin e (E GEMS) 100 unit capsule Take  by mouth daily.  zinc oxide-cod liver oil (DESITIN) 40 % ointment Apply  to affected area as needed for Skin Irritation.  folic acid-vit D7-WXM G48 2.2-25-1 mg tab Take  by mouth.  traZODone (DESYREL) 50 mg tablet Take 50 mg by mouth.  promethazine (PHENERGAN) 25 mg tablet Take 1 Tab by mouth every eight (8) hours as needed for Nausea for up to 10 doses. 10 Tab 0     No current facility-administered medications on file prior to visit. Allergies   Allergen Reactions    Aspirin Nausea and Vomiting    Nut - Unspecified Anaphylaxis    Peanut Anaphylaxis     And tree nuts    Penicillins Rash    Percocet [Oxycodone-Acetaminophen] Itching    Aspirin Other (comments)     Muscle spasms    Pcn [Penicillins] Unknown (comments)    Percocet [Oxycodone-Acetaminophen] Rash    Sulfa (Sulfonamide Antibiotics) Other (comments)     States it was a childhood reaction does not remember       Past Medical History:   Diagnosis Date    Arthritis     Asthma     Psychiatric disorder     depression and anxiety       No past surgical history on file.     Family History   Problem Relation Age of Onset   [place] [facility] Mother     Hypertension Father     Diabetes Father     Diabetes Brother     Asthma Maternal Grandmother     No Known Problems Paternal Grandmother     Hypertension Brother        Social History     Socioeconomic History    Marital status:      Spouse name: Not on file    Number of children: Not on file    Years of education: Not on file    Highest education level: Not on file   Occupational History    Not on file   Social Needs    Financial resource strain: Not on file    Food insecurity Worry: Not on file     Inability: Not on file    Transportation needs     Medical: Not on file     Non-medical: Not on file   Tobacco Use    Smoking status: Never Smoker    Smokeless tobacco: Never Used   Substance and Sexual Activity    Alcohol use: Yes     Comment: occ/socially-    Drug use: Yes     Types: Marijuana     Comment: Recreational/Insomnia     Sexual activity: Yes     Partners: Female     Birth control/protection: None   Lifestyle    Physical activity     Days per week: Not on file     Minutes per session: Not on file    Stress: Not on file   Relationships    Social connections     Talks on phone: Not on file     Gets together: Not on file     Attends Jain service: Not on file     Active member of club or organization: Not on file     Attends meetings of clubs or organizations: Not on file     Relationship status: Not on file    Intimate partner violence     Fear of current or ex partner: Not on file     Emotionally abused: Not on file     Physically abused: Not on file     Forced sexual activity: Not on file   Other Topics Concern    Not on file   Social History Narrative    ** Merged History Encounter **            No visits with results within 3 Month(s) from this visit.    Latest known visit with results is:   Office Visit on 11/15/2019   Component Date Value Ref Range Status    Color (UA POC) 11/15/2019 Red   Final    Clarity (UA POC) 11/15/2019 Cloudy   Final    Glucose (UA POC) 11/15/2019 Negative  Negative Final    Bilirubin (UA POC) 11/15/2019 1+  Negative Final    Ketones (UA POC) 11/15/2019 Negative  Negative Final    Specific gravity (UA POC) 11/15/2019 1.020  1.001 - 1.035 Final    Blood (UA POC) 11/15/2019 3+  Negative Final    pH (UA POC) 11/15/2019 7.5  4.6 - 8.0 Final    Protein (UA POC) 11/15/2019 2+  Negative Final    Urobilinogen (UA POC) 11/15/2019 2 mg/dL  0.2 - 1 Final    3.2 umol/L    Nitrites (UA POC) 11/15/2019 Negative  Negative Final    Leukocyte esterase (UA POC) 11/15/2019 Trace  Negative Final     Review of Systems   Constitutional: Negative for malaise/fatigue and weight loss. HENT: Negative for congestion and sore throat. Eyes: Negative for blurred vision. Respiratory: Negative for cough and shortness of breath. Cardiovascular: Negative for chest pain and leg swelling. Gastrointestinal: Negative for constipation and heartburn. Genitourinary: Negative for frequency and urgency. Musculoskeletal: Positive for joint pain (B/L ankles). Negative for back pain and myalgias. Neurological: Negative for dizziness and headaches. Psychiatric/Behavioral: Negative for depression. The patient is nervous/anxious. The patient does not have insomnia. Visit Vitals  /67 (BP 1 Location: Left arm, BP Patient Position: Sitting)   Pulse 78   Temp 97.9 °F (36.6 °C) (Temporal)   Resp 18   LMP 07/27/2020   SpO2 100%     Physical Exam  Vitals signs and nursing note reviewed. Constitutional:       General: She is not in acute distress. Appearance: Normal appearance. She is well-developed and well-groomed. She is obese. She is not diaphoretic. Comments: Pt presents in wheelchair   HENT:      Right Ear: External ear normal.      Left Ear: External ear normal.   Eyes:      General: No scleral icterus. Right eye: No discharge. Left eye: No discharge. Extraocular Movements: Extraocular movements intact. Neck:      Musculoskeletal: Normal range of motion and neck supple. Cardiovascular:      Rate and Rhythm: Normal rate and regular rhythm. Pulmonary:      Effort: Pulmonary effort is normal.      Breath sounds: Normal breath sounds. No wheezing. Musculoskeletal:      Right lower leg: No edema. Left lower leg: No edema. Comments: +B/L ankles immobilized in casts; L ankle in removable boot, R ankle in hard cast   Lymphadenopathy:      Cervical: No cervical adenopathy.    Neurological:      Mental Do Not Resuscitate (DNR)/Medical Orders for Life-Sustaining Treatment (MOLST) Status: She is alert and oriented to person, place, and time. Psychiatric:         Mood and Affect: Mood and affect normal.         Behavior: Behavior normal.       ASSESSMENT and PLAN    ICD-10-CM ICD-9-CM    1. Pain in both lower extremities  M79.604 729.5 She is healing from her recent fractures and has a f/u appt with her surgeon scheduled for soon. On tramadol as needed for pain. M79.605     2. Moderate persistent asthma without complication  T82.17 518.81 Stable on Advair. 3. History of ankle fracture  Z87.81 V15.51 She is healing from her recent fractures and has a f/u appt with her surgeon scheduled for soon. 4. DVT prophylaxis  Z29.9 V07.9 Pt continues on Lovenox. She was given a 30 day supply by the hospital.     5. Anxiety  F41.9 300.00 I instructed her to try out Celexa now that she has settled in after her fractures and surgeries. 6. Gastroesophageal reflux disease without esophagitis  K21.9 530.81 Stable, continues on Pepcid. This plan was reviewed with the patient and patient agrees. All questions were answered. This scribe documentation was reviewed by me and accurately reflects the examination and decisions made by me. This note will not be viewable in [address]

## 2024-09-20 NOTE — GOALS OF CARE CONVERSATION - ADVANCED CARE PLANNING - CONVERSATION DETAILS
Spoke in full detail with daughter Josette Ventura at bedside regarding life sustaining treatment in the event of cardiac and respiratory arrest. Patient mental status at baseline A+OX2-3, today A_Ox1, at time lethargic. Patient with hypercapnia ICU consulted for possible new BIPAP for hypercapnia. Patient family verbalize no cardiac percussion resuscitation and no mechanical intubation however does want a trial of non-invasive ventilation. Patient family encouraged to verbalize concerns and emotional support given. Patient DNR/DNI with trial of NIV.

## 2024-09-21 NOTE — DIETITIAN INITIAL EVALUATION ADULT - NS FNS DIET ORDER
Mom confirms as written below from walk in visit. Upon beginning call writer heard infant's breathing. Respiratory rate 60. Writer also heard congestion and cough. Mom states she sees infant pulling in between the ribs a little and doing belly breathing. Temp 100 this am. Mom states infant is not wanting to eat this morning and took a long time to eat at her regular 0430 feeding time.Infants last wet diaper a 0620. Mom states she does not get much out when she cleans infants nose. Writer advised Mom to take infant to the ER. Mom agrees and states she will go now to the [facility] in [place].  
Now that the tylenol has worn off. Whining and cough. Other daughter has having same symptoms so likely has it as well. Verified home remedies and to call with any other questions or concerns. Made follow-up appointment for Wednesday. Mom to call back if no longer needs it before then.   
Per 9/25/22 Walk-in note:    HISTORY OF PRESENT ILLNESS      Cough  This is a new problem. The current episode started in the past 7 days. Associated symptoms include congestion and coughing. Pertinent negatives include no fever, rash or vomiting.     Vitals:  Visit Vitals  Pulse 152   Temp 97.8 °F (36.6 °C)   Resp 32   Wt 9.072 kg (20 lb)   SpO2 96%   BMI 18.78 kg/m²          ASSESSMENT/PLAN      Diagnoses and all orders for this visit:  Cough  -     levalbuterol (XOPENEX) 0.63 MG/3ML nebulizer solution 0.63 mg  -     XR CHEST PA AND LATERAL; Future  -     COVID/FLU/RSV PANEL  RSV infection  -     prednisoLONE sod-phos (ORAPRED) 15 MG/5ML oral solution; 2 ml po once a day for 5 days     Patient received Xopenex 0.63 mg nebulizer.  Follow up pcp.          RSV positive      Narrative & Impression   HISTORY: Cough.     EXAM: PA and Lateral CXR.     COMPARISON: 2022.     FINDINGS: The lungs are clear, with sharp costophrenic angles. The heart  size and pulmonary vasculature are normal. The mediastinal contour is  normal. There is no pneumothorax. Air-filled distended loops of bowel noted  throughout the upper abdomen.        IMPRESSION: No acute cardiopulmonary disease. No interval change.          
Diet, DASH/TLC:   Sodium & Cholesterol Restricted  1500mL Fluid Restriction ([id number])  No Concentrated Potassium     Special Instructions for Nursin milliLiter(s) to 2000 milliLiter(s) fluid restriction (24 @ 10:00) [Active]

## 2024-09-21 NOTE — PROGRESS NOTE ADULT - SUBJECTIVE AND OBJECTIVE BOX
Date of Service 09-21-24 @ 10:17    CHIEF COMPLAINT:Patient is a 89y old  Female who presents with a chief complaint of CHF exacerbation .Pt appears comfortable.    	  REVIEW OF SYSTEMS:  CONSTITUTIONAL: No fever, weight loss, or fatigue  EYES: No eye pain, visual disturbances, or discharge  ENT:  No difficulty hearing, tinnitus, vertigo; No sinus or throat pain  NECK: No pain or stiffness  RESPIRATORY: No cough, wheezing, chills or hemoptysis; No Shortness of Breath  CARDIOVASCULAR: No chest pain, palpitations, passing out, dizziness, or leg swelling  GASTROINTESTINAL: No abdominal or epigastric pain. No nausea, vomiting, or hematemesis; No diarrhea or constipation. No melena or hematochezia.  GENITOURINARY: No dysuria, frequency, hematuria, or incontinence  NEUROLOGICAL: No headaches, memory loss, loss of strength, numbness, or tremors  SKIN: No itching, burning, rashes, or lesions   LYMPH Nodes: No enlarged glands  ENDOCRINE: No heat or cold intolerance; No hair loss  MUSCULOSKELETAL: No joint pain or swelling; No muscle, back, or extremity pain  PSYCHIATRIC: No depression, anxiety, mood swings, or difficulty sleeping  HEME/LYMPH: No easy bruising, or bleeding gums  ALLERGY AND IMMUNOLOGIC: No hives or eczema	        PHYSICAL EXAM:  T(C): 37.4 (09-21-24 @ 07:50), Max: 37.4 (09-21-24 @ 07:50)  HR: 104 (09-21-24 @ 04:33) (88 - 111)  BP: 141/72 (09-21-24 @ 07:50) (110/53 - 141/72)  RR: 18 (09-21-24 @ 07:50) (18 - 18)  SpO2: 98% (09-21-24 @ 07:50) (96% - 100%)  Wt(kg): --  I&O's Summary    20 Sep 2024 07:01  -  21 Sep 2024 07:00  --------------------------------------------------------  IN: 900 mL / OUT: 650 mL / NET: 250 mL        Appearance: Normal	  HEENT:   Normal oral mucosa, PERRL, EOMI	  Lymphatic: No lymphadenopathy  Cardiovascular: Normal S1 S2, No JVD, No murmurs, No edema  Respiratory: Lungs clear to auscultation	  Psychiatry: A & O x 3, Mood & affect appropriate  Gastrointestinal:  Soft, Non-tender, + BS	  Skin: No rashes, No ecchymoses, No cyanosis	  Neurologic: Non-focal  Extremities: Normal range of motion, No clubbing, cyanosis or edema  Vascular: Peripheral pulses palpable 2+ bilaterally    MEDICATIONS  (STANDING):  albuterol    90 MICROgram(s) HFA Inhaler 2 Puff(s) Inhalation three times a day  albuterol/ipratropium for Nebulization 3 milliLiter(s) Nebulizer every 6 hours  apixaban 5 milliGRAM(s) Oral two times a day  atorvastatin 20 milliGRAM(s) Oral at bedtime  carvedilol 6.25 milliGRAM(s) Oral every 12 hours  dorzolamide 2% Ophthalmic Solution 1 Drop(s) Both EYES three times a day  famotidine    Tablet 20 milliGRAM(s) Oral daily  fluticasone propionate/ salmeterol 100-50 MICROgram(s) Diskus 1 Dose(s) Inhalation two times a day  influenza  Vaccine (HIGH DOSE) 0.5 milliLiter(s) IntraMuscular once  iron sucrose IVPB 200 milliGRAM(s) IV Intermittent every 24 hours  ketotifen 0.025% Ophthalmic Solution 1 Drop(s) Both EYES two times a day  latanoprost 0.005% Ophthalmic Solution 1 Drop(s) Both EYES at bedtime  montelukast 10 milliGRAM(s) Oral daily  polyethylene glycol 3350 17 Gram(s) Oral daily  senna 2 Tablet(s) Oral at bedtime      TELEMETRY: afib 120's	      	  LABS:	 	    Troponin I, High Sensitivity Result: 16.8 ng/L (09-19 @ 03:52)                            8.8    12.39 )-----------( 322      ( 21 Sep 2024 06:13 )             28.7     09-21    138  |  99  |  15  ----------------------------<  105[H]  3.7   |  35[H]  |  0.91    Ca    9.0      21 Sep 2024 06:13  Phos  3.0     09-21  Mg     1.9     09-21    TPro  6.0  /  Alb  2.9[L]  /  TBili  0.3  /  DBili  x   /  AST  13  /  ALT  12  /  AlkPhos  113  09-20      TSH: Thyroid Stimulating Hormone, Serum: 1.56 uU/mL (09-20 @ 07:24)      	  Blood Gas Profile - Arterial in AM (09.21.24 @ 05:54)   pH, Arterial: 7.42  pCO2, Arterial: 57 mmHg  pO2, Arterial: 93 mmHg  HCO3, Arterial: 37 mmol/L  Base Excess, Arterial: 11.0 mmol/L  Oxygen Saturation, Arterial: 100 %  FIO2, Arterial: 35.0  Blood Gas Comments Arterial: BiPAP 12/6/35% LR    Iron Total in AM (09.20.24 @ 07:24)   Iron Total: 15 ug/dLFerritin in AM (09.20.24 @ 07:24)   Ferritin: 19 ng/mLVitamin D, 25-Hydroxy: 151.0

## 2024-09-21 NOTE — DIETITIAN INITIAL EVALUATION ADULT - ADD RECOMMEND
Consider reducing fluid restriction, continue Dash TLC diet, no conc K+, check weights, labs and intake.

## 2024-09-21 NOTE — PROGRESS NOTE ADULT - ASSESSMENT
89yr old Female, from home [facility] (M-F/9h S-S/6h) ambulates with walker/wheelchair, with PMHx of asthma (O2 2L PRN), HTN, PAF and hx of DVT/PE (on eliquis), HLD, GERD, remote hx of r. breast cancer s/p mastectomy and left eye blindness who presents with 3 day history of worsening shortness of breath and orthopnea,hyponatremia,hyperkalemia,acute diastolic HF,respiratory failure.  1.Tele monitoring.  2.Bipap as per Pulm.  3.Hyponatremia-Renal f/u,improved.  4.Afib-inc coreg 12.5mg  bid,eliquis.  5.Acute diastolic HF-s/p IV lasix.  6.Asthma-Pulm f/u.  7.Hx of DVT/PE-eliquis.  8.Iron def anemia-IV iron.  9.PPI.  10.Vit d toxicity-hold vit d.

## 2024-09-21 NOTE — DIETITIAN INITIAL EVALUATION ADULT - PROBLEM SELECTOR PLAN 7
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

## 2024-09-21 NOTE — PROGRESS NOTE ADULT - ASSESSMENT
Hyponatremia due to fluid retention/CHF, sodium improved with diuretics, off diuretics.   CHF most likely right ventricle dysfunction amd pulmonary hypertension. Fluid retention improved, off diuretics , continue fluid restriction 1 liter per day.   CT chest with bilobar right consolidation with bronchial secretion. Follow with pulmonary and chest PT.   COPD with co2 retention placed on BIPAP at night time.

## 2024-09-21 NOTE — DIETITIAN INITIAL EVALUATION ADULT - OTHER CALCULATIONS
Fluid needs deferred to medical team, based on IBW 1500 ml may be too high, consider reducing fluids to 1000 -1200 ml daily. Weight gain in the past year may be due to limited physical activities and fluid retention and advanced age of the patient,

## 2024-09-21 NOTE — DIETITIAN INITIAL EVALUATION ADULT - PERTINENT MEDS FT
MEDICATIONS  (STANDING):  acetylcysteine 20%  Inhalation 4 milliLiter(s) Inhalation every 6 hours  albuterol    90 MICROgram(s) HFA Inhaler 2 Puff(s) Inhalation three times a day  albuterol/ipratropium for Nebulization 3 milliLiter(s) Nebulizer every 6 hours  apixaban 5 milliGRAM(s) Oral two times a day  atorvastatin 20 milliGRAM(s) Oral at bedtime  carvedilol 12.5 milliGRAM(s) Oral every 12 hours  dorzolamide 2% Ophthalmic Solution 1 Drop(s) Both EYES three times a day  famotidine    Tablet 20 milliGRAM(s) Oral daily  fluticasone propionate/ salmeterol 100-50 MICROgram(s) Diskus 1 Dose(s) Inhalation two times a day  influenza  Vaccine (HIGH DOSE) 0.5 milliLiter(s) IntraMuscular once  iron sucrose IVPB 200 milliGRAM(s) IV Intermittent every 24 hours  ketotifen 0.025% Ophthalmic Solution 1 Drop(s) Both EYES two times a day  latanoprost 0.005% Ophthalmic Solution 1 Drop(s) Both EYES at bedtime  montelukast 10 milliGRAM(s) Oral daily  polyethylene glycol 3350 17 Gram(s) Oral daily  senna 2 Tablet(s) Oral at bedtime    MEDICATIONS  (PRN):  acetaminophen     Tablet .. 650 milliGRAM(s) Oral every 6 hours PRN Temp greater or equal to 38C (100.4F), Mild Pain (1 - 3)  bisacodyl 5 milliGRAM(s) Oral every 12 hours PRN Constipation

## 2024-09-21 NOTE — DIETITIAN INITIAL EVALUATION ADULT - PROBLEM SELECTOR PLAN 2
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 2.5mg bid (she doesn't meet criteria for 2.5mg dosing)  Used to be on xarelto 20mg qd but transitioned to 2.5mg bid upon discharge from [facility] on 8/16   Hasn't been on eliquis for last 5d as patient was supposed to get cortisone shots   Reports left leg swelling has been going on for 3 weeks   Will increase eliquis to 5mg bid   f/u left leg doppler

## 2024-09-21 NOTE — DIETITIAN INITIAL EVALUATION ADULT - LITERATURE/VIDEOS GIVEN
Patient was not able to be counseled, family was provided with instructions and was aware of Na and fat restricted diet. Daughter accepted fluid restriction information provided.

## 2024-09-21 NOTE — DIETITIAN INITIAL EVALUATION ADULT - PROBLEM SELECTOR PLAN 3
Hx of PAF on carvedilol 6.25mg bid and eliquis 2.5mg bid   EKG: Atrial fibrillation   c/w home meds, eliquis increased to 5mg BID

## 2024-09-21 NOTE — DIETITIAN INITIAL EVALUATION ADULT - ORAL INTAKE PTA/DIET HISTORY
Patient's daughter, Josette provided information for the assessment. Patient has not lost weight and has been following a low salt and low fat diet at home. She consumes 3 meals per day and enjoys snacks between meals. She has no food preferences or dislikes. Her daughter said she likes everything. She is take vitamins and mineral supplements at home. Family is bringing food from home in addition to trays.

## 2024-09-21 NOTE — DIETITIAN INITIAL EVALUATION ADULT - PROBLEM SELECTOR PLAN 5
Hx of HTN on losartan 25mg bid, coreg 6.25mg bid, torsemide 10mg qd   holding torsemide 10mg qd > giving lasix 40mg IVP qd   c/w home meds,

## 2024-09-21 NOTE — PROGRESS NOTE ADULT - SUBJECTIVE AND OBJECTIVE BOX
Problem List:  Hyponatremia  COPD with wxacerbation.      PAST MEDICAL & SURGICAL HISTORY:  DM (diabetes mellitus)      HTN (hypertension)      HLD (hyperlipidemia)      Breast cancer in female      COPD (chronic obstructive pulmonary disease)      PE (pulmonary thromboembolism)      Diabetes      Breast cancer      Blindness of left eye      History of appendectomy      Spinal stenosis      Spinal stenosis      Multiple pulmonary emboli      Fall      Chronic low back pain      Lumbar spondylosis      H/O mastectomy, right          aspirin (Nausea (Mild))  aspirin (Stomach Upset)      MEDICATIONS  (STANDING):  albuterol    90 MICROgram(s) HFA Inhaler 2 Puff(s) Inhalation three times a day  albuterol/ipratropium for Nebulization 3 milliLiter(s) Nebulizer every 6 hours  apixaban 5 milliGRAM(s) Oral two times a day  atorvastatin 20 milliGRAM(s) Oral at bedtime  carvedilol 6.25 milliGRAM(s) Oral every 12 hours  dorzolamide 2% Ophthalmic Solution 1 Drop(s) Both EYES three times a day  famotidine    Tablet 20 milliGRAM(s) Oral daily  fluticasone propionate/ salmeterol 100-50 MICROgram(s) Diskus 1 Dose(s) Inhalation two times a day  influenza  Vaccine (HIGH DOSE) 0.5 milliLiter(s) IntraMuscular once  ketotifen 0.025% Ophthalmic Solution 1 Drop(s) Both EYES two times a day  latanoprost 0.005% Ophthalmic Solution 1 Drop(s) Both EYES at bedtime  montelukast 10 milliGRAM(s) Oral daily  polyethylene glycol 3350 17 Gram(s) Oral daily  senna 2 Tablet(s) Oral at bedtime    MEDICATIONS  (PRN):  acetaminophen     Tablet .. 650 milliGRAM(s) Oral every 6 hours PRN Temp greater or equal to 38C (100.4F), Mild Pain (1 - 3)  bisacodyl 5 milliGRAM(s) Oral every 12 hours PRN Constipation                            8.8    12.39 )-----------( 322      ( 21 Sep 2024 06:13 )             28.7     09-21    138  |  99  |  15  ----------------------------<  105[H]  3.7   |  35[H]  |  0.91    Ca    9.0      21 Sep 2024 06:13  Phos  3.0     09-21  Mg     1.9     09-21    TPro  6.0  /  Alb  2.9[L]  /  TBili  0.3  /  DBili  x   /  AST  13  /  ALT  12  /  AlkPhos  113  09-20        Creatinine, Random Urine: <13 mg/dL (09-19 @ 13:12)  Potassium, Random Urine: 14 mmol/L (09-19 @ 13:12)  Osmolality, Random Urine: 213 mosm/Kg (09-19 @ 13:12)  Sodium, Random Urine: 86 mmol/L (09-19 @ 13:12)      REVIEW OF SYSTEMS:  General: no fever no chills, no weight loss.  EYES/ENT: No visual changes;  No vertigo, no headache.  NECK: No pain or stiffness  RESPIRATORY: BIAPA d at night.   CARDIOVASCULAR: No chest pain or palpitations. No Edema  GASTROINTESTINAL: No abdominal or epigastric pain. No nausea, vomiting. No diarrhea or constipation. No melena.        VITALS:  T(F): 99.3 (09-21-24 @ 07:50), Max: 99.3 (09-21-24 @ 07:50)  HR: 104 (09-21-24 @ 04:33)  BP: 141/72 (09-21-24 @ 07:50)  RR: 18 (09-21-24 @ 07:50)  SpO2: 98% (09-21-24 @ 07:50)  Wt(kg): --    09-20 @ 07:01  -  09-21 @ 07:00  --------------------------------------------------------  IN: 900 mL / OUT: 650 mL / NET: 250 mL        PHYSICAL EXAM:  Constitutional: well developed, no diaphoresis, no distress.  Neck: No JVD, no carotid bruit, supple.   Respiratory:  no wheezes, rales left lower kalia.    Cardiovascular: S1, S2, RRR, no pericardial rub, no murmur  Abdomen: BS+, soft, no tenderness, no bruit  Pelvis: bladder nondistended  Extremities: No cyanosis or clubbing. No peripheral edema.

## 2024-09-21 NOTE — DIETITIAN INITIAL EVALUATION ADULT - OTHER INFO
Patient has dx of heart failure, breast Ca with mastectomy, blind Lt eye, COPD, DM, PAF, spinal stenosis, HTN, hyperlipidemia, CHF exacerbation, acute metabolic encephalopathy, hyponatremia.

## 2024-09-21 NOTE — DIETITIAN INITIAL EVALUATION ADULT - PROBLEM SELECTOR PLAN 4
Hx of asthma on montelukast 10mg qd, advair, albuterol PRN   on 2L PRN but has been on 2L since d/c on 8/16 (was admitted for asthma exacerbation iso of Influenza)  c/w home meds

## 2024-09-21 NOTE — PROGRESS NOTE ADULT - SUBJECTIVE AND OBJECTIVE BOX
Patient is a 89y old  Female who presents with a chief complaint of CHF exacerbation (20 Sep 2024 12:50)    pt seen in tele [x  ], reg med floor [   ], bed [x  ], chair at bedside [   ], awake and responsive, moderately altered [ x ], lethargic [  ],  nad [ x ]    valle [x  ],        Allergies    aspirin (Nausea (Mild))  aspirin (Stomach Upset)        Vitals    T(F): 98.4 (09-21-24 @ 04:33), Max: 98.7 (09-20-24 @ 11:03)  HR: 104 (09-21-24 @ 04:33) (88 - 111)  BP: 127/58 (09-21-24 @ 04:33) (110/53 - 143/54)  RR: 18 (09-21-24 @ 04:33) (18 - 18)  SpO2: 96% (09-21-24 @ 04:33) (96% - 100%)  Wt(kg): --  CAPILLARY BLOOD GLUCOSE      POCT Blood Glucose.: 148 mg/dL (20 Sep 2024 11:25)      Labs                          9.1    10.27 )-----------( 350      ( 20 Sep 2024 07:24 )             30.6       09-20    137  |  96  |  14  ----------------------------<  119[H]  4.4   |  35[H]  |  1.08    Ca    8.5      20 Sep 2024 07:24  Mg     1.9     09-20    TPro  6.0  /  Alb  2.9[L]  /  TBili  0.3  /  DBili  x   /  AST  13  /  ALT  12  /  AlkPhos  113  09-20          Troponin I, High Sensitivity Result: 16.8 ng/L (09-19-24 @ 03:52)    .Blood Blood-Peripheral  09-19 @ 03:54   No growth at 24 hours  --  --      .Blood Blood-Peripheral  09-19 @ 03:53   No growth at 24 hours  --  --          Radiology Results      Meds    MEDICATIONS  (STANDING):  albuterol    90 MICROgram(s) HFA Inhaler 2 Puff(s) Inhalation three times a day  albuterol/ipratropium for Nebulization 3 milliLiter(s) Nebulizer every 6 hours  apixaban 5 milliGRAM(s) Oral two times a day  atorvastatin 20 milliGRAM(s) Oral at bedtime  carvedilol 6.25 milliGRAM(s) Oral every 12 hours  dorzolamide 2% Ophthalmic Solution 1 Drop(s) Both EYES three times a day  famotidine    Tablet 20 milliGRAM(s) Oral daily  fluticasone propionate/ salmeterol 100-50 MICROgram(s) Diskus 1 Dose(s) Inhalation two times a day  influenza  Vaccine (HIGH DOSE) 0.5 milliLiter(s) IntraMuscular once  ketotifen 0.025% Ophthalmic Solution 1 Drop(s) Both EYES two times a day  latanoprost 0.005% Ophthalmic Solution 1 Drop(s) Both EYES at bedtime  montelukast 10 milliGRAM(s) Oral daily  polyethylene glycol 3350 17 Gram(s) Oral daily  senna 2 Tablet(s) Oral at bedtime      MEDICATIONS  (PRN):  acetaminophen     Tablet .. 650 milliGRAM(s) Oral every 6 hours PRN Temp greater or equal to 38C (100.4F), Mild Pain (1 - 3)  bisacodyl 5 milliGRAM(s) Oral every 12 hours PRN Constipation      Physical Exam    Neuro :  no focal deficits  Respiratory: CTA B/L  CV: RRR, S1S2, no murmurs,   Abdominal: Soft, NT, ND +BS,  Extremities: No edema, + peripheral pulses      ASSESSMENT    acute on hypoxic resp failure pos 2nd to acute chf and asthma/copd exacerb,   r/o recurrent pe/dvt.,   hyponatremia,   hyperkalemia,   h/o asthma/copd (O2 2L PRN),   HTN,   PAF and hx of DVT/PE (on eliquis),   HLD,   GERD,   remote hx of r. breast cancer s/p mastectomy and left eye blindness,   spinal stenosis  DM (diabetes mellitus)  Blindness of left eye    PLAN    cont tele,   cont coreg, lasix iv, statin,   cont eliquis   cardio f/u   d/c cozaar. 2nd to hypokalemia   ct chest with Right middle and lower bilobar consolidation/collapse with bronchial   secretions and small right pleural effusion noted above.    doppler le with No evidence of left lower extremity deep venous thrombosis noted above.    pul f/u   supplemental O2,   symbicort HFA 80/4.5 mcgs 2 puffs po BID with spacer  PFTs as OP.   s/p lokelma  serum potassium wnl   serum sodium wnl  renal  f/u   Venous ABG.   Follow 2 gm K diet.  DC pantoprazole , change to Pepcid.  Follow TFT, RENIN / Aldosterone.   serum bicarb elevated  check cbv, abg  icu eval for bipap  cont current meds       Patient is a 89y old  Female who presents with a chief complaint of CHF exacerbation (20 Sep 2024 12:50)    pt seen in tele [x  ], reg med floor [   ], bed [x  ], chair at bedside [   ], awake and responsive, moderately altered [ x ], lethargic [  ],  nad [ x ]    valle [x  ],        Allergies    aspirin (Nausea (Mild))  aspirin (Stomach Upset)        Vitals    T(F): 98.4 (09-21-24 @ 04:33), Max: 98.7 (09-20-24 @ 11:03)  HR: 104 (09-21-24 @ 04:33) (88 - 111)  BP: 127/58 (09-21-24 @ 04:33) (110/53 - 143/54)  RR: 18 (09-21-24 @ 04:33) (18 - 18)  SpO2: 96% (09-21-24 @ 04:33) (96% - 100%)  Wt(kg): --  CAPILLARY BLOOD GLUCOSE      POCT Blood Glucose.: 148 mg/dL (20 Sep 2024 11:25)      Labs                          9.1    10.27 )-----------( 350      ( 20 Sep 2024 07:24 )             30.6       09-20    137  |  96  |  14  ----------------------------<  119[H]  4.4   |  35[H]  |  1.08    Ca    8.5      20 Sep 2024 07:24  Mg     1.9     09-20    TPro  6.0  /  Alb  2.9[L]  /  TBili  0.3  /  DBili  x   /  AST  13  /  ALT  12  /  AlkPhos  113  09-20          Troponin I, High Sensitivity Result: 16.8 ng/L (09-19-24 @ 03:52)    .Blood Blood-Peripheral  09-19 @ 03:54   No growth at 24 hours  --  --      .Blood Blood-Peripheral  09-19 @ 03:53   No growth at 24 hours  --  --          Radiology Results      Meds    MEDICATIONS  (STANDING):  albuterol    90 MICROgram(s) HFA Inhaler 2 Puff(s) Inhalation three times a day  albuterol/ipratropium for Nebulization 3 milliLiter(s) Nebulizer every 6 hours  apixaban 5 milliGRAM(s) Oral two times a day  atorvastatin 20 milliGRAM(s) Oral at bedtime  carvedilol 6.25 milliGRAM(s) Oral every 12 hours  dorzolamide 2% Ophthalmic Solution 1 Drop(s) Both EYES three times a day  famotidine    Tablet 20 milliGRAM(s) Oral daily  fluticasone propionate/ salmeterol 100-50 MICROgram(s) Diskus 1 Dose(s) Inhalation two times a day  influenza  Vaccine (HIGH DOSE) 0.5 milliLiter(s) IntraMuscular once  ketotifen 0.025% Ophthalmic Solution 1 Drop(s) Both EYES two times a day  latanoprost 0.005% Ophthalmic Solution 1 Drop(s) Both EYES at bedtime  montelukast 10 milliGRAM(s) Oral daily  polyethylene glycol 3350 17 Gram(s) Oral daily  senna 2 Tablet(s) Oral at bedtime      MEDICATIONS  (PRN):  acetaminophen     Tablet .. 650 milliGRAM(s) Oral every 6 hours PRN Temp greater or equal to 38C (100.4F), Mild Pain (1 - 3)  bisacodyl 5 milliGRAM(s) Oral every 12 hours PRN Constipation      Physical Exam    Neuro :  no focal deficits  Respiratory: CTA B/L  CV: RRR, S1S2, no murmurs,   Abdominal: Soft, NT, ND +BS,  Extremities: No edema, + peripheral pulses      ASSESSMENT    acute on hypoxic resp failure pos 2nd to acute chf and asthma/copd exacerb,   r/o recurrent pe/dvt.,   hyponatremia,   hyperkalemia,   h/o asthma/copd (O2 2L PRN),   HTN,   PAF and hx of DVT/PE (on eliquis),   HLD,   GERD,   remote hx of r. breast cancer s/p mastectomy and left eye blindness,   spinal stenosis  DM (diabetes mellitus)  Blindness of left eye    PLAN    cont tele,   cont coreg, statin,   cont eliquis   cardio f/u   d/c cozaar. 2nd to hypokalemia   ct chest with Right middle and lower bilobar consolidation/collapse with bronchial   secretions and small right pleural effusion noted above.    doppler le with No evidence of left lower extremity deep venous thrombosis noted above.    pul f/u   supplemental O2,   symbicort HFA 80/4.5 mcgs 2 puffs po BID with spacer   add mucomyst nebs  PFTs as OP.   s/p lokelma  serum potassium wnl   serum sodium wnl    serum creat wnl   renal  f/u   Hyponatremia due to fluid retention/CHF,   fluid restriction 1 liter per day.  serum bicarb elevated  icu eval for bipap noted   ABG noted with compensated hypercapnia. Likely has underlying OHS vs due to chronic atelectasis.   Recommend to start nocturnal bipap support and monitor respiratory status.   Rest of management per primary team. Consider advance directives.   cont current meds

## 2024-09-21 NOTE — DIETITIAN INITIAL EVALUATION ADULT - PERTINENT LABORATORY DATA
09-21    138  |  99  |  15  ----------------------------<  105[H]  3.7   |  35[H]  |  0.91    Ca    9.0      21 Sep 2024 06:13  Phos  3.0     09-21  Mg     1.9     09-21    TPro  6.0  /  Alb  2.9[L]  /  TBili  0.3  /  DBili  x   /  AST  13  /  ALT  12  /  AlkPhos  113  09-20

## 2024-09-21 NOTE — PROGRESS NOTE ADULT - SUBJECTIVE AND OBJECTIVE BOX
Patient is a 89y old  Female who presents with a chief complaint of CHF exacerbation (21 Sep 2024 08:28)      INTERVAL HPI/OVERNIGHT EVENTS:      VITAL SIGNS:  T(F): 99.3 (09-21-24 @ 07:50)  HR: 104 (09-21-24 @ 04:33)  BP: 141/72 (09-21-24 @ 07:50)  RR: 18 (09-21-24 @ 07:50)  SpO2: 98% (09-21-24 @ 07:50)  Wt(kg): --  I&O's Detail    20 Sep 2024 07:01  -  21 Sep 2024 07:00  --------------------------------------------------------  IN:    Oral Fluid: 900 mL  Total IN: 900 mL    OUT:    Indwelling Catheter - Urethral (mL): 650 mL  Total OUT: 650 mL    Total NET: 250 mL              REVIEW OF SYSTEMS:    CONSTITUTIONAL:  No fevers, chills, sweats    HEENT:  Eyes:  No diplopia or blurred vision. ENT:  No earache, sore throat or runny nose.    CARDIOVASCULAR:  No pressure, squeezing, tightness, or heaviness about the chest; no palpitations.    RESPIRATORY:  Per HPI    GASTROINTESTINAL:  No abdominal pain, nausea, vomiting or diarrhea.    GENITOURINARY:  No dysuria, frequency or urgency.    NEUROLOGIC:  No paresthesias, fasciculations, seizures or weakness.    PSYCHIATRIC:  No disorder of thought or mood.      PHYSICAL EXAM:    Constitutional: Well developed and nourished  Eyes:Perrla  ENMT: normal  Neck:supple  Respiratory: good air entry  Cardiovascular: S1 S2 regular  Gastrointestinal: Soft, Non tender  Extremities: No edema  Vascular:normal  Neurological:Awake, alert,Ox3  Musculoskeletal:Normal      MEDICATIONS  (STANDING):  albuterol    90 MICROgram(s) HFA Inhaler 2 Puff(s) Inhalation three times a day  albuterol/ipratropium for Nebulization 3 milliLiter(s) Nebulizer every 6 hours  apixaban 5 milliGRAM(s) Oral two times a day  atorvastatin 20 milliGRAM(s) Oral at bedtime  carvedilol 6.25 milliGRAM(s) Oral every 12 hours  dorzolamide 2% Ophthalmic Solution 1 Drop(s) Both EYES three times a day  famotidine    Tablet 20 milliGRAM(s) Oral daily  fluticasone propionate/ salmeterol 100-50 MICROgram(s) Diskus 1 Dose(s) Inhalation two times a day  influenza  Vaccine (HIGH DOSE) 0.5 milliLiter(s) IntraMuscular once  ketotifen 0.025% Ophthalmic Solution 1 Drop(s) Both EYES two times a day  latanoprost 0.005% Ophthalmic Solution 1 Drop(s) Both EYES at bedtime  montelukast 10 milliGRAM(s) Oral daily  polyethylene glycol 3350 17 Gram(s) Oral daily  senna 2 Tablet(s) Oral at bedtime    MEDICATIONS  (PRN):  acetaminophen     Tablet .. 650 milliGRAM(s) Oral every 6 hours PRN Temp greater or equal to 38C (100.4F), Mild Pain (1 - 3)  bisacodyl 5 milliGRAM(s) Oral every 12 hours PRN Constipation      Allergies    aspirin (Nausea (Mild))  aspirin (Stomach Upset)    Intolerances        LABS:                        8.8    12.39 )-----------( 322      ( 21 Sep 2024 06:13 )             28.7     09-21    138  |  99  |  15  ----------------------------<  105[H]  3.7   |  35[H]  |  0.91    Ca    9.0      21 Sep 2024 06:13  Phos  3.0     09-21  Mg     1.9     09-21    TPro  6.0  /  Alb  2.9[L]  /  TBili  0.3  /  DBili  x   /  AST  13  /  ALT  12  /  AlkPhos  113  09-20      Urinalysis Basic - ( 21 Sep 2024 06:13 )    Color: x / Appearance: x / SG: x / pH: x  Gluc: 105 mg/dL / Ketone: x  / Bili: x / Urobili: x   Blood: x / Protein: x / Nitrite: x   Leuk Esterase: x / RBC: x / WBC x   Sq Epi: x / Non Sq Epi: x / Bacteria: x      ABG - ( 21 Sep 2024 05:54 )  pH, Arterial: 7.42  pH, Blood: x     /  pCO2: 57    /  pO2: 93    / HCO3: 37    / Base Excess: 11.0  /  SaO2: 100                   CAPILLARY BLOOD GLUCOSE      POCT Blood Glucose.: 148 mg/dL (20 Sep 2024 11:25)        RADIOLOGY & ADDITIONAL TESTS:    CXR:    Ct scan chest:    ekg;    echo: Patient is a 89y old  Female who presents with a chief complaint of CHF exacerbation (21 Sep 2024 08:28)  Pt is alert, awake, lying comfortably in bed in NAD with O2 supp via NC.    INTERVAL HPI/OVERNIGHT EVENTS:      VITAL SIGNS:  T(F): 99.3 (09-21-24 @ 07:50)  HR: 104 (09-21-24 @ 04:33)  BP: 141/72 (09-21-24 @ 07:50)  RR: 18 (09-21-24 @ 07:50)  SpO2: 98% (09-21-24 @ 07:50)  Wt(kg): --  I&O's Detail    20 Sep 2024 07:01  -  21 Sep 2024 07:00  --------------------------------------------------------  IN:    Oral Fluid: 900 mL  Total IN: 900 mL    OUT:    Indwelling Catheter - Urethral (mL): 650 mL  Total OUT: 650 mL    Total NET: 250 mL              REVIEW OF SYSTEMS:    CONSTITUTIONAL:  No fevers, chills, sweats    HEENT:  Eyes:  No diplopia or blurred vision. ENT:  No earache, sore throat or runny nose.    CARDIOVASCULAR:  No pressure, squeezing, tightness, or heaviness about the chest; no palpitations.    RESPIRATORY:  Per HPI    GASTROINTESTINAL:  No abdominal pain, nausea, vomiting or diarrhea.    GENITOURINARY:  No dysuria, frequency or urgency.    NEUROLOGIC:  No paresthesias, fasciculations, seizures or weakness.    PSYCHIATRIC:  No disorder of thought or mood.      PHYSICAL EXAM:    Constitutional: Well developed and nourished  Eyes:Perrla  ENMT: normal  Neck:supple  Respiratory: good air entry  Cardiovascular: S1 S2 regular  Gastrointestinal: Soft, Non tender  Extremities: No edema  Vascular:normal  Neurological:Awake, alert,Ox3  Musculoskeletal:Normal      MEDICATIONS  (STANDING):  albuterol    90 MICROgram(s) HFA Inhaler 2 Puff(s) Inhalation three times a day  albuterol/ipratropium for Nebulization 3 milliLiter(s) Nebulizer every 6 hours  apixaban 5 milliGRAM(s) Oral two times a day  atorvastatin 20 milliGRAM(s) Oral at bedtime  carvedilol 6.25 milliGRAM(s) Oral every 12 hours  dorzolamide 2% Ophthalmic Solution 1 Drop(s) Both EYES three times a day  famotidine    Tablet 20 milliGRAM(s) Oral daily  fluticasone propionate/ salmeterol 100-50 MICROgram(s) Diskus 1 Dose(s) Inhalation two times a day  influenza  Vaccine (HIGH DOSE) 0.5 milliLiter(s) IntraMuscular once  ketotifen 0.025% Ophthalmic Solution 1 Drop(s) Both EYES two times a day  latanoprost 0.005% Ophthalmic Solution 1 Drop(s) Both EYES at bedtime  montelukast 10 milliGRAM(s) Oral daily  polyethylene glycol 3350 17 Gram(s) Oral daily  senna 2 Tablet(s) Oral at bedtime    MEDICATIONS  (PRN):  acetaminophen     Tablet .. 650 milliGRAM(s) Oral every 6 hours PRN Temp greater or equal to 38C (100.4F), Mild Pain (1 - 3)  bisacodyl 5 milliGRAM(s) Oral every 12 hours PRN Constipation      Allergies    aspirin (Nausea (Mild))  aspirin (Stomach Upset)    Intolerances        LABS:                        8.8    12.39 )-----------( 322      ( 21 Sep 2024 06:13 )             28.7     09-21    138  |  99  |  15  ----------------------------<  105[H]  3.7   |  35[H]  |  0.91    Ca    9.0      21 Sep 2024 06:13  Phos  3.0     09-21  Mg     1.9     09-21    TPro  6.0  /  Alb  2.9[L]  /  TBili  0.3  /  DBili  x   /  AST  13  /  ALT  12  /  AlkPhos  113  09-20      Urinalysis Basic - ( 21 Sep 2024 06:13 )    Color: x / Appearance: x / SG: x / pH: x  Gluc: 105 mg/dL / Ketone: x  / Bili: x / Urobili: x   Blood: x / Protein: x / Nitrite: x   Leuk Esterase: x / RBC: x / WBC x   Sq Epi: x / Non Sq Epi: x / Bacteria: x      ABG - ( 21 Sep 2024 05:54 )  pH, Arterial: 7.42  pH, Blood: x     /  pCO2: 57    /  pO2: 93    / HCO3: 37    / Base Excess: 11.0  /  SaO2: 100                   CAPILLARY BLOOD GLUCOSE      POCT Blood Glucose.: 148 mg/dL (20 Sep 2024 11:25)        RADIOLOGY & ADDITIONAL TESTS:    CXR:    Ct scan chest:    ekg;    echo: Patient is a 89y old  Female who presents with a chief complaint of CHF exacerbation (21 Sep 2024 08:28)  Pt is alert, awake, lying comfortably in bed in NAD with O2 supp via NC.    INTERVAL HPI/OVERNIGHT EVENTS:      VITAL SIGNS:  T(F): 99.3 (09-21-24 @ 07:50)  HR: 104 (09-21-24 @ 04:33)  BP: 141/72 (09-21-24 @ 07:50)  RR: 18 (09-21-24 @ 07:50)  SpO2: 98% (09-21-24 @ 07:50)  Wt(kg): --  I&O's Detail    20 Sep 2024 07:01  -  21 Sep 2024 07:00  --------------------------------------------------------  IN:    Oral Fluid: 900 mL  Total IN: 900 mL    OUT:    Indwelling Catheter - Urethral (mL): 650 mL  Total OUT: 650 mL    Total NET: 250 mL              REVIEW OF SYSTEMS:    CONSTITUTIONAL:  No fevers, chills, sweats    HEENT:  Eyes:  No diplopia or blurred vision. ENT:  No earache, sore throat or runny nose.    CARDIOVASCULAR:  No pressure, squeezing, tightness, or heaviness about the chest; no palpitations.    RESPIRATORY:  Per HPI    GASTROINTESTINAL:  No abdominal pain, nausea, vomiting or diarrhea.    GENITOURINARY:  No dysuria, frequency or urgency.    NEUROLOGIC:  No paresthesias, fasciculations, seizures or weakness.    PSYCHIATRIC:  No disorder of thought or mood.      PHYSICAL EXAM:    Constitutional: Well developed and nourished  Eyes:Perrla  ENMT: normal  Neck:supple  Respiratory: good air entry  Cardiovascular: S1 S2 regular  Gastrointestinal: Soft, Non tender  Extremities: No edema  Vascular:normal  Neurological:Awake, alert,Ox3  Musculoskeletal:Normal      MEDICATIONS  (STANDING):  albuterol    90 MICROgram(s) HFA Inhaler 2 Puff(s) Inhalation three times a day  albuterol/ipratropium for Nebulization 3 milliLiter(s) Nebulizer every 6 hours  apixaban 5 milliGRAM(s) Oral two times a day  atorvastatin 20 milliGRAM(s) Oral at bedtime  carvedilol 6.25 milliGRAM(s) Oral every 12 hours  dorzolamide 2% Ophthalmic Solution 1 Drop(s) Both EYES three times a day  famotidine    Tablet 20 milliGRAM(s) Oral daily  fluticasone propionate/ salmeterol 100-50 MICROgram(s) Diskus 1 Dose(s) Inhalation two times a day  influenza  Vaccine (HIGH DOSE) 0.5 milliLiter(s) IntraMuscular once  ketotifen 0.025% Ophthalmic Solution 1 Drop(s) Both EYES two times a day  latanoprost 0.005% Ophthalmic Solution 1 Drop(s) Both EYES at bedtime  montelukast 10 milliGRAM(s) Oral daily  polyethylene glycol 3350 17 Gram(s) Oral daily  senna 2 Tablet(s) Oral at bedtime    MEDICATIONS  (PRN):  acetaminophen     Tablet .. 650 milliGRAM(s) Oral every 6 hours PRN Temp greater or equal to 38C (100.4F), Mild Pain (1 - 3)  bisacodyl 5 milliGRAM(s) Oral every 12 hours PRN Constipation      Allergies    aspirin (Nausea (Mild))  aspirin (Stomach Upset)    Intolerances        LABS:                        8.8    12.39 )-----------( 322      ( 21 Sep 2024 06:13 )             28.7     09-21    138  |  99  |  15  ----------------------------<  105[H]  3.7   |  35[H]  |  0.91    Ca    9.0      21 Sep 2024 06:13  Phos  3.0     09-21  Mg     1.9     09-21    TPro  6.0  /  Alb  2.9[L]  /  TBili  0.3  /  DBili  x   /  AST  13  /  ALT  12  /  AlkPhos  113  09-20      Urinalysis Basic - ( 21 Sep 2024 06:13 )    Color: x / Appearance: x / SG: x / pH: x  Gluc: 105 mg/dL / Ketone: x  / Bili: x / Urobili: x   Blood: x / Protein: x / Nitrite: x   Leuk Esterase: x / RBC: x / WBC x   Sq Epi: x / Non Sq Epi: x / Bacteria: x      ABG - ( 21 Sep 2024 05:54 )  pH, Arterial: 7.42  pH, Blood: x     /  pCO2: 57    /  pO2: 93    / HCO3: 37    / Base Excess: 11.0  /  SaO2: 100                   CAPILLARY BLOOD GLUCOSE      POCT Blood Glucose.: 148 mg/dL (20 Sep 2024 11:25)        RADIOLOGY & ADDITIONAL TESTS:    CXR:  < from: CT Chest No Cont (09.19.24 @ 16:32) >  IMPRESSION:  Right middle and lower bilobar consolidation/collapse with bronchial   secretions and small right pleural effusion.    Cardiomegaly.    < end of copied text >    Ct scan chest:    ekg;    echo:

## 2024-09-22 NOTE — PROGRESS NOTE ADULT - PROBLEM SELECTOR PLAN 2
Bronchodilators  montelukast 10 mgs po Qhs  PFTs as OP Bronchodilators  montelukast 10 mgs po Qhs  Advair Diskus 250/50 mcgs one inh po BID  PFTs as OP  Bi-pap machine at night

## 2024-09-22 NOTE — CONSULT NOTE ADULT - ASSESSMENT
Patient is a 89y old  Female who is from home HHA (M-F/9h S-S/6h) ambulates with walker/wheelchair, with PMHx of asthma (O2 2L PRN), HTN, PAF and hx of DVT/PE (on eliquis), HLD, GERD, remote hx of r. breast cancer s/p mastectomy and left eye blindness, who presents to the ER on 9/19/24 for evaluation of worsening shortness of breath and orthopnea. Patient was hospitalized at [facility] from 8/7-8/16 for Asthma exacerbation due to influenza virus. Since the discharge, patient has been wearing 2L NC at home. However, shortness of breath and orthopnea  is worsening to a point that patient is not able to lay down flat at all. She has admitted with  CHF exacerbation. Now The CT scan from 9/19 and CXr from 9/22 shows Airspace consolidation. She has started on Ceftriaxone and Flagyl, and the ID consult requested to assist with further evaluation and antibiotic management.    # Pneumonia  # Leukocytosis    would recommend:    1. Please obtain MRSA PCR and Legionella urine Ag  2. Change Ceftriaxone to Cefepime and c/e flagyl  3. Aspiration precaution  4. Supplemental oxygenation and Bronchodilator as needed  5. Monitor WBC count    will follow the patient with you and make further recommendation based on the clinical course and Lab results  Thank you for the opportunity to participate in Ms. CARLTON's care    Attending Attestation:    Spent more than 65 minutes on total encounter, more than 50 % of the visit was spent counseling and/or coordinating care by the Attending physician.       Patient is a 89y old  Female who is from home HHA (M-F/9h S-S/6h) ambulates with walker/wheelchair, with PMHx of asthma (O2 2L PRN), HTN, PAF and hx of DVT/PE (on eliquis), HLD, GERD, remote hx of r. breast cancer s/p mastectomy and left eye blindness, who presents to the ER on 9/19/24 for evaluation of worsening shortness of breath and orthopnea. Patient was hospitalized at [facility] from 8/7-8/16 for Asthma exacerbation due to influenza virus. Since the discharge, patient has been wearing 2L NC at home. However, shortness of breath and orthopnea  is worsening to a point that patient is not able to lay down flat at all. She has admitted with  CHF exacerbation. Now The CT scan from 9/19 and CXr from 9/22 shows Airspace consolidation. She has started on Ceftriaxone and Flagyl, and the ID consult requested to assist with further evaluation and antibiotic management.    # Pneumonia  # Leukocytosis    would recommend:    1. Please obtain MRSA PCR and Legionella urine Ag  2. Change Ceftriaxone to Cefepime and c/w flagyl  3. Aspiration precaution  4. Supplemental oxygenation and Bronchodilator as needed  5. Monitor WBC count    will follow the patient with you and make further recommendation based on the clinical course and Lab results  Thank you for the opportunity to participate in Ms. CARLTON's care    Attending Attestation:    Spent more than 65 minutes on total encounter, more than 50 % of the visit was spent counseling and/or coordinating care by the Attending physician.

## 2024-09-22 NOTE — CONSULT NOTE ADULT - SUBJECTIVE AND OBJECTIVE BOX
Patient is a 89y old  Female who is from home HHA (M-F/9h S-S/6h) ambulates with walker/wheelchair, with PMHx of asthma (O2 2L PRN), HTN, PAF and hx of DVT/PE (on eliquis), HLD, GERD, remote hx of r. breast cancer s/p mastectomy and left eye blindness, who presents to the ER on 9/19/24 for evaluation of worsening shortness of breath and orthopnea. Patient was hospitalized at [facility] from 8/7-8/16 for Asthma exacerbation due to influenza virus. Since the discharge, patient has been wearing 2L NC at home. However, shortness of breath and orthopnea  is worsening to a point that patient is not able to lay down flat at all. She has admitted with  CHF exacerbation. Now The CT scan from 9/19 and CXr from 9/22 shows Airspace consolidation. She has started on Ceftriaxone and Flagyl, and the ID consult requested to assist with further evaluation and antibiotic management.      REVIEW OF SYSTEMS: Total of twelve systems have been reviewed with patient and found to be negative unless mentioned in HPI      PAST MEDICAL & SURGICAL HISTORY:  DM (diabetes mellitus)  HTN (hypertension)  HLD (hyperlipidemia)  Breast cancer in female  COPD (chronic obstructive pulmonary disease)  PE (pulmonary thromboembolism)  Blindness of left eye  History of appendectomy  Spinal stenosis  Multiple pulmonary emboli  Fall  Chronic low back pain  Lumbar spondylosis  H/O mastectomy, right      SOCIAL HISTORY  Alcohol: Does not drink  Tobacco: Does not smoke  Illicit substance use: None      FAMILY HISTORY: Non contributory to the present illness      ALLERGIES: aspirin (Nausea (Mild, Stomach Upset)      Vital Signs Last 24 Hrs  T(C): 37.1 (22 Sep 2024 11:00), Max: 37.4 (22 Sep 2024 07:20)  T(F): 98.8 (22 Sep 2024 11:00), Max: 99.3 (22 Sep 2024 07:20)  HR: 98 (22 Sep 2024 11:00) (92 - 108)  BP: 114/61 (22 Sep 2024 11:00) (114/61 - 146/81)  BP(mean): --  RR: 18 (22 Sep 2024 11:00) (18 - 18)  SpO2: 97% (22 Sep 2024 11:00) (96% - 100%)    Parameters below as of 22 Sep 2024 11:00  Patient On (Oxygen Delivery Method): nasal cannula  O2 Flow (L/min): 3        PHYSICAL EXAM:  GENERAL: Not in distress , on oxygen via NC  CHEST/LUNG:  Not using accessory muscles  HEART: s1 and s2 present  ABDOMEN:  Nontender and  Nondistended  EXTREMITIES: No pedal  edema  CNS: Awake and Alert      LABS:                        9.0    15.49 )-----------( 323      ( 22 Sep 2024 06:20 )             29.8       09-22    139  |  99  |  14  ----------------------------<  103[H]  3.8   |  34[H]  |  0.91    Ca    9.0      22 Sep 2024 06:20  Phos  2.6     09-22  Mg     2.0     09-22        ABG - ( 21 Sep 2024 05:54 )  pH, Arterial: 7.42  pH, Blood: x     /  pCO2: 57    /  pO2: 93    / HCO3: 37    / Base Excess: 11.0  /  SaO2: 100       p    MEDICATIONS  (STANDING):  acetylcysteine 20%  Inhalation 4 milliLiter(s) Inhalation every 6 hours  albuterol    90 MICROgram(s) HFA Inhaler 2 Puff(s) Inhalation three times a day  albuterol/ipratropium for Nebulization 3 milliLiter(s) Nebulizer every 6 hours  apixaban 5 milliGRAM(s) Oral two times a day  atorvastatin 20 milliGRAM(s) Oral at bedtime  carvedilol 12.5 milliGRAM(s) Oral every 12 hours  cefTRIAXone   IVPB      dorzolamide 2% Ophthalmic Solution 1 Drop(s) Both EYES three times a day  famotidine    Tablet 20 milliGRAM(s) Oral daily  fluticasone propionate/ salmeterol 100-50 MICROgram(s) Diskus 1 Dose(s) Inhalation two times a day  influenza  Vaccine (HIGH DOSE) 0.5 milliLiter(s) IntraMuscular once  iron sucrose IVPB 200 milliGRAM(s) IV Intermittent every 24 hours  ketotifen 0.025% Ophthalmic Solution 1 Drop(s) Both EYES two times a day  latanoprost 0.005% Ophthalmic Solution 1 Drop(s) Both EYES at bedtime  metroNIDAZOLE  IVPB      metroNIDAZOLE  IVPB 500 milliGRAM(s) IV Intermittent every 8 hours  montelukast 10 milliGRAM(s) Oral daily  polyethylene glycol 3350 17 Gram(s) Oral daily  senna 2 Tablet(s) Oral at bedtime    MEDICATIONS  (PRN):  acetaminophen     Tablet .. 650 milliGRAM(s) Oral every 6 hours PRN Temp greater or equal to 38C (100.4F), Mild Pain (1 - 3)  bisacodyl 5 milliGRAM(s) Oral every 12 hours PRN Constipation        RADIOLOGY & ADDITIONAL TESTS:    9/22/24 : Xray Chest 1 View- PORTABLE-Urgent (Xray Chest 1 View- PORTABLE-Urgent .) (09.22.24 @ 09:22) >  Suspect LEFT retrocardiac airspace consolidation and/or effusion  obscuring diaphragm contour.  Elevated RIGHT hemidiaphragm.  Lateral radiograph recommended for confirmation.        9/19/24 : CT Chest No Cont (09.19.24 @ 16:32) Right middle and lower bilobar consolidation/collapse with bronchial   secretions and small right pleural effusion.      MICROBIOLOGY DATA:    Culture - Blood (09.19.24 @ 03:54)   Specimen Source: .Blood Blood-Peripheral  Culture Results:   No growth at 72 Hours    Culture - Blood (09.19.24 @ 03:53)   Specimen Source: .Blood Blood-Peripheral  Culture Results:   No growth at 72 Hours  Rapid HIV-1/2 Antibody (09.19.24 @ 03:52)   Rapid HIV-1/2 Antibody: Nonreact                 Patient is a 89y old  Female who is from home HHA (M-F/9h S-S/6h) ambulates with walker/wheelchair, with PMHx of asthma (O2 2L PRN), HTN, PAF and hx of DVT/PE (on eliquis), HLD, GERD, remote hx of r. breast cancer s/p mastectomy and left eye blindness, who presents to the ER on 9/19/24 for evaluation of worsening shortness of breath and orthopnea. Patient was hospitalized at [facility] from 8/7-8/16 for Asthma exacerbation due to influenza virus. Since the discharge, patient has been wearing 2L NC at home. However, shortness of breath and orthopnea  is worsening to a point that patient is not able to lay down flat at all. She has admitted with  CHF exacerbation. Now The CT scan from 9/19 and CXR from 9/22 shows Airspace consolidation. She has started on Ceftriaxone and Flagyl, and the ID consult requested to assist with further evaluation and antibiotic management.      REVIEW OF SYSTEMS: Total of twelve systems have been reviewed with patient and found to be negative unless mentioned in HPI      PAST MEDICAL & SURGICAL HISTORY:  DM (diabetes mellitus)  HTN (hypertension)  HLD (hyperlipidemia)  Breast cancer in female  COPD (chronic obstructive pulmonary disease)  PE (pulmonary thromboembolism)  Blindness of left eye  History of appendectomy  Spinal stenosis  Multiple pulmonary emboli  Fall  Chronic low back pain  Lumbar spondylosis  H/O mastectomy, right      SOCIAL HISTORY  Alcohol: Does not drink  Tobacco: Does not smoke  Illicit substance use: None      FAMILY HISTORY: Non contributory to the present illness      ALLERGIES: aspirin (Nausea (Mild, Stomach Upset)      Vital Signs Last 24 Hrs  T(C): 37.1 (22 Sep 2024 11:00), Max: 37.4 (22 Sep 2024 07:20)  T(F): 98.8 (22 Sep 2024 11:00), Max: 99.3 (22 Sep 2024 07:20)  HR: 98 (22 Sep 2024 11:00) (92 - 108)  BP: 114/61 (22 Sep 2024 11:00) (114/61 - 146/81)  BP(mean): --  RR: 18 (22 Sep 2024 11:00) (18 - 18)  SpO2: 97% (22 Sep 2024 11:00) (96% - 100%)    Parameters below as of 22 Sep 2024 11:00  Patient On (Oxygen Delivery Method): nasal cannula  O2 Flow (L/min): 3      PHYSICAL EXAM:  GENERAL: Not in acute distress , on oxygen via NC  CHEST/LUNG:  Not using accessory muscles  HEART: s1 and s2 present  ABDOMEN:  Nontender and  Nondistended  EXTREMITIES: No pedal  edema  CNS: Awake and Alert      LABS:                        9.0    15.49 )-----------( 323      ( 22 Sep 2024 06:20 )             29.8       09-22    139  |  99  |  14  ----------------------------<  103[H]  3.8   |  34[H]  |  0.91    Ca    9.0      22 Sep 2024 06:20  Phos  2.6     09-22  Mg     2.0     09-22        ABG - ( 21 Sep 2024 05:54 )  pH, Arterial: 7.42  pH, Blood: x     /  pCO2: 57    /  pO2: 93    / HCO3: 37    / Base Excess: 11.0  /  SaO2: 100           MEDICATIONS  (STANDING):  acetylcysteine 20%  Inhalation 4 milliLiter(s) Inhalation every 6 hours  albuterol    90 MICROgram(s) HFA Inhaler 2 Puff(s) Inhalation three times a day  albuterol/ipratropium for Nebulization 3 milliLiter(s) Nebulizer every 6 hours  apixaban 5 milliGRAM(s) Oral two times a day  atorvastatin 20 milliGRAM(s) Oral at bedtime  carvedilol 12.5 milliGRAM(s) Oral every 12 hours  cefTRIAXone   IVPB      dorzolamide 2% Ophthalmic Solution 1 Drop(s) Both EYES three times a day  famotidine    Tablet 20 milliGRAM(s) Oral daily  fluticasone propionate/ salmeterol 100-50 MICROgram(s) Diskus 1 Dose(s) Inhalation two times a day  influenza  Vaccine (HIGH DOSE) 0.5 milliLiter(s) IntraMuscular once  iron sucrose IVPB 200 milliGRAM(s) IV Intermittent every 24 hours  ketotifen 0.025% Ophthalmic Solution 1 Drop(s) Both EYES two times a day  latanoprost 0.005% Ophthalmic Solution 1 Drop(s) Both EYES at bedtime  metroNIDAZOLE  IVPB      metroNIDAZOLE  IVPB 500 milliGRAM(s) IV Intermittent every 8 hours  montelukast 10 milliGRAM(s) Oral daily  polyethylene glycol 3350 17 Gram(s) Oral daily  senna 2 Tablet(s) Oral at bedtime    MEDICATIONS  (PRN):  acetaminophen     Tablet .. 650 milliGRAM(s) Oral every 6 hours PRN Temp greater or equal to 38C (100.4F), Mild Pain (1 - 3)  bisacodyl 5 milliGRAM(s) Oral every 12 hours PRN Constipation      RADIOLOGY & ADDITIONAL TESTS:    9/22/24 : Xray Chest 1 View- PORTABLE-Urgent (Xray Chest 1 View- PORTABLE-Urgent .) (09.22.24 @ 09:22) >  Suspect LEFT retrocardiac airspace consolidation and/or effusion  obscuring diaphragm contour.  Elevated RIGHT hemidiaphragm.  Lateral radiograph recommended for confirmation.      9/19/24 : CT Chest No Cont (09.19.24 @ 16:32) Right middle and lower bilobar consolidation/collapse with bronchial   secretions and small right pleural effusion.      MICROBIOLOGY DATA:    Culture - Blood (09.19.24 @ 03:54)   Specimen Source: .Blood Blood-Peripheral  Culture Results: No growth at 72 Hours    Culture - Blood (09.19.24 @ 03:53)   Specimen Source: .Blood Blood-Peripheral  Culture Results: No growth at 72 Hours    Rapid HIV-1/2 Antibody (09.19.24 @ 03:52)   Rapid HIV-1/2 Antibody: Nonreact

## 2024-09-22 NOTE — PROGRESS NOTE ADULT - SUBJECTIVE AND OBJECTIVE BOX
Patient is a 89y old  Female who presents with a chief complaint of Heart failure     (21 Sep 2024 12:34)    pt seen in tele [x  ], reg med floor [   ], bed [x  ], chair at bedside [   ], awake and responsive, moderately altered [ x ], lethargic [  ],    nad [ x ]      Allergies    aspirin (Nausea (Mild))  aspirin (Stomach Upset)        Vitals    T(F): 99 (09-22-24 @ 04:56), Max: 99.3 (09-21-24 @ 07:50)  HR: 103 (09-22-24 @ 04:56) (92 - 103)  BP: 140/63 (09-22-24 @ 04:56) (133/66 - 146/81)  RR: 18 (09-22-24 @ 04:56) (18 - 18)  SpO2: 99% (09-22-24 @ 04:56) (97% - 100%)  Wt(kg): --  CAPILLARY BLOOD GLUCOSE      POCT Blood Glucose.: 148 mg/dL (20 Sep 2024 11:25)      Labs                          8.8    12.39 )-----------( 322      ( 21 Sep 2024 06:13 )             28.7       09-21    138  |  99  |  15  ----------------------------<  105[H]  3.7   |  35[H]  |  0.91    Ca    9.0      21 Sep 2024 06:13  Phos  3.0     09-21  Mg     1.9     09-21    TPro  6.0  /  Alb  2.9[L]  /  TBili  0.3  /  DBili  x   /  AST  13  /  ALT  12  /  AlkPhos  113  09-20            .Blood Blood-Peripheral  09-19 @ 03:54   No growth at 48 Hours  --  --      .Blood Blood-Peripheral  09-19 @ 03:53   No growth at 48 Hours  --  --          Radiology Results      Meds    MEDICATIONS  (STANDING):  acetylcysteine 20%  Inhalation 4 milliLiter(s) Inhalation every 6 hours  albuterol    90 MICROgram(s) HFA Inhaler 2 Puff(s) Inhalation three times a day  albuterol/ipratropium for Nebulization 3 milliLiter(s) Nebulizer every 6 hours  apixaban 5 milliGRAM(s) Oral two times a day  atorvastatin 20 milliGRAM(s) Oral at bedtime  carvedilol 12.5 milliGRAM(s) Oral every 12 hours  dorzolamide 2% Ophthalmic Solution 1 Drop(s) Both EYES three times a day  famotidine    Tablet 20 milliGRAM(s) Oral daily  fluticasone propionate/ salmeterol 100-50 MICROgram(s) Diskus 1 Dose(s) Inhalation two times a day  influenza  Vaccine (HIGH DOSE) 0.5 milliLiter(s) IntraMuscular once  iron sucrose IVPB 200 milliGRAM(s) IV Intermittent every 24 hours  ketotifen 0.025% Ophthalmic Solution 1 Drop(s) Both EYES two times a day  latanoprost 0.005% Ophthalmic Solution 1 Drop(s) Both EYES at bedtime  montelukast 10 milliGRAM(s) Oral daily  senna 2 Tablet(s) Oral at bedtime      MEDICATIONS  (PRN):  acetaminophen     Tablet .. 650 milliGRAM(s) Oral every 6 hours PRN Temp greater or equal to 38C (100.4F), Mild Pain (1 - 3)  bisacodyl 5 milliGRAM(s) Oral every 12 hours PRN Constipation      Physical Exam    Neuro :  no focal deficits  Respiratory: CTA B/L  CV: RRR, S1S2, no murmurs,   Abdominal: Soft, NT, ND +BS,  Extremities: No edema, + peripheral pulses      ASSESSMENT    acute on hypoxic resp failure pos 2nd to acute chf and asthma/copd exacerb,   r/o recurrent pe/dvt.,   hyponatremia,   hyperkalemia,   h/o asthma/copd (O2 2L PRN),   HTN,   PAF and hx of DVT/PE (on eliquis),   HLD,   GERD,   remote hx of r. breast cancer s/p mastectomy and left eye blindness,   spinal stenosis  DM (diabetes mellitus)  Blindness of left eye    PLAN    cont tele,   cont coreg, statin,   cont eliquis   cardio f/u   d/c cozaar. 2nd to hypokalemia   ct chest with Right middle and lower bilobar consolidation/collapse with bronchial   secretions and small right pleural effusion noted above.    doppler le with No evidence of left lower extremity deep venous thrombosis noted above.    pul f/u   supplemental O2,   symbicort HFA 80/4.5 mcgs 2 puffs po BID with spacer   add mucomyst nebs  PFTs as OP.   s/p lokelma  serum potassium wnl   serum sodium wnl    serum creat wnl   renal  f/u   Hyponatremia due to fluid retention/CHF,   fluid restriction 1 liter per day.  serum bicarb elevated  icu eval for bipap noted   ABG noted with compensated hypercapnia. Likely has underlying OHS vs due to chronic atelectasis.   Recommend to start nocturnal bipap support and monitor respiratory status.   Rest of management per primary team. Consider advance directives.   cont current meds         Patient is a 89y old  Female who presents with a chief complaint of Heart failure     (21 Sep 2024 12:34)    pt seen in tele [x  ], reg med floor [   ], bed [x  ], chair at bedside [   ], awake and responsive, moderately altered [ x ], lethargic [  ],    nad [ x ]      Allergies    aspirin (Nausea (Mild))  aspirin (Stomach Upset)        Vitals    T(F): 99 (09-22-24 @ 04:56), Max: 99.3 (09-21-24 @ 07:50)  HR: 103 (09-22-24 @ 04:56) (92 - 103)  BP: 140/63 (09-22-24 @ 04:56) (133/66 - 146/81)  RR: 18 (09-22-24 @ 04:56) (18 - 18)  SpO2: 99% (09-22-24 @ 04:56) (97% - 100%)  Wt(kg): --  CAPILLARY BLOOD GLUCOSE      POCT Blood Glucose.: 148 mg/dL (20 Sep 2024 11:25)      Labs                          8.8    12.39 )-----------( 322      ( 21 Sep 2024 06:13 )             28.7       09-21    138  |  99  |  15  ----------------------------<  105[H]  3.7   |  35[H]  |  0.91    Ca    9.0      21 Sep 2024 06:13  Phos  3.0     09-21  Mg     1.9     09-21    TPro  6.0  /  Alb  2.9[L]  /  TBili  0.3  /  DBili  x   /  AST  13  /  ALT  12  /  AlkPhos  113  09-20        Vitamin D, 25-Hydroxy (09.20.24 @ 07:24)   Vitamin D, 25-Hydroxy: 151.0    .Blood Blood-Peripheral  09-19 @ 03:54   No growth at 48 Hours  --  --      .Blood Blood-Peripheral  09-19 @ 03:53   No growth at 48 Hours  --  --          Radiology Results      Meds    MEDICATIONS  (STANDING):  acetylcysteine 20%  Inhalation 4 milliLiter(s) Inhalation every 6 hours  albuterol    90 MICROgram(s) HFA Inhaler 2 Puff(s) Inhalation three times a day  albuterol/ipratropium for Nebulization 3 milliLiter(s) Nebulizer every 6 hours  apixaban 5 milliGRAM(s) Oral two times a day  atorvastatin 20 milliGRAM(s) Oral at bedtime  carvedilol 12.5 milliGRAM(s) Oral every 12 hours  dorzolamide 2% Ophthalmic Solution 1 Drop(s) Both EYES three times a day  famotidine    Tablet 20 milliGRAM(s) Oral daily  fluticasone propionate/ salmeterol 100-50 MICROgram(s) Diskus 1 Dose(s) Inhalation two times a day  influenza  Vaccine (HIGH DOSE) 0.5 milliLiter(s) IntraMuscular once  iron sucrose IVPB 200 milliGRAM(s) IV Intermittent every 24 hours  ketotifen 0.025% Ophthalmic Solution 1 Drop(s) Both EYES two times a day  latanoprost 0.005% Ophthalmic Solution 1 Drop(s) Both EYES at bedtime  montelukast 10 milliGRAM(s) Oral daily  senna 2 Tablet(s) Oral at bedtime      MEDICATIONS  (PRN):  acetaminophen     Tablet .. 650 milliGRAM(s) Oral every 6 hours PRN Temp greater or equal to 38C (100.4F), Mild Pain (1 - 3)  bisacodyl 5 milliGRAM(s) Oral every 12 hours PRN Constipation      Physical Exam    Neuro :  no focal deficits  Respiratory: CTA B/L  CV: RRR, S1S2, no murmurs,   Abdominal: Soft, NT, ND +BS,  Extremities: No edema, + peripheral pulses      ASSESSMENT    acute on hypoxic resp failure pos 2nd to acute chf and asthma/copd exacerb,   recurrent pe/dvt. r/o,   hyponatremia resolved,   hyperkalemia resolved,   h/o asthma/copd (O2 2L PRN),   HTN,   PAF and hx of DVT/PE (on eliquis),   HLD,   GERD,   remote hx of r. breast cancer s/p mastectomy and left eye blindness,   spinal stenosis  DM (diabetes mellitus)  Blindness of left eye    PLAN    cont tele,   contstatin,   cont eliquis   cardio f/u   inc coreg 12.5mg  bid  d/c cozaar. 2nd to hypokalemia   ct chest with Right middle and lower bilobar consolidation/collapse with bronchial   secretions and small right pleural effusion noted     doppler le with No evidence of left lower extremity deep venous thrombosis noted    start rocephin and flagyl   id cons   f/u cxr   pul f/u   supplemental O2,   cont bipap as needed  symbicort HFA 80/4.5 mcgs 2 puffs po BID with spacer   cont  mucomyst nebs  PFTs as OP.   s/p lokelma  serum potassium wnl   serum sodium wnl    serum creat wnl   vit d lev supratherapeutic noted above  hold vit d   renal  f/u   Hyponatremia due to fluid retention/CHF,   fluid restriction 1 liter per day.  serum bicarb elevated  icu eval for bipap noted   ABG noted with compensated hypercapnia. Likely has underlying OHS vs due to chronic atelectasis.   Recommend to start nocturnal bipap support and monitor respiratory status.   Rest of management per primary team. Consider advance directives.   cont current meds

## 2024-09-22 NOTE — PROGRESS NOTE ADULT - SUBJECTIVE AND OBJECTIVE BOX
Problem List:  Hyponatremia    PAST MEDICAL & SURGICAL HISTORY:  DM (diabetes mellitus)      HTN (hypertension)      HLD (hyperlipidemia)      Breast cancer in female      COPD (chronic obstructive pulmonary disease)      PE (pulmonary thromboembolism)      Diabetes      Breast cancer      Blindness of left eye      History of appendectomy      Spinal stenosis      Spinal stenosis      Multiple pulmonary emboli      Fall      Chronic low back pain      Lumbar spondylosis      H/O mastectomy, right          aspirin (Nausea (Mild))  aspirin (Stomach Upset)      MEDICATIONS  (STANDING):  acetylcysteine 20%  Inhalation 4 milliLiter(s) Inhalation every 6 hours  albuterol    90 MICROgram(s) HFA Inhaler 2 Puff(s) Inhalation three times a day  albuterol/ipratropium for Nebulization 3 milliLiter(s) Nebulizer every 6 hours  apixaban 5 milliGRAM(s) Oral two times a day  atorvastatin 20 milliGRAM(s) Oral at bedtime  carvedilol 12.5 milliGRAM(s) Oral every 12 hours  cefTRIAXone   IVPB 1000 milliGRAM(s) IV Intermittent once  cefTRIAXone   IVPB      dorzolamide 2% Ophthalmic Solution 1 Drop(s) Both EYES three times a day  famotidine    Tablet 20 milliGRAM(s) Oral daily  fluticasone propionate/ salmeterol 100-50 MICROgram(s) Diskus 1 Dose(s) Inhalation two times a day  influenza  Vaccine (HIGH DOSE) 0.5 milliLiter(s) IntraMuscular once  iron sucrose IVPB 200 milliGRAM(s) IV Intermittent every 24 hours  ketotifen 0.025% Ophthalmic Solution 1 Drop(s) Both EYES two times a day  latanoprost 0.005% Ophthalmic Solution 1 Drop(s) Both EYES at bedtime  metroNIDAZOLE  IVPB 500 milliGRAM(s) IV Intermittent once  metroNIDAZOLE  IVPB      metroNIDAZOLE  IVPB 500 milliGRAM(s) IV Intermittent every 8 hours  montelukast 10 milliGRAM(s) Oral daily  senna 2 Tablet(s) Oral at bedtime    MEDICATIONS  (PRN):  acetaminophen     Tablet .. 650 milliGRAM(s) Oral every 6 hours PRN Temp greater or equal to 38C (100.4F), Mild Pain (1 - 3)  bisacodyl 5 milliGRAM(s) Oral every 12 hours PRN Constipation                            9.0    15.49 )-----------( 323      ( 22 Sep 2024 06:20 )             29.8     09-22    139  |  99  |  14  ----------------------------<  103[H]  3.8   |  34[H]  |  0.91    Ca    9.0      22 Sep 2024 06:20  Phos  2.6     09-22  Mg     2.0     09-22          Creatinine, Random Urine: <13 mg/dL (09-19 @ 13:12)  Potassium, Random Urine: 14 mmol/L (09-19 @ 13:12)  Osmolality, Random Urine: 213 mosm/Kg (09-19 @ 13:12)  Sodium, Random Urine: 86 mmol/L (09-19 @ 13:12)      REVIEW OF SYSTEMS:  General: no fever no chills, no weight loss.  EYES/ENT: No visual changes;  No vertigo, no headache.  NECK: No pain or stiffness  RESPIRATORY: No cough, wheezing, hemoptysis; No shortness of breath  CARDIOVASCULAR: No chest pain or palpitations. No Edema  GASTROINTESTINAL: No abdominal or epigastric pain. No nausea, vomiting. No diarrhea or constipation. No melena.  GENITOURINARY: No dysuria, frequency, foamy urine, urinary urgency, incontinence or hematuria          VITALS:  T(F): 99.3 (09-22-24 @ 07:20), Max: 99.3 (09-22-24 @ 07:20)  HR: 108 (09-22-24 @ 07:20)  BP: 140/73 (09-22-24 @ 07:20)  RR: 18 (09-22-24 @ 07:20)  SpO2: 96% (09-22-24 @ 07:20)  Wt(kg): --    09-21 @ 07:01  -  09-22 @ 07:00  --------------------------------------------------------  IN: 0 mL / OUT: 350 mL / NET: -350 mL        PHYSICAL EXAM:  Constitutional: well developed, no diaphoresis, no distress.  Neck: No JVD, no carotid bruit, supple, no adenopathy  Respiratory: Good air entrance B/L, no wheezes, rales or rhonchi  Cardiovascular: S1, S2, RRR, no pericardial rub, no murmur  Abdomen: BS+, soft, no tenderness, no bruit  Pelvis: bladder nondistended  Extremities: No cyanosis or clubbing. No peripheral edema.

## 2024-09-22 NOTE — PROGRESS NOTE ADULT - SUBJECTIVE AND OBJECTIVE BOX
Patient is a 89y old  Female who presents with a chief complaint of CHF exacerbation (22 Sep 2024 08:57)  Patient is  alert, awake, lying comfortably in bed, in NAD, doing well on RA, She feels better.    INTERVAL HPI/OVERNIGHT EVENTS:      VITAL SIGNS:  T(F): 99.3 (09-22-24 @ 07:20)  HR: 108 (09-22-24 @ 07:20)  BP: 140/73 (09-22-24 @ 07:20)  RR: 18 (09-22-24 @ 07:20)  SpO2: 96% (09-22-24 @ 07:20)  Wt(kg): --  I&O's Detail    21 Sep 2024 07:01  -  22 Sep 2024 07:00  --------------------------------------------------------  IN:  Total IN: 0 mL    OUT:    Voided (mL): 350 mL  Total OUT: 350 mL    Total NET: -350 mL              REVIEW OF SYSTEMS:    CONSTITUTIONAL:  No fevers, chills, sweats    HEENT:  Eyes:  No diplopia or blurred vision. ENT:  No earache, sore throat or runny nose.    CARDIOVASCULAR:  No pressure, squeezing, tightness, or heaviness about the chest; no palpitations.    RESPIRATORY:  Per HPI    GASTROINTESTINAL:  No abdominal pain, nausea, vomiting or diarrhea.    GENITOURINARY:  No dysuria, frequency or urgency.    NEUROLOGIC:  No paresthesias, fasciculations, seizures or weakness.    PSYCHIATRIC:  No disorder of thought or mood.      PHYSICAL EXAM:    Constitutional: Well developed and nourished  Eyes:Perrla  ENMT: normal  Neck:supple  Respiratory: good air entry  Cardiovascular: S1 S2 regular  Gastrointestinal: Soft, Non tender  Extremities: No edema  Vascular:normal  Neurological:Awake, alert,Ox3  Musculoskeletal:Normal      MEDICATIONS  (STANDING):  acetylcysteine 20%  Inhalation 4 milliLiter(s) Inhalation every 6 hours  albuterol    90 MICROgram(s) HFA Inhaler 2 Puff(s) Inhalation three times a day  albuterol/ipratropium for Nebulization 3 milliLiter(s) Nebulizer every 6 hours  apixaban 5 milliGRAM(s) Oral two times a day  atorvastatin 20 milliGRAM(s) Oral at bedtime  carvedilol 12.5 milliGRAM(s) Oral every 12 hours  cefTRIAXone   IVPB 1000 milliGRAM(s) IV Intermittent once  cefTRIAXone   IVPB      dorzolamide 2% Ophthalmic Solution 1 Drop(s) Both EYES three times a day  famotidine    Tablet 20 milliGRAM(s) Oral daily  fluticasone propionate/ salmeterol 100-50 MICROgram(s) Diskus 1 Dose(s) Inhalation two times a day  influenza  Vaccine (HIGH DOSE) 0.5 milliLiter(s) IntraMuscular once  iron sucrose IVPB 200 milliGRAM(s) IV Intermittent every 24 hours  ketotifen 0.025% Ophthalmic Solution 1 Drop(s) Both EYES two times a day  latanoprost 0.005% Ophthalmic Solution 1 Drop(s) Both EYES at bedtime  metroNIDAZOLE  IVPB      metroNIDAZOLE  IVPB 500 milliGRAM(s) IV Intermittent every 8 hours  metroNIDAZOLE  IVPB 500 milliGRAM(s) IV Intermittent once  montelukast 10 milliGRAM(s) Oral daily  senna 2 Tablet(s) Oral at bedtime    MEDICATIONS  (PRN):  acetaminophen     Tablet .. 650 milliGRAM(s) Oral every 6 hours PRN Temp greater or equal to 38C (100.4F), Mild Pain (1 - 3)  bisacodyl 5 milliGRAM(s) Oral every 12 hours PRN Constipation      Allergies    aspirin (Nausea (Mild))  aspirin (Stomach Upset)    Intolerances        LABS:                        9.0    15.49 )-----------( 323      ( 22 Sep 2024 06:20 )             29.8     09-22    139  |  99  |  14  ----------------------------<  103[H]  3.8   |  34[H]  |  0.91    Ca    9.0      22 Sep 2024 06:20  Phos  2.6     09-22  Mg     2.0     09-22        Urinalysis Basic - ( 22 Sep 2024 06:20 )    Color: x / Appearance: x / SG: x / pH: x  Gluc: 103 mg/dL / Ketone: x  / Bili: x / Urobili: x   Blood: x / Protein: x / Nitrite: x   Leuk Esterase: x / RBC: x / WBC x   Sq Epi: x / Non Sq Epi: x / Bacteria: x      ABG - ( 21 Sep 2024 05:54 )  pH, Arterial: 7.42  pH, Blood: x     /  pCO2: 57    /  pO2: 93    / HCO3: 37    / Base Excess: 11.0  /  SaO2: 100       Complete Blood Count in AM (09.22.24 @ 06:20)   Nucleated RBC: 0 /100 WBCs  WBC Count: 15.49 K/uL  RBC Count: 3.21 M/uL  Hemoglobin: 9.0 g/dL  Hematocrit: 29.8 %  Mean Cell Volume: 92.8 fl  Mean Cell Hemoglobin: 28.0 pg  Mean Cell Hemoglobin Conc: 30.2 gm/dL  Red Cell Distrib Width: 15.2 %  Platelet Count - Automated: 323 K/uL            CAPILLARY BLOOD GLUCOSE            RADIOLOGY & ADDITIONAL TESTS:    CXR:    Ct scan chest:< from: CT Chest No Cont (09.19.24 @ 16:32) >  IMPRESSION:  Right middle and lower bilobar consolidation/collapse with bronchial   secretions and small right pleural effusion.      < end of copied text >        ekg;    echo:

## 2024-09-22 NOTE — PROGRESS NOTE ADULT - ASSESSMENT
89yr old Female, from home [facility] (M-F/9h S-S/6h) ambulates with walker/wheelchair, with PMHx of asthma (O2 2L PRN), HTN, PAF and hx of DVT/PE (on eliquis), HLD, GERD, remote hx of r. breast cancer s/p mastectomy and left eye blindness who presents with 3 day history of worsening shortness of breath and orthopnea,hyponatremia,hyperkalemia,acute diastolic HF,respiratory failure.  1.Tele monitoring.  2.Bipap as per Pulm.  3.Hyponatremia-Renal f/u,improved.  4.Afib- coreg 12.5mg  bid,eliquis.  5.Acute diastolic HF-s/p IV lasix.  6.Asthma-Pulm f/u.  7.Hx of DVT/PE-eliquis.  8.Iron def anemia-IV iron.  9.PPI.  10.Vit d toxicity-hold vit d.

## 2024-09-22 NOTE — PROGRESS NOTE ADULT - SUBJECTIVE AND OBJECTIVE BOX
Date of Service 09-22-24 @ 10:37    CHIEF COMPLAINT:Patient is a 89y old  Female who presents with a chief complaint of CHF exacerbation .Pt appears comfortable.    	  REVIEW OF SYSTEMS:  CONSTITUTIONAL: No fever, weight loss, or fatigue  EYES: No eye pain, visual disturbances, or discharge  ENT:  No difficulty hearing, tinnitus, vertigo; No sinus or throat pain  NECK: No pain or stiffness  RESPIRATORY: No cough, wheezing, chills or hemoptysis; No Shortness of Breath  CARDIOVASCULAR: No chest pain, palpitations, passing out, dizziness, or leg swelling  GASTROINTESTINAL: No abdominal or epigastric pain. No nausea, vomiting, or hematemesis; No diarrhea or constipation. No melena or hematochezia.  GENITOURINARY: No dysuria, frequency, hematuria, or incontinence  NEUROLOGICAL: No headaches, memory loss, loss of strength, numbness, or tremors  SKIN: No itching, burning, rashes, or lesions   LYMPH Nodes: No enlarged glands  ENDOCRINE: No heat or cold intolerance; No hair loss  MUSCULOSKELETAL: No joint pain or swelling; No muscle, back, or extremity pain  PSYCHIATRIC: No depression, anxiety, mood swings, or difficulty sleeping  HEME/LYMPH: No easy bruising, or bleeding gums  ALLERGY AND IMMUNOLOGIC: No hives or eczema	        PHYSICAL EXAM:  T(C): 37.4 (09-22-24 @ 07:20), Max: 37.4 (09-22-24 @ 07:20)  HR: 108 (09-22-24 @ 07:20) (92 - 108)  BP: 140/73 (09-22-24 @ 07:20) (133/66 - 146/81)  RR: 18 (09-22-24 @ 07:20) (18 - 18)  SpO2: 96% (09-22-24 @ 07:20) (96% - 100%)  Wt(kg): --  I&O's Summary    21 Sep 2024 07:01  -  22 Sep 2024 07:00  --------------------------------------------------------  IN: 0 mL / OUT: 350 mL / NET: -350 mL    Tele-afib 110's    Appearance: Normal	  HEENT:   Normal oral mucosa, PERRL, EOMI	  Lymphatic: No lymphadenopathy  Cardiovascular: Normal S1 S2, No JVD, No murmurs, No edema  Respiratory: Lungs clear to auscultation	  Psychiatry: A & O x 3, Mood & affect appropriate  Gastrointestinal:  Soft, Non-tender, + BS	  Skin: No rashes, No ecchymoses, No cyanosis	  Neurologic: Non-focal  Extremities: Normal range of motion, No clubbing, cyanosis or edema  Vascular: Peripheral pulses palpable 2+ bilaterally    MEDICATIONS  (STANDING):  acetylcysteine 20%  Inhalation 4 milliLiter(s) Inhalation every 6 hours  albuterol    90 MICROgram(s) HFA Inhaler 2 Puff(s) Inhalation three times a day  albuterol/ipratropium for Nebulization 3 milliLiter(s) Nebulizer every 6 hours  apixaban 5 milliGRAM(s) Oral two times a day  atorvastatin 20 milliGRAM(s) Oral at bedtime  carvedilol 12.5 milliGRAM(s) Oral every 12 hours  cefTRIAXone   IVPB 1000 milliGRAM(s) IV Intermittent once  cefTRIAXone   IVPB      dorzolamide 2% Ophthalmic Solution 1 Drop(s) Both EYES three times a day  famotidine    Tablet 20 milliGRAM(s) Oral daily  fluticasone propionate/ salmeterol 100-50 MICROgram(s) Diskus 1 Dose(s) Inhalation two times a day  influenza  Vaccine (HIGH DOSE) 0.5 milliLiter(s) IntraMuscular once  iron sucrose IVPB 200 milliGRAM(s) IV Intermittent every 24 hours  ketotifen 0.025% Ophthalmic Solution 1 Drop(s) Both EYES two times a day  latanoprost 0.005% Ophthalmic Solution 1 Drop(s) Both EYES at bedtime  metroNIDAZOLE  IVPB      metroNIDAZOLE  IVPB 500 milliGRAM(s) IV Intermittent every 8 hours  metroNIDAZOLE  IVPB 500 milliGRAM(s) IV Intermittent once  montelukast 10 milliGRAM(s) Oral daily  senna 2 Tablet(s) Oral at bedtime        LABS:	 	                          9.0    15.49 )-----------( 323      ( 22 Sep 2024 06:20 )             29.8     09-22    139  |  99  |  14  ----------------------------<  103[H]  3.8   |  34[H]  |  0.91    Ca    9.0      22 Sep 2024 06:20  Phos  2.6     09-22  Mg     2.0     09-22        TSH: Thyroid Stimulating Hormone, Serum: 1.56 uU/mL (09-20 @ 07:24)      	  < from: CT Chest No Cont (09.19.24 @ 16:32) >  ACC: [id number] EXAM:  CT CHEST   ORDERED BY: KG RUBIO     PROCEDURE DATE:  09/19/2024          INTERPRETATION:  INDICATION: Heart failure    TECHNIQUE: A volumetric CT acquisition of the chest was obtained from the   thoracic inlet to the upper abdomen without the use of intravenous   contrast. Coronal and sagittal reconstructed images are provided.    COMPARISON: Chest CT of 4/24/2019    FINDINGS:    Lungs/Airways/Pleura: Small right pleural effusion with right middle and   lower lobe consolidation/collapse with associated bronchial secretions.   Mild left basilar atelectasis. No pneumothorax.    Mediastinum/Lymph nodes: No thoracic adenopathy.    Heart and Vessels: Torturous right common carotid artery. Biatrial   enlargement qualitatively. Coronary arterial, aortic valvular and mitral   annular calcification. No pericardial effusion.    Upper Abdomen: Bilateral renal cysts one with calcified septation in the   left. Trace ascites.    Osseous structures and Soft Tissues: No aggressive bone lesions.    IMPRESSION:  Right middle and lower bilobar consolidation/collapse with bronchial   secretions and small right pleural effusion.    Cardiomegaly.    --- End of Report ---            ELY LR M.D., Attending Radiologist  This document has been electronically signed. Sep 19 2024  5:20PM    < end of copied text >

## 2024-09-22 NOTE — PROGRESS NOTE ADULT - ASSESSMENT
Hyponatremia due to fluid retention/CHF, sodium improved with diuretics, off diuretics.   CHF most likely right ventricle dysfunction amd pulmonary hypertension. Fluid retention improved, off diuretics , continue fluid restriction 1 liter per day.   CT chest with bilobar right consolidation with bronchial secretion. Follow with pulmonary and chest PT.   COPD with co2 retention placed on BIPAP at night time.   Anemia severe iron deficiency , start Venofer IV.                Hyponatremia due to fluid retention/CHF, sodium improved with diuretics, off diuretics.   CHF most likely right ventricle dysfunction amd pulmonary hypertension. Fluid retention improved, off diuretics , continue fluid restriction 1 liter per day.   CT chest with bilobar right consolidation with bronchial secretion. Follow with pulmonary and chest PT.   COPD with co2 retention placed on BIPAP at night time.   Anemia severe iron deficiency , start Venofer IV.   Increased WBC follow with ID.

## 2024-09-23 NOTE — PROGRESS NOTE ADULT - SUBJECTIVE AND OBJECTIVE BOX
Problem List:  89yr old Female, from home [place] (M-F/9h S-S/6h) ambulates with walker/wheelchair, with PMHx of asthma (O2 2L PRN), HTN, PAF and hx of DVT/PE (on eliquis), HLD, GERD, remote hx of r. breast cancer s/p mastectomy and left eye blindness who presents with 3 day history of worsening shortness of breath and orthopnea,hyponatremia,hyperkalemia,acute diastolic HF,exacerbation of COPD with CO2 retention at present on BIPAP.       PAST MEDICAL & SURGICAL HISTORY:  DM (diabetes mellitus)      HTN (hypertension)      HLD (hyperlipidemia)      Breast cancer in female      COPD (chronic obstructive pulmonary disease)      PE (pulmonary thromboembolism)      Diabetes      Breast cancer      Blindness of left eye      History of appendectomy      Spinal stenosis      Spinal stenosis      Multiple pulmonary emboli      Fall      Chronic low back pain      Lumbar spondylosis      H/O mastectomy, right          aspirin (Nausea (Mild))  aspirin (Stomach Upset)      MEDICATIONS  (STANDING):  acetylcysteine 20%  Inhalation 4 milliLiter(s) Inhalation every 6 hours  albuterol    90 MICROgram(s) HFA Inhaler 2 Puff(s) Inhalation three times a day  albuterol/ipratropium for Nebulization 3 milliLiter(s) Nebulizer every 6 hours  apixaban 5 milliGRAM(s) Oral two times a day  atorvastatin 20 milliGRAM(s) Oral at bedtime  carvedilol 12.5 milliGRAM(s) Oral every 12 hours  cefTRIAXone   IVPB 1000 milliGRAM(s) IV Intermittent every 24 hours  cefTRIAXone   IVPB      dorzolamide 2% Ophthalmic Solution 1 Drop(s) Both EYES three times a day  famotidine    Tablet 20 milliGRAM(s) Oral daily  fluticasone propionate/ salmeterol 100-50 MICROgram(s) Diskus 1 Dose(s) Inhalation two times a day  influenza  Vaccine (HIGH DOSE) 0.5 milliLiter(s) IntraMuscular once  ketotifen 0.025% Ophthalmic Solution 1 Drop(s) Both EYES two times a day  latanoprost 0.005% Ophthalmic Solution 1 Drop(s) Both EYES at bedtime  metroNIDAZOLE  IVPB      metroNIDAZOLE  IVPB 500 milliGRAM(s) IV Intermittent every 8 hours  montelukast 10 milliGRAM(s) Oral daily  polyethylene glycol 3350 17 Gram(s) Oral daily  senna 2 Tablet(s) Oral at bedtime    MEDICATIONS  (PRN):  acetaminophen     Tablet .. 650 milliGRAM(s) Oral every 6 hours PRN Temp greater or equal to 38C (100.4F), Mild Pain (1 - 3)  bisacodyl 5 milliGRAM(s) Oral every 12 hours PRN Constipation                            9.0    14.80 )-----------( 282      ( 23 Sep 2024 10:36 )             30.0     09-23    140  |  102  |  20[H]  ----------------------------<  136[H]  3.9   |  33[H]  |  0.98    Ca    8.7      23 Sep 2024 10:36  Phos  3.7     09-23  Mg     2.1     09-23    TPro  5.9[L]  /  Alb  2.6[L]  /  TBili  0.3  /  DBili  x   /  AST  13  /  ALT  12  /  AlkPhos  100  09-23        Creatinine, Random Urine: <13 mg/dL (09-19 @ 13:12)  Potassium, Random Urine: 14 mmol/L (09-19 @ 13:12)  Osmolality, Random Urine: 213 mosm/Kg (09-19 @ 13:12)  Sodium, Random Urine: 86 mmol/L (09-19 @ 13:12)      REVIEW OF SYSTEMS:  General: no fever no chills, no weight loss.  EYES/ENT: No visual changes;  No vertigo, no headache.  NECK: No pain or stiffness  RESPIRATORY: No cough, wheezing, hemoptysis; No shortness of breath  CARDIOVASCULAR: No chest pain or palpitations. No Edema  GASTROINTESTINAL: No abdominal or epigastric pain. No nausea, vomiting. No diarrhea or constipation. No melena.  GENITOURINARY: No dysuria, frequency, foamy urine, urinary urgency, incontinence or hematuria          VITALS:  T(F): 98 (09-23-24 @ 07:38), Max: 98.6 (09-22-24 @ 15:13)  HR: 93 (09-23-24 @ 08:51)  BP: 109/53 (09-23-24 @ 07:38)  RR: 18 (09-23-24 @ 07:38)  SpO2: 97% (09-23-24 @ 08:51)  Wt(kg): --    09-23 @ 07:01 - 09-23 @ 12:14  --------------------------------------------------------  IN: 380 mL / OUT: 0 mL / NET: 380 mL        PHYSICAL EXAM:  Constitutional: well developed, no diaphoresis, no distress.  Neck: No JVD, no carotid bruit, supple, no adenopathy  Respiratory: air entrance B/L, no wheezes, rales or rhonchi  Cardiovascular: S1, S2, RRR, no pericardial rub, no murmur  Abdomen: BS+, soft, no tenderness, no bruit  Pelvis: bladder nondistended  Extremities: No cyanosis or clubbing. No peripheral edema.

## 2024-09-23 NOTE — PROGRESS NOTE ADULT - SUBJECTIVE AND OBJECTIVE BOX
pt seen in icu [  ], reg med floor [  x ], bed [ x ], chair at bedside [   ]  Awake, alert, comfortable in bed in NAD. Doing well on  oxygen supp via NC  REVIEW OF SYSTEMS:    CONSTITUTIONAL: No weakness, fevers or chills  EYES/ENT: No visual changes;  No vertigo or throat pain   NECK: No pain or stiffness  RESPIRATORY: No cough, wheezing, hemoptysis; No shortness of breath  CARDIOVASCULAR: No chest pain or palpitations  GASTROINTESTINAL: No abdominal or epigastric pain. No nausea, vomiting, or hematemesis; No diarrhea or constipation. No melena or hematochezia.  GENITOURINARY: No dysuria, frequency or hematuria  NEUROLOGICAL: No numbness or weakness  SKIN: No itching, burning, rashes, or lesions   All other review of systems is negative unless indicated above.    Physical Exam    General: WN/WD NAD  Neurology: A&Ox3, nonfocal, DWYER x 4  Respiratory: CTA B/L  CV: RRR, S1S2, no murmurs, rubs or gallops  Abdominal: Soft, NT, ND +BS, Last BM  Extremities: No edema, + peripheral pulses      Allergies  aspirin (Nausea (Mild))  aspirin (Stomach Upset)      Health Issues  Heart failure    DNI: Trial NIV    DM (diabetes mellitus)    HTN (hypertension)    HLD (hyperlipidemia)    Breast cancer in female    COPD (chronic obstructive pulmonary disease)    PE (pulmonary thromboembolism)    Diabetes    Breast cancer    Blindness of left eye    History of appendectomy    Spinal stenosis    Spinal stenosis    Multiple pulmonary emboli    Fall    Chronic low back pain    Lumbar spondylosis    H/O mastectomy, right        Vitals  T(F): 98 (09-23-24 @ 07:38), Max: 98.8 (09-22-24 @ 11:00)  HR: 93 (09-23-24 @ 08:51) (90 - 105)  BP: 109/53 (09-23-24 @ 07:38) (100/49 - 114/61)  RR: 18 (09-23-24 @ 07:38) (18 - 18)  SpO2: 97% (09-23-24 @ 08:51) (95% - 98%)  Wt(kg): --  CAPILLARY BLOOD GLUCOSE          Labs                          9.0    15.49 )-----------( 323      ( 22 Sep 2024 06:20 )             29.8       09-22    139  |  99  |  14  ----------------------------<  103[H]  3.8   |  34[H]  |  0.91    Ca    9.0      22 Sep 2024 06:20  Phos  2.6     09-22  Mg     2.0     09-22              Radiology Results      Meds    MEDICATIONS  (STANDING):  acetylcysteine 20%  Inhalation 4 milliLiter(s) Inhalation every 6 hours  albuterol    90 MICROgram(s) HFA Inhaler 2 Puff(s) Inhalation three times a day  albuterol/ipratropium for Nebulization 3 milliLiter(s) Nebulizer every 6 hours  apixaban 5 milliGRAM(s) Oral two times a day  atorvastatin 20 milliGRAM(s) Oral at bedtime  carvedilol 12.5 milliGRAM(s) Oral every 12 hours  cefTRIAXone   IVPB 1000 milliGRAM(s) IV Intermittent every 24 hours  cefTRIAXone   IVPB      dorzolamide 2% Ophthalmic Solution 1 Drop(s) Both EYES three times a day  famotidine    Tablet 20 milliGRAM(s) Oral daily  fluticasone propionate/ salmeterol 100-50 MICROgram(s) Diskus 1 Dose(s) Inhalation two times a day  influenza  Vaccine (HIGH DOSE) 0.5 milliLiter(s) IntraMuscular once  iron sucrose IVPB 200 milliGRAM(s) IV Intermittent every 24 hours  ketotifen 0.025% Ophthalmic Solution 1 Drop(s) Both EYES two times a day  latanoprost 0.005% Ophthalmic Solution 1 Drop(s) Both EYES at bedtime  metroNIDAZOLE  IVPB      metroNIDAZOLE  IVPB 500 milliGRAM(s) IV Intermittent every 8 hours  montelukast 10 milliGRAM(s) Oral daily  polyethylene glycol 3350 17 Gram(s) Oral daily  senna 2 Tablet(s) Oral at bedtime      MEDICATIONS  (PRN):  acetaminophen     Tablet .. 650 milliGRAM(s) Oral every 6 hours PRN Temp greater or equal to 38C (100.4F), Mild Pain (1 - 3)  bisacodyl 5 milliGRAM(s) Oral every 12 hours PRN Constipation

## 2024-09-23 NOTE — PROGRESS NOTE ADULT - SUBJECTIVE AND OBJECTIVE BOX
Date of Service 09-23-24 @ 09:07    CHIEF COMPLAINT:Patient is a 89y old  Female who presents with a chief complaint of CHF exacerbation .Pt appears comfortable.    	  REVIEW OF SYSTEMS:  CONSTITUTIONAL: No fever, weight loss, or fatigue  EYES: No eye pain, visual disturbances, or discharge  ENT:  No difficulty hearing, tinnitus, vertigo; No sinus or throat pain  NECK: No pain or stiffness  RESPIRATORY: No cough, wheezing, chills or hemoptysis; No Shortness of Breath  CARDIOVASCULAR: No chest pain, palpitations, passing out, dizziness, or leg swelling  GASTROINTESTINAL: No abdominal or epigastric pain. No nausea, vomiting, or hematemesis; No diarrhea or constipation. No melena or hematochezia.  GENITOURINARY: No dysuria, frequency, hematuria, or incontinence  NEUROLOGICAL: No headaches, memory loss, loss of strength, numbness, or tremors  SKIN: No itching, burning, rashes, or lesions   LYMPH Nodes: No enlarged glands  ENDOCRINE: No heat or cold intolerance; No hair loss  MUSCULOSKELETAL: No joint pain or swelling; No muscle, back, or extremity pain  PSYCHIATRIC: No depression, anxiety, mood swings, or difficulty sleeping  HEME/LYMPH: No easy bruising, or bleeding gums  ALLERGY AND IMMUNOLOGIC: No hives or eczema	          PHYSICAL EXAM:  T(C): 36.7 (09-23-24 @ 07:38), Max: 37.1 (09-22-24 @ 11:00)  HR: 93 (09-23-24 @ 08:51) (90 - 105)  BP: 109/53 (09-23-24 @ 07:38) (100/49 - 114/61)  RR: 18 (09-23-24 @ 07:38) (18 - 18)  SpO2: 97% (09-23-24 @ 08:51) (95% - 98%)  Wt(kg): --  I&O's Summary      Appearance: Normal	  HEENT:   Normal oral mucosa, PERRL, EOMI	  Lymphatic: No lymphadenopathy  Cardiovascular: Normal S1 S2, No JVD, No murmurs, No edema  Respiratory: Dec BS at bases	  Psychiatry: A & O x 3, Mood & affect appropriate  Gastrointestinal:  Soft, Non-tender, + BS	  Skin: No rashes, No ecchymoses, No cyanosis	  Neurologic: Non-focal  Extremities: Normal range of motion, No clubbing, cyanosis or edema  Vascular: Peripheral pulses palpable 2+ bilaterally    MEDICATIONS  (STANDING):  acetylcysteine 20%  Inhalation 4 milliLiter(s) Inhalation every 6 hours  albuterol    90 MICROgram(s) HFA Inhaler 2 Puff(s) Inhalation three times a day  albuterol/ipratropium for Nebulization 3 milliLiter(s) Nebulizer every 6 hours  apixaban 5 milliGRAM(s) Oral two times a day  atorvastatin 20 milliGRAM(s) Oral at bedtime  carvedilol 12.5 milliGRAM(s) Oral every 12 hours  cefTRIAXone   IVPB 1000 milliGRAM(s) IV Intermittent every 24 hours  cefTRIAXone   IVPB      dorzolamide 2% Ophthalmic Solution 1 Drop(s) Both EYES three times a day  famotidine    Tablet 20 milliGRAM(s) Oral daily  fluticasone propionate/ salmeterol 100-50 MICROgram(s) Diskus 1 Dose(s) Inhalation two times a day  influenza  Vaccine (HIGH DOSE) 0.5 milliLiter(s) IntraMuscular once  iron sucrose IVPB 200 milliGRAM(s) IV Intermittent every 24 hours  ketotifen 0.025% Ophthalmic Solution 1 Drop(s) Both EYES two times a day  latanoprost 0.005% Ophthalmic Solution 1 Drop(s) Both EYES at bedtime  metroNIDAZOLE  IVPB      metroNIDAZOLE  IVPB 500 milliGRAM(s) IV Intermittent every 8 hours  montelukast 10 milliGRAM(s) Oral daily  polyethylene glycol 3350 17 Gram(s) Oral daily  senna 2 Tablet(s) Oral at bedtime        LABS:	 	                        9.0    15.49 )-----------( 323      ( 22 Sep 2024 06:20 )             29.8     09-22    139  |  99  |  14  ----------------------------<  103[H]  3.8   |  34[H]  |  0.91    Ca    9.0      22 Sep 2024 06:20  Phos  2.6     09-22  Mg     2.0     09-22      proBNP:   Lipid Profile:   HgA1c:   TSH: Thyroid Stimulating Hormone, Serum: 1.56 uU/mL (09-20 @ 07:24)      	      < from: Xray Chest 1 View- PORTABLE-Urgent (Xray Chest 1 View- PORTABLE-Urgent .) (09.22.24 @ 09:22) >  ACC: [id number] EXAM:  XR CHEST PORTABLE URGENT 1V   ORDERED BY: MOISÉS TOM     PROCEDURE DATE:  09/22/2024          INTERPRETATION:  Portable AP chest radiograph    COMPARISON: 9/19/2024 chest x-ray.    CLINICAL INFORMATION: Aspiration pneumonia.    FINDINGS:  CATHETERS AND TUBES: None    PULMONARY: Elevated RIGHT hemidiaphragm.  Current film shows indistinct diaphragm contour concerning for LEFT lower   lower zone airspace consolidation and/or effusion.  Pulmonary vascular congestion. Upperzones clear. Posterior RIGHT lung   base cannot be assessed due to elevated diaphragm.    No pneumothorax.    HEART/VASCULAR: The  heart is enlarged in transverse diameter. .    BONES: The visualized osseous thorax is intact.    IMPRESSION:  Suspect LEFT retrocardiac airspace consolidation and/or effusion   obscuring diaphragm contour.  Elevated RIGHT hemidiaphragm.  Lateral radiograph recommended for confirmation.    --- End of Report ---            AKBAR COTA MD; Attending Radiologist  This document has been electronically signed. Sep 22 2024 12:27PM    < end of copied text >

## 2024-09-23 NOTE — PROGRESS NOTE ADULT - ASSESSMENT
Admitted for exacerbation of COPD and CO2 retention, Hyperkalemia and hyponatremia with CHF that improved with diuretics.   At present on nocturnal BIPAP .   Hyponatremia due to fluid retention/CHF, sodium improved with diuretics, off diuretics. Hyperkalemia resolved , ACI I was stopped.   CHF most likely right ventricle dysfunction an d pulmonary hypertension. Fluid retention improved, off diuretics , continue fluid restriction 1 liter per day.   CT chest with bilobar right consolidation with bronchial secretion. Follow with pulmonary and chest PT.   COPD with co2 retention placed on BIPAP at night time.   Anemia severe iron deficiency , start Venofer IV.   Increased WBC follow with ID. Trending down./

## 2024-09-23 NOTE — PROGRESS NOTE ADULT - SUBJECTIVE AND OBJECTIVE BOX
REVIEW OF SYSTEMS: Total of twelve systems have been reviewed with patient and found to be negative unless mentioned in HPI      PAST MEDICAL & SURGICAL HISTORY:  DM (diabetes mellitus)  HTN (hypertension)  HLD (hyperlipidemia)  Breast cancer in female  COPD (chronic obstructive pulmonary disease)  PE (pulmonary thromboembolism)  Blindness of left eye  History of appendectomy  Spinal stenosis  Multiple pulmonary emboli  Fall  Chronic low back pain  Lumbar spondylosis  H/O mastectomy, right      SOCIAL HISTORY  Alcohol: Does not drink  Tobacco: Does not smoke  Illicit substance use: None      FAMILY HISTORY: Non contributory to the present illness      ALLERGIES: aspirin (Nausea (Mild, Stomach Upset)      Vital Signs Last 24 Hrs  T(C): 37.1 (22 Sep 2024 11:00), Max: 37.4 (22 Sep 2024 07:20)  T(F): 98.8 (22 Sep 2024 11:00), Max: 99.3 (22 Sep 2024 07:20)  HR: 98 (22 Sep 2024 11:00) (92 - 108)  BP: 114/61 (22 Sep 2024 11:00) (114/61 - 146/81)  BP(mean): --  RR: 18 (22 Sep 2024 11:00) (18 - 18)  SpO2: 97% (22 Sep 2024 11:00) (96% - 100%)    Parameters below as of 22 Sep 2024 11:00  Patient On (Oxygen Delivery Method): nasal cannula  O2 Flow (L/min): 3      PHYSICAL EXAM:  GENERAL: Not in acute distress , on oxygen via NC  CHEST/LUNG:  Not using accessory muscles  HEART: s1 and s2 present  ABDOMEN:  Nontender and  Nondistended  EXTREMITIES: No pedal  edema  CNS: Awake and Alert      LABS:                        9.0    15.49 )-----------( 323      ( 22 Sep 2024 06:20 )             29.8       09-22    139  |  99  |  14  ----------------------------<  103[H]  3.8   |  34[H]  |  0.91    Ca    9.0      22 Sep 2024 06:20  Phos  2.6     09-22  Mg     2.0     09-22        ABG - ( 21 Sep 2024 05:54 )  pH, Arterial: 7.42  pH, Blood: x     /  pCO2: 57    /  pO2: 93    / HCO3: 37    / Base Excess: 11.0  /  SaO2: 100           MEDICATIONS  (STANDING):  acetylcysteine 20%  Inhalation 4 milliLiter(s) Inhalation every 6 hours  albuterol    90 MICROgram(s) HFA Inhaler 2 Puff(s) Inhalation three times a day  albuterol/ipratropium for Nebulization 3 milliLiter(s) Nebulizer every 6 hours  apixaban 5 milliGRAM(s) Oral two times a day  atorvastatin 20 milliGRAM(s) Oral at bedtime  carvedilol 12.5 milliGRAM(s) Oral every 12 hours  cefTRIAXone   IVPB      dorzolamide 2% Ophthalmic Solution 1 Drop(s) Both EYES three times a day  famotidine    Tablet 20 milliGRAM(s) Oral daily  fluticasone propionate/ salmeterol 100-50 MICROgram(s) Diskus 1 Dose(s) Inhalation two times a day  influenza  Vaccine (HIGH DOSE) 0.5 milliLiter(s) IntraMuscular once  iron sucrose IVPB 200 milliGRAM(s) IV Intermittent every 24 hours  ketotifen 0.025% Ophthalmic Solution 1 Drop(s) Both EYES two times a day  latanoprost 0.005% Ophthalmic Solution 1 Drop(s) Both EYES at bedtime  metroNIDAZOLE  IVPB      metroNIDAZOLE  IVPB 500 milliGRAM(s) IV Intermittent every 8 hours  montelukast 10 milliGRAM(s) Oral daily  polyethylene glycol 3350 17 Gram(s) Oral daily  senna 2 Tablet(s) Oral at bedtime    MEDICATIONS  (PRN):  acetaminophen     Tablet .. 650 milliGRAM(s) Oral every 6 hours PRN Temp greater or equal to 38C (100.4F), Mild Pain (1 - 3)  bisacodyl 5 milliGRAM(s) Oral every 12 hours PRN Constipation      RADIOLOGY & ADDITIONAL TESTS:    9/22/24 : Xray Chest 1 View- PORTABLE-Urgent (Xray Chest 1 View- PORTABLE-Urgent .) (09.22.24 @ 09:22) >  Suspect LEFT retrocardiac airspace consolidation and/or effusion  obscuring diaphragm contour.  Elevated RIGHT hemidiaphragm.  Lateral radiograph recommended for confirmation.      9/19/24 : CT Chest No Cont (09.19.24 @ 16:32) Right middle and lower bilobar consolidation/collapse with bronchial   secretions and small right pleural effusion.      MICROBIOLOGY DATA:    Culture - Blood (09.19.24 @ 03:54)   Specimen Source: .Blood Blood-Peripheral  Culture Results: No growth at 72 Hours    Culture - Blood (09.19.24 @ 03:53)   Specimen Source: .Blood Blood-Peripheral  Culture Results: No growth at 72 Hours    Rapid HIV-1/2 Antibody (09.19.24 @ 03:52)   Rapid HIV-1/2 Antibody: Nonreact                Patient is a 89y old  Female who is from home HHA (M-F/9h S-S/6h) ambulates with walker/wheelchair, with PMHx of asthma (O2 2L PRN), HTN, PAF and hx of DVT/PE (on eliquis), HLD, GERD, remote hx of r. breast cancer s/p mastectomy and left eye blindness, who presents to the ER on 9/19/24 for evaluation of worsening shortness of breath and orthopnea. Patient was hospitalized at [facility] from 8/7-8/16 for Asthma exacerbation due to influenza virus. Since the discharge, patient has been wearing 2L NC at home. However, shortness of breath and orthopnea  is worsening to a point that patient is not able to lay down flat at all. She has admitted with  CHF exacerbation. Now The CT scan from 9/19 and CXr from 9/22 shows Airspace consolidation. She has started on Ceftriaxone and Flagyl, and the ID consult requested to assist with further evaluation and antibiotic management.    # Pneumonia  # Leukocytosis    would recommend:    1. Please obtain MRSA PCR and Legionella urine Ag  2. Change Ceftriaxone to Cefepime and c/w flagyl  3. Aspiration precaution  4. Supplemental oxygenation and Bronchodilator as needed  5. Monitor WBC count    will follow the patient with you and make further recommendation based on the clinical course and Lab results  Thank you for the opportunity to participate in Ms. CARLTON's care    Attending Attestation:    Spent more than 65 minutes on total encounter, more than 50 % of the visit was spent counseling and/or coordinating care by the Attending physician.       Patient is seen and examined at the bed side, is afebrile. She is feeling little better. The Family at the bed side. The Leukocytosis started trending down slowly.       REVIEW OF SYSTEMS: All other review systems are negative      ALLERGIES: aspirin (Nausea (Mild, Stomach Upset)        Vital Signs Last 24 Hrs  T(C): 36.8 (23 Sep 2024 15:35), Max: 36.9 (22 Sep 2024 23:50)  T(F): 98.2 (23 Sep 2024 15:35), Max: 98.4 (22 Sep 2024 23:50)  HR: 98 (23 Sep 2024 15:35) (90 - 102)  BP: 127/63 (23 Sep 2024 15:35) (100/49 - 127/63)  BP(mean): --  RR: 18 (23 Sep 2024 15:35) (18 - 18)  SpO2: 94% (23 Sep 2024 15:35) (94% - 98%)    Parameters below as of 23 Sep 2024 15:35  Patient On (Oxygen Delivery Method): nasal cannula  O2 Flow (L/min): 2        PHYSICAL EXAM:  GENERAL: Not in acute distress , on oxygen via NC  CHEST/LUNG:  Not using accessory muscles  HEART: s1 and s2 present  ABDOMEN:  Nontender and  Nondistended  EXTREMITIES: No pedal  edema  CNS: Awake and Alert      LABS:                        9.0    14.80 )-----------( 282      ( 23 Sep 2024 10:36 )             30.0                           9.0    15.49 )-----------( 323      ( 22 Sep 2024 06:20 )             29.8         09-23    140  |  102  |  20[H]  ----------------------------<  136[H]  3.9   |  33[H]  |  0.98    Ca    8.7      23 Sep 2024 10:36  Phos  3.7     09-23  Mg     2.1     09-23    TPro  5.9[L]  /  Alb  2.6[L]  /  TBili  0.3  /  DBili  x   /  AST  13  /  ALT  12  /  AlkPhos  100  09-23    09-22    139  |  99  |  14  ----------------------------<  103[H]  3.8   |  34[H]  |  0.91    Ca    9.0      22 Sep 2024 06:20  Phos  2.6     09-22  Mg     2.0     09-22        ABG - ( 21 Sep 2024 05:54 )  pH, Arterial: 7.42  pH, Blood: x     /  pCO2: 57    /  pO2: 93    / HCO3: 37    / Base Excess: 11.0  /  SaO2: 100           MEDICATIONS  (STANDING):    acetylcysteine 20%  Inhalation 4 milliLiter(s) Inhalation every 6 hours  albuterol    90 MICROgram(s) HFA Inhaler 2 Puff(s) Inhalation three times a day  albuterol/ipratropium for Nebulization 3 milliLiter(s) Nebulizer every 6 hours  apixaban 5 milliGRAM(s) Oral two times a day  atorvastatin 20 milliGRAM(s) Oral at bedtime  carvedilol 12.5 milliGRAM(s) Oral every 12 hours  cefepime   IVPB 1000 milliGRAM(s) IV Intermittent every 8 hours  cefepime   IVPB      dorzolamide 2% Ophthalmic Solution 1 Drop(s) Both EYES three times a day  famotidine    Tablet 20 milliGRAM(s) Oral daily  fluticasone propionate/ salmeterol 100-50 MICROgram(s) Diskus 1 Dose(s) Inhalation two times a day  influenza  Vaccine (HIGH DOSE) 0.5 milliLiter(s) IntraMuscular once  ketotifen 0.025% Ophthalmic Solution 1 Drop(s) Both EYES two times a day  latanoprost 0.005% Ophthalmic Solution 1 Drop(s) Both EYES at bedtime  metroNIDAZOLE  IVPB      metroNIDAZOLE  IVPB 500 milliGRAM(s) IV Intermittent every 8 hours  montelukast 10 milliGRAM(s) Oral daily  polyethylene glycol 3350 17 Gram(s) Oral daily  senna 2 Tablet(s) Oral at bedtime        RADIOLOGY & ADDITIONAL TESTS:    9/22/24 : Xray Chest 1 View- PORTABLE-Urgent (Xray Chest 1 View- PORTABLE-Urgent .) (09.22.24 @ 09:22) >  Suspect LEFT retrocardiac airspace consolidation and/or effusion  obscuring diaphragm contour.  Elevated RIGHT hemidiaphragm.  Lateral radiograph recommended for confirmation.      9/19/24 : CT Chest No Cont (09.19.24 @ 16:32) Right middle and lower bilobar consolidation/collapse with bronchial secretions and small right pleural effusion.      MICROBIOLOGY DATA:    Culture - Blood (09.19.24 @ 03:54)   Specimen Source: .Blood Blood-Peripheral  Culture Results: No growth at 4 days    Culture - Blood (09.19.24 @ 03:53)   Specimen Source: .Blood Blood-Peripheral  Culture Results: No growth at 4 days     Rapid HIV-1/2 Antibody (09.19.24 @ 03:52)   Rapid HIV-1/2 Antibody: Nonreact

## 2024-09-23 NOTE — SWALLOW BEDSIDE ASSESSMENT ADULT - SWALLOW EVAL: DIAGNOSIS
Pt p/w s&s oropharyngeal dysphagia w/ age related changes (presbyphagia) c/b weak bolus formation, slow A-P transport, delayed swallow reflex, decreased hyolaryngeal elevation, and intermittent cough on thin liquids. Suspected premature spillage of bolus to the pharynx.

## 2024-09-23 NOTE — SWALLOW BEDSIDE ASSESSMENT ADULT - SPECIFY REASON(S)
Subjective assessment of current swallow function; Family attests to frequent coughing on thin liquids

## 2024-09-23 NOTE — PROGRESS NOTE ADULT - PROBLEM SELECTOR PLAN 2
Bronchodilators  montelukast 10 mgs po Qhs  Advair Diskus 250/50 mcgs one inh po BID  PFTs as OP  Bi-pap machine at night

## 2024-09-23 NOTE — PROGRESS NOTE ADULT - SUBJECTIVE AND OBJECTIVE BOX
Patient is a 89y old  Female who presents with a chief complaint of CHF exacerbation (23 Sep 2024 14:04)      OVERNIGHT EVENTS: Sp[debbie ID [id number]    REVIEW OF SYSTEMS:  CONSTITUTIONAL: No fever, chills  RESPIRATORY: No cough, SOB  CARDIOVASCULAR: No chest pain, palpitations  GASTROINTESTINAL: No abdominal pain. No nausea, vomiting, or diarrhea  GENITOURINARY: No dysuria  NEUROLOGICAL: No HA  SKIN: No itching, burning, rashes, or lesions   LYMPH NODES: No enlarged glands  ENDOCRINE: No heat or cold intolerance; No hair loss  MUSCULOSKELETAL: No joint pain or swelling; No muscle, back, or extremity pain  PSYCHIATRIC: No depression, anxiety  HEME/LYMPH: No easy bruising, or bleeding gums    T(C): 36.8 (09-23-24 @ 15:35), Max: 36.9 (09-22-24 @ 23:50)  HR: 98 (09-23-24 @ 15:35) (90 - 102)  BP: 127/63 (09-23-24 @ 15:35) (100/49 - 127/63)  RR: 18 (09-23-24 @ 15:35) (18 - 18)  SpO2: 94% (09-23-24 @ 15:35) (94% - 98%)  Wt(kg): --Vital Signs Last 24 Hrs  T(C): 36.8 (23 Sep 2024 15:35), Max: 36.9 (22 Sep 2024 23:50)  T(F): 98.2 (23 Sep 2024 15:35), Max: 98.4 (22 Sep 2024 23:50)  HR: 98 (23 Sep 2024 15:35) (90 - 102)  BP: 127/63 (23 Sep 2024 15:35) (100/49 - 127/63)  BP(mean): --  RR: 18 (23 Sep 2024 15:35) (18 - 18)  SpO2: 94% (23 Sep 2024 15:35) (94% - 98%)    Parameters below as of 23 Sep 2024 15:35  Patient On (Oxygen Delivery Method): nasal cannula  O2 Flow (L/min): 2        PHYSICAL EXAM:  GENERAL: NAD  CHEST/LUNG: Clear to auscultation bilaterally; No rales, rhonchi, wheezing, or rubs  HEART: S1, S2, Regular rate and rhythm  ABDOMEN: Soft, Nontender, Nondistended; Bowel sounds present  NEURO: Alert & Oriented X3  EXTREMITIES: +LE edema, no calf tenderness  LYMPH: No lymphadenopathy noted  SKIN: No rashes or lesions    Consultant(s) Notes Reviewed:  [x ] YES  [ ] NO  Care Discussed with Consultants/Other Providers [ x] YES  [ ] NO  LABS:                        9.0    14.80 )-----------( 282      ( 23 Sep 2024 10:36 )             30.0     09-23    140  |  102  |  20[H]  ----------------------------<  136[H]  3.9   |  33[H]  |  0.98    Ca    8.7      23 Sep 2024 10:36  Phos  3.7     09-23  Mg     2.1     09-23    TPro  5.9[L]  /  Alb  2.6[L]  /  TBili  0.3  /  DBili  x   /  AST  13  /  ALT  12  /  AlkPhos  100  09-23      CAPILLARY BLOOD GLUCOSE          Urinalysis Basic - ( 23 Sep 2024 10:36 )    Color: x / Appearance: x / SG: x / pH: x  Gluc: 136 mg/dL / Ketone: x  / Bili: x / Urobili: x   Blood: x / Protein: x / Nitrite: x   Leuk Esterase: x / RBC: x / WBC x   Sq Epi: x / Non Sq Epi: x / Bacteria: x        RADIOLOGY & ADDITIONAL TESTS:  Imaging Personally Reviewed:  [ ] YES  [ ] NO   Patient is a 89y old  Female who presents with a chief complaint of CHF exacerbation (23 Sep 2024 14:04)      OVERNIGHT EVENTS: Frisian ID kiran [id number]    REVIEW OF SYSTEMS:  CONSTITUTIONAL: No fever, chills  RESPIRATORY: + SOB  CARDIOVASCULAR: No chest pain, palpitations  GASTROINTESTINAL: No abdominal pain. No nausea, vomiting, or diarrhea  GENITOURINARY: No dysuria  NEUROLOGICAL: No HA  SKIN: No itching, burning, rashes, or lesions   LYMPH NODES: No enlarged glands    T(C): 36.8 (09-23-24 @ 15:35), Max: 36.9 (09-22-24 @ 23:50)  HR: 98 (09-23-24 @ 15:35) (90 - 102)  BP: 127/63 (09-23-24 @ 15:35) (100/49 - 127/63)  RR: 18 (09-23-24 @ 15:35) (18 - 18)  SpO2: 94% (09-23-24 @ 15:35) (94% - 98%)  Wt(kg): --Vital Signs Last 24 Hrs  T(C): 36.8 (23 Sep 2024 15:35), Max: 36.9 (22 Sep 2024 23:50)  T(F): 98.2 (23 Sep 2024 15:35), Max: 98.4 (22 Sep 2024 23:50)  HR: 98 (23 Sep 2024 15:35) (90 - 102)  BP: 127/63 (23 Sep 2024 15:35) (100/49 - 127/63)  BP(mean): --  RR: 18 (23 Sep 2024 15:35) (18 - 18)  SpO2: 94% (23 Sep 2024 15:35) (94% - 98%)    Parameters below as of 23 Sep 2024 15:35  Patient On (Oxygen Delivery Method): nasal cannula  O2 Flow (L/min): 2        PHYSICAL EXAM:  GENERAL: NAD  CHEST/LUNG: bibasilar crackles with good air entry  HEART: S1, S2, Regular rate and rhythm  ABDOMEN: Soft, Nontender, Nondistended; Bowel sounds present  NEURO: Alert & Oriented X3  EXTREMITIES: +LE edema, no calf tenderness  LYMPH: No lymphadenopathy noted  SKIN: No rashes or lesions    Consultant(s) Notes Reviewed:  [x ] YES  [ ] NO  Care Discussed with Consultants/Other Providers [ x] YES  [ ] NO  LABS:                        9.0    14.80 )-----------( 282      ( 23 Sep 2024 10:36 )             30.0     09-23    140  |  102  |  20[H]  ----------------------------<  136[H]  3.9   |  33[H]  |  0.98    Ca    8.7      23 Sep 2024 10:36  Phos  3.7     09-23  Mg     2.1     09-23    TPro  5.9[L]  /  Alb  2.6[L]  /  TBili  0.3  /  DBili  x   /  AST  13  /  ALT  12  /  AlkPhos  100  09-23      CAPILLARY BLOOD GLUCOSE          Urinalysis Basic - ( 23 Sep 2024 10:36 )    Color: x / Appearance: x / SG: x / pH: x  Gluc: 136 mg/dL / Ketone: x  / Bili: x / Urobili: x   Blood: x / Protein: x / Nitrite: x   Leuk Esterase: x / RBC: x / WBC x   Sq Epi: x / Non Sq Epi: x / Bacteria: x        RADIOLOGY & ADDITIONAL TESTS:  Imaging Personally Reviewed:  [ ] YES  [ ] NO

## 2024-09-23 NOTE — SWALLOW BEDSIDE ASSESSMENT ADULT - MODE OF PRESENTATION
fed by family;  cup x 3; straw x 2 (very small sips)/cup spoon x 3; cup x 3/cup/spoon/fed by clinician fed by clinician

## 2024-09-23 NOTE — PROGRESS NOTE ADULT - PROBLEM SELECTOR PLAN 1
likely end stage COPD  ICU consulted- not candidate  starting nocturnal BiPAP 12/6/35%  f/u am ABG  f/u ECHO  Patient may need BIPAP on discharge   continue Duoneb, advair  continue Singulair   Pulmonologist Dr. Byrd  Cardiologist Dr. Coy likely end stage COPD  ICU consulted- not candidate; unable to move pt to 4N as pt is in need to Tele-afib with RVR  c/w nocturnal BiPAP 12/6/35%  Patient may need BIPAP on discharge   continue Duoneb, advair  continue Singulair   Pulmonologist Dr. Byrd  Cardiologist Dr. Coy

## 2024-09-23 NOTE — PROGRESS NOTE ADULT - PROBLEM SELECTOR PLAN 4
p/w 3d history of worsening SOB and orthopnea  PEx: B/L wet rales (R>L), B/L leg edema  pro-BNP 6k  CXR: Pulmonary congestion and possible RLL effusion   s/p lasix 40mg IVP in ED  f/u TTE   hold lasix worsening BUN/Creatinine  Cardio Dr. Anaya p/w 3d history of worsening SOB and orthopnea  PEx: B/L wet rales (R>L), B/L leg edema  pro-BNP 6k  CXR: Pulmonary congestion and possible RLL effusion   Cardio Dr. Anaya

## 2024-09-23 NOTE — SWALLOW BEDSIDE ASSESSMENT ADULT - PHARYNGEAL PHASE
commensurate w/ age/Delayed pharyngeal swallow/Decreased laryngeal elevation Delayed pharyngeal swallow/Decreased laryngeal elevation/Cough post oral intake/Delayed cough post oral intake/Multiple swallows

## 2024-09-23 NOTE — SWALLOW BEDSIDE ASSESSMENT ADULT - SLP PERTINENT HISTORY OF CURRENT PROBLEM
89F, from home HHA (M-F/9h S-S/6h) ambulates with walker/wheelchair, with PMHx of COPD (O2 2L PRN), HTN, PAF and hx of DVT/PE (on eliquis), HLD, GERD, remote hx of r. breast cancer s/p mastectomy and left eye blindness. Patient presented to ED with worsening shortness of breath and orthopnea.  Patient admitted to telemetry for Acute Hypoxic Hypercapnic respiratory failure secondary to COPD and CHF. Patient was recently admitted to [facility] in August for ABG resulting CO2 53 baseline. ICU consulted for possible new BiPAP ABG 7.3/74/97/42. However patient not candidate for ICU recommending nocturnal BiPAP for COPD and likely CARLOS with atelectasis.

## 2024-09-23 NOTE — PROGRESS NOTE ADULT - ASSESSMENT
Patient is a 89y old  Female who is from home HHA (M-F/9h S-S/6h) ambulates with walker/wheelchair, with PMHx of asthma (O2 2L PRN), HTN, PAF and hx of DVT/PE (on eliquis), HLD, GERD, remote hx of r. breast cancer s/p mastectomy and left eye blindness, who presents to the ER on 9/19/24 for evaluation of worsening shortness of breath and orthopnea. Patient was hospitalized at [facility] from 8/7-8/16 for Asthma exacerbation due to influenza virus. Since the discharge, patient has been wearing 2L NC at home. However, shortness of breath and orthopnea  is worsening to a point that patient is not able to lay down flat at all. She has admitted with  CHF exacerbation. Now The CT scan from 9/19 and CXr from 9/22 shows Airspace consolidation. She has started on Ceftriaxone and Flagyl, and the ID consult requested to assist with further evaluation and antibiotic management.    # Pneumonia - MRSA PCR is negative  # Leukocytosis    would recommend:    1. Please follow up Legionella urine Ag  2. Continue Cefepime and  flagyl  3. Aspiration precaution  4. Supplemental oxygenation and Bronchodilator as needed  5. Monitor WBC count    d/w covering NP, Mohiba and Family at the bed side      Attending Attestation:    Spent more than 45 minutes on total encounter, more than 50 % of the visit was spent counseling and/or coordinating care by the Attending physician.

## 2024-09-23 NOTE — PROGRESS NOTE ADULT - ASSESSMENT
89yr old Female, from home HHA (M-F/9h S-S/6h) ambulates with walker/wheelchair, with PMHx of asthma (O2 2L PRN), HTN, PAF and hx of DVT/PE (on eliquis), HLD, GERD, remote hx of r. breast cancer s/p mastectomy and left eye blindness who presents with 3 day history of worsening shortness of breath and orthopnea,hyponatremia,hyperkalemia,acute diastolic HF,respiratory failure,suspected aspiration pneumonia.  1.Tele monitoring.  2.Bipap as per Pulm.  3.Hyponatremia-Renal f/u,improved.  4.Afib- coreg 12.5mg  bid,eliquis.  5.Acute diastolic HF-s/p IV lasix.  6.Asthma-Pulm f/u.  7.Hx of DVT/PE-eliquis.  8.Iron def anemia- iron.  9.PPI.  10.Vit d toxicity-hold vit d.  11.Suspected aspiration pneumonia-abx.  12.Speech and swallow eval.

## 2024-09-23 NOTE — SWALLOW BEDSIDE ASSESSMENT ADULT - COMMENTS
Pt stated she was finished eating and was no longer willing to accept further solids. during counseling & interview with family at bedside, it was agreed to change diet consistency to Minced & Moist solids for ease of chew & swallow and to mimic similar diet at home. Pt AA+Ox2-3, HOB elevated to 90°. Family at bedside. Exam given in Korean by SLP.

## 2024-09-23 NOTE — PROGRESS NOTE ADULT - PROBLEM SELECTOR PLAN 11
BiPAP nocturnal  f/u am ABG  hold PT as patient at baseline BiPAP nocturnal  hold PT as patient at baseline

## 2024-09-23 NOTE — PROGRESS NOTE ADULT - ASSESSMENT
Patient is a 89F, from home HHA (M-F/9h S-S/6h) ambulates with walker/wheelchair, with PMHx of COPD (O2 2L PRN), HTN, PAF and hx of DVT/PE (on eliquis), HLD, GERD, remote hx of r. breast cancer s/p mastectomy and left eye blindness. Patient presented to ED with worsening shortness of breath and orthopnea.  Patient admitted to telemetry for Acute Hypoxic Hypercapnic respiratory failure secondary to COPD and CHF. Patient was recently admitted to [facility] in August for ABG resulting CO2 53 baseline, as per daughters patient was placed on NIV, improved and discharged home with oxygen.   ICU consulted for possible new BiPAP ABG 7.3/74/97/42. However patient not candidate for ICU recommending nocturnal BiPAP for COPD and likely CARLOS with atelectasis. Patient will benefit from BIPAP at night for home. Patient will need am ABG to reflect improvement in CO2 with nocturnal BiPAP.  Patient is a 89F, from home HHA (M-F/9h S-S/6h) ambulates with walker/wheelchair, with PMHx of COPD (O2 2L PRN), HTN, PAF and hx of DVT/PE (on eliquis), HLD, GERD, remote hx of r. breast cancer s/p mastectomy and left eye blindness. Patient presented to ED with worsening shortness of breath and orthopnea.  Patient admitted to telemetry for Acute Hypoxic Hypercapnic respiratory failure secondary to COPD and CHF. Patient was recently admitted to [facility] in August for ABG resulting CO2 53 baseline, as per daughters patient was placed on NIV, improved and discharged home with oxygen.   ICU consulted for possible new BiPAP ABG 7.3/74/97/42. However patient not candidate for ICU recommending nocturnal BiPAP for COPD and likely CARLOS with atelectasis. Patient will benefit from BIPAP at night for home. Patient will need am ABG to reflect improvement in CO2 with nocturnal BiPAP.     9/23: speech & swallow recs minced and moist

## 2024-09-24 NOTE — PROGRESS NOTE ADULT - SUBJECTIVE AND OBJECTIVE BOX
Patient is seen and examined at the bed side, is afebrile. The Leukocytosis trended down to normal.       REVIEW OF SYSTEMS: All other review systems are negative      ALLERGIES: aspirin (Nausea (Mild, Stomach Upset)        Vital Signs Last 24 Hrs  T(C): 37.5 (24 Sep 2024 16:17), Max: 37.7 (24 Sep 2024 05:05)  T(F): 99.5 (24 Sep 2024 16:17), Max: 99.9 (24 Sep 2024 05:05)  HR: 104 (24 Sep 2024 16:17) (92 - 107)  BP: 116/68 (24 Sep 2024 16:17) (110/64 - 128/62)  BP(mean): --  RR: 17 (24 Sep 2024 16:17) (17 - 18)  SpO2: 95% (24 Sep 2024 16:17) (94% - 97%)    Parameters below as of 24 Sep 2024 16:17  Patient On (Oxygen Delivery Method): nasal cannula  O2 Flow (L/min): 2        PHYSICAL EXAM:  GENERAL: Not in acute distress , on oxygen via NC  CHEST/LUNG:  Not using accessory muscles  HEART: s1 and s2 present  ABDOMEN:  Nontender and  Nondistended  EXTREMITIES: No pedal  edema  CNS: Awake and Alert      LABS:                        8.5    11.88 )-----------( 267      ( 24 Sep 2024 06:14 )             27.5                           9.0    14.80 )-----------( 282      ( 23 Sep 2024 10:36 )             30.0       09-24    142  |  103  |  19[H]  ----------------------------<  100[H]  3.4[L]   |  33[H]  |  0.85    Ca    9.1      24 Sep 2024 06:14  Phos  3.7     09-23  Mg     2.1     09-23    TPro  5.8[L]  /  Alb  2.5[L]  /  TBili  0.4  /  DBili  x   /  AST  8[L]  /  ALT  12  /  AlkPhos  97  09-24    09-23    140  |  102  |  20[H]  ----------------------------<  136[H]  3.9   |  33[H]  |  0.98    Ca    8.7      23 Sep 2024 10:36  Phos  3.7     09-23  Mg     2.1     09-23    TPro  5.9[L]  /  Alb  2.6[L]  /  TBili  0.3  /  DBili  x   /  AST  13  /  ALT  12  /  AlkPhos  100  09-23      ABG - ( 21 Sep 2024 05:54 )  pH, Arterial: 7.42  pH, Blood: x     /  pCO2: 57    /  pO2: 93    / HCO3: 37    / Base Excess: 11.0  /  SaO2: 100           MEDICATIONS  (STANDING):    acetylcysteine 20%  Inhalation 4 milliLiter(s) Inhalation every 6 hours  albuterol    90 MICROgram(s) HFA Inhaler 2 Puff(s) Inhalation three times a day  albuterol/ipratropium for Nebulization 3 milliLiter(s) Nebulizer every 6 hours  apixaban 5 milliGRAM(s) Oral two times a day  atorvastatin 20 milliGRAM(s) Oral at bedtime  carvedilol 12.5 milliGRAM(s) Oral every 12 hours  cefepime   IVPB 1000 milliGRAM(s) IV Intermittent every 8 hours  cefepime   IVPB      dorzolamide 2% Ophthalmic Solution 1 Drop(s) Both EYES three times a day  famotidine    Tablet 20 milliGRAM(s) Oral daily  fluticasone propionate/ salmeterol 100-50 MICROgram(s) Diskus 1 Dose(s) Inhalation two times a day  influenza  Vaccine (HIGH DOSE) 0.5 milliLiter(s) IntraMuscular once  ketotifen 0.025% Ophthalmic Solution 1 Drop(s) Both EYES two times a day  latanoprost 0.005% Ophthalmic Solution 1 Drop(s) Both EYES at bedtime  metroNIDAZOLE  IVPB      metroNIDAZOLE  IVPB 500 milliGRAM(s) IV Intermittent every 8 hours  montelukast 10 milliGRAM(s) Oral daily  polyethylene glycol 3350 17 Gram(s) Oral daily  senna 2 Tablet(s) Oral at bedtime        RADIOLOGY & ADDITIONAL TESTS:    9/22/24 : Xray Chest 1 View- PORTABLE-Urgent (Xray Chest 1 View- PORTABLE-Urgent .) (09.22.24 @ 09:22) >  Suspect LEFT retrocardiac airspace consolidation and/or effusion  obscuring diaphragm contour.  Elevated RIGHT hemidiaphragm.  Lateral radiograph recommended for confirmation.      9/19/24 : CT Chest No Cont (09.19.24 @ 16:32) Right middle and lower bilobar consolidation/collapse with bronchial secretions and small right pleural effusion.      MICROBIOLOGY DATA:      Culture - Blood (09.19.24 @ 03:54)   Specimen Source: .Blood Blood-Peripheral  Culture Results: No growth at 5 days    Culture - Blood (09.19.24 @ 03:53)   Specimen Source: .Blood Blood-Peripheral  Culture Results: No growth at 5 days     Rapid HIV-1/2 Antibody (09.19.24 @ 03:52)   Rapid HIV-1/2 Antibody: Nonreact

## 2024-09-24 NOTE — PROGRESS NOTE ADULT - PROBLEM SELECTOR PLAN 2
CT scan from 9/19 and CXr from 9/22 shows Airspace consolidation  MRSA PCR and Legionella urine Ag PENDING  c/w cefepime and flagyl  S & S recs minced and moist, mildly thick liquids   ID Dr. Lao following

## 2024-09-24 NOTE — PROGRESS NOTE ADULT - ASSESSMENT
Admitted for exacerbation of COPD and CO2 retention, Hyperkalemia and hyponatremia with CHF that improved with diuretics.   At present on nocturnal BIPAP .   Hyponatremia due to fluid retention/CHF, sodium improved with diuretics, off diuretics. Hyperkalemia resolved , ACI I was stopped.   CHF most likely right ventricle dysfunction an d pulmonary hypertension. Fluid retention improved, off diuretics , continue fluid restriction 1 liter per day.   CT chest with bilobar right consolidation with bronchial secretion. Follow with pulmonary and chest PT.   COPD with co2 retention placed on BIPAP at night time.   Anemia severe iron deficiency , start Venofer IV.   Increased WBC follow with ID. Trending down./     No need for further renal follow up.

## 2024-09-24 NOTE — PROGRESS NOTE ADULT - SUBJECTIVE AND OBJECTIVE BOX
Problem List:  HYPONATREMIA - RESOLVED    PAST MEDICAL & SURGICAL HISTORY:  DM (diabetes mellitus)      HTN (hypertension)      HLD (hyperlipidemia)      Breast cancer in female      COPD (chronic obstructive pulmonary disease)      PE (pulmonary thromboembolism)      Diabetes      Breast cancer      Blindness of left eye      History of appendectomy      Spinal stenosis      Spinal stenosis      Multiple pulmonary emboli      Fall      Chronic low back pain      Lumbar spondylosis      H/O mastectomy, right          aspirin (Nausea (Mild))  aspirin (Stomach Upset)      MEDICATIONS  (STANDING):  acetylcysteine 20%  Inhalation 4 milliLiter(s) Inhalation every 6 hours  albuterol    90 MICROgram(s) HFA Inhaler 2 Puff(s) Inhalation three times a day  albuterol/ipratropium for Nebulization 3 milliLiter(s) Nebulizer every 6 hours  apixaban 5 milliGRAM(s) Oral two times a day  atorvastatin 20 milliGRAM(s) Oral at bedtime  carvedilol 12.5 milliGRAM(s) Oral every 12 hours  cefepime   IVPB      cefepime   IVPB 1000 milliGRAM(s) IV Intermittent every 8 hours  dorzolamide 2% Ophthalmic Solution 1 Drop(s) Both EYES three times a day  famotidine    Tablet 20 milliGRAM(s) Oral daily  fluticasone propionate/ salmeterol 100-50 MICROgram(s) Diskus 1 Dose(s) Inhalation two times a day  influenza  Vaccine (HIGH DOSE) 0.5 milliLiter(s) IntraMuscular once  ketotifen 0.025% Ophthalmic Solution 1 Drop(s) Both EYES two times a day  latanoprost 0.005% Ophthalmic Solution 1 Drop(s) Both EYES at bedtime  metroNIDAZOLE  IVPB      metroNIDAZOLE  IVPB 500 milliGRAM(s) IV Intermittent every 8 hours  montelukast 10 milliGRAM(s) Oral daily  polyethylene glycol 3350 17 Gram(s) Oral daily  senna 2 Tablet(s) Oral at bedtime    MEDICATIONS  (PRN):  acetaminophen     Tablet .. 650 milliGRAM(s) Oral every 6 hours PRN Temp greater or equal to 38C (100.4F), Mild Pain (1 - 3)  bisacodyl 5 milliGRAM(s) Oral every 12 hours PRN Constipation                            8.5    11.88 )-----------( 267      ( 24 Sep 2024 06:14 )             27.5     09-24    142  |  103  |  19[H]  ----------------------------<  100[H]  3.4[L]   |  33[H]  |  0.85    Ca    9.1      24 Sep 2024 06:14  Phos  3.7     09-23  Mg     2.1     09-23    TPro  5.8[L]  /  Alb  2.5[L]  /  TBili  0.4  /  DBili  x   /  AST  8[L]  /  ALT  12  /  AlkPhos  97  09-24        Creatinine, Random Urine: <13 mg/dL (09-19 @ 13:12)  Potassium, Random Urine: 14 mmol/L (09-19 @ 13:12)  Osmolality, Random Urine: 213 mosm/Kg (09-19 @ 13:12)  Sodium, Random Urine: 86 mmol/L (09-19 @ 13:12)      REVIEW OF SYSTEMS:  General: no fever no chills, no weight loss.    RESPIRATORY: No cough, wheezing, hemoptysis; BIPAP during the night.  CARDIOVASCULAR: No chest pain or palpitations. No Edema  GASTROINTESTINAL: No abdominal or epigastric pain. No nausea, vomiting. No diarrhea or constipation. No melena.  GENITOURINARY: No dysuria, frequency, foamy urine, urinary urgency, incontinence or hematuria  NEUROLOGICAL: No numbness or weakness, no tremor , no dizziness.   Muscle skeletal : no joint pain and no swelling of joints and limbs.  SKIN: No itching, burning, rashes.        VITALS:  T(F): 98.7 (09-24-24 @ 11:15), Max: 99.9 (09-24-24 @ 05:05)  HR: 92 (09-24-24 @ 11:15)  BP: 111/72 (09-24-24 @ 11:15)  RR: 18 (09-24-24 @ 11:15)  SpO2: 96% (09-24-24 @ 11:15)  Wt(kg): --    09-23 @ 07:01  -  09-24 @ 07:00  --------------------------------------------------------  IN: 380 mL / OUT: 1000 mL / NET: -620 mL    09-24 @ 07:01  - 09-24 @ 15:43  --------------------------------------------------------  IN: 430 mL / OUT: 0 mL / NET: 430 mL        PHYSICAL EXAM:  Constitutional: well developed, no diaphoresis, no distress.  Neck: No JVD, no carotid bruit, supple, no adenopathy  Respiratory: Good air entrance B/L, no wheezes, rales or rhonchi  Cardiovascular: S1, S2, RRR, no pericardial rub, no murmur  Abdomen: BS+, soft, no tenderness, no bruit  Pelvis: bladder nondistended  Extremities: No cyanosis or clubbing. No peripheral edema.

## 2024-09-24 NOTE — CHART NOTE - NSCHARTNOTEFT_GEN_A_CORE
Patient will require new BiPAP. Patient has End stage COPD which is contributing to persistent hypercapnia. PCO2 57 currently, Patient improved on BiPAP qHS. Patient will require BiPAP to prevent admission.

## 2024-09-24 NOTE — PROGRESS NOTE ADULT - ASSESSMENT
Patient is a 89F, from home HHA (M-F/9h S-S/6h) ambulates with walker/wheelchair, with PMHx of COPD (O2 2L PRN), HTN, PAF and hx of DVT/PE (on eliquis), HLD, GERD, remote hx of r. breast cancer s/p mastectomy and left eye blindness. Patient presented to ED with worsening shortness of breath and orthopnea.  Patient admitted to telemetry for Acute Hypoxic Hypercapnic respiratory failure secondary to COPD and CHF. Patient was recently admitted to [facility] in August for ABG resulting CO2 53 baseline, as per daughters patient was placed on NIV, improved and discharged home with oxygen.   ICU consulted for possible new BiPAP ABG 7.3/74/97/42. However patient not candidate for ICU recommending nocturnal BiPAP for COPD and likely CALROS with atelectasis. Patient will benefit from BIPAP at night for home. Patient will need am ABG to reflect improvement in CO2 with nocturnal BiPAP.   Chest X ray showed Bibasilar/retrocardiac opacities with likely continued elevation of the right hemidiaphragm. The findings are of uncertain etiology. Bilateral pleural effusions with associated passive atelectasis, atelectasis of   other cause, and/or other pathology including, but not limited to, pneumonia is possible.  Echo showed The left ventricular cavity is normal in size. Left ventricular wall thickness is normal. Left ventricular systolic function is normal with a calculated ejection fraction of 68%. Doppler negative for Left Leg DVT.   CT chest showed Right middle and lower bilobar consolidation/collapse with bronchial secretions and small right pleural effusion.      9/23: speech & swallow recs minced and moist. Unable to move pt to 4N (BiPAP) as pt remains on tele. Vitamin D toxicity noted- hold for now.     9/24: c/w telemety, pt in afib with rvr, coreg increased, Cardio Dr. Coy following. Pt to be discharged on BiPAP-CNM forms completed and in chart. Hypokalemia noted, repleted, f/uBMP in the AM. Aspiration PNA, c/w cefepime and flagyl, Legionella pending, ID Dr. Lao following Patient is a 89F, from home HHA (M-F/9h S-S/6h) ambulates with walker/wheelchair, with PMHx of COPD (O2 2L PRN), HTN, PAF and hx of DVT/PE (on eliquis), HLD, GERD, remote hx of r. breast cancer s/p mastectomy and left eye blindness. Patient presented to ED with worsening shortness of breath and orthopnea.  Patient admitted to telemetry for Acute Hypoxic Hypercapnic respiratory failure secondary to COPD and CHF. Patient was recently admitted to [facility] in August for ABG resulting CO2 53 baseline, as per daughters patient was placed on NIV, improved and discharged home with oxygen.   ICU consulted for possible new BiPAP ABG 7.3/74/97/42. However patient not candidate for ICU recommending nocturnal BiPAP for COPD and likely CARLOS with atelectasis. Patient will benefit from BIPAP at night for home. Patient will need am ABG to reflect improvement in CO2 with nocturnal BiPAP.   Chest X ray showed Bibasilar/retrocardiac opacities with likely continued elevation of the right hemidiaphragm. The findings are of uncertain etiology. Bilateral pleural effusions with associated passive atelectasis, atelectasis of   other cause, and/or other pathology including, but not limited to, pneumonia is possible.  Echo showed The left ventricular cavity is normal in size. Left ventricular wall thickness is normal. Left ventricular systolic function is normal with a calculated ejection fraction of 68%. Doppler negative for Left Leg DVT.   CT chest showed Right middle and lower bilobar consolidation/collapse with bronchial secretions and small right pleural effusion.      9/23: speech & swallow recs minced and moist. Unable to move pt to 4N (BiPAP) as pt remains on tele. Vitamin D toxicity noted- hold for now.     9/24: c/w telemety, pt in afib with rvr, coreg increased, Cardio Dr. Coy following. Pt to be discharged on BiPAP-CNM forms completed and in chart. Hypokalemia noted, repleted, f/uBMP in the AM. Aspiration PNA, c/w cefepime and flagyl, Legionella pending, ID Dr. Lao following. BIPAP forms, Chart note, Rx for BIPAP are COMPLETED and in chart.

## 2024-09-24 NOTE — PROGRESS NOTE ADULT - PROBLEM SELECTOR PLAN 1
likely end stage COPD  ICU consulted- not candidate; unable to move pt to 4N as pt is in need to Tele-afib with RVR  c/w nocturnal BiPAP 12/6/35%  c/w nasal cannula 2L AM  continue Duoneb, advair  continue Singulair   Pulmonologist Dr. Byrd  Cardiologist Dr. Coy

## 2024-09-24 NOTE — PROGRESS NOTE ADULT - SUBJECTIVE AND OBJECTIVE BOX
Patient is a 89y old  Female who presents with a chief complaint of CHF exacerbation (24 Sep 2024 06:42)  Awake, alert, laying in bed in NAD. Doing well on RA. Bi-pap at night    INTERVAL HPI/OVERNIGHT EVENTS:      VITAL SIGNS:  T(F): 98.2 (09-24-24 @ 07:32)  HR: 100 (09-24-24 @ 07:32)  BP: 119/61 (09-24-24 @ 07:32)  RR: 18 (09-24-24 @ 07:32)  SpO2: 97% (09-24-24 @ 07:32)  Wt(kg): --  I&O's Detail    23 Sep 2024 07:01  -  24 Sep 2024 07:00  --------------------------------------------------------  IN:    IV PiggyBack: 100 mL    Oral Fluid: 280 mL  Total IN: 380 mL    OUT:    Indwelling Catheter - Urethral (mL): 1000 mL  Total OUT: 1000 mL    Total NET: -620 mL      24 Sep 2024 07:01  -  24 Sep 2024 09:35  --------------------------------------------------------  IN:    Oral Fluid: 200 mL  Total IN: 200 mL    OUT:  Total OUT: 0 mL    Total NET: 200 mL              REVIEW OF SYSTEMS:    CONSTITUTIONAL:  No fevers, chills, sweats    HEENT:  Eyes:  No diplopia or blurred vision. ENT:  No earache, sore throat or runny nose.    CARDIOVASCULAR:  No pressure, squeezing, tightness, or heaviness about the chest; no palpitations.    RESPIRATORY:  Per HPI    GASTROINTESTINAL:  No abdominal pain, nausea, vomiting or diarrhea.    GENITOURINARY:  No dysuria, frequency or urgency.    NEUROLOGIC:  No paresthesias, fasciculations, seizures or weakness.    PSYCHIATRIC:  No disorder of thought or mood.      PHYSICAL EXAM:    Constitutional: Well developed and nourished  Eyes:Perrla  ENMT: normal  Neck:supple  Respiratory: good air entry  Cardiovascular: S1 S2 regular  Gastrointestinal: Soft, Non tender  Extremities: No edema  Vascular:normal  Neurological:Awake, alert,Ox3  Musculoskeletal:Normal      MEDICATIONS  (STANDING):  acetylcysteine 20%  Inhalation 4 milliLiter(s) Inhalation every 6 hours  albuterol    90 MICROgram(s) HFA Inhaler 2 Puff(s) Inhalation three times a day  albuterol/ipratropium for Nebulization 3 milliLiter(s) Nebulizer every 6 hours  apixaban 5 milliGRAM(s) Oral two times a day  atorvastatin 20 milliGRAM(s) Oral at bedtime  carvedilol 12.5 milliGRAM(s) Oral every 12 hours  cefepime   IVPB 1000 milliGRAM(s) IV Intermittent every 8 hours  cefepime   IVPB      dorzolamide 2% Ophthalmic Solution 1 Drop(s) Both EYES three times a day  famotidine    Tablet 20 milliGRAM(s) Oral daily  fluticasone propionate/ salmeterol 100-50 MICROgram(s) Diskus 1 Dose(s) Inhalation two times a day  influenza  Vaccine (HIGH DOSE) 0.5 milliLiter(s) IntraMuscular once  ketotifen 0.025% Ophthalmic Solution 1 Drop(s) Both EYES two times a day  latanoprost 0.005% Ophthalmic Solution 1 Drop(s) Both EYES at bedtime  metroNIDAZOLE  IVPB      metroNIDAZOLE  IVPB 500 milliGRAM(s) IV Intermittent every 8 hours  montelukast 10 milliGRAM(s) Oral daily  polyethylene glycol 3350 17 Gram(s) Oral daily  potassium chloride   Powder 40 milliEquivalent(s) Oral once  senna 2 Tablet(s) Oral at bedtime    MEDICATIONS  (PRN):  acetaminophen     Tablet .. 650 milliGRAM(s) Oral every 6 hours PRN Temp greater or equal to 38C (100.4F), Mild Pain (1 - 3)  bisacodyl 5 milliGRAM(s) Oral every 12 hours PRN Constipation      Allergies    aspirin (Nausea (Mild))  aspirin (Stomach Upset)    Intolerances        LABS:                        8.5    11.88 )-----------( 267      ( 24 Sep 2024 06:14 )             27.5     09-24    142  |  103  |  19[H]  ----------------------------<  100[H]  3.4[L]   |  33[H]  |  0.85    Ca    9.1      24 Sep 2024 06:14  Phos  3.7     09-23  Mg     2.1     09-23    TPro  5.8[L]  /  Alb  2.5[L]  /  TBili  0.4  /  DBili  x   /  AST  8[L]  /  ALT  12  /  AlkPhos  97  09-24      Urinalysis Basic - ( 24 Sep 2024 06:14 )    Color: x / Appearance: x / SG: x / pH: x  Gluc: 100 mg/dL / Ketone: x  / Bili: x / Urobili: x   Blood: x / Protein: x / Nitrite: x   Leuk Esterase: x / RBC: x / WBC x   Sq Epi: x / Non Sq Epi: x / Bacteria: x            CAPILLARY BLOOD GLUCOSE            RADIOLOGY & ADDITIONAL TESTS:    CXR:  < from: Xray Chest 2 Views PA/Lat (09.22.24 @ 21:31) >  IMPRESSION:  Limited images.    Bibasilar/retrocardiac opacities with likely continued elevation of the   right hemidiaphragm. The findings are of uncertain etiology. Bilateral   pleural effusions with associated passive atelectasis, atelectasis of   other cause, and/or other pathology including, but not limited to,   pneumonia is possible.      < end of copied text >    Ct scan chest:    ekg;    echo:

## 2024-09-24 NOTE — PROGRESS NOTE ADULT - SUBJECTIVE AND OBJECTIVE BOX
Patient is a 89y old  Female who presents with a chief complaint of CHF exacerbation (23 Sep 2024 16:53)    pt seen in tele [x  ], reg med floor [   ], bed [x  ], chair at bedside [   ], awake and responsive, moderately altered [ x ], lethargic [  ],    nad [ x ]        Allergies    aspirin (Nausea (Mild))  aspirin (Stomach Upset)        Vitals    T(F): 99.9 (09-24-24 @ 05:05), Max: 99.9 (09-24-24 @ 05:05)  HR: 107 (09-24-24 @ 05:05) (90 - 107)  BP: 110/64 (09-24-24 @ 05:05) (109/53 - 128/62)  RR: 18 (09-24-24 @ 05:05) (18 - 18)  SpO2: 95% (09-24-24 @ 05:05) (94% - 98%)  Wt(kg): --  CAPILLARY BLOOD GLUCOSE          Labs                          9.0    14.80 )-----------( 282      ( 23 Sep 2024 10:36 )             30.0       09-23    140  |  102  |  20[H]  ----------------------------<  136[H]  3.9   |  33[H]  |  0.98    Ca    8.7      23 Sep 2024 10:36  Phos  3.7     09-23  Mg     2.1     09-23    TPro  5.9[L]  /  Alb  2.6[L]  /  TBili  0.3  /  DBili  x   /  AST  13  /  ALT  12  /  AlkPhos  100  09-23            .Blood Blood-Peripheral  09-19 @ 03:54   No growth at 4 days  --  --      .Blood Blood-Peripheral  09-19 @ 03:53   No growth at 4 days  --  --          Radiology Results      Meds    MEDICATIONS  (STANDING):  acetylcysteine 20%  Inhalation 4 milliLiter(s) Inhalation every 6 hours  albuterol    90 MICROgram(s) HFA Inhaler 2 Puff(s) Inhalation three times a day  albuterol/ipratropium for Nebulization 3 milliLiter(s) Nebulizer every 6 hours  apixaban 5 milliGRAM(s) Oral two times a day  atorvastatin 20 milliGRAM(s) Oral at bedtime  carvedilol 12.5 milliGRAM(s) Oral every 12 hours  cefepime   IVPB      cefepime   IVPB 1000 milliGRAM(s) IV Intermittent every 8 hours  dorzolamide 2% Ophthalmic Solution 1 Drop(s) Both EYES three times a day  famotidine    Tablet 20 milliGRAM(s) Oral daily  fluticasone propionate/ salmeterol 100-50 MICROgram(s) Diskus 1 Dose(s) Inhalation two times a day  influenza  Vaccine (HIGH DOSE) 0.5 milliLiter(s) IntraMuscular once  ketotifen 0.025% Ophthalmic Solution 1 Drop(s) Both EYES two times a day  latanoprost 0.005% Ophthalmic Solution 1 Drop(s) Both EYES at bedtime  metroNIDAZOLE  IVPB      metroNIDAZOLE  IVPB 500 milliGRAM(s) IV Intermittent every 8 hours  montelukast 10 milliGRAM(s) Oral daily  polyethylene glycol 3350 17 Gram(s) Oral daily  senna 2 Tablet(s) Oral at bedtime      MEDICATIONS  (PRN):  acetaminophen     Tablet .. 650 milliGRAM(s) Oral every 6 hours PRN Temp greater or equal to 38C (100.4F), Mild Pain (1 - 3)  bisacodyl 5 milliGRAM(s) Oral every 12 hours PRN Constipation      Physical Exam    Neuro :  no focal deficits  Respiratory: CTA B/L  CV: RRR, S1S2, no murmurs,   Abdominal: Soft, NT, ND +BS,  Extremities: No edema, + peripheral pulses      ASSESSMENT    acute on hypoxic resp failure pos 2nd to acute chf and asthma/copd exacerb,   recurrent pe/dvt. r/o,   hyponatremia resolved,   hyperkalemia resolved,   h/o asthma/copd (O2 2L PRN),   HTN,   PAF and hx of DVT/PE (on eliquis),   HLD,   GERD,   remote hx of r. breast cancer s/p mastectomy and left eye blindness,   spinal stenosis  DM (diabetes mellitus)  Blindness of left eye    PLAN    cont tele,   contstatin,   cont eliquis   cardio f/u   inc coreg 12.5mg  bid  d/c cozaar. 2nd to hypokalemia   ct chest with Right middle and lower bilobar consolidation/collapse with bronchial   secretions and small right pleural effusion noted     doppler le with No evidence of left lower extremity deep venous thrombosis noted    start rocephin and flagyl   id cons   f/u cxr   pul f/u   supplemental O2,   cont bipap as needed  symbicort HFA 80/4.5 mcgs 2 puffs po BID with spacer   cont  mucomyst nebs  PFTs as OP.   s/p lokelma  serum potassium wnl   serum sodium wnl    serum creat wnl   vit d lev supratherapeutic noted above  hold vit d   renal  f/u   Hyponatremia due to fluid retention/CHF,   fluid restriction 1 liter per day.  serum bicarb elevated  icu eval for bipap noted   ABG noted with compensated hypercapnia. Likely has underlying OHS vs due to chronic atelectasis.   Recommend to start nocturnal bipap support and monitor respiratory status.   Rest of management per primary team. Consider advance directives.   cont current meds       Patient is a 89y old  Female who presents with a chief complaint of CHF exacerbation (23 Sep 2024 16:53)    pt seen in tele [x  ], reg med floor [   ], bed [x  ], chair at bedside [   ], awake and responsive, [ x ], lethargic [  ],    nad [ x ]        Allergies    aspirin (Nausea (Mild))  aspirin (Stomach Upset)        Vitals    T(F): 99.9 (09-24-24 @ 05:05), Max: 99.9 (09-24-24 @ 05:05)  HR: 107 (09-24-24 @ 05:05) (90 - 107)  BP: 110/64 (09-24-24 @ 05:05) (109/53 - 128/62)  RR: 18 (09-24-24 @ 05:05) (18 - 18)  SpO2: 95% (09-24-24 @ 05:05) (94% - 98%)  Wt(kg): --  CAPILLARY BLOOD GLUCOSE          Labs                          9.0    14.80 )-----------( 282      ( 23 Sep 2024 10:36 )             30.0       09-23    140  |  102  |  20[H]  ----------------------------<  136[H]  3.9   |  33[H]  |  0.98    Ca    8.7      23 Sep 2024 10:36  Phos  3.7     09-23  Mg     2.1     09-23    TPro  5.9[L]  /  Alb  2.6[L]  /  TBili  0.3  /  DBili  x   /  AST  13  /  ALT  12  /  AlkPhos  100  09-23            .Blood Blood-Peripheral  09-19 @ 03:54   No growth at 4 days  --  --      .Blood Blood-Peripheral  09-19 @ 03:53   No growth at 4 days  --  --          Radiology Results    < from: Xray Chest 2 Views PA/Lat (09.22.24 @ 21:31) >  IMPRESSION:  Limited images.    Bibasilar/retrocardiac opacities with likely continued elevation of the   right hemidiaphragm. The findings are of uncertain etiology. Bilateral   pleural effusions with associated passive atelectasis, atelectasis of   other cause, and/or other pathology including, but not limited to,   pneumonia is possible.      < end of copied text >        Meds    MEDICATIONS  (STANDING):  acetylcysteine 20%  Inhalation 4 milliLiter(s) Inhalation every 6 hours  albuterol    90 MICROgram(s) HFA Inhaler 2 Puff(s) Inhalation three times a day  albuterol/ipratropium for Nebulization 3 milliLiter(s) Nebulizer every 6 hours  apixaban 5 milliGRAM(s) Oral two times a day  atorvastatin 20 milliGRAM(s) Oral at bedtime  carvedilol 12.5 milliGRAM(s) Oral every 12 hours  cefepime   IVPB      cefepime   IVPB 1000 milliGRAM(s) IV Intermittent every 8 hours  dorzolamide 2% Ophthalmic Solution 1 Drop(s) Both EYES three times a day  famotidine    Tablet 20 milliGRAM(s) Oral daily  fluticasone propionate/ salmeterol 100-50 MICROgram(s) Diskus 1 Dose(s) Inhalation two times a day  influenza  Vaccine (HIGH DOSE) 0.5 milliLiter(s) IntraMuscular once  ketotifen 0.025% Ophthalmic Solution 1 Drop(s) Both EYES two times a day  latanoprost 0.005% Ophthalmic Solution 1 Drop(s) Both EYES at bedtime  metroNIDAZOLE  IVPB      metroNIDAZOLE  IVPB 500 milliGRAM(s) IV Intermittent every 8 hours  montelukast 10 milliGRAM(s) Oral daily  polyethylene glycol 3350 17 Gram(s) Oral daily  senna 2 Tablet(s) Oral at bedtime      MEDICATIONS  (PRN):  acetaminophen     Tablet .. 650 milliGRAM(s) Oral every 6 hours PRN Temp greater or equal to 38C (100.4F), Mild Pain (1 - 3)  bisacodyl 5 milliGRAM(s) Oral every 12 hours PRN Constipation      Physical Exam    Neuro :  no focal deficits  Respiratory: CTA B/L  CV: RRR, S1S2, no murmurs,   Abdominal: Soft, NT, ND +BS,  Extremities: No edema, + peripheral pulses      ASSESSMENT    acute on hypoxic resp failure pos 2nd to acute chf and asthma/copd exacerb,   recurrent pe/dvt. r/o,   hyponatremia resolved,   hyperkalemia resolved,   h/o asthma/copd (O2 2L PRN),   HTN,   PAF and hx of DVT/PE (on eliquis),   HLD,   GERD,   remote hx of r. breast cancer s/p mastectomy and left eye blindness,   spinal stenosis  DM (diabetes mellitus)  Blindness of left eye    PLAN    cont tele,   cont statin,   cont eliquis   cardio f/u   cont coreg 12.5mg  bid  d/c cozaar. 2nd to hypokalemia   ct chest with Right middle and lower bilobar consolidation/collapse with bronchial   secretions and small right pleural effusion noted     doppler le with No evidence of left lower extremity deep venous thrombosis noted    cont cefepime and flagyl   id f/u    follow up Legionella urine Ag  cxr with Bibasilar/retrocardiac opacities with likely continued elevation of the   right hemidiaphragm. The findings are of uncertain etiology. Bilateral   pleural effusions with associated passive atelectasis, atelectasis of   other cause, and/or other pathology including, but not limited to,   pneumonia is possible noted above.    pulm f/u   supplemental O2,   cont bipap as needed  symbicort HFA 80/4.5 mcgs 2 puffs po BID with spacer   cont  mucomyst nebs  PFTs as OP.   swallow eval noted and rec Minced & Moist solids w/ Mildly thick liquids  for pt with s&s oropharyngeal dysphagia w/ age related changes (presbyphagia)  s/p lokelma  serum potassium wnl   serum sodium wnl    serum creat wnl   vit d lev supratherapeutic noted    hold vit d   renal  f/u   Hyponatremia due to fluid retention/CHF,   fluid restriction 1 liter per day.  serum bicarb elevated  icu eval for bipap noted   ABG noted with compensated hypercapnia. Likely has underlying OHS vs due to chronic atelectasis.   Recommend to start nocturnal bipap support and monitor respiratory status.   Rest of management per primary team. Consider advance directives.   cont current meds

## 2024-09-24 NOTE — PROGRESS NOTE ADULT - ASSESSMENT
Patient is a 89y old  Female who is from home HHA (M-F/9h S-S/6h) ambulates with walker/wheelchair, with PMHx of asthma (O2 2L PRN), HTN, PAF and hx of DVT/PE (on eliquis), HLD, GERD, remote hx of r. breast cancer s/p mastectomy and left eye blindness, who presents to the ER on 9/19/24 for evaluation of worsening shortness of breath and orthopnea. Patient was hospitalized at [facility] from 8/7-8/16 for Asthma exacerbation due to influenza virus. Since the discharge, patient has been wearing 2L NC at home. However, shortness of breath and orthopnea  is worsening to a point that patient is not able to lay down flat at all. She has admitted with  CHF exacerbation. Now The CT scan from 9/19 and CXr from 9/22 shows Airspace consolidation. She has started on Ceftriaxone and Flagyl, and the ID consult requested to assist with further evaluation and antibiotic management.    # Pneumonia - MRSA PCR is negative  # Leukocytosis - resolved    would recommend:    1. PT evaluation  2. Continue Cefepime and  flagyl X total of 7 days  3. Aspiration precaution  4. Supplemental oxygenation and Bronchodilator as needed      d/w covering NP, Mohiba and Family at the bed side    Attending Attestation:    Spent more than 35 minutes on total encounter, more than 50 % of the visit was spent counseling and/or coordinating care by the Attending physician.

## 2024-09-24 NOTE — PROGRESS NOTE ADULT - SUBJECTIVE AND OBJECTIVE BOX
Patient is a 89y old  Female who presents with a chief complaint of CHF exacerbation (24 Sep 2024 09:35)      OVERNIGHT EVENTS: none noted     REVIEW OF SYSTEMS:  CONSTITUTIONAL: No fever, chills  ENMT:  No difficulty hearing, no change in vision  NECK: No pain or stiffness  RESPIRATORY: +SOB, No cough  CARDIOVASCULAR: No chest pain, palpitations  GASTROINTESTINAL: No abdominal pain. No nausea, vomiting, or diarrhea  GENITOURINARY: No dysuria  NEUROLOGICAL: No HA    T(C): 37.1 (09-24-24 @ 11:15), Max: 37.7 (09-24-24 @ 05:05)  HR: 92 (09-24-24 @ 11:15) (92 - 107)  BP: 111/72 (09-24-24 @ 11:15) (110/64 - 128/62)  RR: 18 (09-24-24 @ 11:15) (18 - 18)  SpO2: 96% (09-24-24 @ 11:15) (94% - 97%)  Wt(kg): --Vital Signs Last 24 Hrs  T(C): 37.1 (24 Sep 2024 11:15), Max: 37.7 (24 Sep 2024 05:05)  T(F): 98.7 (24 Sep 2024 11:15), Max: 99.9 (24 Sep 2024 05:05)  HR: 92 (24 Sep 2024 11:15) (92 - 107)  BP: 111/72 (24 Sep 2024 11:15) (110/64 - 128/62)  BP(mean): --  RR: 18 (24 Sep 2024 11:15) (18 - 18)  SpO2: 96% (24 Sep 2024 11:15) (94% - 97%)    Parameters below as of 24 Sep 2024 11:15  Patient On (Oxygen Delivery Method): nasal cannula  O2 Flow (L/min): 2        PHYSICAL EXAM:  GENERAL: NAD  CHEST/LUNG: bibasilar crackles with good air entry  HEART: S1, S2, Regular rate and rhythm  ABDOMEN: Soft, Nontender, Nondistended; Bowel sounds present  NEURO: Alert & Oriented X3  EXTREMITIES: + LE edema, no calf tenderness      Consultant(s) Notes Reviewed:  [x ] YES  [ ] NO  Care Discussed with Consultants/Other Providers [ x] YES  [ ] NO  LABS:                        8.5    11.88 )-----------( 267      ( 24 Sep 2024 06:14 )             27.5     09-24    142  |  103  |  19[H]  ----------------------------<  100[H]  3.4[L]   |  33[H]  |  0.85    Ca    9.1      24 Sep 2024 06:14  Phos  3.7     09-23  Mg     2.1     09-23    TPro  5.8[L]  /  Alb  2.5[L]  /  TBili  0.4  /  DBili  x   /  AST  8[L]  /  ALT  12  /  AlkPhos  97  09-24      CAPILLARY BLOOD GLUCOSE          Urinalysis Basic - ( 24 Sep 2024 06:14 )    Color: x / Appearance: x / SG: x / pH: x  Gluc: 100 mg/dL / Ketone: x  / Bili: x / Urobili: x   Blood: x / Protein: x / Nitrite: x   Leuk Esterase: x / RBC: x / WBC x   Sq Epi: x / Non Sq Epi: x / Bacteria: x        RADIOLOGY & ADDITIONAL TESTS:  Imaging Personally Reviewed:  [ ] YES  [ ] NO

## 2024-09-25 NOTE — PROGRESS NOTE ADULT - SUBJECTIVE AND OBJECTIVE BOX
Patient is a 89y old  Female who presents with a chief complaint of CHF exacerbation (25 Sep 2024 10:00)  Awake, alert, comfortable in bed in NAD    INTERVAL HPI/OVERNIGHT EVENTS:      VITAL SIGNS:  T(F): 98.3 (09-25-24 @ 08:27)  HR: 92 (09-25-24 @ 08:27)  BP: 106/56 (09-25-24 @ 08:27)  RR: 19 (09-25-24 @ 08:27)  SpO2: 95% (09-25-24 @ 08:27)  Wt(kg): --  I&O's Detail    24 Sep 2024 07:01  -  25 Sep 2024 07:00  --------------------------------------------------------  IN:    IV PiggyBack: 100 mL    IV PiggyBack: 50 mL    Oral Fluid: 630 mL  Total IN: 780 mL    OUT:  Total OUT: 0 mL    Total NET: 780 mL              REVIEW OF SYSTEMS:    CONSTITUTIONAL:  No fevers, chills, sweats    HEENT:  Eyes:  No diplopia or blurred vision. ENT:  No earache, sore throat or runny nose.    CARDIOVASCULAR:  No pressure, squeezing, tightness, or heaviness about the chest; no palpitations.    RESPIRATORY:  Per HPI    GASTROINTESTINAL:  No abdominal pain, nausea, vomiting or diarrhea.    GENITOURINARY:  No dysuria, frequency or urgency.    NEUROLOGIC:  No paresthesias, fasciculations, seizures or weakness.    PSYCHIATRIC:  No disorder of thought or mood.      PHYSICAL EXAM:    Constitutional: Well developed and nourished  Eyes:Perrla  ENMT: normal  Neck:supple  Respiratory: good air entry  Cardiovascular: S1 S2 regular  Gastrointestinal: Soft, Non tender  Extremities: No edema  Vascular:normal  Neurological:Awake, alert,Ox3  Musculoskeletal:Normal      MEDICATIONS  (STANDING):  acetylcysteine 20%  Inhalation 4 milliLiter(s) Inhalation every 6 hours  albuterol    90 MICROgram(s) HFA Inhaler 2 Puff(s) Inhalation three times a day  albuterol/ipratropium for Nebulization 3 milliLiter(s) Nebulizer every 6 hours  apixaban 5 milliGRAM(s) Oral two times a day  atorvastatin 20 milliGRAM(s) Oral at bedtime  carvedilol 12.5 milliGRAM(s) Oral every 12 hours  cefepime   IVPB      cefepime   IVPB 1000 milliGRAM(s) IV Intermittent every 8 hours  dorzolamide 2% Ophthalmic Solution 1 Drop(s) Both EYES three times a day  famotidine    Tablet 20 milliGRAM(s) Oral daily  fluticasone propionate/ salmeterol 100-50 MICROgram(s) Diskus 1 Dose(s) Inhalation two times a day  furosemide   Injectable 20 milliGRAM(s) IV Push once  influenza  Vaccine (HIGH DOSE) 0.5 milliLiter(s) IntraMuscular once  ketotifen 0.025% Ophthalmic Solution 1 Drop(s) Both EYES two times a day  latanoprost 0.005% Ophthalmic Solution 1 Drop(s) Both EYES at bedtime  metroNIDAZOLE  IVPB      metroNIDAZOLE  IVPB 500 milliGRAM(s) IV Intermittent every 8 hours  montelukast 10 milliGRAM(s) Oral daily  polyethylene glycol 3350 17 Gram(s) Oral daily  senna 2 Tablet(s) Oral at bedtime    MEDICATIONS  (PRN):  acetaminophen     Tablet .. 650 milliGRAM(s) Oral every 6 hours PRN Temp greater or equal to 38C (100.4F), Mild Pain (1 - 3)  bisacodyl 5 milliGRAM(s) Oral every 12 hours PRN Constipation      Allergies    aspirin (Nausea (Mild))  aspirin (Stomach Upset)    Intolerances        LABS:                        8.2    13.06 )-----------( 260      ( 25 Sep 2024 06:33 )             27.7     09-25    143  |  107  |  25[H]  ----------------------------<  97  4.4   |  30  |  1.15    Ca    8.7      25 Sep 2024 06:33    TPro  5.8[L]  /  Alb  2.5[L]  /  TBili  0.4  /  DBili  x   /  AST  8[L]  /  ALT  12  /  AlkPhos  97  09-24      Urinalysis Basic - ( 25 Sep 2024 06:33 )    Color: x / Appearance: x / SG: x / pH: x  Gluc: 97 mg/dL / Ketone: x  / Bili: x / Urobili: x   Blood: x / Protein: x / Nitrite: x   Leuk Esterase: x / RBC: x / WBC x   Sq Epi: x / Non Sq Epi: x / Bacteria: x            CAPILLARY BLOOD GLUCOSE            RADIOLOGY & ADDITIONAL TESTS:  < from: Xray Chest 2 Views PA/Lat (09.22.24 @ 21:31) >  IMPRESSION:  Limited images.    Bibasilar/retrocardiac opacities with likely continued elevation of the   right hemidiaphragm. The findings are of uncertain etiology. Bilateral   pleural effusions with associated passive atelectasis, atelectasis of   other cause, and/or other pathology including, but not limited to,   pneumonia is possible.      < end of copied text >    CXR:    Ct scan chest:    ekg;    echo:

## 2024-09-25 NOTE — PROGRESS NOTE ADULT - SUBJECTIVE AND OBJECTIVE BOX
Patient is a 89y old  Female who presents with a chief complaint of CHF exacerbation (25 Sep 2024 10:59)      OVERNIGHT EVENTS: hypoensive overnight    [place] ID gladis [id number] used, family at bedside, spoke with pt's son Juan A via telephone (emergency contact) all questions answered     REVIEW OF SYSTEMS:  CONSTITUTIONAL: No fever, chills    RESPIRATORY:+cough, no SOB  CARDIOVASCULAR: No chest pain, palpitations      T(C): 36.8 (09-25-24 @ 08:27), Max: 37.5 (09-24-24 @ 16:17)  HR: 92 (09-25-24 @ 08:27) (89 - 104)  BP: 106/56 (09-25-24 @ 08:27) (76/52 - 116/68)  RR: 19 (09-25-24 @ 08:27) (17 - 19)  SpO2: 95% (09-25-24 @ 08:27) (94% - 98%)  Wt(kg): --Vital Signs Last 24 Hrs  T(C): 36.8 (25 Sep 2024 08:27), Max: 37.5 (24 Sep 2024 16:17)  T(F): 98.3 (25 Sep 2024 08:27), Max: 99.5 (24 Sep 2024 16:17)  HR: 92 (25 Sep 2024 08:27) (89 - 104)  BP: 106/56 (25 Sep 2024 08:27) (76/52 - 116/68)  BP(mean): 70 (25 Sep 2024 03:04) (60 - 70)  RR: 19 (25 Sep 2024 08:27) (17 - 19)  SpO2: 95% (25 Sep 2024 08:27) (94% - 98%)    Parameters below as of 25 Sep 2024 08:27  Patient On (Oxygen Delivery Method): BiPAP/CPAP          PHYSICAL EXAM:  GENERAL: NAD  CHEST/LUNG: Clear to auscultation bilaterally; No rales, rhonchi, wheezing, or rubs  HEART: S1, S2, Regular rate and rhythm  ABDOMEN: Soft, Nontender, Nondistended; Bowel sounds present  NEURO: Alert & Oriented X2/3    Consultant(s) Notes Reviewed:  [x ] YES  [ ] NO  Care Discussed with Consultants/Other Providers [ x] YES  [ ] NO  LABS:                        8.2    13.06 )-----------( 260      ( 25 Sep 2024 06:33 )             27.7     09-25    143  |  107  |  25[H]  ----------------------------<  97  4.4   |  30  |  1.15    Ca    8.7      25 Sep 2024 06:33    TPro  5.8[L]  /  Alb  2.5[L]  /  TBili  0.4  /  DBili  x   /  AST  8[L]  /  ALT  12  /  AlkPhos  97  09-24      CAPILLARY BLOOD GLUCOSE          Urinalysis Basic - ( 25 Sep 2024 06:33 )    Color: x / Appearance: x / SG: x / pH: x  Gluc: 97 mg/dL / Ketone: x  / Bili: x / Urobili: x   Blood: x / Protein: x / Nitrite: x   Leuk Esterase: x / RBC: x / WBC x   Sq Epi: x / Non Sq Epi: x / Bacteria: x        RADIOLOGY & ADDITIONAL TESTS:  Imaging Personally Reviewed:  [ ] YES  [ ] NO

## 2024-09-25 NOTE — PROGRESS NOTE ADULT - PROBLEM SELECTOR PROBLEM 8
GERD (gastroesophageal reflux disease)
HTN (hypertension)
GERD (gastroesophageal reflux disease)
HTN (hypertension)

## 2024-09-25 NOTE — PROGRESS NOTE ADULT - PROBLEM SELECTOR PROBLEM 3
Atelectasis
COPD (chronic obstructive pulmonary disease)
Atelectasis
Acute metabolic encephalopathy
COPD (chronic obstructive pulmonary disease)
Acute metabolic encephalopathy

## 2024-09-25 NOTE — PROGRESS NOTE ADULT - PROBLEM SELECTOR PROBLEM 1
Congestive heart failure (CHF)
Acute hypoxic on chronic hypercapnic respiratory failure
Acute hypoxic on chronic hypercapnic respiratory failure
Congestive heart failure (CHF)
Congestive heart failure (CHF)
Acute hypoxic on chronic hypercapnic respiratory failure
Congestive heart failure (CHF)
Congestive heart failure (CHF)
Acute hypoxic on chronic hypercapnic respiratory failure
Congestive heart failure (CHF)

## 2024-09-25 NOTE — PROGRESS NOTE ADULT - ASSESSMENT
Patient is a 89F, from home [facility]HA (M-F/9h S-S/6h) ambulates with walker/wheelchair, with PMHx of COPD (O2 2L PRN), HTN, PAF and hx of DVT/PE (on eliquis), HLD, GERD, remote hx of r. breast cancer s/p mastectomy and left eye blindness. Patient presented to ED with worsening shortness of breath and orthopnea.  Patient admitted to telemetry for Acute Hypoxic Hypercapnic respiratory failure secondary to COPD and CHF. Patient was recently admitted to [facility] in August for ABG resulting CO2 53 baseline, as per daughters patient was placed on NIV, improved and discharged home with oxygen.   ICU consulted for possible new BiPAP ABG 7.3/74/97/42. However patient not candidate for ICU recommending nocturnal BiPAP for COPD and likely CARLOS with atelectasis. Patient will benefit from BIPAP at night for home. Patient will need am ABG to reflect improvement in CO2 with nocturnal BiPAP.   Chest X ray showed Bibasilar/retrocardiac opacities with likely continued elevation of the right hemidiaphragm. The findings are of uncertain etiology. Bilateral pleural effusions with associated passive atelectasis, atelectasis of   other cause, and/or other pathology including, but not limited to, pneumonia is possible.  Echo showed The left ventricular cavity is normal in size. Left ventricular wall thickness is normal. Left ventricular systolic function is normal with a calculated ejection fraction of 68%. Doppler negative for Left Leg DVT.   CT chest showed Right middle and lower bilobar consolidation/collapse with bronchial secretions and small right pleural effusion.    9/23: speech & swallow recs minced and moist. Unable to move pt to 4N (BiPAP) as pt remains on tele. Vitamin D toxicity noted- hold for now.   9/24: c/w telemety, pt in afib with rvr, coreg increased, Cardio Dr. Coy following. Pt to be discharged on BiPAP-CNM forms completed and in chart. Hypokalemia noted, repleted, f/uBMP in the AM. Aspiration PNA, c/w cefepime and flagyl, Legionella pending, ID Dr. Lao following. BIPAP forms, Chart note, Rx for BIPAP are COMPLETED and in chart, MARIA M Vasquez made aware and following.   9/25: 1 unit of PRBC to be transfused, telephone consent given by emergency contact Juan A (pt's son), f/up on CBC. PT pending.

## 2024-09-25 NOTE — PROGRESS NOTE ADULT - PROBLEM SELECTOR PLAN 5
cont meds  monitor for bleeding
p/w 3d history of worsening SOB and orthopnea  PEx: B/L wet rales (R>L), B/L leg edema  pro-BNP 6k  CXR: Pulmonary congestion and possible RLL effusion   Cardio Dr. Coy following
doppler LLE negative 9/19  continue Eliquis
p/w 3d history of worsening SOB and orthopnea  PEx: B/L wet rales (R>L), B/L leg edema  pro-BNP 6k  CXR: Pulmonary congestion and possible RLL effusion   Cardio Dr. Coy following
cont meds  monitor for bleeding
cont meds  monitor for bleeding
doppler LLE negative 9/19  continue Eliquis

## 2024-09-25 NOTE — PROGRESS NOTE ADULT - PROBLEM SELECTOR PLAN 8
continue famotidine
Hx of HTN on losartan 25mg bid, coreg 6.25mg bid, torsemide 10mg qd   holding diuretic and ARB  continue coreg
continue famotidine
Hx of HTN on losartan 25mg bid, coreg 6.25mg bid, torsemide 10mg qd   holding diuretic and ARB  continue coreg

## 2024-09-25 NOTE — PROGRESS NOTE ADULT - SUBJECTIVE AND OBJECTIVE BOX
Patient is a 89y old  Female who presents with a chief complaint of CHF exacerbation (24 Sep 2024 15:55)    pt seen in tele [x  ], reg med floor [   ], bed [x  ], chair at bedside [   ], awake and responsive, [ x ], lethargic [  ],    nad [ x ]      Allergies    aspirin (Nausea (Mild))  aspirin (Stomach Upset)        Vitals    T(F): 99.1 (09-25-24 @ 04:35), Max: 99.5 (09-24-24 @ 16:17)  HR: 99 (09-25-24 @ 04:35) (89 - 104)  BP: 98/700 (09-25-24 @ 04:35) (76/52 - 119/61)  RR: 18 (09-25-24 @ 04:35) (17 - 18)  SpO2: 94% (09-25-24 @ 04:35) (94% - 98%)  Wt(kg): --  CAPILLARY BLOOD GLUCOSE          Labs                          8.5    11.88 )-----------( 267      ( 24 Sep 2024 06:14 )             27.5       09-24    142  |  103  |  19[H]  ----------------------------<  100[H]  3.4[L]   |  33[H]  |  0.85    Ca    9.1      24 Sep 2024 06:14  Phos  3.7     09-23  Mg     2.1     09-23    TPro  5.8[L]  /  Alb  2.5[L]  /  TBili  0.4  /  DBili  x   /  AST  8[L]  /  ALT  12  /  AlkPhos  97  09-24            .Blood Blood-Peripheral  09-19 @ 03:54   No growth at 5 days  --  --      .Blood Blood-Peripheral  09-19 @ 03:53   No growth at 5 days  --  --          Radiology Results      Meds    MEDICATIONS  (STANDING):  acetylcysteine 20%  Inhalation 4 milliLiter(s) Inhalation every 6 hours  albuterol    90 MICROgram(s) HFA Inhaler 2 Puff(s) Inhalation three times a day  albuterol/ipratropium for Nebulization 3 milliLiter(s) Nebulizer every 6 hours  apixaban 5 milliGRAM(s) Oral two times a day  atorvastatin 20 milliGRAM(s) Oral at bedtime  carvedilol 12.5 milliGRAM(s) Oral every 12 hours  cefepime   IVPB      cefepime   IVPB 1000 milliGRAM(s) IV Intermittent every 8 hours  dorzolamide 2% Ophthalmic Solution 1 Drop(s) Both EYES three times a day  famotidine    Tablet 20 milliGRAM(s) Oral daily  fluticasone propionate/ salmeterol 100-50 MICROgram(s) Diskus 1 Dose(s) Inhalation two times a day  influenza  Vaccine (HIGH DOSE) 0.5 milliLiter(s) IntraMuscular once  ketotifen 0.025% Ophthalmic Solution 1 Drop(s) Both EYES two times a day  latanoprost 0.005% Ophthalmic Solution 1 Drop(s) Both EYES at bedtime  metroNIDAZOLE  IVPB      metroNIDAZOLE  IVPB 500 milliGRAM(s) IV Intermittent every 8 hours  montelukast 10 milliGRAM(s) Oral daily  polyethylene glycol 3350 17 Gram(s) Oral daily  senna 2 Tablet(s) Oral at bedtime      MEDICATIONS  (PRN):  acetaminophen     Tablet .. 650 milliGRAM(s) Oral every 6 hours PRN Temp greater or equal to 38C (100.4F), Mild Pain (1 - 3)  bisacodyl 5 milliGRAM(s) Oral every 12 hours PRN Constipation      Physical Exam    Neuro :  no focal deficits  Respiratory: CTA B/L  CV: RRR, S1S2, no murmurs,   Abdominal: Soft, NT, ND +BS,  Extremities: No edema, + peripheral pulses      ASSESSMENT    acute on hypoxic resp failure pos 2nd to acute chf and asthma/copd exacerb,   recurrent pe/dvt. r/o,   hyponatremia resolved,   hyperkalemia resolved,   h/o asthma/copd (O2 2L PRN),   HTN,   PAF and hx of DVT/PE (on eliquis),   HLD,   GERD,   remote hx of r. breast cancer s/p mastectomy and left eye blindness,   spinal stenosis  DM (diabetes mellitus)  Blindness of left eye    PLAN    cont tele,   cont statin,   cont eliquis   cardio f/u   cont coreg 12.5mg  bid  d/c cozaar. 2nd to hypokalemia   ct chest with Right middle and lower bilobar consolidation/collapse with bronchial   secretions and small right pleural effusion noted     doppler le with No evidence of left lower extremity deep venous thrombosis noted    cont cefepime and flagyl   id f/u    follow up Legionella urine Ag  cxr with Bibasilar/retrocardiac opacities with likely continued elevation of the   right hemidiaphragm. The findings are of uncertain etiology. Bilateral   pleural effusions with associated passive atelectasis, atelectasis of   other cause, and/or other pathology including, but not limited to,   pneumonia is possible noted above.    pulm f/u   supplemental O2,   cont bipap as needed  symbicort HFA 80/4.5 mcgs 2 puffs po BID with spacer   cont  mucomyst nebs  PFTs as OP.   swallow eval noted and rec Minced & Moist solids w/ Mildly thick liquids  for pt with s&s oropharyngeal dysphagia w/ age related changes (presbyphagia)  s/p lokelma  serum potassium wnl   serum sodium wnl    serum creat wnl   vit d lev supratherapeutic noted    hold vit d   renal  f/u   Hyponatremia due to fluid retention/CHF,   fluid restriction 1 liter per day.  serum bicarb elevated  icu eval for bipap noted   ABG noted with compensated hypercapnia. Likely has underlying OHS vs due to chronic atelectasis.   Recommend to start nocturnal bipap support and monitor respiratory status.   Rest of management per primary team. Consider advance directives.   cont current meds         Patient is a 89y old  Female who presents with a chief complaint of CHF exacerbation (24 Sep 2024 15:55)    pt seen in tele [x  ], reg med floor [   ], bed [x  ], chair at bedside [   ], awake and responsive, [ x ], lethargic [  ],    nad [ x ]      Allergies    aspirin (Nausea (Mild))  aspirin (Stomach Upset)        Vitals    T(F): 99.1 (09-25-24 @ 04:35), Max: 99.5 (09-24-24 @ 16:17)  HR: 99 (09-25-24 @ 04:35) (89 - 104)  BP: 98/700 (09-25-24 @ 04:35) (76/52 - 119/61)  RR: 18 (09-25-24 @ 04:35) (17 - 18)  SpO2: 94% (09-25-24 @ 04:35) (94% - 98%)  Wt(kg): --  CAPILLARY BLOOD GLUCOSE          Labs                          8.5    11.88 )-----------( 267      ( 24 Sep 2024 06:14 )             27.5       09-24    142  |  103  |  19[H]  ----------------------------<  100[H]  3.4[L]   |  33[H]  |  0.85    Ca    9.1      24 Sep 2024 06:14  Phos  3.7     09-23  Mg     2.1     09-23    TPro  5.8[L]  /  Alb  2.5[L]  /  TBili  0.4  /  DBili  x   /  AST  8[L]  /  ALT  12  /  AlkPhos  97  09-24            .Blood Blood-Peripheral  09-19 @ 03:54   No growth at 5 days  --  --      .Blood Blood-Peripheral  09-19 @ 03:53   No growth at 5 days  --  --          Radiology Results      Meds    MEDICATIONS  (STANDING):  acetylcysteine 20%  Inhalation 4 milliLiter(s) Inhalation every 6 hours  albuterol    90 MICROgram(s) HFA Inhaler 2 Puff(s) Inhalation three times a day  albuterol/ipratropium for Nebulization 3 milliLiter(s) Nebulizer every 6 hours  apixaban 5 milliGRAM(s) Oral two times a day  atorvastatin 20 milliGRAM(s) Oral at bedtime  carvedilol 12.5 milliGRAM(s) Oral every 12 hours  cefepime   IVPB      cefepime   IVPB 1000 milliGRAM(s) IV Intermittent every 8 hours  dorzolamide 2% Ophthalmic Solution 1 Drop(s) Both EYES three times a day  famotidine    Tablet 20 milliGRAM(s) Oral daily  fluticasone propionate/ salmeterol 100-50 MICROgram(s) Diskus 1 Dose(s) Inhalation two times a day  influenza  Vaccine (HIGH DOSE) 0.5 milliLiter(s) IntraMuscular once  ketotifen 0.025% Ophthalmic Solution 1 Drop(s) Both EYES two times a day  latanoprost 0.005% Ophthalmic Solution 1 Drop(s) Both EYES at bedtime  metroNIDAZOLE  IVPB      metroNIDAZOLE  IVPB 500 milliGRAM(s) IV Intermittent every 8 hours  montelukast 10 milliGRAM(s) Oral daily  polyethylene glycol 3350 17 Gram(s) Oral daily  senna 2 Tablet(s) Oral at bedtime      MEDICATIONS  (PRN):  acetaminophen     Tablet .. 650 milliGRAM(s) Oral every 6 hours PRN Temp greater or equal to 38C (100.4F), Mild Pain (1 - 3)  bisacodyl 5 milliGRAM(s) Oral every 12 hours PRN Constipation      Physical Exam    Neuro :  no focal deficits  Respiratory: CTA B/L  CV: RRR, S1S2, no murmurs,   Abdominal: Soft, NT, ND +BS,  Extremities: No edema, + peripheral pulses      ASSESSMENT    acute on hypoxic resp failure pos 2nd to acute chf and asthma/copd exacerb,   recurrent pe/dvt. r/o,   hyponatremia resolved,   hyperkalemia resolved,   h/o asthma/copd (O2 2L PRN),   HTN,   PAF and hx of DVT/PE (on eliquis),   HLD,   GERD,   remote hx of r. breast cancer s/p mastectomy and left eye blindness,   spinal stenosis  DM (diabetes mellitus)  Blindness of left eye    PLAN    cont tele,   cont statin,   cont eliquis   cardio f/u   cont coreg 12.5mg  bid  d/c cozaar. 2nd to hypokalemia   ct chest with Right middle and lower bilobar consolidation/collapse with bronchial   secretions and small right pleural effusion noted     doppler le with No evidence of left lower extremity deep venous thrombosis noted    id f/u    Continue Cefepime and  flagyl X total of 7 days until 9/29/24  cxr with Bibasilar/retrocardiac opacities with likely continued elevation of the   right hemidiaphragm. The findings are of uncertain etiology. Bilateral   pleural effusions with associated passive atelectasis, atelectasis of   other cause, and/or other pathology including, but not limited to,   pneumonia is possible noted     pulm f/u   supplemental O2,   montelukast 10 mgs po Qhs  Advair Diskus 250/50 mcgs one inh po BID  PFTs as OP  [facility]ap [facility] at [facility]   cont  mucomyst nebs  swallow eval noted and rec Minced & Moist solids w/ Mildly thick liquids  for pt with s&s oropharyngeal dysphagia w/ age related changes (presbyphagia)  s/p lokelma  serum potassium wnl   serum sodium wnl    serum creat wnl   vit d lev supratherapeutic noted    hold vit d   renal  f/u   Fluid retention improved, off diuretics ,   continue fluid restriction 1 liter per day.   Anemia severe iron deficiency , s/p Venofer IV.   serum bicarb elevated  icu eval for bipap noted   ABG noted with compensated hypercapnia. Likely has underlying OHS vs due to chronic atelectasis.   Recommend to start nocturnal bipap support and monitor respiratory status.   Rest of management per primary team. Consider advance directives.   cont current meds

## 2024-09-25 NOTE — PROGRESS NOTE ADULT - SUBJECTIVE AND OBJECTIVE BOX
Patient is seen and examined at the bed side, is afebrile. She is more awake and alert now. The family at the bed side.      REVIEW OF SYSTEMS: All other review systems are negative      ALLERGIES: aspirin (Nausea (Mild, Stomach Upset)      Vital Signs Last 24 Hrs  T(C): 36.5 (25 Sep 2024 15:38), Max: 37.3 (25 Sep 2024 04:35)  T(F): 97.7 (25 Sep 2024 15:38), Max: 99.1 (25 Sep 2024 04:35)  HR: 91 (25 Sep 2024 15:38) (89 - 107)  BP: 113/77 (25 Sep 2024 15:38) (76/52 - 113/77)  BP(mean): 70 (25 Sep 2024 03:04) (60 - 70)  RR: 18 (25 Sep 2024 15:38) (17 - 19)  SpO2: 94% (25 Sep 2024 15:38) (94% - 98%)    Parameters below as of 25 Sep 2024 15:38  Patient On (Oxygen Delivery Method): nasal cannula  O2 Flow (L/min): 2        PHYSICAL EXAM:  GENERAL: Not in acute distress , on oxygen via NC  CHEST/LUNG:  Not using accessory muscles  HEART: s1 and s2 present  ABDOMEN:  Nontender and  Nondistended  EXTREMITIES: No pedal  edema  CNS: Awake and Alert      LABS:                        8.2    13.06 )-----------( 260      ( 25 Sep 2024 06:33 )             27.7                           8.5    11.88 )-----------( 267      ( 24 Sep 2024 06:14 )             27.5            09-25    143  |  107  |  25[H]  ----------------------------<  97  4.4   |  30  |  1.15    Ca    8.7      25 Sep 2024 06:33    TPro  5.8[L]  /  Alb  2.5[L]  /  TBili  0.4  /  DBili  x   /  AST  8[L]  /  ALT  12  /  AlkPhos  97  09-24 09-24    142  |  103  |  19[H]  ----------------------------<  100[H]  3.4[L]   |  33[H]  |  0.85    Ca    9.1      24 Sep 2024 06:14  Phos  3.7     09-23  Mg     2.1     09-23    TPro  5.8[L]  /  Alb  2.5[L]  /  TBili  0.4  /  DBili  x   /  AST  8[L]  /  ALT  12  /  AlkPhos  97  09-24    ABG - ( 21 Sep 2024 05:54 )  pH, Arterial: 7.42  pH, Blood: x     /  pCO2: 57    /  pO2: 93    / HCO3: 37    / Base Excess: 11.0  /  SaO2: 100           MEDICATIONS  (STANDING):    acetylcysteine 20%  Inhalation 4 milliLiter(s) Inhalation every 6 hours  albuterol    90 MICROgram(s) HFA Inhaler 2 Puff(s) Inhalation three times a day  albuterol/ipratropium for Nebulization 3 milliLiter(s) Nebulizer every 6 hours  apixaban 5 milliGRAM(s) Oral two times a day  atorvastatin 20 milliGRAM(s) Oral at bedtime  carvedilol 12.5 milliGRAM(s) Oral every 12 hours  cefepime   IVPB 1000 milliGRAM(s) IV Intermittent every 8 hours  cefepime   IVPB      dorzolamide 2% Ophthalmic Solution 1 Drop(s) Both EYES three times a day  famotidine    Tablet 20 milliGRAM(s) Oral daily  fluticasone propionate/ salmeterol 100-50 MICROgram(s) Diskus 1 Dose(s) Inhalation two times a day  influenza  Vaccine (HIGH DOSE) 0.5 milliLiter(s) IntraMuscular once  ketotifen 0.025% Ophthalmic Solution 1 Drop(s) Both EYES two times a day  latanoprost 0.005% Ophthalmic Solution 1 Drop(s) Both EYES at bedtime  metroNIDAZOLE  IVPB      metroNIDAZOLE  IVPB 500 milliGRAM(s) IV Intermittent every 8 hours  montelukast 10 milliGRAM(s) Oral daily  polyethylene glycol 3350 17 Gram(s) Oral daily  senna 2 Tablet(s) Oral at bedtime        RADIOLOGY & ADDITIONAL TESTS:    9/22/24 : Xray Chest 1 View- PORTABLE-Urgent (Xray Chest 1 View- PORTABLE-Urgent .) (09.22.24 @ 09:22) >  Suspect LEFT retrocardiac airspace consolidation and/or effusion  obscuring diaphragm contour.  Elevated RIGHT hemidiaphragm.  Lateral radiograph recommended for confirmation.      9/19/24 : CT Chest No Cont (09.19.24 @ 16:32) Right middle and lower bilobar consolidation/collapse with bronchial secretions and small right pleural effusion.      MICROBIOLOGY DATA:      Culture - Blood (09.19.24 @ 03:54)   Specimen Source: .Blood Blood-Peripheral  Culture Results: No growth at 5 days    Culture - Blood (09.19.24 @ 03:53)   Specimen Source: .Blood Blood-Peripheral  Culture Results: No growth at 5 days     Rapid HIV-1/2 Antibody (09.19.24 @ 03:52)   Rapid HIV-1/2 Antibody: Nonreact

## 2024-09-25 NOTE — PROGRESS NOTE ADULT - PROBLEM SELECTOR PLAN 4
[General Appearance - Alert] : alert [General Appearance - In No Acute Distress] : in no acute distress [2+] : left foot dorsalis pedis 2+ [No Joint Swelling] : no joint swelling [Pes Planus] : pes planus deformity [Skin Color & Pigmentation] : normal skin color and pigmentation [Skin Turgor] : normal skin turgor [Skin Lesions] : no skin lesions [Ankle Swelling (On Exam)] : not present [Varicose Veins Of Lower Extremities] : not present [] : not present [Delayed in the Right Toes] : capillary refills normal in right toes [Delayed in the Left Toes] : capillary refills normal in the left toes [Fede's Test For Radial Artery Patency Bilaterally] : negative bilateral [de-identified] : Hip ROM: 40 degrees external rotation, 30 degrees internal rotation b/l. No limb length discrepancy, patella midline b/l, no genu recurvatum/valgum/varum, no tibial varum, valgus. Ankle joint rectus b/l. Ankle joint ROM: -5 degrees with knee extended and knee flexed b/l. STJ ROM: 20 degrees inversion 10 degrees eversion R, 30 degrees inversion, 10 degrees eversion L. MTJ ROM: 15 degrees inversion/eversion R. 20 degrees inversion, 15 degrees eversion L. 1st ray ROM: 1.5 cm dorsiflexion, 5mm plantarflexion b/l. hallux dorsiflexion 25 degrees b/l, 50 degrees plantarflexion R, 30 degrees plantarflexion L. Early heel off b/l during gait, negative double and single heel raise test. [Foot Ulcer] : no foot ulcer [Skin Induration] : no skin induration [FreeTextEntry1] : very mild diffuse Sub metatarsal 4 hyperkeratosis b/l see plan as above

## 2024-09-25 NOTE — PROGRESS NOTE ADULT - ASSESSMENT
Patient is a 89y old  Female who is from home [facility]HA (M-F/9h S-S/6h) ambulates with walker/wheelchair, with PMHx of asthma (O2 2L PRN), HTN, PAF and hx of DVT/PE (on eliquis), HLD, GERD, remote hx of r. breast cancer s/p mastectomy and left eye blindness, who presents to the ER on 9/19/24 for evaluation of worsening shortness of breath and orthopnea. Patient was hospitalized at [facility] from 8/7-8/16 for Asthma exacerbation due to influenza virus. Since the discharge, patient has been wearing 2L NC at home. However, shortness of breath and orthopnea  is worsening to a point that patient is not able to lay down flat at all. She has admitted with  CHF exacerbation. Now The CT scan from 9/19 and CXr from 9/22 shows Airspace consolidation. She has started on Ceftriaxone and Flagyl, and the ID consult requested to assist with further evaluation and antibiotic management.    # Pneumonia - MRSA PCR is negative  # Leukocytosis - resolving    would recommend:    1. Monitor WBC count  2. Continue Cefepime and  flagyl X total of 7 days  3. Aspiration precaution  4. Supplemental oxygenation and Bronchodilator as needed  5. PT evaluation and OOB to chair     d/w  Family at the bed side    Attending Attestation:    Spent more than 35 minutes on total encounter, more than 50 % of the visit was spent counseling and/or coordinating care by the Attending physician.

## 2024-09-25 NOTE — PROGRESS NOTE ADULT - PROBLEM SELECTOR PLAN 10
continue dorzolamide, ketotifen, latanoprost
DVT prophylaxis - on eliquis  GI PPX: Famotidine
DVT prophylaxis - on eliquis  GI PPX: Famotidine
continue dorzolamide, ketotifen, latanoprost

## 2024-09-25 NOTE — PROGRESS NOTE ADULT - SUBJECTIVE AND OBJECTIVE BOX
Date of Service 09-25-24 @ 10:00    CHIEF COMPLAINT:Patient is a 89y old  Female who presents with a chief complaint of CHF exacerbation .Pt s/p hypotension overnight.    	  REVIEW OF SYSTEMS:  CONSTITUTIONAL: No fever, weight loss, or fatigue  EYES: No eye pain, visual disturbances, or discharge  ENT:  No difficulty hearing, tinnitus, vertigo; No sinus or throat pain  NECK: No pain or stiffness  RESPIRATORY: No cough, wheezing, chills or hemoptysis; No Shortness of Breath  CARDIOVASCULAR: No chest pain, palpitations, passing out, dizziness, or leg swelling  GASTROINTESTINAL: No abdominal or epigastric pain. No nausea, vomiting, or hematemesis; No diarrhea or constipation. No melena or hematochezia.  GENITOURINARY: No dysuria, frequency, hematuria, or incontinence  NEUROLOGICAL: No headaches, memory loss, loss of strength, numbness, or tremors  SKIN: No itching, burning, rashes, or lesions   LYMPH Nodes: No enlarged glands  ENDOCRINE: No heat or cold intolerance; No hair loss  MUSCULOSKELETAL: No joint pain or swelling; No muscle, back, or extremity pain  PSYCHIATRIC: No depression, anxiety, mood swings, or difficulty sleeping  HEME/LYMPH: No easy bruising, or bleeding gums  ALLERGY AND IMMUNOLOGIC: No hives or eczema	    PHYSICAL EXAM:  T(C): 36.8 (09-25-24 @ 08:27), Max: 37.5 (09-24-24 @ 16:17)  HR: 92 (09-25-24 @ 08:27) (89 - 104)  BP: 106/56 (09-25-24 @ 08:27) (76/52 - 116/68)  RR: 19 (09-25-24 @ 08:27) (17 - 19)  SpO2: 95% (09-25-24 @ 08:27) (94% - 98%)  Wt(kg): --  I&O's Summary    24 Sep 2024 07:01  -  25 Sep 2024 07:00  --------------------------------------------------------  IN: 780 mL / OUT: 0 mL / NET: 780 mL        Appearance: Normal	  HEENT:   Normal oral mucosa, PERRL, EOMI	  Lymphatic: No lymphadenopathy  Cardiovascular: Normal S1 S2, No JVD, No murmurs, No edema  Respiratory: Dec BS at bases	  Psychiatry: A & O x 3, Mood & affect appropriate  Gastrointestinal:  Soft, Non-tender, + BS	  Skin: No rashes, No ecchymoses, No cyanosis	  Neurologic: Non-focal  Extremities: Normal range of motion, No clubbing, cyanosis or edema  Vascular: Peripheral pulses palpable 2+ bilaterally    MEDICATIONS  (STANDING):  acetylcysteine 20%  Inhalation 4 milliLiter(s) Inhalation every 6 hours  albuterol    90 MICROgram(s) HFA Inhaler 2 Puff(s) Inhalation three times a day  albuterol/ipratropium for Nebulization 3 milliLiter(s) Nebulizer every 6 hours  apixaban 5 milliGRAM(s) Oral two times a day  atorvastatin 20 milliGRAM(s) Oral at bedtime  carvedilol 12.5 milliGRAM(s) Oral every 12 hours  cefepime   IVPB      cefepime   IVPB 1000 milliGRAM(s) IV Intermittent every 8 hours  dorzolamide 2% Ophthalmic Solution 1 Drop(s) Both EYES three times a day  famotidine    Tablet 20 milliGRAM(s) Oral daily  fluticasone propionate/ salmeterol 100-50 MICROgram(s) Diskus 1 Dose(s) Inhalation two times a day  influenza  Vaccine (HIGH DOSE) 0.5 milliLiter(s) IntraMuscular once  ketotifen 0.025% Ophthalmic Solution 1 Drop(s) Both EYES two times a day  latanoprost 0.005% Ophthalmic Solution 1 Drop(s) Both EYES at bedtime  metroNIDAZOLE  IVPB      metroNIDAZOLE  IVPB 500 milliGRAM(s) IV Intermittent every 8 hours  montelukast 10 milliGRAM(s) Oral daily  polyethylene glycol 3350 17 Gram(s) Oral daily  senna 2 Tablet(s) Oral at bedtime      TELEMETRY: 	  afib 120's    LABS:	 	                       8.2    13.06 )-----------( 260      ( 25 Sep 2024 06:33 )             27.7     09-25    143  |  107  |  25[H]  ----------------------------<  97  4.4   |  30  |  1.15    Ca    8.7      25 Sep 2024 06:33  Phos  3.7     09-23  Mg     2.1     09-23    TPro  5.8[L]  /  Alb  2.5[L]  /  TBili  0.4  /  DBili  x   /  AST  8[L]  /  ALT  12  /  AlkPhos  97  09-24      TSH: Thyroid Stimulating Hormone, Serum: 1.56 uU/mL (09-20 @ 07:24)

## 2024-09-25 NOTE — PROGRESS NOTE ADULT - PROBLEM SELECTOR PROBLEM 5
DVT, lower extremity
Congestive heart failure (CHF)
Congestive heart failure (CHF)
DVT, lower extremity

## 2024-09-25 NOTE — PROGRESS NOTE ADULT - PROBLEM SELECTOR PLAN 6
monitor BP  cont meds
doppler LLE negative 9/19  continue Eliquis
continue carvedilol and Eliquis
doppler LLE negative 9/19  continue Eliquis
continue carvedilol and Eliquis

## 2024-09-25 NOTE — PROGRESS NOTE ADULT - PROBLEM SELECTOR PROBLEM 2
Asthma
Acute metabolic encephalopathy
Acute metabolic encephalopathy
Pneumonia, aspiration
Pneumonia, aspiration

## 2024-09-25 NOTE — PROGRESS NOTE ADULT - PROBLEM/PLAN-6
DISPLAY PLAN FREE TEXT
shoulder
DISPLAY PLAN FREE TEXT

## 2024-09-25 NOTE — PROGRESS NOTE ADULT - ASSESSMENT
89yr old Female, from home [facility]HA (M-F/9h S-S/6h) ambulates with walker/wheelchair, with PMHx of asthma (O2 2L PRN), HTN, PAF and hx of DVT/PE (on eliquis), HLD, GERD, remote hx of r. breast cancer s/p mastectomy and left eye blindness who presents with 3 day history of worsening shortness of breath and orthopnea,hyponatremia,hyperkalemia,acute diastolic HF,respiratory failure,suspected aspiration pneumonia.  1.Tele monitoring.  2.Bipap as per Pulm.  3.Hyponatremia-Renal f/u,improved.  4.Afib- coreg 12.5mg  bid,eliquis.  5.Acute diastolic HF-s/p IV lasix.  6.Asthma-Pulm f/u.  7.Hx of DVT/PE-eliquis.  8.Iron def anemia- iron.  9.PPI.  10.Vit d toxicity-hold vit d.  11.Suspected aspiration pneumonia-abx.  12.Anemia-transfuse 1 prbc (pt hypotensive and tachy-coreg not given due to hypotension).

## 2024-09-25 NOTE — PROGRESS NOTE ADULT - PROBLEM SELECTOR PROBLEM 4
PAF (paroxysmal atrial fibrillation)
COPD (chronic obstructive pulmonary disease)
PAF (paroxysmal atrial fibrillation)
Congestive heart failure (CHF)
Congestive heart failure (CHF)
COPD (chronic obstructive pulmonary disease)

## 2024-09-25 NOTE — PROGRESS NOTE ADULT - PROBLEM SELECTOR PLAN 3
Bronchodilators  chest pT
incentive spirometry  Bronchodilators  chest PT
incentive spirometry  Bronchodilators  chest PT
see as above
Bronchodilators  chest pT
see plan as above
see plan as above
see as above

## 2024-09-25 NOTE — PROGRESS NOTE ADULT - PROBLEM SELECTOR PROBLEM 6
HTN (hypertension)
HTN (hypertension)
DVT, lower extremity
PAF (paroxysmal atrial fibrillation)
HTN (hypertension)
HTN (hypertension)
PAF (paroxysmal atrial fibrillation)
HTN (hypertension)
HTN (hypertension)
DVT, lower extremity

## 2024-09-25 NOTE — PROGRESS NOTE ADULT - PROBLEM SELECTOR PROBLEM 7
PAF (paroxysmal atrial fibrillation)
PAF (paroxysmal atrial fibrillation)
HTN (hypertension)
HTN (hypertension)

## 2024-09-25 NOTE — PROGRESS NOTE ADULT - PROBLEM SELECTOR PLAN 9
continue famotidine
continue famotidine
continue dorzolamide, ketotifen, latanoprost
7
continue dorzolamide, ketotifen, latanoprost

## 2024-09-25 NOTE — PROGRESS NOTE ADULT - PROBLEM SELECTOR PROBLEM 9
GERD (gastroesophageal reflux disease)
Blindness of left eye
GERD (gastroesophageal reflux disease)
Blindness of left eye

## 2024-09-25 NOTE — PROGRESS NOTE ADULT - PROBLEM SELECTOR PLAN 7
continue carvedilol and Eliquis
Hx of HTN on losartan 25mg bid, coreg 6.25mg bid, torsemide 10mg qd   holding diuretic and ARB  continue coreg
Hx of HTN on losartan 25mg bid, coreg 6.25mg bid, torsemide 10mg qd   holding diuretic and ARB  continue coreg
continue carvedilol and Eliquis

## 2024-09-26 NOTE — CHART NOTE - NSCHARTNOTEFT_GEN_A_CORE
EVENT: Received telephone call from RN that as per pt's family at bedside, pt had not voided overnight. Bladder scan done showed 650 cc    HPI: Patient is a 89y old  Female who is from home HHA (M-F/9h S-S/6h) ambulates with walker/wheelchair, with PMHx of asthma (O2 2L PRN), HTN, PAF and hx of DVT/PE (on eliquis), HLD, GERD, remote hx of r. breast cancer s/p mastectomy and left eye blindness, who presents to the ER on 9/19/24 for evaluation of worsening shortness of breath and orthopnea. Patient was hospitalized at [facility] from 8/7-8/16 for Asthma exacerbation due to influenza virus. Since the discharge, patient has been wearing 2L NC at home. However, shortness of breath and orthopnea  is worsening to a point that patient is not able to lay down flat at all. She has admitted with  CHF exacerbation.     SUBJECTIVE: n/a    OBJECTIVE:  Vital Signs Last 24 Hrs  T(C): 37.1 (26 Sep 2024 04:37), Max: 37.1 (26 Sep 2024 04:37)  T(F): 98.8 (26 Sep 2024 04:37), Max: 98.8 (26 Sep 2024 04:37)  HR: 104 (26 Sep 2024 04:37) (91 - 107)  BP: 104/70 (26 Sep 2024 04:37) (101/56 - 115/68)  BP(mean): --  RR: 19 (26 Sep 2024 04:37) (18 - 19)  SpO2: 93% (26 Sep 2024 04:37) (93% - 96%)    Parameters below as of 26 Sep 2024 04:37  Patient On (Oxygen Delivery Method): nasal cannula  O2 Flow (L/min): 2      FOCUSED PHYSICAL EXAM:  Neuro: awake, alert, in NAD  Cardiovascular: Pulses +2 B/L in lower and upper extremities, HR regular, BP stable, No edema.  Respiratory: Respirations regular, unlabored, breath sounds clear B/L.   GI: Abdomen soft, non-tender, positive bowel sounds.  : no bladder distention noted. No complaints at this time.  Skin: Dry, intact, no bruising, no diaphoresis.    LABS:                        10.3   13.64 )-----------( 270      ( 26 Sep 2024 05:30 )             34.4     09-26    146[H]  |  110[H]  |  34[H]  ----------------------------<  105[H]  4.8   |  28  |  1.32[H]    Ca    9.1      26 Sep 2024 05:30        EKG:   IMAGING:    ASSESSMENT/PROBLEM: Urinary retention      PLAN:   1. Bladder scan order for Q8 hrs  2. Straight cath ordered stat  3. UA with reflex sent  4. Endorse to day NP to f/u  5. Cont present care/treatment  6. Supportive care

## 2024-09-26 NOTE — PROGRESS NOTE ADULT - PROVIDER SPECIALTY LIST ADULT
Infectious Disease
Internal Medicine
Nephrology-Glomerular
Cardiology
Cardiology
Infectious Disease
Infectious Disease
Nephrology
Pulmonology
Cardiology
Internal Medicine
Nephrology
Nephrology-Cardiorenal
Nephrology
Pulmonology
Internal Medicine
Pulmonology
Internal Medicine
Internal Medicine

## 2024-09-26 NOTE — PROGRESS NOTE ADULT - SUBJECTIVE AND OBJECTIVE BOX
Patient is a 89y old  Female who presents with a chief complaint of CHF exacerbation (25 Sep 2024 15:08)    pt seen in tele [x  ], reg med floor [   ], bed [x  ], chair at bedside [   ], awake and responsive, [ x ], lethargic [  ],    nad [ x ]        Allergies    aspirin (Nausea (Mild))  aspirin (Stomach Upset)        Vitals    T(F): 98.8 (09-26-24 @ 04:37), Max: 98.8 (09-26-24 @ 04:37)  HR: 104 (09-26-24 @ 04:37) (91 - 107)  BP: 104/70 (09-26-24 @ 04:37) (101/56 - 115/68)  RR: 19 (09-26-24 @ 04:37) (18 - 19)  SpO2: 93% (09-26-24 @ 04:37) (93% - 96%)  Wt(kg): --  CAPILLARY BLOOD GLUCOSE      POCT Blood Glucose.: 204 mg/dL (25 Sep 2024 12:12)      Labs                          10.3   13.64 )-----------( 270      ( 26 Sep 2024 05:30 )             34.4       09-26    146[H]  |  110[H]  |  34[H]  ----------------------------<  105[H]  4.8   |  28  |  1.32[H]    Ca    9.1      26 Sep 2024 05:30              .Blood Blood-Peripheral  09-19 @ 03:54   No growth at 5 days  --  --      .Blood Blood-Peripheral  09-19 @ 03:53   No growth at 5 days  --  --          Radiology Results      Meds    MEDICATIONS  (STANDING):  acetylcysteine 20%  Inhalation 4 milliLiter(s) Inhalation every 6 hours  albuterol    90 MICROgram(s) HFA Inhaler 2 Puff(s) Inhalation three times a day  albuterol/ipratropium for Nebulization 3 milliLiter(s) Nebulizer every 6 hours  apixaban 5 milliGRAM(s) Oral two times a day  atorvastatin 20 milliGRAM(s) Oral at bedtime  carvedilol 12.5 milliGRAM(s) Oral every 12 hours  cefepime   IVPB 1000 milliGRAM(s) IV Intermittent every 8 hours  cefepime   IVPB      dorzolamide 2% Ophthalmic Solution 1 Drop(s) Both EYES three times a day  famotidine    Tablet 20 milliGRAM(s) Oral daily  fluticasone propionate/ salmeterol 100-50 MICROgram(s) Diskus 1 Dose(s) Inhalation two times a day  influenza  Vaccine (HIGH DOSE) 0.5 milliLiter(s) IntraMuscular once  ketotifen 0.025% Ophthalmic Solution 1 Drop(s) Both EYES two times a day  latanoprost 0.005% Ophthalmic Solution 1 Drop(s) Both EYES at bedtime  metroNIDAZOLE  IVPB      metroNIDAZOLE  IVPB 500 milliGRAM(s) IV Intermittent every 8 hours  montelukast 10 milliGRAM(s) Oral daily  polyethylene glycol 3350 17 Gram(s) Oral daily  senna 2 Tablet(s) Oral at bedtime      MEDICATIONS  (PRN):  acetaminophen     Tablet .. 650 milliGRAM(s) Oral every 6 hours PRN Temp greater or equal to 38C (100.4F), Mild Pain (1 - 3)  bisacodyl 5 milliGRAM(s) Oral every 12 hours PRN Constipation      Physical Exam    Neuro :  no focal deficits  Respiratory: CTA B/L  CV: RRR, S1S2, no murmurs,   Abdominal: Soft, NT, ND +BS,  Extremities: No edema, + peripheral pulses      ASSESSMENT    acute on hypoxic resp failure pos 2nd to acute chf and asthma/copd exacerb,   recurrent pe/dvt. r/o,   hyponatremia resolved,   hyperkalemia resolved,   h/o asthma/copd (O2 2L PRN),   HTN,   PAF and hx of DVT/PE (on eliquis),   HLD,   GERD,   remote hx of r. breast cancer s/p mastectomy and left eye blindness,   spinal stenosis  DM (diabetes mellitus)  Blindness of left eye    PLAN    cont tele,   cont statin,   cont eliquis   cardio f/u   cont coreg 12.5mg  bid  d/c cozaar. 2nd to hypokalemia   ct chest with Right middle and lower bilobar consolidation/collapse with bronchial   secretions and small right pleural effusion noted     doppler le with No evidence of left lower extremity deep venous thrombosis noted    id f/u    Continue Cefepime and  flagyl X total of 7 days until 9/29/24  cxr with Bibasilar/retrocardiac opacities with likely continued elevation of the   right hemidiaphragm. The findings are of uncertain etiology. Bilateral   pleural effusions with associated passive atelectasis, atelectasis of   other cause, and/or other pathology including, but not limited to,   pneumonia is possible noted     pulm f/u   supplemental O2,   montelukast 10 mgs po Qhs  Advair Diskus 250/50 mcgs one inh po BID  PFTs as OP  [facility]-pap machine at [facility]   cont  mucomyst nebs  swallow eval noted and rec Minced & Moist solids w/ Mildly thick liquids  for pt with s&s oropharyngeal dysphagia w/ age related changes (presbyphagia)  s/p lokelma  serum potassium wnl   serum sodium wnl    serum creat wnl   vit d lev supratherapeutic noted    hold vit d   renal  f/u   Fluid retention improved, off diuretics ,   continue fluid restriction 1 liter per day.   Anemia severe iron deficiency , s/p Venofer IV.   serum bicarb elevated  icu eval for bipap noted   ABG noted with compensated hypercapnia. Likely has underlying OHS vs due to chronic atelectasis.   Recommend to start nocturnal bipap support and monitor respiratory status.   Rest of management per primary team. Consider advance directives.   cont current meds

## 2024-09-26 NOTE — DISCHARGE NOTE FOR THE EXPIRED PATIENT - HOSPITAL COURSE
Patient is a 89F, from home HHA (M-F/9h S-S/6h) ambulates with walker/wheelchair, with PMHx of COPD (O2 2L PRN), HTN, PAF and hx of DVT/PE (on eliquis), HLD, GERD, remote hx of r. breast cancer s/p mastectomy and left eye blindness. Patient presented to ED with worsening shortness of breath and orthopnea.  Patient admitted to telemetry for Acute Hypoxic Hypercapnic respiratory failure secondary to COPD and CHF. Patient was recently admitted to [facility] in August for ABG resulting CO2 53 baseline, as per daughters patient was placed on NIV, improved and discharged home with oxygen.   ICU consulted for possible new BiPAP ABG 7.3/74/97/42. However patient not candidate for ICU recommending nocturnal BiPAP for COPD and likely CARLOS with atelectasis. Patient will benefit from BIPAP at night for home. Patient will need am ABG to reflect improvement in CO2 with nocturnal BiPAP.   Chest X ray showed Bibasilar/retrocardiac opacities with likely continued elevation of the right hemidiaphragm. The findings are of uncertain etiology. Bilateral pleural effusions with associated passive atelectasis, atelectasis of   other cause, and/or other pathology including, but not limited to, pneumonia is possible.  Echo showed The left ventricular cavity is normal in size. Left ventricular wall thickness is normal. Left ventricular systolic function is normal with a calculated ejection fraction of 68%. Doppler negative for Left Leg DVT.   CT chest showed Right middle and lower bilobar consolidation/collapse with bronchial secretions and small right pleural effusion.    9/23: speech & swallow recs minced and moist. Unable to move pt to 4N (BiPAP) as pt remains on tele. Vitamin D toxicity noted- hold for now.   9/24: c/w telemety, pt in afib with rvr, coreg increased, Cardio Dr. Coy following. Pt to be discharged on BiPAP-CNM forms completed and in chart. Hypokalemia noted, repleted, f/uBMP in the AM. Aspiration PNA, c/w cefepime and flagyl, Legionella pending, ID Dr. Lao following. BIPAP forms, Chart note, Rx for BIPAP are COMPLETED and in chart, MARIA M Vasquez made aware and following.   9/25: 1 unit of PRBC to be transfused, telephone consent given by emergency contact Juan A (pt's son), f/up on CBC. PT pending.   09/26/24- As per RN, Rapid Response called at 07:35AM as pt was noted to look unwell in context of her respiration.  Pt's Code Status is DNR/DNI with trial of NIV.  Pt was evaluated by RRT and was noted to be hypoxic with pulse ox of 82 and HR 12 of cardiac monitor. Pt became asystole and was pronounced at 7:44AM.  Daughter was at bedside at time of expiration.  Sequence of event explained to daughter and condolences offered via Welsh speaking - Moshe [phone] and all questions answered.

## 2024-09-26 NOTE — PROGRESS NOTE ADULT - REASON FOR ADMISSION
CHF exacerbation

## 2024-09-26 NOTE — RAPID RESPONSE TEAM SUMMARY - NSSITUATIONBACKGROUNDRRT_GEN_ALL_CORE
89F, from home HHA (M-F/9h S-S/6h) ambulates with walker/wheelchair, with PMHx of COPD (O2 2L PRN), HTN, PAF and hx of DVT/PE (on eliquis), HLD, GERD, remote hx of r. breast cancer s/p mastectomy and left eye blindness. Patient presented to ED with worsening shortness of breath and orthopnea.  Patient admitted to telemetry for Acute Hypoxic Hypercapnic respiratory failure secondary to COPD and CHF. Patient was recently admitted to [facility] in August for ABG resulting CO2 53 baseline, as per daughters patient was placed on NIV, improved and discharged home with oxygen. ICU consulted for possible new BiPAP ABG 7.3/74/97/42. However patient not candidate for ICU recommending nocturnal BiPAP for COPD and likely CARLOS with atelectasis. Patient will benefit from BIPAP at night for home. Patient will need am ABG to reflect improvement in CO2 with nocturnal BiPAP.   Chest X ray showed Bibasilar/retrocardiac opacities with likely continued elevation of the right hemidiaphragm. The findings are of uncertain etiology. Bilateral pleural effusions with associated passive atelectasis, atelectasis of   other cause, and/or other pathology including, but not limited to, pneumonia is possible.  Echo showed The left ventricular cavity is normal in size. Left ventricular wall thickness is normal. Left ventricular systolic function is normal with a calculated ejection fraction of 68%. Doppler negative for Left Leg DVT.   CT chest showed Right middle and lower bilobar consolidation/collapse with bronchial secretions and small right pleural effusion.    9/23: speech & swallow recs minced and moist. Unable to move pt to 4N (BiPAP) as pt remains on tele. Vitamin D toxicity noted- hold for now.   9/24: c/w telemety, pt in afib with rvr, coreg increased, Cardio Dr. Coy following. Pt to be discharged on BiPAP-CNM forms completed and in chart. Hypokalemia noted, repleted, f/uBMP in the AM. Aspiration PNA, c/w cefepime and flagyl, Legionella pending, ID Dr. Lao following. BIPAP forms, Chart note, Rx for BIPAP are COMPLETED and in chart, MARIA M Vasquez made aware and following.   9/25: 1 unit of PRBC to be transfused, telephone consent given by emergency contact Juan A (pt's son), f/up on CBC. PT pending.   09/26/24- As per RN, Rapid Response called at 07:35AM as pt was noted to look unwell in context of her respiration.  Pt's Code Status is DNR/DNI with trial of NIV.      RRT called for altered mental status, by family at bedside. Pt placed on monitor and was found to be in PEA. Oculocephalic reflexes were absent. Bed side POCUS showed no movement of the heart in the cardiac windows. Pt became asystole and was pronounced at 7:44AM.  Daughter was at bedside at time of expiration.  Sequence of event explained to daughter and condolences offered via Bulgarian speaking - Moshe [phone] and all questions answered.

## 2024-10-15 PROCEDURE — 85730 THROMBOPLASTIN TIME PARTIAL: CPT

## 2024-10-15 PROCEDURE — 87640 STAPH A DNA AMP PROBE: CPT

## 2024-10-15 PROCEDURE — 86901 BLOOD TYPING SEROLOGIC RH(D): CPT

## 2024-10-15 PROCEDURE — 83935 ASSAY OF URINE OSMOLALITY: CPT

## 2024-10-15 PROCEDURE — 83735 ASSAY OF MAGNESIUM: CPT

## 2024-10-15 PROCEDURE — 36415 COLL VENOUS BLD VENIPUNCTURE: CPT

## 2024-10-15 PROCEDURE — 93005 ELECTROCARDIOGRAM TRACING: CPT

## 2024-10-15 PROCEDURE — 86850 RBC ANTIBODY SCREEN: CPT

## 2024-10-15 PROCEDURE — 71046 X-RAY EXAM CHEST 2 VIEWS: CPT

## 2024-10-15 PROCEDURE — 36430 TRANSFUSION BLD/BLD COMPNT: CPT

## 2024-10-15 PROCEDURE — 82088 ASSAY OF ALDOSTERONE: CPT

## 2024-10-15 PROCEDURE — P9040: CPT

## 2024-10-15 PROCEDURE — 83540 ASSAY OF IRON: CPT

## 2024-10-15 PROCEDURE — 85610 PROTHROMBIN TIME: CPT

## 2024-10-15 PROCEDURE — 86703 HIV-1/HIV-2 1 RESULT ANTBDY: CPT

## 2024-10-15 PROCEDURE — 99285 EMERGENCY DEPT VISIT HI MDM: CPT

## 2024-10-15 PROCEDURE — 96374 THER/PROPH/DIAG INJ IV PUSH: CPT

## 2024-10-15 PROCEDURE — 80053 COMPREHEN METABOLIC PANEL: CPT

## 2024-10-15 PROCEDURE — 80048 BASIC METABOLIC PNL TOTAL CA: CPT

## 2024-10-15 PROCEDURE — 87641 MR-STAPH DNA AMP PROBE: CPT

## 2024-10-15 PROCEDURE — 82728 ASSAY OF FERRITIN: CPT

## 2024-10-15 PROCEDURE — 85027 COMPLETE CBC AUTOMATED: CPT

## 2024-10-15 PROCEDURE — 74019 RADEX ABDOMEN 2 VIEWS: CPT

## 2024-10-15 PROCEDURE — 82607 VITAMIN B-12: CPT

## 2024-10-15 PROCEDURE — 87636 SARSCOV2 & INF A&B AMP PRB: CPT

## 2024-10-15 PROCEDURE — 86900 BLOOD TYPING SEROLOGIC ABO: CPT

## 2024-10-15 PROCEDURE — 87040 BLOOD CULTURE FOR BACTERIA: CPT

## 2024-10-15 PROCEDURE — 84156 ASSAY OF PROTEIN URINE: CPT

## 2024-10-15 PROCEDURE — 82570 ASSAY OF URINE CREATININE: CPT

## 2024-10-15 PROCEDURE — 94660 CPAP INITIATION&MGMT: CPT

## 2024-10-15 PROCEDURE — 81003 URINALYSIS AUTO W/O SCOPE: CPT

## 2024-10-15 PROCEDURE — 93971 EXTREMITY STUDY: CPT

## 2024-10-15 PROCEDURE — 85025 COMPLETE CBC W/AUTO DIFF WBC: CPT

## 2024-10-15 PROCEDURE — 94760 N-INVAS EAR/PLS OXIMETRY 1: CPT

## 2024-10-15 PROCEDURE — 84100 ASSAY OF PHOSPHORUS: CPT

## 2024-10-15 PROCEDURE — 86923 COMPATIBILITY TEST ELECTRIC: CPT

## 2024-10-15 PROCEDURE — 94640 AIRWAY INHALATION TREATMENT: CPT

## 2024-10-15 PROCEDURE — 84300 ASSAY OF URINE SODIUM: CPT

## 2024-10-15 PROCEDURE — 82306 VITAMIN D 25 HYDROXY: CPT

## 2024-10-15 PROCEDURE — 93306 TTE W/DOPPLER COMPLETE: CPT

## 2024-10-15 PROCEDURE — 84244 ASSAY OF RENIN: CPT

## 2024-10-15 PROCEDURE — 84484 ASSAY OF TROPONIN QUANT: CPT

## 2024-10-15 PROCEDURE — 71045 X-RAY EXAM CHEST 1 VIEW: CPT

## 2024-10-15 PROCEDURE — 82962 GLUCOSE BLOOD TEST: CPT

## 2024-10-15 PROCEDURE — 83880 ASSAY OF NATRIURETIC PEPTIDE: CPT

## 2024-10-15 PROCEDURE — 83605 ASSAY OF LACTIC ACID: CPT

## 2024-10-15 PROCEDURE — 92610 EVALUATE SWALLOWING FUNCTION: CPT

## 2024-10-15 PROCEDURE — 84443 ASSAY THYROID STIM HORMONE: CPT

## 2024-10-15 PROCEDURE — 82803 BLOOD GASES ANY COMBINATION: CPT

## 2024-10-15 PROCEDURE — 71250 CT THORAX DX C-: CPT | Mod: MC

## 2024-10-15 PROCEDURE — 87449 NOS EACH ORGANISM AG IA: CPT

## 2024-10-15 PROCEDURE — 84133 ASSAY OF URINE POTASSIUM: CPT

## 2024-12-06 NOTE — PROGRESS NOTE ADULT - PROBLEM SELECTOR PLAN 4
Pt has a history of HTN, takes losartan 100mg daily, and amlodipine 5mg daily and torsemide 10mg daily, coreg 3.125mg BID at home  - C/w amlodipine and Coreg  - hold torsemide and losartan as may be worsening hyponatremia
monitor BP  cont meds
Pt has a history of HTN, takes losartan 100mg daily, and amlodipine 5mg daily and torsemide 10mg daily, coreg 3.125mg BID at home  - C/w amlodipine and Coreg  - hold torsemide and losartan as may be worsening hyponatremia
monitor BP  cont meds
independent

## 2024-12-12 NOTE — CONSULT NOTE ADULT - REASON FOR ADMISSION
[MRI] : MRI
[Left] : left
Mechanical Fall and left eye hematoma
[Knee] : knee
[I independently reviewed and interpreted images and report] : I independently reviewed and interpreted images and report
Mechanical Fall and left eye hematoma
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  Severe degeneration of the medial collateral ligament with a prominent intrasubstance tear of the distal fibers. Ganglion cyst formation associated with the tear extends superficial to the MCL attachment and measures up to 2.2 cm in maximal dimension.  Status post previous partial medial meniscectomy. Meniscal remnant is degenerated and frayed.  Moderate medial and mild to moderate patellofemoral arthrosis. Full-thickness chondral defect at the medial femoral condyle measures up to 1.2 cm.

## 2025-02-09 NOTE — PROVIDER CONTACT NOTE (CRITICAL VALUE NOTIFICATION) - DATE AND TIME:
05-Nov-2017 16:10 05-Nov-2017 17:10 [Follow-Up Visit] : a follow-up visit for [FreeTextEntry2] : bilateral hip

## 2025-04-22 NOTE — PHYSICAL THERAPY INITIAL EVALUATION ADULT - FOLLOWS COMMANDS/ANSWERS QUESTIONS, REHAB EVAL
100% of the time Legs Incubation Time: 2 Hours History Of Hsv (Optional): No Frequency Of Pdt: Single Treatment Face And Neck Incubation Time: 1 Hour Previous Therapies To Treat Diagnosis: 5FU and cryotherapy Face Incubation Time: 90 mins Face And Scalp Incubation Time: 90 minutes for the face and 2 Hours for the scalp Photosensitizer: Levulan Pdt Type: PREMA-U Detail Level: Simple Location: Face and Scalp Consent: The procedure and risks were reviewed with the patient including but not limited to: burning, pigmentary changes, pain, blistering, scabbing, redness, and the possibility of needing numerous treatments. Strict photoprotection after the procedure was also discussed.